# Patient Record
Sex: FEMALE | Race: BLACK OR AFRICAN AMERICAN | NOT HISPANIC OR LATINO | Employment: FULL TIME | URBAN - METROPOLITAN AREA
[De-identification: names, ages, dates, MRNs, and addresses within clinical notes are randomized per-mention and may not be internally consistent; named-entity substitution may affect disease eponyms.]

---

## 2017-01-09 ENCOUNTER — TELEPHONE (OUTPATIENT)
Dept: FAMILY MEDICINE | Facility: CLINIC | Age: 47
End: 2017-01-09

## 2017-01-09 DIAGNOSIS — I82.4Y9 ACUTE DEEP VEIN THROMBOSIS (DVT) OF PROXIMAL VEIN OF LOWER EXTREMITY, UNSPECIFIED LATERALITY (H): Primary | ICD-10-CM

## 2017-01-09 RX ORDER — WARFARIN SODIUM 5 MG/1
TABLET ORAL
Qty: 60 TABLET | Refills: 0 | Status: CANCELLED | OUTPATIENT
Start: 2017-01-09

## 2017-01-09 NOTE — TELEPHONE ENCOUNTER
warfarin (COUMADIN) 5 MG tablet 60 tablet 0 12/2/2016       Last Written Prescription Date: 12-2-2016  Last Fill Qty: 60, # refills: 0  Last Office Visit with [facility], [facility] or [facility] prescribing provider: 12-2-2016       Date and Result of Last PT/INR:   INR     2.80   8/19/2016  INR      1.7   12/10/2015  INR      2.7   11/16/2015  INR     4.50   11/12/2015

## 2017-01-12 ENCOUNTER — HOSPITAL ENCOUNTER (OUTPATIENT)
Dept: ULTRASOUND IMAGING | Facility: CLINIC | Age: 47
Discharge: HOME | End: 2017-01-12
Attending: INTERNAL MEDICINE | Admitting: INTERNAL MEDICINE
Payer: COMMERCIAL

## 2017-01-12 ENCOUNTER — ANTICOAGULATION THERAPY VISIT (OUTPATIENT)
Dept: NURSING | Facility: CLINIC | Age: 47
End: 2017-01-12
Payer: COMMERCIAL

## 2017-01-12 ENCOUNTER — OFFICE VISIT (OUTPATIENT)
Dept: FAMILY MEDICINE | Facility: CLINIC | Age: 47
End: 2017-01-12
Payer: COMMERCIAL

## 2017-01-12 VITALS
BODY MASS INDEX: 31.65 KG/M2 | SYSTOLIC BLOOD PRESSURE: 141 MMHG | HEIGHT: 65 IN | WEIGHT: 190 LBS | HEART RATE: 81 BPM | DIASTOLIC BLOOD PRESSURE: 94 MMHG | OXYGEN SATURATION: 98 % | TEMPERATURE: 97.8 F | RESPIRATION RATE: 18 BRPM

## 2017-01-12 DIAGNOSIS — I82.4Y9 ACUTE DEEP VEIN THROMBOSIS (DVT) OF PROXIMAL VEIN OF LOWER EXTREMITY, UNSPECIFIED LATERALITY (H): ICD-10-CM

## 2017-01-12 DIAGNOSIS — M79.89 RIGHT LEG SWELLING: ICD-10-CM

## 2017-01-12 DIAGNOSIS — Z79.01 LONG-TERM (CURRENT) USE OF ANTICOAGULANTS: Primary | ICD-10-CM

## 2017-01-12 DIAGNOSIS — I26.99 OTHER PULMONARY EMBOLISM WITHOUT ACUTE COR PULMONALE, UNSPECIFIED CHRONICITY (H): ICD-10-CM

## 2017-01-12 LAB — INR POINT OF CARE: 1.7 (ref 0.86–1.14)

## 2017-01-12 PROCEDURE — 99207 ZZC NO CHARGE NURSE ONLY: CPT

## 2017-01-12 PROCEDURE — 93971 EXTREMITY STUDY: CPT | Mod: RT

## 2017-01-12 PROCEDURE — 85610 PROTHROMBIN TIME: CPT | Mod: QW

## 2017-01-12 PROCEDURE — 99213 OFFICE O/P EST LOW 20 MIN: CPT | Performed by: INTERNAL MEDICINE

## 2017-01-12 PROCEDURE — 36416 COLLJ CAPILLARY BLOOD SPEC: CPT

## 2017-01-12 RX ORDER — WARFARIN SODIUM 5 MG/1
TABLET ORAL
Qty: 60 TABLET | Refills: 0 | Status: SHIPPED | OUTPATIENT
Start: 2017-01-12 | End: 2017-01-12

## 2017-01-12 RX ORDER — WARFARIN SODIUM 5 MG/1
TABLET ORAL
Qty: 60 TABLET | Refills: 0 | Status: SHIPPED | OUTPATIENT
Start: 2017-01-12 | End: 2017-02-13

## 2017-01-12 NOTE — TELEPHONE ENCOUNTER
"Routing to INR pool:    Pt has INR appointment today at 2:45pm. If she does attend this appointment, please refill medication.   Pt has not had INR done since 8/19/16. Pt has had many \"no shows\" to her INR appointments.     Silvia Swanson RN     "

## 2017-01-12 NOTE — PROGRESS NOTES
SUBJECTIVE:                                                    Lima Bueno is a 46 year old female who presents to clinic today for the following health issues:        Swelling      Duration: 2 weeks    Description (location/character/radiation): Swelling Lower right Extremity     Intensity:  moderate    Accompanying signs and symptoms: INR-low; Swelling; Pain in lower right extremity     History (similar episodes/previous evaluation): History of Blood clot    Precipitating or alleviating factors: None    Therapies tried and outcome: None   This is a pleasant 46-year-old -American female with a history of a previous right lower extremity deep venous thrombosis and pulmonary embolism. She presented for routine INR check and was noted to have a sub-therapeutic INR. She was encouraged to schedule a doctor visit appointment today after she revealed to the INR clinic nurse that she was experiencing a several week history of swelling in her right lower extremity. She has also noted mild to moderate associated pain which seems to improve when she elevates her right leg. She denies any new shortness of breath, pain. She has not had palpitations.    Problem list and histories reviewed & adjusted, as indicated.  Additional history: as documented    Patient Active Problem List   Diagnosis     DVT (deep venous thrombosis) (H)     Pulmonary embolism (H)     Asthma     ADD (attention deficit disorder with hyperactivity)     Breast mass     CARDIOVASCULAR SCREENING; LDL GOAL LESS THAN 160     Protein S deficiency (H)     Tobacco use disorder     LSIL (low grade squamous intraepithelial lesion) on Pap smear     Spinal stenosis     Chronic pain     Long-term (current) use of anticoagulants [Z79.01]     History reviewed. No pertinent past surgical history.    Social History   Substance Use Topics     Smoking status: Current Every Day Smoker -- 0.50 packs/day     Types: Cigarettes     Smokeless tobacco: Never Used      Alcohol Use: No     Family History   Problem Relation Age of Onset     Thyroid Disease Mother      hyperactive     Hypertension Brother      Breast Cancer No family hx of      Ovarian Cancer Paternal Grandmother          Current Outpatient Prescriptions   Medication Sig Dispense Refill     warfarin (COUMADIN) 5 MG tablet TAKE ONE & ONE-HALF TO TWO TABLETS BY MOUTH ONCE DAILY AS DIRECTED BY INR CLINIC. 60 tablet 0     nystatin POWD Externally apply 1 Dose topically every 6 hours as needed 60 g 1     COMPRESSION STOCKINGS 24-30mmHg, thigh high  Use as directed 2 each 2     methocarbamol (ROBAXIN) 750 MG tablet TAKE ONE TABLET BY MOUTH THREE TIMES DAILY AS NEEDED FOR MUSCLE SPASM 20 tablet 0     traMADol (ULTRAM) 50 MG tablet TAKE ONE TABLET BY MOUTH EVERY 6 HOURS AS NEEDED FOR  MODERATE  PAIN 30 tablet 0     traZODone (DESYREL) 100 MG tablet Take 1 tablet (100 mg) by mouth nightly as needed for sleep 90 tablet 3     albuterol (ALBUTEROL) 108 (90 BASE) MCG/ACT Inhaler Inhale 2 puffs into the lungs every 4 hours as needed for shortness of breath / dyspnea 1 Inhaler 11     ciclopirox 8 % SOLN Apply daily for min 4 months.  First day of each week, file off old medication and roughen surface of nail. 3 Bottle 2     mometasone-formoterol (DULERA) 100-5 MCG/ACT oral inhaler Inhale 2 puffs into the lungs 2 times daily 13 g 1     fexofenadine (ALLEGRA) 180 MG tablet Take 1 tablet (180 mg) by mouth daily (allergy symptoms) 30 tablet 0     nicotine (NICODERM CQ) 21 MG/24HR patch 2h hr Place 1 patch onto the skin every 24 hours 30 patch 3     nicotine polacrilex (EQ NICOTINE) 2 MG gum Place 1 each (2 mg) inside cheek as needed for smoking cessation 30 tablet 3     [DISCONTINUED] warfarin (COUMADIN) 5 MG tablet TAKE ONE & ONE-HALF TO TWO TABLETS BY MOUTH ONCE DAILY AS DIRECTED BY INR CLINIC. 60 tablet 0     [DISCONTINUED] warfarin (COUMADIN) 5 MG tablet TAKE ONE & ONE-HALF TO TWO TABLETS BY MOUTH ONCE DAILY AS DIRECTED BY INR  "CLINIC. 60 tablet 0     Allergies   Allergen Reactions     Terbutaline Hives       ROS:  C: NEGATIVE for fever, chills, change in weight  E/M: NEGATIVE for ear, mouth and throat problems  R: NEGATIVE for significant cough or SOB  CV: NEGATIVE for chest pain, palpitations or peripheral edema    OBJECTIVE:                                                    /94 mmHg  Pulse 81  Temp(Src) 97.8  F (36.6  C) (Oral)  Resp 18  Ht 5' 5\" (1.651 m)  Wt 190 lb (86.183 kg)  BMI 31.62 kg/m2  SpO2 98%  LMP 12/10/2016 (Approximate)  Body mass index is 31.62 kg/(m^2).  GENERAL: healthy, alert and no distress  RESP: lungs clear to auscultation - no rales, rhonchi or wheezes  CV: regular rate and rhythm, normal S1 S2, no S3 or S4, no murmur, click or rub, no peripheral edema and peripheral pulses strong  MS: There is possibly trace edema just above the ankle and the right foot, although this is very subtle, the difference in swelling between the left and right feet is minimal, however, she does have some tenderness in the calf with dorsiflexion of the right foot in addition to tenderness in the right calf to palpation without the identification of a discrete palpable cord.  NEURO: Normal strength and tone, mentation intact and speech normal  PSYCH: mentation appears normal, affect normal/bright    Diagnostic Test Results:  A Doppler ultrasound of the right lower extremity was scheduled for this patient after this clinic visit and the results of which are pending      ASSESSMENT/PLAN:                                                          1. Right leg swelling  Since she has a mildly subtherapeutic INR level, check right lower extremity Doppler ultrasound to exclude recurrent DVT which might need bridging Lovenox until the INR can be documented to be greater than 2. If the Doppler ultrasound is negative for deep vein thrombosis, consider a musculoskeletal etiology for her right lower extremity pain and mild swelling?  - " US Lower Extremity Venous Duplex Right; Future      FUTURE APPOINTMENTS:  Pending Doppler ultrasound results and symptoms    Adonis Prince MD  [facility]

## 2017-01-12 NOTE — TELEPHONE ENCOUNTER
Second request from pharmacy - states patient is out of medication!         Date and Result of Last PT/INR:   INR     2.80   8/19/2016  INR      1.7   12/10/2015  INR      2.7   11/16/2015  INR     4.50   11/12/2015       Pat Colindres RT (R)

## 2017-01-12 NOTE — PROGRESS NOTES
ANTICOAGULATION FOLLOW-UP CLINIC VISIT    Patient Name:  Lima Bueno  Date:  1/12/2017  Contact Type:  Face to Face    SUBJECTIVE:     Patient Findings     Comments Patient non-compliant with INR appts. Last INR;  8-22-16.    INR in clinic today 1.7  Patient reports no missed doses.   Patient complaining of left lower ext swelling and pain x 2 days.    Scheduled with Dr Prince today after INR appointment for DVT eval.    Patient agreed to keep INR appts going forward.    Will avoid greens for the next several days. Warfarin dose increased slightly with follow up INR appointment Monday (4 days).             OBJECTIVE    INR PROTIME   Date Value Ref Range Status   01/12/2017 1.7* 0.86 - 1.14 Final       ASSESSMENT / PLAN  INR assessment SUB    Recheck INR In: 4 DAYS    INR Location Clinic      Anticoagulation Summary as of 1/12/2017     INR goal 2.0-3.0   Selected INR 1.7! (1/12/2017)   Maintenance plan 10 mg (5 mg x 2) on Mon, Fri; 7.5 mg (5 mg x 1.5) all other days   Full instructions 1/12: 10 mg; Otherwise 10 mg on Mon, Fri; 7.5 mg all other days   Weekly total 57.5 mg   Plan last modified Mary Faustin RN (8/22/2016)   Next INR check 1/16/2017   Target end date     Indications   Long-term (current) use of anticoagulants [Z79.01] [Z79.01]  DVT (deep venous thrombosis) (H) [I82.409]  Pulmonary embolism (H) [I26.99]         Anticoagulation Episode Summary     [facility]    Preferred lab     Send INR reminders to [facility]    Comments Due to scheduling conflicts, has INR done in lab early a.m      Anticoagulation Care Providers     Provider Role Specialty Phone number    Oscar Baltazar MD Responsible Internal Medicine [phone]            See the Encounter Report to view Anticoagulation Flowsheet and Dosing Calendar (Go to Encounters tab in chart review, and find the Anticoagulation Therapy Visit)    Dosage adjustment made based on physician directed care  plan.    Ailin Buchanan RN

## 2017-01-12 NOTE — MR AVS SNAPSHOT
Lima Bueno   1/12/2017 2:45 PM   Anticoagulation Therapy Visit    Description:  46 year old female   Provider:   ANTICOAGULATION CLINIC   Department:   Nurse           INR as of 1/12/2017     Selected INR 1.7! (1/12/2017)      Anticoagulation Summary as of 1/12/2017     INR goal 2.0-3.0   Selected INR 1.7! (1/12/2017)   Full instructions 1/12: 10 mg; Otherwise 10 mg on Mon, Fri; 7.5 mg all other days   Next INR check 1/16/2017    Indications   Long-term (current) use of anticoagulants [Z79.01] [Z79.01]  DVT (deep venous thrombosis) (H) [I82.409]  Pulmonary embolism (H) [I26.99]         Your next Anticoagulation Clinic appointment(s)     Jan 16, 2017  2:45 PM   Anticoagulation Visit with  ANTICOAGULATION CLINIC   [facility]    [address]  [place] [address]   [phone]              Contact Numbers     Clinic Number:         January 2017 Details    Sun Mon Tue Wed Thu Fri Sat     1               2               3               4               5               6               7                 8               9               10               11               12      10 mg   See details      13      10 mg         14      7.5 mg           15      7.5 mg         16            17               18               19               20               21                 22               23               24               25               26               27               28                 29               30               31                    Date Details   01/12 This INR check       Date of next INR:  1/16/2017         How to take your warfarin dose     To take:  7.5 mg Take 1.5 of the 5 mg tablets.    To take:  10 mg Take 2 of the 5 mg tablets.

## 2017-01-12 NOTE — NURSING NOTE
"Chief Complaint   Patient presents with     Edema       Initial /94 mmHg  Pulse 81  Temp(Src) 97.8  F (36.6  C) (Oral)  Resp 18  Ht 5' 5\" (1.651 m)  Wt 190 lb (86.183 kg)  BMI 31.62 kg/m2  SpO2 98%  LMP 12/10/2016 (Approximate) Estimated body mass index is 31.62 kg/(m^2) as calculated from the following:    Height as of this encounter: 5' 5\" (1.651 m).    Weight as of this encounter: 190 lb (86.183 kg).  BP completed using cuff size: large; Right Arm  Evon Espinoza CMA (AAMA)      "

## 2017-01-12 NOTE — TELEPHONE ENCOUNTER
Patient scheduled for INR appointment today at 2:45pm.  Will review and refill at appointment.   Patient has been non-compliant with INR rechecks.    Last INR 8-22-16.     Ailin Buchanan RN, BSN

## 2017-01-18 ENCOUNTER — ANTICOAGULATION THERAPY VISIT (OUTPATIENT)
Dept: NURSING | Facility: CLINIC | Age: 47
End: 2017-01-18
Payer: COMMERCIAL

## 2017-01-18 DIAGNOSIS — I82.409 DVT (DEEP VENOUS THROMBOSIS) (H): ICD-10-CM

## 2017-01-18 DIAGNOSIS — Z79.01 LONG-TERM (CURRENT) USE OF ANTICOAGULANTS: Primary | ICD-10-CM

## 2017-01-18 DIAGNOSIS — I26.99 PULMONARY EMBOLISM (H): ICD-10-CM

## 2017-01-18 LAB — INR POINT OF CARE: 1.7 (ref 0.86–1.14)

## 2017-01-18 PROCEDURE — 99207 ZZC NO CHARGE NURSE ONLY: CPT

## 2017-01-18 PROCEDURE — 36416 COLLJ CAPILLARY BLOOD SPEC: CPT

## 2017-01-18 PROCEDURE — 85610 PROTHROMBIN TIME: CPT | Mod: QW

## 2017-01-18 NOTE — PROGRESS NOTES
ANTICOAGULATION FOLLOW-UP CLINIC VISIT    Patient Name:  Lima Bueno  Date:  1/18/2017  Contact Type:  Face to Face    SUBJECTIVE:     Patient Findings     Positives Unexplained INR or factor level change    Comments Denies missing any doses, call to [facility] to check MFG of newly started Warfarin RX and it is NOT TAR[facility]. Denies any other changes.           OBJECTIVE    INR PROTIME   Date Value Ref Range Status   01/18/2017 1.7* 0.86 - 1.14 Final       ASSESSMENT / PLAN  INR assessment SUB    Recheck INR In: 10 DAYS    INR Location Clinic      Anticoagulation Summary as of 1/18/2017     INR goal 2.0-3.0   Selected INR 1.7! (1/18/2017)   Maintenance plan 10 mg (5 mg x 2) on Mon, Wed, Fri; 7.5 mg (5 mg x 1.5) all other days   Full instructions 1/18: 15 mg; 1/19: 10 mg; Otherwise 10 mg on Mon, Wed, Fri; 7.5 mg all other days   Weekly total 60 mg   Plan last modified Jessica Alfonso RN (1/18/2017)   Next INR check 1/27/2017   Target end date     Indications   Long-term (current) use of anticoagulants [Z79.01] [Z79.01]  DVT (deep venous thrombosis) (H) [I82.409]  Pulmonary embolism (H) [I26.99]         Anticoagulation Episode Summary     [facility]    Preferred lab     Send INR reminders to [facility]    Comments Due to scheduling conflicts, has INR done in lab early a.m      Anticoagulation Care Providers     Provider Role Specialty Phone number    [place]xpe, Oscar Hubbard MD Responsible Internal Medicine [phone]            See the Encounter Report to view Anticoagulation Flowsheet and Dosing Calendar (Go to Encounters tab in chart review, and find the Anticoagulation Therapy Visit)    Dosage adjustment made based on physician directed care plan.    Jessica Alfonso RN

## 2017-01-18 NOTE — MR AVS SNAPSHOT
Lima Bueno   1/18/2017 3:45 PM   Anticoagulation Therapy Visit    Description:  46 year old female   Provider:   ANTICOAGULATION CLINIC   Department:   Nurse           INR as of 1/18/2017     Selected INR 1.7! (1/18/2017)      Anticoagulation Summary as of 1/18/2017     INR goal 2.0-3.0   Selected INR 1.7! (1/18/2017)   Full instructions 1/18: 15 mg; 1/19: 10 mg; Otherwise 10 mg on Mon, Wed, Fri; 7.5 mg all other days   Next INR check 1/27/2017    Indications   Long-term (current) use of anticoagulants [Z79.01] [Z79.01]  DVT (deep venous thrombosis) (H) [I82.409]  Pulmonary embolism (H) [I26.99]         Your next Anticoagulation Clinic appointment(s)     Jan 27, 2017  3:45 PM   Anticoagulation Visit with  ANTICOAGULATION CLINIC   [facility]    [address]  [place] [address]   [phone]              Contact Numbers     Clinic Number:         January 2017 Details    Sun Mon Tue Wed Thu Fri Sat     1               2               3               4               5               6               7                 8               9               10               11               12               13               14                 15               16               17               18      15 mg   See details      19      10 mg         20      10 mg         21      7.5 mg           22      7.5 mg         23      10 mg         24      7.5 mg         25      10 mg         26      7.5 mg         27            28                 29               30               31                    Date Details   01/18 This INR check       Date of next INR:  1/27/2017         How to take your warfarin dose     To take:  7.5 mg Take 1.5 of the 5 mg tablets.    To take:  10 mg Take 2 of the 5 mg tablets.    To take:  15 mg Take 3 of the 5 mg tablets.

## 2017-01-20 NOTE — PROGRESS NOTES
Quick Note:    I called her swelling is no worse, her INR is still too low, but she adjusted her coumadin dose. She will continue elevate her leg, and return to INR clinic as directed  ______

## 2017-01-27 ENCOUNTER — TELEPHONE (OUTPATIENT)
Dept: FAMILY MEDICINE | Facility: CLINIC | Age: 47
End: 2017-01-27

## 2017-01-27 NOTE — TELEPHONE ENCOUNTER
Reason for Call:  Other call back    Detailed comments: pt wants a INR to call her back.  About another appt.    Phone Number Patient can be reached at: Home number on file [phone] (home)    Best Time: any    Can we leave a detailed message on this number? YES    Call taken on 1/27/2017 at 3:12 PM by Madelin Novoa

## 2017-02-01 ENCOUNTER — ANTICOAGULATION THERAPY VISIT (OUTPATIENT)
Dept: NURSING | Facility: CLINIC | Age: 47
End: 2017-02-01
Payer: COMMERCIAL

## 2017-02-01 DIAGNOSIS — I82.409 DVT (DEEP VENOUS THROMBOSIS) (H): ICD-10-CM

## 2017-02-01 DIAGNOSIS — Z79.01 LONG-TERM (CURRENT) USE OF ANTICOAGULANTS: Primary | ICD-10-CM

## 2017-02-01 DIAGNOSIS — I26.99 PULMONARY EMBOLISM (H): ICD-10-CM

## 2017-02-01 LAB — INR POINT OF CARE: 1.9 (ref 0.86–1.14)

## 2017-02-01 PROCEDURE — 99207 ZZC NO CHARGE NURSE ONLY: CPT

## 2017-02-01 PROCEDURE — 36416 COLLJ CAPILLARY BLOOD SPEC: CPT

## 2017-02-01 PROCEDURE — 85610 PROTHROMBIN TIME: CPT | Mod: QW

## 2017-02-01 NOTE — PROGRESS NOTES
ANTICOAGULATION FOLLOW-UP CLINIC VISIT    Patient Name:  Lima Bueno  Date:  2/1/2017  Contact Type:  Face to Face    SUBJECTIVE:     Patient Findings     Positives No Problem Findings           OBJECTIVE    INR PROTIME   Date Value Ref Range Status   02/01/2017 1.9* 0.86 - 1.14 Final       ASSESSMENT / PLAN  INR assessment THER    Recheck INR In: 2 WEEKS    INR Location Clinic      Anticoagulation Summary as of 2/1/2017     INR goal 2.0-3.0   Selected INR 1.9! (2/1/2017)   Maintenance plan 7.5 mg (5 mg x 1.5) on Tue, Thu; 10 mg (5 mg x 2) all other days   Full instructions 2/1: 15 mg; 2/2: 10 mg; Otherwise 7.5 mg on Tue, Thu; 10 mg all other days   Weekly total 65 mg   Plan last modified Jessica Alfonso RN (2/1/2017)   Next INR check 2/15/2017   Target end date     Indications   Long-term (current) use of anticoagulants [Z79.01] [Z79.01]  DVT (deep venous thrombosis) (H) [I82.409]  Pulmonary embolism (H) [I26.99]         Anticoagulation Episode Summary     [facility] location [facility]    Preferred lab     Send INR reminders to [facility]    Comments Due to scheduling conflicts, has INR done in lab early a.m      Anticoagulation Care Providers     Provider Role Specialty Phone number    Oscar Baltazar MD [facility] Internal Medicine [phone]            See the Encounter Report to view Anticoagulation Flowsheet and Dosing Calendar (Go to Encounters tab in chart review, and find the Anticoagulation Therapy Visit)    Dosage adjustment made based on physician directed care plan.    Jessica Alfonso RN

## 2017-02-13 DIAGNOSIS — I82.4Y9 ACUTE DEEP VEIN THROMBOSIS (DVT) OF PROXIMAL VEIN OF LOWER EXTREMITY, UNSPECIFIED LATERALITY (H): ICD-10-CM

## 2017-02-13 RX ORDER — WARFARIN SODIUM 5 MG/1
7.5-1 TABLET ORAL DAILY
Qty: 180 TABLET | Refills: 0 | Status: SHIPPED | OUTPATIENT
Start: 2017-02-13 | End: 2017-05-18

## 2017-02-13 NOTE — TELEPHONE ENCOUNTER
Warfarin    Last Written Prescription Date: 1/12/17  Last Fill Qty: 60, # refills: 0  Last Office Visit with [facility], [facility] or [facility] prescribing provider: 1/12/2017       Date and Result of Last PT/INR:   Lab Results   Component Value Date    INR 1.9 02/01/2017    INR 1.7 01/18/2017    INR 2.80 08/19/2016    INR 4.50 11/12/2015      Refilled per [facility] protocol.  Jessica Alfonso RN

## 2017-02-15 ENCOUNTER — ANTICOAGULATION THERAPY VISIT (OUTPATIENT)
Dept: NURSING | Facility: CLINIC | Age: 47
End: 2017-02-15
Payer: COMMERCIAL

## 2017-02-15 DIAGNOSIS — I26.99 PULMONARY EMBOLISM (H): ICD-10-CM

## 2017-02-15 DIAGNOSIS — I82.409 DVT (DEEP VENOUS THROMBOSIS) (H): ICD-10-CM

## 2017-02-15 DIAGNOSIS — Z79.01 LONG-TERM (CURRENT) USE OF ANTICOAGULANTS: ICD-10-CM

## 2017-02-15 LAB — INR POINT OF CARE: 1.9 (ref 0.86–1.14)

## 2017-02-15 PROCEDURE — 99207 ZZC NO CHARGE NURSE ONLY: CPT

## 2017-02-15 PROCEDURE — 36416 COLLJ CAPILLARY BLOOD SPEC: CPT

## 2017-02-15 PROCEDURE — 85610 PROTHROMBIN TIME: CPT | Mod: QW

## 2017-02-15 NOTE — PROGRESS NOTES
ANTICOAGULATION FOLLOW-UP CLINIC VISIT    Patient Name:  Lima Bueno  Date:  2/15/2017  Contact Type:  Face to Face    SUBJECTIVE:     Patient Findings     Positives No Problem Findings           OBJECTIVE    INR Protime   Date Value Ref Range Status   02/15/2017 1.9 (A) 0.86 - 1.14 Final       ASSESSMENT / PLAN  INR assessment THER    Recheck INR In: 2 WEEKS    INR Location Clinic      Anticoagulation Summary as of 2/15/2017     INR goal 2.0-3.0   Today's INR 1.9!   Maintenance plan 7.5 mg (5 mg x 1.5) on Tue; 10 mg (5 mg x 2) all other days   Full instructions 7.5 mg on Tue; 10 mg all other days   Weekly total 67.5 mg   Plan last modified Kathy Ttoh RN (2/15/2017)   Next INR check 3/1/2017   Target end date     Indications   Long-term (current) use of anticoagulants [Z79.01] [Z79.01]  DVT (deep venous thrombosis) (H) [I82.409]  Pulmonary embolism (H) [I26.99]         Anticoagulation Episode Summary     INR check location [facility]    Preferred lab     Send INR reminders to [facility]    Comments Due to scheduling conflicts, has INR done in lab early a.m      Anticoagulation Care Providers     Provider Role Specialty Phone number    Oscar Baltazar MD Responsible Internal Medicine [phone]            See the Encounter Report to view Anticoagulation Flowsheet and Dosing Calendar (Go to Encounters tab in chart review, and find the Anticoagulation Therapy Visit)    Dosage adjustment made based on physician directed care plan.    Kathy Toth RN

## 2017-02-15 NOTE — MR AVS SNAPSHOT
Lima Bueno   2/15/2017 3:45 PM   Anticoagulation Therapy Visit    Description:  46 year old female   Provider:   ANTICOAGULATION CLINIC   Department:  [facility] Nurse           INR as of 2/15/2017     Today's INR 1.9!      Anticoagulation Summary as of 2/15/2017     INR goal 2.0-3.0   Today's INR 1.9!   Full instructions 7.5 mg on Tue; 10 mg all other days   Next INR check 3/1/2017    Indications   Long-term (current) use of anticoagulants [Z79.01] [Z79.01]  DVT (deep venous thrombosis) (H) [I82.409]  Pulmonary embolism (H) [I26.99]         Your next Anticoagulation Clinic appointment(s)     Mar 01, 2017  3:45 PM CST   Anticoagulation Visit with  ANTICOAGULATION CLINIC   [facility] [place] ([facility]    [address]  [place] [address]   [phone]              Contact Numbers     Clinic Number:         February 2017 Details    Sun Mon Tue Wed Thu Fri Sat        1               2               3               4                 5               6               7               8               9               10               11                 12               13               14               15      10 mg   See details      16      10 mg         17      10 mg         18      10 mg           19      10 mg         20      10 mg         21      7.5 mg         22      10 mg         23      10 mg         24      10 mg         25      10 mg           26      10 mg         27      10 mg         28      7.5 mg              Date Details   02/15 This INR check               How to take your warfarin dose     To take:  7.5 mg Take 1.5 of the 5 mg tablets.    To take:  10 mg Take 2 of the 5 mg tablets.           March 2017 Details    Sun Mon Tue Wed Thu Fri Sat        1            2               3               4                 5               6               7               8               9               10               11                 12               13               14                15               16               17               18                 19               20               21               22               23               24               25                 26               27               28               29               30               31                 Date Details   No additional details    Date of next INR:  3/1/2017         How to take your warfarin dose     To take:  10 mg Take 2 of the 5 mg tablets.

## 2017-03-06 ENCOUNTER — ANTICOAGULATION THERAPY VISIT (OUTPATIENT)
Dept: NURSING | Facility: CLINIC | Age: 47
End: 2017-03-06
Payer: COMMERCIAL

## 2017-03-06 DIAGNOSIS — I82.409 DVT (DEEP VENOUS THROMBOSIS) (H): ICD-10-CM

## 2017-03-06 DIAGNOSIS — I26.99 PULMONARY EMBOLISM (H): ICD-10-CM

## 2017-03-06 DIAGNOSIS — Z79.01 LONG-TERM (CURRENT) USE OF ANTICOAGULANTS: ICD-10-CM

## 2017-03-06 LAB — INR POINT OF CARE: 2.6 (ref 0.86–1.14)

## 2017-03-06 PROCEDURE — 36416 COLLJ CAPILLARY BLOOD SPEC: CPT

## 2017-03-06 PROCEDURE — 85610 PROTHROMBIN TIME: CPT | Mod: QW

## 2017-03-06 PROCEDURE — 99207 ZZC NO CHARGE NURSE ONLY: CPT

## 2017-03-06 NOTE — MR AVS SNAPSHOT
Lima Bueno   3/6/2017 3:30 PM   Anticoagulation Therapy Visit    Description:  46 year old female   Provider:   ANTICOAGULATION CLINIC   Department:  [facility] Nurse           INR as of 3/6/2017     Today's INR 2.6      Anticoagulation Summary as of 3/6/2017     INR goal 2.0-3.0   Today's INR 2.6   Full instructions 7.5 mg on Tue; 10 mg all other days   Next INR check 3/22/2017    Indications   Long-term (current) use of anticoagulants [Z79.01] [Z79.01]  DVT (deep venous thrombosis) (H) [I82.409]  Pulmonary embolism (H) [I26.99]         Your next Anticoagulation Clinic appointment(s)     Mar 24, 2017  4:00 PM CDT   Anticoagulation Visit with  ANTICOAGULATION CLINIC   [facility] [place] ([facility])    [address]  [place] [address]   [phone]              Contact Numbers     Clinic Number:         March 2017 Details    Sun Mon Tue Wed Thu Fri Sat        1               2               3               4                 5               6      10 mg   See details      7      7.5 mg         8      10 mg         9      10 mg         10      10 mg         11      10 mg           12      10 mg         13      10 mg         14      7.5 mg         15      10 mg         16      10 mg         17      10 mg         18      10 mg           19      10 mg         20      10 mg         21      7.5 mg         22            23               24               25                 26               27               28               29               30               31                 Date Details   03/06 This INR check       Date of next INR:  3/22/2017         How to take your warfarin dose     To take:  7.5 mg Take 1.5 of the 5 mg tablets.    To take:  10 mg Take 2 of the 5 mg tablets.

## 2017-03-06 NOTE — PROGRESS NOTES
ANTICOAGULATION FOLLOW-UP CLINIC VISIT    Patient Name:  Lima Bueno  Date:  3/6/2017  Contact Type:  Face to Face    SUBJECTIVE:     Patient Findings     Positives No Problem Findings           OBJECTIVE    INR Protime   Date Value Ref Range Status   03/06/2017 2.6 (A) 0.86 - 1.14 Final       ASSESSMENT / PLAN  INR assessment THER    Recheck INR In: 2 WEEKS    INR Location Clinic      Anticoagulation Summary as of 3/6/2017     INR goal 2.0-3.0   Today's INR 2.6   Maintenance plan 7.5 mg (5 mg x 1.5) on Tue; 10 mg (5 mg x 2) all other days   Full instructions 7.5 mg on Tue; 10 mg all other days   Weekly total 67.5 mg   No change documented Mary Solares RN   Plan last modified Kathy Toth RN (2/15/2017)   Next INR check 3/22/2017   Priority INR   Target end date     Indications   Long-term (current) use of anticoagulants [Z79.01] [Z79.01]  DVT (deep venous thrombosis) (H) [I82.409]  Pulmonary embolism (H) [I26.99]         Anticoagulation Episode Summary     [facility] check [facility]    Preferred lab     Send INR reminders to [facility] ANT[facility]AG[facility]    Comments Due to scheduling conflicts, has INR done in lab early a.m      Anticoagulation Care Providers     Provider Role Specialty Phone number    SiennabrianOscar miller MD [facility] Internal Medicine [phone]            See the Encounter Report to view Anticoagulation Flowsheet and Dosing Calendar (Go to Encounters tab in chart review, and find the Anticoagulation Therapy Visit)    Dosing based on [facility] Protocol and Provider directed care plan.      Mary Solares RN

## 2017-03-21 ENCOUNTER — TELEPHONE (OUTPATIENT)
Dept: FAMILY MEDICINE | Facility: CLINIC | Age: 47
End: 2017-03-21

## 2017-03-21 NOTE — TELEPHONE ENCOUNTER
Reason for Call:  Other: Appointment change    Detailed comments: Lima R Perez Killion called in to switch her INR Friday appointment on 3*24/17 to the 11am spot. She stated that if there is a call back and she cannot answer please leave a detailed message with the changed time to 11am for 3/24/17. Thank you.    Phone Number Patient can be reached at: Cell number on file:    Telephone Information:   Mobile [phone]       Best Time: Before 3/24/17    Can we leave a detailed message on this number? YES    Call taken on 3/21/2017 at 3:12 PM by Olivia Bradshaw

## 2017-03-24 ENCOUNTER — TELEPHONE (OUTPATIENT)
Dept: FAMILY MEDICINE | Facility: CLINIC | Age: 47
End: 2017-03-24

## 2017-03-24 NOTE — TELEPHONE ENCOUNTER
Reason for Call:  call back    Detailed comments: Patient call and would like for Jessica Alfonso  to call her back  She did not state way.    Phone Number Patient can be reached at: Home number on file [phone] (home)    Best Time: anytime    Can we leave a detailed message on this number? YES    Call taken on 3/24/2017 at 10:54 AM by Bria Merida

## 2017-03-29 ENCOUNTER — ANTICOAGULATION THERAPY VISIT (OUTPATIENT)
Dept: NURSING | Facility: CLINIC | Age: 47
End: 2017-03-29
Payer: COMMERCIAL

## 2017-03-29 DIAGNOSIS — I26.99 PULMONARY EMBOLISM (H): ICD-10-CM

## 2017-03-29 DIAGNOSIS — Z79.01 LONG-TERM (CURRENT) USE OF ANTICOAGULANTS: ICD-10-CM

## 2017-03-29 DIAGNOSIS — I82.409 DVT (DEEP VENOUS THROMBOSIS) (H): ICD-10-CM

## 2017-03-29 LAB — INR POINT OF CARE: 2.3 (ref 0.86–1.14)

## 2017-03-29 PROCEDURE — 85610 PROTHROMBIN TIME: CPT | Mod: QW

## 2017-03-29 PROCEDURE — 36416 COLLJ CAPILLARY BLOOD SPEC: CPT

## 2017-03-29 PROCEDURE — 99207 ZZC NO CHARGE NURSE ONLY: CPT

## 2017-03-29 NOTE — MR AVS SNAPSHOT
Lima Bueno   3/29/2017 3:45 PM   Anticoagulation Therapy Visit    Description:  46 year old female   Provider:   ANTICOAGULATION CLINIC   Department:  [facility] Nurse           INR as of 3/29/2017     Today's INR 2.3      Anticoagulation Summary as of 3/29/2017     INR goal 2.0-3.0   Today's INR 2.3   Full instructions 7.5 mg on Tue; 10 mg all other days   Next INR check 4/12/2017    Indications   Long-term (current) use of anticoagulants [Z79.01] [Z79.01]  DVT (deep venous thrombosis) (H) [I82.409]  Pulmonary embolism (H) [I26.99]         Your next Anticoagulation Clinic appointment(s)     Apr 12, 2017  3:45 PM CDT   Anticoagulation Visit with  ANTICOAGULATION CLINIC   [facility]    [address]  [place] [address]   [phone]              Contact Numbers     Clinic Number:         March 2017 Details    Sun Mon Tue Wed Thu Fri Sat        1               2               3               4                 5               6               7               8               9               10               11                 12               13               14               15               16               17               18                 19               20               21               22               23               24               25                 26               27               28               29      10 mg   See details      30      10 mg         31      10 mg           Date Details   03/29 This INR check               How to take your warfarin dose     To take:  10 mg Take 2 of the 5 mg tablets.           April 2017 Details    Sun Mon Tue Wed Thu Fri Sat           1      10 mg           2      10 mg         3      10 mg         4      7.5 mg         5      10 mg         6      10 mg         7      10 mg         8      10 mg           9      10 mg         10      10 mg         11      7.5 mg         12            13               14                15                 16               17               18               19               20               21               22                 23               24               25               26               27               28               29                 30                      Date Details   No additional details    Date of next INR:  4/12/2017         How to take your warfarin dose     To take:  7.5 mg Take 1.5 of the 5 mg tablets.    To take:  10 mg Take 2 of the 5 mg tablets.

## 2017-03-29 NOTE — PROGRESS NOTES
ANTICOAGULATION FOLLOW-UP CLINIC VISIT    Patient Name:  Lima Bueno  Date:  3/29/2017  Contact Type:  Face to Face    SUBJECTIVE:     Patient Findings     Positives No Problem Findings           OBJECTIVE    INR Protime   Date Value Ref Range Status   03/29/2017 2.3 (A) 0.86 - 1.14 Final       ASSESSMENT / PLAN  INR assessment THER    Recheck INR In: 2 WEEKS    INR Location Clinic      Anticoagulation Summary as of 3/29/2017     INR goal 2.0-3.0   Today's INR 2.3   Maintenance plan 7.5 mg (5 mg x 1.5) on Tue; 10 mg (5 mg x 2) all other days   Full instructions 7.5 mg on Tue; 10 mg all other days   Weekly total 67.5 mg   No change documented Jessica Alfonso RN   Plan last modified Kathy Toth RN (2/15/2017)   Next INR check 4/12/2017   Priority INR   Target end date     Indications   Long-term (current) use of anticoagulants [Z79.01] [Z79.01]  DVT (deep venous thrombosis) (H) [I82.409]  Pulmonary embolism (H) [I26.99]         Anticoagulation Episode Summary     [facility]    Preferred lab     Send INR reminders to [facility]    Comments Due to scheduling conflicts, has INR done in lab early a.m      Anticoagulation Care Providers     Provider Role Specialty Phone number    MadeleinekimberleyOscar miller MD Responsible Internal Medicine [phone]            See the Encounter Report to view Anticoagulation Flowsheet and Dosing Calendar (Go to Encounters tab in chart review, and find the Anticoagulation Therapy Visit)    Dosage adjustment made based on physician directed care plan.    Jessica Alfonso RN

## 2017-05-01 ENCOUNTER — TELEPHONE (OUTPATIENT)
Dept: FAMILY MEDICINE | Facility: CLINIC | Age: 47
End: 2017-05-01

## 2017-05-01 DIAGNOSIS — F51.01 PRIMARY INSOMNIA: ICD-10-CM

## 2017-05-01 RX ORDER — TRAZODONE HYDROCHLORIDE 100 MG/1
200 TABLET ORAL AT BEDTIME
Qty: 180 TABLET | Refills: 3 | Status: SHIPPED | OUTPATIENT
Start: 2017-05-01 | End: 2018-07-13

## 2017-05-01 NOTE — TELEPHONE ENCOUNTER
"Returned call to patient.  PCP--please advise.       Patient out of Trazadone 100mg tabs early.      Patient increased the dose on her own to 200mg at hs x 2 mths ago.    Now out of the med and requesting RX of the higher dose---or other med/advise.      Patient states unable to sleep without Trazadone and 100mg at hs \"wasn't working\".        Ailin Buchanan RN, BSN    "

## 2017-05-01 NOTE — TELEPHONE ENCOUNTER
Detailed message left with information noted below from provider along with contact number to clinic for additional questions.  Naomy Allred RN  [employer]

## 2017-05-01 NOTE — TELEPHONE ENCOUNTER
Reason for Call:  traZODone (DESYREL) 100 MG tablet    Detailed comments: Lima R Perez Killion called and wants to talk about her trazodone dosage with Dr. Baltazar and get a refill if possible.       Phone Number Patient can be reached at: Home number on file [phone] (home)    Best Time: ASAP     Can we leave a detailed message on this number? YES    Call taken on 5/1/2017 at 11:22 AM by Vi Bauman

## 2017-05-18 DIAGNOSIS — I82.4Y9 ACUTE DEEP VEIN THROMBOSIS (DVT) OF PROXIMAL VEIN OF LOWER EXTREMITY, UNSPECIFIED LATERALITY (H): ICD-10-CM

## 2017-05-18 NOTE — TELEPHONE ENCOUNTER
warfarin (COUMADIN) 5 MG tablet 180 tablet 0 2/13/2017       Last Written Prescription Date: 02/13/2017  Last Fill Qty: 180, # refills: 0  Last Office Visit with [facility], P or [facility] prescribing provider: 12/02/2016       Date and Result of Last PT/INR:   Lab Results   Component Value Date    INR 2.3 03/29/2017    INR 2.6 03/06/2017    INR 2.80 08/19/2016    INR 4.50 11/12/2015

## 2017-05-19 RX ORDER — WARFARIN SODIUM 5 MG/1
TABLET ORAL
Qty: 180 TABLET | Refills: 0 | Status: SHIPPED | OUTPATIENT
Start: 2017-05-19 | End: 2017-07-03

## 2017-05-19 NOTE — TELEPHONE ENCOUNTER
Prescription approved per [facility] Refill Protocol.  Mary Solares RN

## 2017-06-02 ENCOUNTER — TELEPHONE (OUTPATIENT)
Dept: FAMILY MEDICINE | Facility: CLINIC | Age: 47
End: 2017-06-02

## 2017-06-02 NOTE — TELEPHONE ENCOUNTER
Patient concerned she has not had period in 2 months.  Wondering if she should follow up with GYN or Dr. Baltazar.  Advised GYN would be appropriate to look into further.  Patient agreed and will schedule with GYN  Naomy Allred RN  [employer]riage [employer]

## 2017-06-02 NOTE — TELEPHONE ENCOUNTER
Reason for call:  Patient reporting a symptom    Symptom or request: no menstrial period in the last 3 months.  Is it due to the Coumadin or should she go to her OB/GYN    Duration (how long have symptoms been present): 3 months    Have you been treated for this before? No    Additional comments:     Phone Number patient can be reached at:  Home number on file [phone] (home)    Best Time:  any    Can we leave a detailed message on this number:  YES    Call taken on 6/2/2017 at 7:55 AM by Madelin Novoa

## 2017-06-05 ENCOUNTER — TELEPHONE (OUTPATIENT)
Dept: NURSING | Facility: CLINIC | Age: 47
End: 2017-06-05

## 2017-06-05 ENCOUNTER — ANTICOAGULATION THERAPY VISIT (OUTPATIENT)
Dept: NURSING | Facility: CLINIC | Age: 47
End: 2017-06-05
Payer: COMMERCIAL

## 2017-06-05 DIAGNOSIS — I82.409 DVT (DEEP VENOUS THROMBOSIS) (H): ICD-10-CM

## 2017-06-05 DIAGNOSIS — I26.99 PULMONARY EMBOLISM (H): ICD-10-CM

## 2017-06-05 DIAGNOSIS — Z79.01 LONG-TERM (CURRENT) USE OF ANTICOAGULANTS: ICD-10-CM

## 2017-06-05 LAB — INR POINT OF CARE: 1.9 (ref 0.86–1.14)

## 2017-06-05 PROCEDURE — 85610 PROTHROMBIN TIME: CPT | Mod: QW

## 2017-06-05 PROCEDURE — 99207 ZZC NO CHARGE NURSE ONLY: CPT

## 2017-06-05 PROCEDURE — 36416 COLLJ CAPILLARY BLOOD SPEC: CPT

## 2017-06-05 NOTE — TELEPHONE ENCOUNTER
Patient would like to do home INR monitoring.  States it is really hard for her to get to the clinic for INRs during business hours.  Patient has been coumadin since at least 2012.    Application completed and waiting for signature for PCP.  Mray Solares RN

## 2017-06-05 NOTE — MR AVS SNAPSHOT
Limaleydi Bueno   6/5/2017 11:30 AM   Anticoagulation Therapy Visit    Description:  46 year old female   Provider:   ANTICOAGULATION CLINIC   Department:  [facility] Nurse           INR as of 6/5/2017     Today's INR 1.9!      Anticoagulation Summary as of 6/5/2017     INR goal 2.0-3.0   Today's INR 1.9!   Full instructions 6/6: 10 mg; Otherwise 10 mg every day   Next INR check 6/19/2017    Indications   Long-term (current) use of anticoagulants [Z79.01] [Z79.01]  DVT (deep venous thrombosis) (H) [I82.409]  Pulmonary embolism (H) [I26.99]         Your next Anticoagulation Clinic appointment(s)     Jun 19, 2017  8:45 AM CDT   Anticoagulation Visit with  ANTICOAGULATION CLINIC   [facility] [place] ([facility]    [address]  [place] [address]   [phone]              Contact Numbers     Clinic Number:         June 2017 Details    Sun Mon Tue Wed Thu Fri Sat         1               2               3                 4               5      10 mg   See details      6      10 mg         7      10 mg         8      10 mg         9      10 mg         10      10 mg           11      10 mg         12      10 mg         13      10 mg         14      10 mg         15      10 mg         16      10 mg         17      10 mg           18      10 mg         19            20               21               22               23               24                 25               26               27               28               29               30                 Date Details   06/05 This INR check       Date of next INR:  6/19/2017         How to take your warfarin dose     To take:  10 mg Take 2 of the 5 mg tablets.

## 2017-06-05 NOTE — PROGRESS NOTES
ANTICOAGULATION FOLLOW-UP CLINIC VISIT    Patient Name:  Lima Bueno  Date:  6/5/2017  Contact Type:  Face to Face    SUBJECTIVE:     Patient Findings     Positives No Problem Findings           OBJECTIVE    INR Protime   Date Value Ref Range Status   06/05/2017 1.9 (A) 0.86 - 1.14 Final       ASSESSMENT / PLAN  INR assessment THER    Recheck INR In: 2 WEEKS    INR Location Clinic      Anticoagulation Summary as of 6/5/2017     INR goal 2.0-3.0   Today's INR 1.9!   Maintenance plan 10 mg (5 mg x 2) every day   Full instructions 6/6: 10 mg; Otherwise 10 mg every day   Weekly total 70 mg   Plan last modified Sonja Pettit RN (6/5/2017)   Next INR check 6/19/2017   Priority INR   Target end date     Indications   Long-term (current) use of anticoagulants [Z79.01] [Z79.01]  DVT (deep venous thrombosis) (H) [I82.409]  Pulmonary embolism (H) [I26.99]         Anticoagulation Episode Summary     INR check location [facility]    Preferred lab     Send INR reminders to [facility] ANTICOAGULATION    Comments Due to scheduling conflicts, has INR done in lab early a.m      Anticoagulation Care Providers     Provider Role Specialty Phone number    Oscar Baltazar MD Responsible Internal Medicine [phone]            See the Encounter Report to view Anticoagulation Flowsheet and Dosing Calendar (Go to Encounters tab in chart review, and find the Anticoagulation Therapy Visit)    Dosage adjustment made based on physician directed care plan.Pt is noncompliant with INR checks, remains on low end of range with checks.  Discussed increasing dosing by 2.5mg weekly, to 10mg daily every day.  Recheck 2 weeks.  Pt also asked about home monitoring.  Staff will fill out ale[employer] form and have PCP sign to start process.     Sonja Pettit RN

## 2017-06-05 NOTE — TELEPHONE ENCOUNTER
Application signed for PCP and faxed to [facility] [employer] today.  Application placed in [facility] folder in INR room.  Mary Solares RN

## 2017-06-06 ENCOUNTER — TELEPHONE (OUTPATIENT)
Dept: FAMILY MEDICINE | Facility: CLINIC | Age: 47
End: 2017-06-06

## 2017-06-06 NOTE — TELEPHONE ENCOUNTER
Left voicemail for frances to return call to clinic after being transferred twice.    Please try to get INR nurse with call back that is on 1st floor (Mary Serrano,Jessica Casillas today).      Form is in [place] folder in main INR room.  Sonja Pettit RN

## 2017-06-06 NOTE — TELEPHONE ENCOUNTER
Reason for Call:  Other     Detailed comments: calling to go over enrollment paperwork    Phone Number Rep can be reached at: Janina [phone]  [employer]    Best Time: anytime    Can we leave a detailed message on this number? YES    Call taken on 6/6/2017 at 9:29 AM by Noemi Meier

## 2017-06-06 NOTE — TELEPHONE ENCOUNTER
Janina called back from [place] and I spoke with her.   Answered all questions.     Maggie Moe RN

## 2017-06-12 ENCOUNTER — RADIANT APPOINTMENT (OUTPATIENT)
Dept: GENERAL RADIOLOGY | Facility: CLINIC | Age: 47
End: 2017-06-12
Attending: INTERNAL MEDICINE
Payer: COMMERCIAL

## 2017-06-12 ENCOUNTER — OFFICE VISIT (OUTPATIENT)
Dept: FAMILY MEDICINE | Facility: CLINIC | Age: 47
End: 2017-06-12
Payer: COMMERCIAL

## 2017-06-12 VITALS
SYSTOLIC BLOOD PRESSURE: 134 MMHG | WEIGHT: 168 LBS | BODY MASS INDEX: 27.99 KG/M2 | OXYGEN SATURATION: 96 % | TEMPERATURE: 97.6 F | HEIGHT: 65 IN | HEART RATE: 89 BPM | DIASTOLIC BLOOD PRESSURE: 87 MMHG

## 2017-06-12 DIAGNOSIS — J20.9 ACUTE BRONCHITIS, UNSPECIFIED ORGANISM: ICD-10-CM

## 2017-06-12 DIAGNOSIS — J45.20 MILD INTERMITTENT ASTHMA WITHOUT COMPLICATION: ICD-10-CM

## 2017-06-12 DIAGNOSIS — L81.9 DISCOLORATION OF SKIN: Primary | ICD-10-CM

## 2017-06-12 LAB
BASOPHILS # BLD AUTO: 0 10E9/L (ref 0–0.2)
BASOPHILS NFR BLD AUTO: 0.3 %
DIFFERENTIAL METHOD BLD: ABNORMAL
EOSINOPHIL # BLD AUTO: 0.1 10E9/L (ref 0–0.7)
EOSINOPHIL NFR BLD AUTO: 1.4 %
ERYTHROCYTE [DISTWIDTH] IN BLOOD BY AUTOMATED COUNT: 16.9 % (ref 10–15)
HCT VFR BLD AUTO: 42.8 % (ref 35–47)
HGB BLD-MCNC: 14.4 G/DL (ref 11.7–15.7)
LYMPHOCYTES # BLD AUTO: 3.3 10E9/L (ref 0.8–5.3)
LYMPHOCYTES NFR BLD AUTO: 52.3 %
MCH RBC QN AUTO: 28.1 PG (ref 26.5–33)
MCHC RBC AUTO-ENTMCNC: 33.6 G/DL (ref 31.5–36.5)
MCV RBC AUTO: 83 FL (ref 78–100)
MONOCYTES # BLD AUTO: 0.5 10E9/L (ref 0–1.3)
MONOCYTES NFR BLD AUTO: 7.9 %
NEUTROPHILS # BLD AUTO: 2.4 10E9/L (ref 1.6–8.3)
NEUTROPHILS NFR BLD AUTO: 38.1 %
PLATELET # BLD AUTO: 233 10E9/L (ref 150–450)
RBC # BLD AUTO: 5.13 10E12/L (ref 3.8–5.2)
WBC # BLD AUTO: 6.3 10E9/L (ref 4–11)

## 2017-06-12 PROCEDURE — 85025 COMPLETE CBC W/AUTO DIFF WBC: CPT | Performed by: INTERNAL MEDICINE

## 2017-06-12 PROCEDURE — 99214 OFFICE O/P EST MOD 30 MIN: CPT | Performed by: INTERNAL MEDICINE

## 2017-06-12 PROCEDURE — 36415 COLL VENOUS BLD VENIPUNCTURE: CPT | Performed by: INTERNAL MEDICINE

## 2017-06-12 PROCEDURE — 71020 XR CHEST 2 VW: CPT

## 2017-06-12 RX ORDER — ALBUTEROL SULFATE 90 UG/1
2 AEROSOL, METERED RESPIRATORY (INHALATION) EVERY 4 HOURS PRN
Qty: 1 INHALER | Refills: 11 | Status: SHIPPED | OUTPATIENT
Start: 2017-06-12 | End: 2018-02-07

## 2017-06-12 RX ORDER — CODEINE PHOSPHATE AND GUAIFENESIN 10; 100 MG/5ML; MG/5ML
1 SOLUTION ORAL EVERY 4 HOURS PRN
Qty: 236 ML | Refills: 0 | Status: SHIPPED | OUTPATIENT
Start: 2017-06-12 | End: 2017-07-03

## 2017-06-12 NOTE — LETTER
[facility]  [address] [facility]  [address]  [place]  [address]  Tel: [phone]    June 13, 2017    Lima Bueno  [address]  [place] [address]        Dear Ms. Chris Bueno,    Complete blood count is all returned within normal limits     If you have any further questions or problems, please contact our office.      Sincerely,    Oscar Baltazar MD/demetrice    Results for orders placed or performed in visit on 06/12/17   CBC with platelets and differential   Result Value Ref Range    WBC 6.3 4.0 - 11.0 10e9/L    RBC Count 5.13 3.8 - 5.2 10e12/L    Hemoglobin 14.4 11.7 - 15.7 g/dL    Hematocrit 42.8 35.0 - 47.0 %    MCV 83 78 - 100 fl    MCH 28.1 26.5 - 33.0 pg    MCHC 33.6 31.5 - 36.5 g/dL    RDW 16.9 (H) 10.0 - 15.0 %    Platelet Count 233 150 - 450 10e9/L    Diff Method Automated Method     % Neutrophils 38.1 %    % Lymphocytes 52.3 %    % Monocytes 7.9 %    % Eosinophils 1.4 %    % Basophils 0.3 %    Absolute Neutrophil 2.4 1.6 - 8.3 10e9/L    Absolute Lymphocytes 3.3 0.8 - 5.3 10e9/L    Absolute Monocytes 0.5 0.0 - 1.3 10e9/L    Absolute Eosinophils 0.1 0.0 - 0.7 10e9/L    Absolute Basophils 0.0 0.0 - 0.2 10e9/L           Enclosure: Lab Results

## 2017-06-12 NOTE — PROGRESS NOTES
"[facility]  CLINIC PROGRESS NOTE    Subjective:  Cough   Lima Bueno has had symptoms of wheezing and cough.  She has also noticed that she has had difficulty controlling her bowels - and passing gas. Has had cough for over two weeks.  She notes that her sputum is yellow.  She has stopped for 2 weeks smoking.  She is only smoking 6 cigarettes per day.     Skin Discoloration   Has had a discoloration on her temple on the left side.  She has yet to see a dermatologist.        Past medical history, medications, allergies, social history, family history reviewed and updated in [facility] as of 6/12/2017 .    ROS  CONSTITUTIONAL: no fatigue, no unexpected change in weight  SKIN: no worrisome rashes, no worrisome moles, no worrisome lesions  EYES: no acute vision problems or changes  ENT: no ear problems, no mouth problems, no throat problems  RESP: as above   CV: no chest pain, no palpitations, no new or worsening peripheral edema  GI: no nausea, no vomiting, no constipation, no diarrhea  : no frequency, no dysuria, no hematuria  MS: no claudication, no myalgias, no joint aches  PSYCHIATRIC: no changes in mood or affect      Objective:  Vitals  /87  Pulse 89  Temp 97.6  F (36.4  C)  Ht 5' 5\" (1.651 m)  Wt 168 lb (76.2 kg)  LMP 03/02/2017  SpO2 96%  Breastfeeding? No  BMI 27.96 kg/m2  GEN: Alert Oriented x3 NAD  HEENT: Atraumatic, normocephalic, neck supple, no thyromegaly, negative cervical adenopathy  TM: TM bilaterally pearly and grey with normal light reflex  CV: RRR no murmurs or rubs  PULM: wheezing on expiation all lung fields.   ABD: Soft, nontender nondistended, no hepatosplenomegally  SKIN: discoloration increased pigmentation over upper scalp  EXT: 2+ dorsal pedis pulses, no edema bilateral lower extremities  NEURO: Gait and station deferred, No focal neurologic deficits  PSYCH: Mood good, affect mood congruent    No results found for this or any previous visit (from the past 24 " hour(s)).    Assessment/Plan:  Patient Instructions   (L81.9) Discoloration of skin  (primary encounter diagnosis)  Comment: Recommendation referral to dermatology to evaluate skin on face  Plan: DERMATOLOGY REFERRAL            (J45.20) Mild intermittent asthma without complication  Comment: We will place prescription for albuterol and Dulera to pharmacy so that you will have enough refills -start taking immediately  Plan: albuterol (ALBUTEROL) 108 (90 BASE) MCG/ACT         Inhaler, mometasone-formoterol (DULERA) 100-5         MCG/ACT oral inhaler            (J20.9) Acute bronchitis, unspecified organism  Comment: We will treat empirically with antibiotics and will request chest x-ray and labs today   Plan: mometasone-formoterol (DULERA) 100-5 MCG/ACT         oral inhaler, XR Chest 2 Views, CBC with         platelets and differential,         amoxicillin-clavulanate (AUGMENTIN) 875-125 MG         per tablet, guaiFENesin-codeine (ROBITUSSIN AC)        100-10 MG/5ML SOLN solution            Follow up as needed        Follow up in as needed     Disclaimer: This note consists of symbols derived from keyboarding, dictation and/or voice recognition software. As a result, there may be errors in the script that have gone undetected. Please consider this when interpreting information found in this chart.    Oscar Baltazar MD  [phone]

## 2017-06-12 NOTE — MR AVS SNAPSHOT
After Visit Summary   6/12/2017    Lima Bueno    MRN: [id number]           Patient Information     Date Of Birth          1970        Visit Information        Provider Department      6/12/2017 2:30 PM Oscar Baltazar MD [facility]        Today's Diagnoses     Discoloration of skin    -  1    Mild intermittent asthma without complication        Acute bronchitis, unspecified organism          Care Instructions    (L81.9) Discoloration of skin  (primary encounter diagnosis)  Comment: Recommendation referral to dermatology to evaluate skin on face  Plan: DERMATOLOGY REFERRAL            (J45.20) Mild intermittent asthma without complication  Comment: We will place prescription for albuterol and Dulera to pharmacy so that you will have enough refills -start taking immediately  Plan: albuterol (ALBUTEROL) 108 (90 BASE) MCG/ACT         Inhaler, mometasone-formoterol (DULERA) 100-5         MCG/ACT oral inhaler            (J20.9) Acute bronchitis, unspecified organism  Comment: We will treat empirically with antibiotics and will request chest x-ray and labs today   Plan: mometasone-formoterol (DULERA) 100-5 MCG/ACT         oral inhaler, XR Chest 2 Views, CBC with         platelets and differential,         amoxicillin-clavulanate (AUGMENTIN) 875-125 MG         per tablet, guaiFENesin-codeine (ROBITUSSIN AC)        100-10 MG/5ML SOLN solution            Follow up as needed              Follow-ups after your visit        Additional Services     DERMATOLOGY REFERRAL       Your provider has referred you to:     [facility]    Please be aware that coverage of these services is subject to the terms and limitations of your health insurance plan.  Call member services at your health plan with any benefit or coverage questions.      Please bring the following with you to your appointment:    (1) Any X-Rays, CTs or MRIs which have been performed.  Contact the facility  where they were done to arrange for  prior to your scheduled appointment.    (2) List of current medications  (3) This referral request   (4) Any documents/labs given to you for this referral                  Your next 10 appointments already scheduled     Jun 19, 2017  8:45 AM CDT   Anticoagulation Visit with [facility]   [facility] ([facility]    [address]  [place] [address]   [phone]            Jun 23, 2017  9:50 AM CDT   Office Visit with Sera Cabezas MD   [facility]    [address]  [place] [address]   [phone]           Bring a current list of meds and any records pertaining to this visit.  For Physicals, please bring immunization records and any forms needing to be filled out.  Please arrive 10 minutes early to complete paperwork.              Future tests that were ordered for you today     Open Future Orders        Priority Expected Expires Ordered    XR Chest 2 Views Routine 6/12/2017 6/12/2018 6/12/2017            Who to contact     If you have questions or need follow up information about today's clinic visit or your schedule please contact [facility] directly at [phone].  Normal or non-critical lab and imaging results will be communicated to you by wavecatchhart, letter or phone within 4 business days after the clinic has received the results. If you do not hear from us within 7 days, please contact the clinic through [employer] or phone. If you have a critical or abnormal lab result, we will notify you by phone as soon as possible.  Submit refill requests through [email or web address] or call your pharmacy and they will forward the refill request to us. Please allow 3 business days for your refill to be completed.          Additional Information About Your Visit        [email or web address] Information     [email or web address] lets you send messages to your doctor, view your test results,  "renew your prescriptions, schedule appointments and more. To sign up, go to [email or web address] . Click on \"Log in\" on the left side of the screen, which will take you to the Welcome page. Then click on \"Sign up Now\" on the right side of the page.     You will be asked to enter the access code listed below, as well as some personal information. Please follow the directions to create your username and password.     Your access code is: [email or web address]  Expires: 7/3/2017  3:33 PM     Your access code will  in 90 days. If you need help or a new code, please call your [facility] clinic or [phone].        Care EveryWhere ID     This is your Care EveryWhere ID. This could be used by other organizations to access your [facility] medical records  [id number]        Your Vitals Were     Pulse Temperature Height Last Period Pulse Oximetry Breastfeeding?    89 97.6  F (36.4  C) 5' 5\" (1.651 m) 2017 96% No    BMI (Body Mass Index)                   27.96 kg/m2            Blood Pressure from Last 3 Encounters:   17 134/87   17 (!) 141/94   16 138/90    Weight from Last 3 Encounters:   17 168 lb (76.2 kg)   17 190 lb (86.2 kg)   16 185 lb (83.9 kg)              We Performed the Following     CBC with platelets and differential     DERMATOLOGY REFERRAL          Today's Medication Changes          These changes are accurate as of: 17  3:04 PM.  If you have any questions, ask your nurse or doctor.               Start taking these medicines.        Dose/Directions    amoxicillin-clavulanate 875-125 MG per tablet   Commonly known as:  AUGMENTIN   Used for:  Acute bronchitis, unspecified organism   Started by:  Oscar Baltazar MD        Dose:  1 tablet   Take 1 tablet by mouth 2 times daily   Quantity:  20 tablet   Refills:  0       guaiFENesin-codeine 100-10 MG/5ML Soln solution   Commonly known as:  ROBITUSSIN AC   Used for:  Acute bronchitis, unspecified organism "   Started by:  Oscar Baltazar MD        Dose:  1 tsp.   Take 5 mLs by mouth every 4 hours as needed for cough   Quantity:  236 mL   Refills:  0            Where to get your medicines      These medications were sent to [facility] [address] [place] [address] - [address]  [address] [facility] [address]     Phone:  [phone]     albuterol 108 (90 BASE) MCG/ACT Inhaler    amoxicillin-clavulanate 875-125 MG per tablet         Some of these will need a paper prescription and others can be bought over the counter.  Ask your nurse if you have questions.     Bring a paper prescription for each of these medications     guaiFENesin-codeine 100-10 MG/5ML Soln solution         Call your pharmacy to confirm that your medication is ready for pickup. It may take up to 24 hours for them to receive the prescription. If the prescription is not ready within 3 business days, please contact your clinic or your provider.     We will let you know when these medications are ready. If you don't hear back within 3 business days, please contact us.     mometasone-formoterol 100-5 MCG/ACT oral inhaler                Primary Care Provider Office Phone # Fax #    Oscar Baltazar [phone]       [place] [address] [facility] [address]  [facility] [address]        Thank you!     Thank you for choosing [facility]  for your care. Our goal is always to provide you with excellent care. Hearing back from our patients is one way we can continue to improve our services. Please take a few minutes to complete the written survey that you may receive in the mail after your visit with us. Thank you!             Your Updated Medication List - Protect others around you: Learn how to safely use, store and throw away your medicines at www.disposemymeds.org.          This list is accurate as of: 6/12/17  3:04 PM.  Always use your most recent med list.                   Brand Name  Dispense Instructions for use    albuterol 108 (90 BASE) MCG/ACT Inhaler    albuterol    1 Inhaler    Inhale 2 puffs into the lungs every 4 hours as needed for shortness of breath / dyspnea       amoxicillin-clavulanate 875-125 MG per tablet    AUGMENTIN    20 tablet    Take 1 tablet by mouth 2 times daily       ciclopirox 8 % Soln     3 Bottle    Apply daily for min 4 months.  First day of each week, file off old medication and roughen surface of nail.       COMPRESSION STOCKINGS     2 each    24-30mmHg, thigh high Use as directed       fexofenadine 180 MG tablet    ALLEGRA    30 tablet    Take 1 tablet (180 mg) by mouth daily (allergy symptoms)       guaiFENesin-codeine 100-10 MG/5ML Soln solution    ROBITUSSIN AC    236 mL    Take 5 mLs by mouth every 4 hours as needed for cough       methocarbamol 750 MG tablet    ROBAXIN    20 tablet    TAKE ONE TABLET BY MOUTH THREE TIMES DAILY AS NEEDED FOR MUSCLE SPASM       mometasone-formoterol 100-5 MCG/ACT oral inhaler    DULERA    13 g    Inhale 2 puffs into the lungs 2 times daily       nicotine 21 MG/24HR 24 hr patch    NICODERM CQ    30 patch    Place 1 patch onto the skin every 24 hours       nicotine polacrilex 2 MG gum    EQ NICOTINE    30 tablet    Place 1 each (2 mg) inside cheek as needed for smoking cessation       nystatin Powd     60 g    Externally apply 1 Dose topically every 6 hours as needed       traMADol 50 MG tablet    ULTRAM    30 tablet    TAKE ONE TABLET BY MOUTH EVERY 6 HOURS AS NEEDED FOR  MODERATE  PAIN       traZODone 100 MG tablet    DESYREL    180 tablet    Take 2 tablets (200 mg) by mouth At Bedtime       warfarin 5 MG tablet    COUMADIN    180 tablet    TAKE ONE & ONE-HALF TO TWO TABLETS BY MOUTH ONCE DAILY

## 2017-06-12 NOTE — PATIENT INSTRUCTIONS
(L81.9) Discoloration of skin  (primary encounter diagnosis)  Comment: Recommendation referral to dermatology to evaluate skin on face  Plan: DERMATOLOGY REFERRAL            (J45.20) Mild intermittent asthma without complication  Comment: We will place prescription for albuterol and Dulera to pharmacy so that you will have enough refills -start taking immediately  Plan: albuterol (ALBUTEROL) 108 (90 BASE) MCG/ACT         Inhaler, mometasone-formoterol (DULERA) 100-5         MCG/ACT oral inhaler            (J20.9) Acute bronchitis, unspecified organism  Comment: We will treat empirically with antibiotics and will request chest x-ray and labs today   Plan: mometasone-formoterol (DULERA) 100-5 MCG/ACT         oral inhaler, XR Chest 2 Views, CBC with         platelets and differential,         amoxicillin-clavulanate (AUGMENTIN) 875-125 MG         per tablet, guaiFENesin-codeine (ROBITUSSIN AC)        100-10 MG/5ML SOLN solution            Follow up as needed

## 2017-06-13 ENCOUNTER — TELEPHONE (OUTPATIENT)
Dept: FAMILY MEDICINE | Facility: CLINIC | Age: 47
End: 2017-06-13

## 2017-06-13 NOTE — TELEPHONE ENCOUNTER
PA has been submitted to [facility] for further review of the Dulera inhaler.    Delmar Wong, CMA

## 2017-06-19 ENCOUNTER — TELEPHONE (OUTPATIENT)
Dept: PODIATRY | Facility: CLINIC | Age: 47
End: 2017-06-19

## 2017-06-19 ENCOUNTER — TELEPHONE (OUTPATIENT)
Dept: FAMILY MEDICINE | Facility: CLINIC | Age: 47
End: 2017-06-19

## 2017-06-19 NOTE — TELEPHONE ENCOUNTER
ACT Asthma Control Test not done at recent appt per weekly quality report.  Mailed to pt.  Unable to do over the phone due to copyright laws.  Nereyda Flores MA

## 2017-06-19 NOTE — TELEPHONE ENCOUNTER
Reason for Call:  Other     Detailed comments: wants to discuss toenail removal    Phone Number Patient can be reached at: Home number on file [phone] (home)    Best Time: anytime    Can we leave a detailed message on this number? YES    Call taken on 6/19/2017 at 1:40 PM by Noemi Meier

## 2017-06-19 NOTE — TELEPHONE ENCOUNTER
Called patient and left a voicemail to call back.    Frances Loza CMA ([facility])  Podiatry/Foot & Ankle Surgery  [facility]

## 2017-06-20 NOTE — TELEPHONE ENCOUNTER
Janina from [facility] called in to Give update:   She said the patient opted to not move forward with the   INR program.     Ph.[phone] Ext [phone]

## 2017-06-20 NOTE — TELEPHONE ENCOUNTER
PA has been approved for the Dulera from 5/13/17 - 6/13/18. I called the pharmacy to let them know.  Delmar Wong, CMA

## 2017-06-20 NOTE — TELEPHONE ENCOUNTER
Pt called back yesterday and I spoke with her about the expected procedure. Her appt was made for 15 minutes but I extended it to 30 minutes so we would have time for the procedure. No further questions.    Frances Loza CMA ([facility])  Podiatry/Foot & Ankle Surgery  [facility]

## 2017-06-29 ENCOUNTER — OFFICE VISIT (OUTPATIENT)
Dept: PODIATRY | Facility: CLINIC | Age: 47
End: 2017-06-29
Payer: COMMERCIAL

## 2017-06-29 VITALS
HEIGHT: 65 IN | HEART RATE: 55 BPM | WEIGHT: 168 LBS | DIASTOLIC BLOOD PRESSURE: 86 MMHG | SYSTOLIC BLOOD PRESSURE: 130 MMHG | BODY MASS INDEX: 27.99 KG/M2

## 2017-06-29 DIAGNOSIS — B35.3 TINEA PEDIS OF BOTH FEET: ICD-10-CM

## 2017-06-29 DIAGNOSIS — B35.1 ONYCHOMYCOSIS: Primary | ICD-10-CM

## 2017-06-29 PROCEDURE — 99213 OFFICE O/P EST LOW 20 MIN: CPT | Performed by: PODIATRIST

## 2017-06-29 NOTE — NURSING NOTE
"Chief Complaint   Patient presents with     Procedure     Bilateral hallux, permanent nail removal       Initial /86 (BP Location: Left arm, Patient Position: Sitting, Cuff Size: Adult Large)  Pulse 55  Ht 5' 5\" (1.651 m)  Wt 168 lb (76.2 kg)  LMP 03/02/2017  BMI 27.96 kg/m2 Estimated body mass index is 27.96 kg/(m^2) as calculated from the following:    Height as of this encounter: 5' 5\" (1.651 m).    Weight as of this encounter: 168 lb (76.2 kg).  Medication Reconciliation: complete'    "

## 2017-06-29 NOTE — PROGRESS NOTES
"Foot & Ankle Surgery   June 29, 2017    S:  Pt is seen today for evaluation of onychomycosis.  She has tried topical therapies, and had the hallux nails temporarily removed, but they grew back mycotic.  She's been doing the skin/socks/shoes therapies as well.  In today for total temp bilateral hallux nail removal, but frustrated that nothing has worked.  She has not tried PO meds.    Vitals:    06/29/17 1010   BP: 130/86   BP Location: Left arm   Patient Position: Sitting   Cuff Size: Adult Large   Pulse: 55   Weight: 168 lb (76.2 kg)   Height: 5' 5\" (1.651 m)   '      ROS - Pos for CC.  Patient denies current nausea, vomiting, chills, fevers, belly pain, calf pain, chest pain or SOB.  Complete remainder of ROS it otherwise neg.      PE:  Gen:   No apparent distress  Neuro:   A&Ox3, no deficits  Psych:    Answering questions appropriately for age and situation with normal affect  Head:    NCAT  Eye:    Visual scanning without deficit  Ear:    Response to auditory stimuli wnl  Lung:    Non-labored breathing on RA noted  Abd:    NTND per patient report  Lymph:    Neg for pitting/non-pitting edema BLE  Vasc:    Pulses palpable, CFT minimally delayed  Neuro:    Light touch sensation intact to all sensory nerve distributions without paresthesias  Derm:    Chronic tinea pedis; onychomycosis multiple nails, including hallux nail bilateral   MSK:    ROM, strength wnl without limitation, no pain on palpation noted.  Calf:    Neg for redness, swelling or tenderness    Assessment:  46 year old female with onychomycosis; chronic tinea pedis bilateral lower extremity       Plan:  Discussed etiologies/options  1.  Onychomycosis bilateral   -discussed topical options; have not worked  -discussed PO options; I do not Rx them, but she can consider a Rx from PCP for this, if comfortable.  Discussed lab work she would likely need to have done as well  -she will discuss with her PCP for PO antifungal    2.  Tinea " pedis  -discussed/reviewed sock/shoe/shower/skin therapies, to improve likelihood of resolution and decrease likelihood of reinfection     Follow up:  Prn or sooner with acute issues      Body mass index is 27.96 kg/(m^2).  Weight management plan: Patient was referred to their PCP to discuss a diet and exercise plan.         Rashad De La Garza DPM   Podiatric Foot & Ankle Surgeon  [facility]  [phone]

## 2017-06-29 NOTE — MR AVS SNAPSHOT
"              After Visit Summary   6/29/2017    Lima Bueno    MRN: [id number]           Patient Information     Date Of Birth          1970        Visit Information        Provider Department      6/29/2017 10:00 AM Rashad De La Garza DPM [facility]        Today's Diagnoses     Onychomycosis    -  1    Tinea pedis of both feet           Follow-ups after your visit        Follow-up notes from your care team     Return if symptoms worsen or fail to improve.      Your next 10 appointments already scheduled     Jul 03, 2017  2:00 PM CDT   Office Visit with Oscar Baltazar MD   [facility] ([facility]    [address] [facility] [address] [phone]           Bring a current list of meds and any records pertaining to this visit.  For Physicals, please bring immunization records and any forms needing to be filled out.  Please arrive 10 minutes early to complete paperwork.              Who to contact     If you have questions or need follow up information about today's clinic visit or your schedule please contact [facility] directly at [phone].  Normal or non-critical lab and imaging results will be communicated to you by MyChart, letter or phone within 4 business days after the clinic has received the results. If you do not hear from us within 7 days, please contact the clinic through [employer] or phone. If you have a critical or abnormal lab result, we will notify you by phone as soon as possible.  Submit refill requests through [employer] or call your pharmacy and they will forward the refill request to us. Please allow 3 business days for your refill to be completed.          Additional Information About Your Visit        MyChart Information     [employer] lets you send messages to your doctor, view your test results, renew your prescriptions, schedule appointments and more. To sign up, go to [email or web address] . Click on \"Log " "in\" on the left side of the screen, which will take you to the Welcome page. Then click on \"Sign up Now\" on the right side of the page.     You will be asked to enter the access code listed below, as well as some personal information. Please follow the directions to create your username and password.     Your access code is: [email or web address]  Expires: 7/3/2017  3:33 PM     Your access code will  in 90 days. If you need help or a new code, please call your [facility] clinic or [phone].        Care EveryWhere ID     This is your Care EveryWhere ID. This could be used by other organizations to access your [facility] medical records  [id number]        Your Vitals Were     Pulse Height Last Period BMI (Body Mass Index)          55 5' 5\" (1.651 m) 2017 27.96 kg/m2         Blood Pressure from Last 3 Encounters:   17 130/86   17 134/87   17 (!) 141/94    Weight from Last 3 Encounters:   17 168 lb (76.2 kg)   17 168 lb (76.2 kg)   17 190 lb (86.2 kg)              Today, you had the following     No orders found for display       Primary Care Provider Office Phone # Fax #    Oscar Baltazar [phone]       [facility] [address]  [facility] [address]        Equal Access to Services     [facility]: Hadii aad ku hadasho Soomaali, waaxda luqadaha, qaybta kaalmada adeegyada, jack hurtado . So [facility] [phone].    ATENCIÓN: Si habla español, tiene a unger disposición servicios gratuitos de asistencia lingüística. Llame al [phone].    We comply with applicable federal civil rights laws and [place] laws. We do not discriminate on the basis of race, color, national origin, age, disability sex, sexual orientation or gender identity.            Thank you!     Thank you for choosing [facility]  for your care. Our goal is always to provide you with excellent care. Hearing back from our patients is " one way we can continue to improve our services. Please take a few minutes to complete the written survey that you may receive in the mail after your visit with us. Thank you!             Your Updated Medication List - Protect others around you: Learn how to safely use, store and throw away your medicines at www.disposemymeds.org.          This list is accurate as of: 6/29/17 10:30 AM.  Always use your most recent med list.                   Brand Name Dispense Instructions for use Diagnosis    albuterol 108 (90 BASE) MCG/ACT Inhaler    albuterol    1 Inhaler    Inhale 2 puffs into the lungs every 4 hours as needed for shortness of breath / dyspnea    Mild intermittent asthma without complication       ciclopirox 8 % Soln     3 Bottle    Apply daily for min 4 months.  First day of each week, file off old medication and roughen surface of nail.    Onychomycosis       COMPRESSION STOCKINGS     2 each    24-30mmHg, thigh high Use as directed    Thrombus       fexofenadine 180 MG tablet    ALLEGRA    30 tablet    Take 1 tablet (180 mg) by mouth daily (allergy symptoms)        guaiFENesin-codeine 100-10 MG/5ML Soln solution    ROBITUSSIN AC    236 mL    Take 5 mLs by mouth every 4 hours as needed for cough    Acute bronchitis, unspecified organism       methocarbamol 750 MG tablet    ROBAXIN    20 tablet    TAKE ONE TABLET BY MOUTH THREE TIMES DAILY AS NEEDED FOR MUSCLE SPASM    Muscle spasm       mometasone-formoterol 100-5 MCG/ACT oral inhaler    DULERA    13 g    Inhale 2 puffs into the lungs 2 times daily    Mild intermittent asthma without complication, Acute bronchitis, unspecified organism       nicotine 21 MG/24HR 24 hr patch    NICODERM CQ    30 patch    Place 1 patch onto the skin every 24 hours    Headache(784.0)       nicotine polacrilex 2 MG gum    EQ NICOTINE    30 tablet    Place 1 each (2 mg) inside cheek as needed for smoking cessation    Headache(784.0)       nystatin Powd     60 g    Externally apply 1  Dose topically every 6 hours as needed    Genital pruritus       traMADol 50 MG tablet    ULTRAM    30 tablet    TAKE ONE TABLET BY MOUTH EVERY 6 HOURS AS NEEDED FOR  MODERATE  PAIN    Other chronic pain       traZODone 100 MG tablet    DESYREL    180 tablet    Take 2 tablets (200 mg) by mouth At Bedtime    Primary insomnia       warfarin 5 MG tablet    COUMADIN    180 tablet    TAKE ONE & ONE-HALF TO TWO TABLETS BY MOUTH ONCE DAILY    Acute deep vein thrombosis (DVT) of proximal vein of lower extremity, unspecified laterality (H)

## 2017-07-03 ENCOUNTER — OFFICE VISIT (OUTPATIENT)
Dept: FAMILY MEDICINE | Facility: CLINIC | Age: 47
End: 2017-07-03
Payer: COMMERCIAL

## 2017-07-03 ENCOUNTER — ANTICOAGULATION THERAPY VISIT (OUTPATIENT)
Dept: NURSING | Facility: CLINIC | Age: 47
End: 2017-07-03
Payer: COMMERCIAL

## 2017-07-03 VITALS
OXYGEN SATURATION: 96 % | SYSTOLIC BLOOD PRESSURE: 115 MMHG | DIASTOLIC BLOOD PRESSURE: 85 MMHG | TEMPERATURE: 97.9 F | HEART RATE: 54 BPM

## 2017-07-03 DIAGNOSIS — Z79.01 LONG-TERM (CURRENT) USE OF ANTICOAGULANTS: ICD-10-CM

## 2017-07-03 DIAGNOSIS — B35.1 ONYCHOMYCOSIS: Primary | ICD-10-CM

## 2017-07-03 DIAGNOSIS — J20.9 ACUTE BRONCHITIS, UNSPECIFIED ORGANISM: ICD-10-CM

## 2017-07-03 DIAGNOSIS — I26.99 PULMONARY EMBOLISM (H): ICD-10-CM

## 2017-07-03 DIAGNOSIS — F17.200 TOBACCO USE DISORDER: ICD-10-CM

## 2017-07-03 DIAGNOSIS — I82.4Y9 ACUTE DEEP VEIN THROMBOSIS (DVT) OF PROXIMAL VEIN OF LOWER EXTREMITY, UNSPECIFIED LATERALITY (H): ICD-10-CM

## 2017-07-03 DIAGNOSIS — J45.20 MILD INTERMITTENT ASTHMA WITHOUT COMPLICATION: ICD-10-CM

## 2017-07-03 DIAGNOSIS — I82.409 DVT (DEEP VENOUS THROMBOSIS) (H): ICD-10-CM

## 2017-07-03 LAB — INR POINT OF CARE: 1.8 (ref 0.86–1.14)

## 2017-07-03 PROCEDURE — 99213 OFFICE O/P EST LOW 20 MIN: CPT | Performed by: INTERNAL MEDICINE

## 2017-07-03 PROCEDURE — 36416 COLLJ CAPILLARY BLOOD SPEC: CPT

## 2017-07-03 PROCEDURE — 80053 COMPREHEN METABOLIC PANEL: CPT | Performed by: INTERNAL MEDICINE

## 2017-07-03 PROCEDURE — 99207 ZZC NO CHARGE NURSE ONLY: CPT

## 2017-07-03 PROCEDURE — 85610 PROTHROMBIN TIME: CPT | Mod: QW

## 2017-07-03 RX ORDER — NICOTINE 21 MG/24HR
1 PATCH, TRANSDERMAL 24 HOURS TRANSDERMAL EVERY 24 HOURS
Qty: 30 PATCH | Refills: 3 | Status: SHIPPED | OUTPATIENT
Start: 2017-07-03 | End: 2018-12-31

## 2017-07-03 RX ORDER — TERBINAFINE HYDROCHLORIDE 250 MG/1
250 TABLET ORAL DAILY
Qty: 90 TABLET | Refills: 0 | Status: SHIPPED | OUTPATIENT
Start: 2017-07-03 | End: 2019-01-08

## 2017-07-03 RX ORDER — CODEINE PHOSPHATE AND GUAIFENESIN 10; 100 MG/5ML; MG/5ML
1 SOLUTION ORAL EVERY 4 HOURS PRN
Qty: 236 ML | Refills: 0 | Status: SHIPPED | OUTPATIENT
Start: 2017-07-03 | End: 2019-01-08

## 2017-07-03 RX ORDER — WARFARIN SODIUM 5 MG/1
10-15 TABLET ORAL DAILY
Qty: 180 TABLET | Refills: 0 | Status: SHIPPED | OUTPATIENT
Start: 2017-07-03 | End: 2017-10-12

## 2017-07-03 NOTE — MR AVS SNAPSHOT
Lima Bueno   7/3/2017 3:00 PM   Anticoagulation Therapy Visit    Description:  46 year old female   Provider:   ANTICOAGULATION CLINIC   Department:  [facility] Nurse           INR as of 7/3/2017     Today's INR 1.8!      Anticoagulation Summary as of 7/3/2017     INR goal 2.0-3.0   Today's INR 1.8!   Full instructions 7/3: 15 mg; 7/10: 15 mg; Otherwise 10 mg every day   Next INR check 7/14/2017    Indications   Long-term (current) use of anticoagulants [Z79.01] [Z79.01]  DVT (deep venous thrombosis) (H) [I82.409]  Pulmonary embolism (H) [I26.99]         Your next Anticoagulation Clinic appointment(s)     Jul 14, 2017 10:30 AM CDT   Anticoagulation Visit with  ANTICOAGULATION CLINIC   [facility]    [address]  [place] [address]   [phone]              Contact Numbers     Clinic Number:         July 2017 Details    Sun Mon Tue Wed Thu Fri Sat           1                 2               3      15 mg   See details      4      10 mg         5      10 mg         6      10 mg         7      10 mg         8      10 mg           9      10 mg         10      15 mg         11      10 mg         12      10 mg         13      10 mg         14            15                 16               17               18               19               20               21               22                 23               24               25               26               27               28               29                 30               31                     Date Details   07/03 This INR check       Date of next INR:  7/14/2017         How to take your warfarin dose     To take:  10 mg Take 2 of the 5 mg tablets.    To take:  15 mg Take 3 of the 5 mg tablets.

## 2017-07-03 NOTE — LETTER
[facility]  [address] [facility]  [address]  [place]  [address]  Tel: [phone]    July 7, 2017    Lima Bueno  [address]  [place] [facility] [address]        Dear Ms. Chris Bueno,    Your comprehensive metabolic panel shows show stable renal function and liver function tests - OK to start terbinafine      If you have any further questions or problems, please contact our office.      Sincerely,    Oscar Baltazar MD/ Fozia LAST CMA  Results for orders placed or performed in visit on 07/03/17   Comprehensive metabolic panel   Result Value Ref Range    Sodium 140 133 - 144 mmol/L    Potassium 3.9 3.4 - 5.3 mmol/L    Chloride 106 94 - 109 mmol/L    Carbon Dioxide 28 20 - 32 mmol/L    Anion Gap 6 3 - 14 mmol/L    Glucose 84 70 - 99 mg/dL    Urea Nitrogen 12 7 - 30 mg/dL    Creatinine 0.85 0.52 - 1.04 mg/dL    GFR Estimate 72 >60 mL/min/1.7m2    GFR Estimate If Black 87 >60 mL/min/1.7m2    Calcium 8.6 8.5 - 10.1 mg/dL    Bilirubin Total 0.4 0.2 - 1.3 mg/dL    Albumin 3.8 3.4 - 5.0 g/dL    Protein Total 7.4 6.8 - 8.8 g/dL    Alkaline Phosphatase 96 40 - 150 U/L    ALT 24 0 - 50 U/L    AST 13 0 - 45 U/L               Enclosure: Lab Results

## 2017-07-03 NOTE — MR AVS SNAPSHOT
After Visit Summary   7/3/2017    Lima Bueno    MRN: [id number]           Patient Information     Date Of Birth          1970        Visit Information        Provider Department      7/3/2017 2:00 PM Osacr Baltazar MD [place] [facility]        Today's Diagnoses     Onychomycosis    -  1    Acute bronchitis, unspecified organism        Tobacco use disorder        Mild intermittent asthma without complication          Care Instructions    (B35.1) Onychomycosis  (primary encounter diagnosis)  Comment: We will start new treatment for onychomycosis - will need labs today and follow up labs in 6 weeks.  Monitor for any side effects or allergies  Plan: terbinafine (LAMISIL) 250 MG tablet, Hepatic         panel, Comprehensive metabolic panel            (J20.9) Acute bronchitis, unspecified organism  Comment: Doing well  Plan: guaiFENesin-codeine (ROBITUSSIN AC) 100-10         MG/5ML SOLN solution            (F17.200) Tobacco use disorder  Comment: OK for a trial of nicotine 21 mg patch and step down to 14 mg.  Plan:     (J45.20) Mild intermittent asthma without complication  Comment: We will look into prior authorization for Dulera   Plan:              Follow-ups after your visit        Your next 10 appointments already scheduled     Jul 03, 2017  3:00 PM CDT   Anticoagulation Visit with  ANTICOAGULATION CLINIC   [facility] [place] ([facility])    [address]  [place] [id number] [phone]              Future tests that were ordered for you today     Open Future Orders        Priority Expected Expires Ordered    Hepatic panel Routine  12/30/2017 7/3/2017            Who to contact     If you have questions or need follow up information about today's clinic visit or your schedule please contact [facility] directly at [phone].  Normal or non-critical lab and imaging results will be communicated to you by MyChart, letter or phone within 4  "business days after the clinic has received the results. If you do not hear from us within 7 days, please contact the clinic through [employer] or phone. If you have a critical or abnormal lab result, we will notify you by phone as soon as possible.  Submit refill requests through [employer] or call your pharmacy and they will forward the refill request to us. Please allow 3 business days for your refill to be completed.          Additional Information About Your Visit        [employer] Information     [employer] lets you send messages to your doctor, view your test results, renew your prescriptions, schedule appointments and more. To sign up, go to [email or web address] . Click on \"Log in\" on the left side of the screen, which will take you to the Welcome page. Then click on \"Sign up Now\" on the right side of the page.     You will be asked to enter the access code listed below, as well as some personal information. Please follow the directions to create your username and password.     Your access code is: [email or web address]  Expires: 7/3/2017  3:33 PM     Your access code will  in 90 days. If you need help or a new code, please call your [place] clinic or [phone].        Care EveryWhere ID     This is your Care EveryWhere ID. This could be used by other organizations to access your [place] medical records  [id number]        Your Vitals Were     Pulse Temperature Last Period Pulse Oximetry Breastfeeding?       54 97.9  F (36.6  C) (Tympanic) 2017 96% No        Blood Pressure from Last 3 Encounters:   17 115/85   17 130/86   17 134/87    Weight from Last 3 Encounters:   17 168 lb (76.2 kg)   17 168 lb (76.2 kg)   17 190 lb (86.2 kg)              We Performed the Following     Comprehensive metabolic panel          Today's Medication Changes          These changes are accurate as of: 7/3/17  2:47 PM.  If you have any questions, ask your nurse or doctor.               Start " taking these medicines.        Dose/Directions    terbinafine 250 MG tablet   Commonly known as:  lamISIL   Used for:  Onychomycosis   Started by:  Oscar Baltazar MD        Dose:  250 mg   Take 1 tablet (250 mg) by mouth daily   Quantity:  90 tablet   Refills:  0            Where to get your medicines      These medications were sent to [facility] [address] [place] [address] [address] [facility]  [address] [facility] [address]     Phone:  [phone]     nicotine 21 MG/24HR 24 hr patch    nicotine polacrilex 2 MG gum    terbinafine 250 MG tablet         Some of these will need a paper prescription and others can be bought over the counter.  Ask your nurse if you have questions.     Bring a paper prescription for each of these medications     guaiFENesin-codeine 100-10 MG/5ML Soln solution                Primary Care Provider Office Phone # Fax #    Oscar Baltazar [phone]       [address] [facility] [address]  [facility] [address]        Equal Access to Services     [facility] AH: Hadii aad ku hadasho Soomaali, waaxda luqadaha, qaybta kaalmada adeegyada, waxay idiin hayaan kerry hurtado . So [facility] [phone].    ATENCIÓN: Si habla español, tiene a unger disposición servicios gratuitos de asistencia lingüística. [facility] [phone].    We comply with applicable federal civil rights laws and [place] laws. We do not discriminate on the basis of race, color, national origin, age, disability sex, sexual orientation or gender identity.            Thank you!     Thank you for choosing [facility]  for your care. Our goal is always to provide you with excellent care. Hearing back from our patients is one way we can continue to improve our services. Please take a few minutes to complete the written survey that you may receive in the mail after your visit with us. Thank you!             Your Updated Medication List - Protect others  around you: Learn how to safely use, store and throw away your medicines at www.disposemymeds.org.          This list is accurate as of: 7/3/17  2:47 PM.  Always use your most recent med list.                   Brand Name Dispense Instructions for use Diagnosis    albuterol 108 (90 BASE) MCG/ACT Inhaler    albuterol    1 Inhaler    Inhale 2 puffs into the lungs every 4 hours as needed for shortness of breath / dyspnea    Mild intermittent asthma without complication       ciclopirox 8 % Soln     3 Bottle    Apply daily for min 4 months.  First day of each week, file off old medication and roughen surface of nail.    Onychomycosis       COMPRESSION STOCKINGS     2 each    24-30mmHg, thigh high Use as directed    Thrombus       fexofenadine 180 MG tablet    ALLEGRA    30 tablet    Take 1 tablet (180 mg) by mouth daily (allergy symptoms)        guaiFENesin-codeine 100-10 MG/5ML Soln solution    ROBITUSSIN AC    236 mL    Take 5 mLs by mouth every 4 hours as needed for cough    Acute bronchitis, unspecified organism       methocarbamol 750 MG tablet    ROBAXIN    20 tablet    TAKE ONE TABLET BY MOUTH THREE TIMES DAILY AS NEEDED FOR MUSCLE SPASM    Muscle spasm       mometasone-formoterol 100-5 MCG/ACT oral inhaler    DULERA    13 g    Inhale 2 puffs into the lungs 2 times daily    Mild intermittent asthma without complication, Acute bronchitis, unspecified organism       nicotine 21 MG/24HR 24 hr patch    NICODERM CQ    30 patch    Place 1 patch onto the skin every 24 hours    Tobacco use disorder       nicotine polacrilex 2 MG gum    EQ NICOTINE    30 tablet    Place 1 each (2 mg) inside cheek as needed for smoking cessation    Tobacco use disorder       nystatin Powd     60 g    Externally apply 1 Dose topically every 6 hours as needed    Genital pruritus       terbinafine 250 MG tablet    lamISIL    90 tablet    Take 1 tablet (250 mg) by mouth daily    Onychomycosis       traMADol 50 MG tablet    ULTRAM    30 tablet     TAKE ONE TABLET BY MOUTH EVERY 6 HOURS AS NEEDED FOR  MODERATE  PAIN    Other chronic pain       traZODone 100 MG tablet    DESYREL    180 tablet    Take 2 tablets (200 mg) by mouth At Bedtime    Primary insomnia       warfarin 5 MG tablet    COUMADIN    180 tablet    TAKE ONE & ONE-HALF TO TWO TABLETS BY MOUTH ONCE DAILY    Acute deep vein thrombosis (DVT) of proximal vein of lower extremity, unspecified laterality (H)

## 2017-07-03 NOTE — PROGRESS NOTES
[facility]  CLINIC PROGRESS NOTE    Subjective:   Onychomycosis bilateral    At her most recent podiatry appointment she discussed topical options for treatment; To this point the topical options have not worked.  SHe also discussed oral options and would like to consider this today.    Past medical history, medications, allergies, social history, family history reviewed and updated in [facility] as of 7/3/2017 .    ROS  CONSTITUTIONAL: no fatigue, no unexpected change in weight  SKIN: no worrisome rashes, no worrisome moles, no worrisome lesions  EYES: no acute vision problems or changes  ENT: no ear problems, no mouth problems, no throat problems  RESP: still cough  CV: no chest pain, no palpitations, no new or worsening peripheral edema  GI: no nausea, no vomiting, no constipation, no diarrhea  : no frequency, no dysuria, no hematuria  MS: no acute pain - occasional cramping in her legs  PSYCHIATRIC: no changes in mood or affect      Objective:  Vitals  /85 (BP Location: Right arm, Cuff Size: Adult Large)  Pulse 54  Temp 97.9  F (36.6  C) (Tympanic)  LMP 03/02/2017  SpO2 96%  Breastfeeding? No  GEN: Alert Oriented x3 NAD  HEENT: Atraumatic, normocephalic, neck supple, no thyromegaly,   SKIN: No visible skin lesion or ulcerations  EXT:  no edema bilateral lower extremities  NEURO: Gait and station deferred, No focal neurologic deficits  PSYCH: Mood good, affect mood congruent    No results found for this or any previous visit (from the past 24 hour(s)).    Assessment/Plan:  Patient Instructions   (B35.1) Onychomycosis  (primary encounter diagnosis)  Comment: We will start new treatment for onychomycosis - will need labs today and follow up labs in 6 weeks.  Monitor for any side effects or allergies  Plan: terbinafine (LAMISIL) 250 MG tablet, Hepatic         panel, Comprehensive metabolic panel            (J20.9) Acute bronchitis, unspecified organism  Comment: Doing well  Plan:  guaiFENesin-codeine (ROBITUSSIN AC) 100-10         MG/5ML SOLN solution            (F17.200) Tobacco use disorder  Comment: OK for a trial of nicotine 21 mg patch and step down to 14 mg.  Plan:     (J45.20) Mild intermittent asthma without complication  Comment: We will look into prior authorization for Dulera   Plan:      15 minutes spent with patient.  Over 50% of time counseling    Follow up in 6 weeks for repeat labs    Disclaimer: This note consists of symbols derived from keyboarding, dictation and/or voice recognition software. As a result, there may be errors in the script that have gone undetected. Please consider this when interpreting information found in this chart.    Oscar Baltazar MD  [phone]

## 2017-07-03 NOTE — PATIENT INSTRUCTIONS
(B35.1) Onychomycosis  (primary encounter diagnosis)  Comment: We will start new treatment for onychomycosis - will need labs today and follow up labs in 6 weeks.  Monitor for any side effects or allergies  Plan: terbinafine (LAMISIL) 250 MG tablet, Hepatic         panel, Comprehensive metabolic panel            (J20.9) Acute bronchitis, unspecified organism  Comment: Doing well  Plan: guaiFENesin-codeine (ROBITUSSIN AC) 100-10         MG/5ML SOLN solution            (F17.200) Tobacco use disorder  Comment: OK for a trial of nicotine 21 mg patch and step down to 14 mg.  Plan:     (J45.20) Mild intermittent asthma without complication  Comment: We will look into prior authorization for Dulera   Plan:

## 2017-07-03 NOTE — PROGRESS NOTES
ANTICOAGULATION FOLLOW-UP CLINIC VISIT    Patient Name:  Lima Bueno  Date:  7/3/2017  Contact Type:  Face to Face    SUBJECTIVE:     Patient Findings     Positives Unexplained INR or factor level change           OBJECTIVE    INR Protime   Date Value Ref Range Status   07/03/2017 1.8 (A) 0.86 - 1.14 Final       ASSESSMENT / PLAN  INR assessment SUB    Recheck INR In: 2 WEEKS    INR Location Clinic      Anticoagulation Summary as of 7/3/2017     INR goal 2.0-3.0   Today's INR 1.8!   Maintenance plan 10 mg (5 mg x 2) every day   Full instructions 7/3: 15 mg; 7/10: 15 mg; Otherwise 10 mg every day   Weekly total 70 mg   Plan last modified Sonja Pettit RN (6/5/2017)   Next INR check 7/14/2017   Priority INR   Target end date     Indications   Long-term (current) use of anticoagulants [Z79.01] [Z79.01]  DVT (deep venous thrombosis) (H) [I82.409]  Pulmonary embolism (H) [I26.99]         Anticoagulation Episode Summary     INR check location [facility]    Preferred lab     Send INR reminders to CS ANTICOAGULATION    Comments Due to scheduling conflicts, has INR done in lab early a.m      Anticoagulation Care Providers     Provider Role Specialty Phone number    Siennaruben, Oscar Hubbard MD [facility] Internal Medicine [phone]            See the Encounter Report to view Anticoagulation Flowsheet and Dosing Calendar (Go to Encounters tab in chart review, and find the Anticoagulation Therapy Visit)    Dosing based on [facility] Protocol and Provider directed care plan.      Mary Solares RN

## 2017-07-04 LAB
ALBUMIN SERPL-MCNC: 3.8 G/DL (ref 3.4–5)
ALP SERPL-CCNC: 96 U/L (ref 40–150)
ALT SERPL W P-5'-P-CCNC: 24 U/L (ref 0–50)
ANION GAP SERPL CALCULATED.3IONS-SCNC: 6 MMOL/L (ref 3–14)
AST SERPL W P-5'-P-CCNC: 13 U/L (ref 0–45)
BILIRUB SERPL-MCNC: 0.4 MG/DL (ref 0.2–1.3)
BUN SERPL-MCNC: 12 MG/DL (ref 7–30)
CALCIUM SERPL-MCNC: 8.6 MG/DL (ref 8.5–10.1)
CHLORIDE SERPL-SCNC: 106 MMOL/L (ref 94–109)
CO2 SERPL-SCNC: 28 MMOL/L (ref 20–32)
CREAT SERPL-MCNC: 0.85 MG/DL (ref 0.52–1.04)
GFR SERPL CREATININE-BSD FRML MDRD: 72 ML/MIN/1.7M2
GLUCOSE SERPL-MCNC: 84 MG/DL (ref 70–99)
POTASSIUM SERPL-SCNC: 3.9 MMOL/L (ref 3.4–5.3)
PROT SERPL-MCNC: 7.4 G/DL (ref 6.8–8.8)
SODIUM SERPL-SCNC: 140 MMOL/L (ref 133–144)

## 2017-07-11 ENCOUNTER — TELEPHONE (OUTPATIENT)
Dept: FAMILY MEDICINE | Facility: CLINIC | Age: 47
End: 2017-07-11

## 2017-07-11 NOTE — LETTER
"[facility]  [address] [facility]  [address]  [place]  [address]  Tel: [phone]    July 11, 2017    Lima Bueno  [address]  [place] [address]        Dear Ms. Chris Bueno,    We missed doing your Asthma Control Test when you were in for your recent appointment.     Please complete the enclosed \"ACT\"and either     mail back to us  or  fax [phone] back to us  or  call [phone] us with your answers (ask for any of the nurses/ med assist).     Due to copyright laws we are unable to ask you the questions over the phone without the form visible to you.         Sincerely,    Nereyda ALVAREZ MA on behalf of Oscar Baltazar MD        Enc: ACT form      "

## 2017-07-20 ENCOUNTER — ANTICOAGULATION THERAPY VISIT (OUTPATIENT)
Dept: NURSING | Facility: CLINIC | Age: 47
End: 2017-07-20
Payer: COMMERCIAL

## 2017-07-20 DIAGNOSIS — Z79.01 LONG-TERM (CURRENT) USE OF ANTICOAGULANTS: ICD-10-CM

## 2017-07-20 DIAGNOSIS — I26.99 PULMONARY EMBOLISM (H): ICD-10-CM

## 2017-07-20 DIAGNOSIS — I82.409 DVT (DEEP VENOUS THROMBOSIS) (H): ICD-10-CM

## 2017-07-20 LAB — INR POINT OF CARE: 2.6 (ref 0.86–1.14)

## 2017-07-20 PROCEDURE — 36416 COLLJ CAPILLARY BLOOD SPEC: CPT

## 2017-07-20 PROCEDURE — 99207 ZZC NO CHARGE NURSE ONLY: CPT

## 2017-07-20 PROCEDURE — 85610 PROTHROMBIN TIME: CPT | Mod: QW

## 2017-07-20 NOTE — PROGRESS NOTES
ANTICOAGULATION FOLLOW-UP CLINIC VISIT    Patient Name:  Lima Bueno  Date:  7/20/2017  Contact Type:  Face to Face    SUBJECTIVE:        OBJECTIVE    INR Protime   Date Value Ref Range Status   07/20/2017 2.6 (A) 0.86 - 1.14 Final       ASSESSMENT / PLAN  INR assessment THER    Recheck INR In: 3 WEEKS    INR Location Clinic      Anticoagulation Summary as of 7/20/2017     INR goal 2.0-3.0   Today's INR 2.6   Maintenance plan 15 mg (5 mg x 3) on Mon; 10 mg (5 mg x 2) all other days   Full instructions 15 mg on Mon; 10 mg all other days   Weekly total 75 mg   Plan last modified Ailin Buchanan RN (7/20/2017)   Next INR check 8/10/2017   Priority INR   Target end date     Indications   Long-term (current) use of anticoagulants [Z79.01] [Z79.01]  DVT (deep venous thrombosis) (H) [I82.409]  Pulmonary embolism (H) [I26.99]         Anticoagulation Episode Summary     INR check location [facility]    Preferred lab     Send INR reminders to CS ANTICOAGULATION    Comments Due to scheduling conflicts, has INR done in lab early a.m      Anticoagulation Care Providers     Provider Role Specialty Phone number    Oscar Baltazar MD Responsible Internal Medicine [phone]            See the Encounter Report to view Anticoagulation Flowsheet and Dosing Calendar (Go to Encounters tab in chart review, and find the Anticoagulation Therapy Visit)    Dosage adjustment made based on physician directed care plan.    Ailin Buchanan RN

## 2017-07-20 NOTE — MR AVS SNAPSHOT
Lima Bueno   7/20/2017 3:45 PM   Anticoagulation Therapy Visit    Description:  46 year old female   Provider:   ANTICOAGULATION CLINIC   Department:  Cs Nurse           INR as of 7/20/2017     Today's INR 2.6      Anticoagulation Summary as of 7/20/2017     INR goal 2.0-3.0   Today's INR 2.6   Full instructions 15 mg on Mon; 10 mg all other days   Next INR check 8/10/2017    Indications   Long-term (current) use of anticoagulants [Z79.01] [Z79.01]  DVT (deep venous thrombosis) (H) [I82.409]  Pulmonary embolism (H) [I26.99]         Your next Anticoagulation Clinic appointment(s)     Aug 10, 2017  3:45 PM CDT   Anticoagulation Visit with  ANTICOAGULATION CLINIC   [facility] [place] ([facility])    [address]  [place] [address]   [phone]              Contact Numbers     Clinic Number:         July 2017 Details    Sun Mon Tue Wed Thu Fri Sat           1                 2               3               4               5               6               7               8                 9               10               11               12               13               14               15                 16               17               18               19               20      10 mg   See details      21      10 mg         22      10 mg           23      10 mg         24      15 mg         25      10 mg         26      10 mg         27      10 mg         28      10 mg         29      10 mg           30      10 mg         31      15 mg               Date Details   07/20 This INR check               How to take your warfarin dose     To take:  10 mg Take 2 of the 5 mg tablets.    To take:  15 mg Take 3 of the 5 mg tablets.           August 2017 Details    Sun Mon Tue Wed Thu Fri Sat       1      10 mg         2      10 mg         3      10 mg         4      10 mg         5      10 mg           6      10 mg         7      15 mg         8      10 mg         9      10 mg          10            11               12                 13               14               15               16               17               18               19                 20               21               22               23               24               25               26                 27               28               29               30               31                  Date Details   No additional details    Date of next INR:  8/10/2017         How to take your warfarin dose     To take:  10 mg Take 2 of the 5 mg tablets.    To take:  15 mg Take 3 of the 5 mg tablets.

## 2017-08-14 ENCOUNTER — TELEPHONE (OUTPATIENT)
Dept: FAMILY MEDICINE | Facility: CLINIC | Age: 47
End: 2017-08-14

## 2017-08-14 NOTE — TELEPHONE ENCOUNTER
Can we call Lima Bueno and remind her that she is due for a follow-up liver function panel as she is now taking terbinafine for her toenail fungus    Oscar Baltazar MD

## 2017-08-22 ENCOUNTER — TELEPHONE (OUTPATIENT)
Dept: FAMILY MEDICINE | Facility: CLINIC | Age: 47
End: 2017-08-22

## 2017-08-22 DIAGNOSIS — I26.99 PULMONARY EMBOLISM (H): ICD-10-CM

## 2017-08-22 DIAGNOSIS — Z79.01 LONG-TERM (CURRENT) USE OF ANTICOAGULANTS: Primary | ICD-10-CM

## 2017-08-22 DIAGNOSIS — Z79.01 LONG-TERM (CURRENT) USE OF ANTICOAGULANTS: ICD-10-CM

## 2017-08-22 DIAGNOSIS — I82.409 DVT (DEEP VENOUS THROMBOSIS) (H): ICD-10-CM

## 2017-08-22 LAB — INR PPP: 2.8 (ref 0.86–1.14)

## 2017-08-22 PROCEDURE — 85610 PROTHROMBIN TIME: CPT | Performed by: INTERNAL MEDICINE

## 2017-08-22 PROCEDURE — 36416 COLLJ CAPILLARY BLOOD SPEC: CPT | Performed by: INTERNAL MEDICINE

## 2017-08-22 NOTE — TELEPHONE ENCOUNTER
Reason for Call:  Other appointment    Detailed comments: She has a 3 pm INR today and wants to know if she can get that drawn today at the lab after her appt  She is having a scheduling conflict today  Cannot make it at 3 for INR     Phone Number Patient can be reached at: Home number on file [phone] (home)    Best Time: Please LVM if she can go to lab for today's INR    Can we leave a detailed message on this number? YES    Call taken on 8/22/2017 at 8:47 AM by Manda Mckay

## 2017-08-23 ENCOUNTER — TELEPHONE (OUTPATIENT)
Dept: FAMILY MEDICINE | Facility: CLINIC | Age: 47
End: 2017-08-23

## 2017-08-23 ENCOUNTER — ANTICOAGULATION THERAPY VISIT (OUTPATIENT)
Dept: FAMILY MEDICINE | Facility: CLINIC | Age: 47
End: 2017-08-23
Payer: COMMERCIAL

## 2017-08-23 DIAGNOSIS — Z79.01 LONG-TERM (CURRENT) USE OF ANTICOAGULANTS: ICD-10-CM

## 2017-08-23 DIAGNOSIS — I26.99 PULMONARY EMBOLISM (H): ICD-10-CM

## 2017-08-23 DIAGNOSIS — I82.409 DVT (DEEP VENOUS THROMBOSIS) (H): ICD-10-CM

## 2017-08-23 PROCEDURE — 99207 ZZC NO CHARGE NURSE ONLY: CPT | Performed by: INTERNAL MEDICINE

## 2017-08-23 NOTE — MR AVS SNAPSHOT
Lima Bueno   8/23/2017   Anticoagulation Therapy Visit    Description:  46 year old female   Provider:  Oscar Baltazar MD   Department:  [facility] Family [facility]           INR as of 8/23/2017     Today's INR 2.80 (8/22/2017)      Anticoagulation Summary as of 8/23/2017     INR goal 2.0-3.0   Today's INR 2.80 (8/22/2017)   Full instructions 15 mg on Mon; 10 mg all other days   Next INR check 9/13/2017    Indications   Long-term (current) use of anticoagulants [Z79.01] [Z79.01]  DVT (deep venous thrombosis) (H) [I82.409]  Pulmonary embolism (H) [I26.99]         August 2017 Details    Sun Mon Tue Wed Thu Fri Sat       1               2               3               4               5                 6               7               8               9               10               11               12                 13               14               15               16               17               18               19                 20               21               22               23      10 mg   See details      24      10 mg         25      10 mg         26      10 mg           27      10 mg         28      15 mg         29      10 mg         30      10 mg         31      10 mg            Date Details   08/23 This INR check               How to take your warfarin dose     To take:  10 mg Take 2 of the 5 mg tablets.    To take:  15 mg Take 3 of the 5 mg tablets.           September 2017 Details    Sun Mon Tue Wed Thu Fri Sat          1      10 mg         2      10 mg           3      10 mg         4      15 mg         5      10 mg         6      10 mg         7      10 mg         8      10 mg         9      10 mg           10      10 mg         11      15 mg         12      10 mg         13            14               15               16                 17               18               19               20               21               22               23                 24               25                26               27               28               29               30                Date Details   No additional details    Date of next INR:  9/13/2017         How to take your warfarin dose     To take:  10 mg Take 2 of the 5 mg tablets.    To take:  15 mg Take 3 of the 5 mg tablets.

## 2017-08-23 NOTE — PROGRESS NOTES
ANTICOAGULATION FOLLOW-UP CLINIC VISIT    Patient Name:  Lima Bueno  Date:  8/23/2017  Contact Type:  Telephone/ Called and left message for pt.     SUBJECTIVE:     Patient Findings     Comments Patient had INR done yesterday in lab after missing INR appointment with nurse.   INR 2.8  Called patient today and left detailed message with INR results, Warfarin dosing instructions and next INR date.                  OBJECTIVE    INR   Date Value Ref Range Status   08/22/2017 2.80 (H) 0.86 - 1.14 Final     Comment:     Performed at Point of Care       ASSESSMENT / PLAN  INR assessment THER    Recheck INR In: 3 WEEKS    INR Location Clinic      Anticoagulation Summary as of 8/23/2017     INR goal 2.0-3.0   Today's INR 2.80 (8/22/2017)   Maintenance plan 15 mg (5 mg x 3) on Mon; 10 mg (5 mg x 2) all other days   Full instructions 15 mg on Mon; 10 mg all other days   Weekly total 75 mg   No change documented Ailin Buchanan RN   Plan last modified Ailin Buchanan RN (7/20/2017)   Next INR check 9/13/2017   Priority INR   Target end date     Indications   Long-term (current) use of anticoagulants [Z79.01] [Z79.01]  DVT (deep venous thrombosis) (H) [I82.409]  Pulmonary embolism (H) [I26.99]         Anticoagulation Episode Summary     INR check location [facility]    Preferred lab     Send INR reminders to [facility] ANTICOAGULATION    Comments Due to scheduling conflicts, has INR done in lab early a.m      Anticoagulation Care Providers     Provider Role Specialty Phone number    Delaney Oscar Hubbard MD [facility] Internal Medicine [phone]            See the Encounter Report to view Anticoagulation Flowsheet and Dosing Calendar (Go to Encounters tab in chart review, and find the Anticoagulation Therapy Visit)    Dosage adjustment made based on physician directed care plan.    Ailin Buchanan RN

## 2017-08-23 NOTE — TELEPHONE ENCOUNTER
Patient had INR done yesterday in lab after missing INR appointment with nurse.     INR 2.8    Called today and left detailed message with INR results, Warfarin dosing instructions and next INR date.     Ailin MARTINEZ RN,BSN

## 2017-08-24 ENCOUNTER — OFFICE VISIT (OUTPATIENT)
Dept: FAMILY MEDICINE | Facility: CLINIC | Age: 47
End: 2017-08-24
Payer: COMMERCIAL

## 2017-08-24 VITALS
TEMPERATURE: 97.8 F | HEART RATE: 81 BPM | OXYGEN SATURATION: 100 % | DIASTOLIC BLOOD PRESSURE: 89 MMHG | SYSTOLIC BLOOD PRESSURE: 135 MMHG

## 2017-08-24 DIAGNOSIS — Z79.899 ENCOUNTER FOR LONG-TERM (CURRENT) USE OF MEDICATIONS: Primary | ICD-10-CM

## 2017-08-24 PROCEDURE — 36415 COLL VENOUS BLD VENIPUNCTURE: CPT | Performed by: NURSE PRACTITIONER

## 2017-08-24 PROCEDURE — 84460 ALANINE AMINO (ALT) (SGPT): CPT | Performed by: NURSE PRACTITIONER

## 2017-08-24 PROCEDURE — 99213 OFFICE O/P EST LOW 20 MIN: CPT | Performed by: NURSE PRACTITIONER

## 2017-08-24 PROCEDURE — 84450 TRANSFERASE (AST) (SGOT): CPT | Performed by: NURSE PRACTITIONER

## 2017-08-24 NOTE — LETTER
[facility]  [address] [facility] [address] [facility]  [address]  [place]  [address]  Tel: [phone]    August 25, 2017    Lima Bueno  [address]  [place] [address]        Dear Ms. Chris Bueno,    Labs reviewed and within normal limits    If you have any further questions or problems, please contact our office.      Sincerely,    Candace Barjaas NP/ Fozia LAST, CMA  Results for orders placed or performed in visit on 08/24/17   ALT   Result Value Ref Range    ALT 28 0 - 50 U/L   AST   Result Value Ref Range    AST 16 0 - 45 U/L               Enclosure: Lab Results

## 2017-08-24 NOTE — NURSING NOTE
"Chief Complaint   Patient presents with     Follow Up For     Follow up for Lamisil and liver function tests due to long term use.       Initial /89 (BP Location: Right arm, Cuff Size: Adult Large)  Pulse 81  Temp 97.8  F (36.6  C) (Oral)  SpO2 100%  Breastfeeding? No Estimated body mass index is 27.96 kg/(m^2) as calculated from the following:    Height as of 6/29/17: 5' 5\" (1.651 m).    Weight as of 6/29/17: 168 lb (76.2 kg).  Medication Reconciliation: complete   Zakiya Correa MA      "

## 2017-08-24 NOTE — PROGRESS NOTES
SUBJECTIVE:   Lima Bueno is a 46 year old female who presents to clinic today for the following health issues:    Follow up for Lamisil and liver function tests due to long term use. Has onychomycosis of right great toe.   And has been on lamisil for 6 weeks.  No new growth seen yet.  No pain       Problem list and histories reviewed & adjusted, as indicated.  Additional history: as documented    Patient Active Problem List   Diagnosis     DVT (deep venous thrombosis) (H)     Pulmonary embolism (H)     Asthma     ADD (attention deficit disorder with hyperactivity)     Breast mass     CARDIOVASCULAR SCREENING; LDL GOAL LESS THAN 160     Protein S deficiency (H)     Tobacco use disorder     LSIL (low grade squamous intraepithelial lesion) on Pap smear     Spinal stenosis     Chronic pain     Long-term (current) use of anticoagulants [Z79.01]     No past surgical history on file.    Social History   Substance Use Topics     Smoking status: Current Every Day Smoker     Packs/day: 0.50     Types: Cigarettes     Smokeless tobacco: Never Used     Alcohol use No     Family History   Problem Relation Age of Onset     Thyroid Disease Mother      hyperactive     Hypertension Brother      Ovarian Cancer Paternal Grandmother      Breast Cancer No family hx of          Current Outpatient Prescriptions   Medication Sig Dispense Refill     terbinafine (LAMISIL) 250 MG tablet Take 1 tablet (250 mg) by mouth daily 90 tablet 0     guaiFENesin-codeine (ROBITUSSIN AC) 100-10 MG/5ML SOLN solution Take 5 mLs by mouth every 4 hours as needed for cough 236 mL 0     nicotine (NICODERM CQ) 21 MG/24HR 24 hr patch Place 1 patch onto the skin every 24 hours 30 patch 3     nicotine polacrilex (EQ NICOTINE) 2 MG gum Place 1 each (2 mg) inside cheek as needed for smoking cessation 30 tablet 3     warfarin (COUMADIN) 5 MG tablet Take 2-3 tablets (10-15 mg) by mouth daily Or as directed by your INR clinic 180 tablet 0     albuterol  (ALBUTEROL) 108 (90 BASE) MCG/ACT Inhaler Inhale 2 puffs into the lungs every 4 hours as needed for shortness of breath / dyspnea 1 Inhaler 11     mometasone-formoterol (DULERA) 100-5 MCG/ACT oral inhaler Inhale 2 puffs into the lungs 2 times daily 13 g 3     traZODone (DESYREL) 100 MG tablet Take 2 tablets (200 mg) by mouth At Bedtime 180 tablet 3     nystatin POWD Externally apply 1 Dose topically every 6 hours as needed 60 g 1     COMPRESSION STOCKINGS 24-30mmHg, thigh high  Use as directed 2 each 2     methocarbamol (ROBAXIN) 750 MG tablet TAKE ONE TABLET BY MOUTH THREE TIMES DAILY AS NEEDED FOR MUSCLE SPASM 20 tablet 0     traMADol (ULTRAM) 50 MG tablet TAKE ONE TABLET BY MOUTH EVERY 6 HOURS AS NEEDED FOR  MODERATE  PAIN 30 tablet 0     ciclopirox 8 % SOLN Apply daily for min 4 months.  First day of each week, file off old medication and roughen surface of nail. 3 Bottle 2     fexofenadine (ALLEGRA) 180 MG tablet Take 1 tablet (180 mg) by mouth daily (allergy symptoms) 30 tablet 0     Allergies   Allergen Reactions     Phosphoric Acid Hives     Terbutaline Hives     Trazodone      Other reaction(s): Other - Describe In Comment Field  Facial nerve pain  Other reaction(s): Other - Describe In Comment Field  Facial nerve pain         Reviewed and updated as needed this visit by clinical staffProblems       Reviewed and updated as needed this visit by Provider  Problems         ROS:  Constitutional, HEENT, cardiovascular, pulmonary, gi and gu systems are negative, except as otherwise noted.      OBJECTIVE:   /89 (BP Location: Right arm, Cuff Size: Adult Large)  Pulse 81  Temp 97.8  F (36.6  C) (Oral)  SpO2 100%  Breastfeeding? No  There is no height or weight on file to calculate BMI.  GENERAL: healthy, alert and no distress  EYES: Eyes grossly normal to inspection, PERRL and conjunctivae and sclerae normal, no icterus  SKIN: no suspicious lesions or rashes, thickened discolored toenail to the base of  right great toe    Diagnostic Test Results:  Alt  AST  ASSESSMENT /PLAN      ICD-10-CM    1. Encounter for long-term (current) use of medications Z79.899 ALT     AST   continue lamisil for total 3 month course      MISTY Jaramillo [facility]

## 2017-08-24 NOTE — MR AVS SNAPSHOT
"              After Visit Summary   2017    Lima Bueno    MRN: [id number]           Patient Information     Date Of Birth          1970        Visit Information        Provider Department      2017 4:00 PM Candace Barajas APRN CNP [facility]        Today's Diagnoses     Encounter for long-term (current) use of medications    -  1       Follow-ups after your visit        Follow-up notes from your care team     Return if symptoms worsen or fail to improve.      Who to contact     If you have questions or need follow up information about today's clinic visit or your schedule please contact [facility] directly at [phone].  Normal or non-critical lab and imaging results will be communicated to you by MyChart, letter or phone within 4 business days after the clinic has received the results. If you do not hear from us within 7 days, please contact the clinic through Familytichart or phone. If you have a critical or abnormal lab result, we will notify you by phone as soon as possible.  Submit refill requests through IntelliGeneScan or call your pharmacy and they will forward the refill request to us. Please allow 3 business days for your refill to be completed.          Additional Information About Your Visit        MyChart Information     IntelliGeneScan lets you send messages to your doctor, view your test results, renew your prescriptions, schedule appointments and more. To sign up, go to [email or web address] . Click on \"Log in\" on the left side of the screen, which will take you to the Welcome page. Then click on \"Sign up Now\" on the right side of the page.     You will be asked to enter the access code listed below, as well as some personal information. Please follow the directions to create your username and password.     Your access code is: [email or web address]  Expires: 2017  7:19 PM     Your access code will  in 90 days. If you need help or a new code, please call your " [facility] or [phone].        Care EveryWhere ID     This is your Care EveryWhere ID. This could be used by other organizations to access your [place] medical records  [id number]        Your Vitals Were     Pulse Temperature Pulse Oximetry Breastfeeding?          81 97.8  F (36.6  C) (Oral) 100% No         Blood Pressure from Last 3 Encounters:   08/24/17 135/89   07/03/17 115/85   06/29/17 130/86    Weight from Last 3 Encounters:   06/29/17 168 lb (76.2 kg)   06/12/17 168 lb (76.2 kg)   01/12/17 190 lb (86.2 kg)              We Performed the Following     ALT     AST        Primary Care Provider Office Phone # Fax #    Oscar Baltazar [phone] [address]  [place] [address]        Equal Access to Services     [facility]: Hadii aad ku hadasho Soomaali, waaxda luqadaha, qaybta kaalmada adeegyada, waxay marjin haylorin kerry hurtado . So [facility] [phone].    ATENCIÓN: Si habla español, tiene a unger disposición servicios gratuitos de asistencia lingüística. Llame al [phone].    We comply with applicable federal civil rights laws and [place] laws. We do not discriminate on the basis of race, color, national origin, age, disability sex, sexual orientation or gender identity.            Thank you!     Thank you for choosing [facility]  for your care. Our goal is always to provide you with excellent care. Hearing back from our patients is one way we can continue to improve our services. Please take a few minutes to complete the written survey that you may receive in the mail after your visit with us. Thank you!             Your Updated Medication List - Protect others around you: Learn how to safely use, store and throw away your medicines at www.disposemymeds.org.          This list is accurate as of: 8/24/17  5:00 PM.  Always use your most recent med list.                   Brand Name Dispense Instructions for use Diagnosis    albuterol  108 (90 BASE) MCG/ACT Inhaler    PROAIR HFA    1 Inhaler    Inhale 2 puffs into the lungs every 4 hours as needed for shortness of breath / dyspnea    Mild intermittent asthma without complication       ciclopirox 8 % Soln     3 Bottle    Apply daily for min 4 months.  First day of each week, file off old medication and roughen surface of nail.    Onychomycosis       COMPRESSION STOCKINGS     2 each    24-30mmHg, thigh high Use as directed    Thrombus       fexofenadine 180 MG tablet    ALLEGRA    30 tablet    Take 1 tablet (180 mg) by mouth daily (allergy symptoms)        guaiFENesin-codeine 100-10 MG/5ML Soln solution    ROBITUSSIN AC    236 mL    Take 5 mLs by mouth every 4 hours as needed for cough    Acute bronchitis, unspecified organism       methocarbamol 750 MG tablet    ROBAXIN    20 tablet    TAKE ONE TABLET BY MOUTH THREE TIMES DAILY AS NEEDED FOR MUSCLE SPASM    Muscle spasm       mometasone-formoterol 100-5 MCG/ACT oral inhaler    DULERA    13 g    Inhale 2 puffs into the lungs 2 times daily    Mild intermittent asthma without complication, Acute bronchitis, unspecified organism       nicotine 21 MG/24HR 24 hr patch    NICODERM CQ    30 patch    Place 1 patch onto the skin every 24 hours    Tobacco use disorder       nicotine polacrilex 2 MG gum    EQ NICOTINE    30 tablet    Place 1 each (2 mg) inside cheek as needed for smoking cessation    Tobacco use disorder       nystatin Powd     60 g    Externally apply 1 Dose topically every 6 hours as needed    Genital pruritus       terbinafine 250 MG tablet    lamISIL    90 tablet    Take 1 tablet (250 mg) by mouth daily    Onychomycosis       traMADol 50 MG tablet    ULTRAM    30 tablet    TAKE ONE TABLET BY MOUTH EVERY 6 HOURS AS NEEDED FOR  MODERATE  PAIN    Other chronic pain       traZODone 100 MG tablet    DESYREL    180 tablet    Take 2 tablets (200 mg) by mouth At Bedtime    Primary insomnia       warfarin 5 MG tablet    COUMADIN    180 tablet     Take 2-3 tablets (10-15 mg) by mouth daily Or as directed by your INR clinic    Acute deep vein thrombosis (DVT) of proximal vein of lower extremity, unspecified laterality (H)

## 2017-08-25 LAB
ALT SERPL W P-5'-P-CCNC: 28 U/L (ref 0–50)
AST SERPL W P-5'-P-CCNC: 16 U/L (ref 0–45)

## 2017-09-13 ENCOUNTER — OFFICE VISIT (OUTPATIENT)
Dept: URGENT CARE | Facility: URGENT CARE | Age: 47
End: 2017-09-13
Payer: COMMERCIAL

## 2017-09-13 VITALS
TEMPERATURE: 98.2 F | SYSTOLIC BLOOD PRESSURE: 113 MMHG | OXYGEN SATURATION: 94 % | DIASTOLIC BLOOD PRESSURE: 84 MMHG | HEART RATE: 71 BPM

## 2017-09-13 DIAGNOSIS — H57.9 ITCHY EYES: ICD-10-CM

## 2017-09-13 DIAGNOSIS — G44.219 EPISODIC TENSION-TYPE HEADACHE, NOT INTRACTABLE: Primary | ICD-10-CM

## 2017-09-13 PROCEDURE — 99214 OFFICE O/P EST MOD 30 MIN: CPT | Performed by: PHYSICIAN ASSISTANT

## 2017-09-13 NOTE — NURSING NOTE
"Chief Complaint   Patient presents with     Urgent Care     Conjunctivitis     Itching in left eyes that started about two days ago and pain on left side of forehead as well.        Initial /84 (BP Location: Right arm, Cuff Size: Adult Large)  Pulse 71  Temp 98.2  F (36.8  C) (Oral)  SpO2 94%  Breastfeeding? No Estimated body mass index is 27.96 kg/(m^2) as calculated from the following:    Height as of 6/29/17: 5' 5\" (1.651 m).    Weight as of 6/29/17: 168 lb (76.2 kg).  Medication Reconciliation: complete.  MAXX Cannon      "

## 2017-09-13 NOTE — LETTER
[facility]  [address]  [place] [address]  Phone: [phone]  Fax: [phone]    September 13, 2017        Lima Bueno  [address]  [place] [address]          To whom it may concern:    RE: Lima Bueno    Patient was seen and treated today at our clinic and missed work. Please excuse her from work on 9/13/2017.     Please contact me for questions or concerns.      Sincerely,        Jenae Cheney PA-C

## 2017-09-13 NOTE — MR AVS SNAPSHOT
"              After Visit Summary   2017    Lima Bueno    MRN: [id number]           Patient Information     Date Of Birth          1970        Visit Information        Provider Department      2017 5:40 PM Jenae Cheney PA-C [facility]        Care Instructions    Tylenol 500-1000mg at the onset of headache with water, sit down and try to relax.             Follow-ups after your visit        Who to contact     If you have questions or need follow up information about today's clinic visit or your schedule please contact [facility] directly at [phone].  Normal or non-critical lab and imaging results will be communicated to you by EcoTimberhart, letter or phone within 4 business days after the clinic has received the results. If you do not hear from us within 7 days, please contact the clinic through EcoTimberhart or phone. If you have a critical or abnormal lab result, we will notify you by phone as soon as possible.  Submit refill requests through EpiSensor or call your pharmacy and they will forward the refill request to us. Please allow 3 business days for your refill to be completed.          Additional Information About Your Visit        MyChart Information     E[employer]Sensor lets you send messages to your doctor, view your test results, renew your prescriptions, schedule appointments and more. To sign up, go to [email or web address] . Click on \"Log in\" on the left side of the screen, which will take you to the Welcome page. Then click on \"Sign up Now\" on the right side of the page.     You will be asked to enter the access code listed below, as well as some personal information. Please follow the directions to create your username and password.     Your access code is: [email or web address]  Expires: 2017  7:19 PM     Your access code will  in 90 days. If you need help or a new code, please call your [place] clinic or [phone].      "   Care EveryWhere ID     This is your Care EveryWhere ID. This could be used by other organizations to access your [place] medical records  [id number]        Your Vitals Were     Pulse Temperature Pulse Oximetry Breastfeeding?          71 98.2  F (36.8  C) (Oral) 94% No         Blood Pressure from Last 3 Encounters:   09/13/17 113/84   08/24/17 135/89   07/03/17 115/85    Weight from Last 3 Encounters:   06/29/17 168 lb (76.2 kg)   06/12/17 168 lb (76.2 kg)   01/12/17 190 lb (86.2 kg)              Today, you had the following     No orders found for display       Primary Care Provider Office Phone # Fax #    Oscar Baltazar [phone] [address] [address]        Equal Access to Services     [facility]: Hadii aad ku hadasho Soomaali, waaxda luqadaha, qaybta kaalmada adeegyada, jack john haymayra hurtado . So [facility] [phone].    ATENCIÓN: Si habla español, tiene a unger disposición servicios gratuitos de asistencia lingüística. Llame al [phone].    We comply with applicable federal civil rights laws and [place] laws. We do not discriminate on the basis of race, color, national origin, age, disability sex, sexual orientation or gender identity.            Thank you!     Thank you for choosing [facility]  for your care. Our goal is always to provide you with excellent care. Hearing back from our patients is one way we can continue to improve our services. Please take a few minutes to complete the written survey that you may receive in the mail after your visit with us. Thank you!             Your Updated Medication List - Protect others around you: Learn how to safely use, store and throw away your medicines at www.disposemymeds.org.          This list is accurate as of: 9/13/17  6:29 PM.  Always use your most recent med list.                   Brand Name Dispense Instructions for use Diagnosis    albuterol 108 (90  BASE) MCG/ACT Inhaler    PROAIR HFA    1 Inhaler    Inhale 2 puffs into the lungs every 4 hours as needed for shortness of breath / dyspnea    Mild intermittent asthma without complication       ciclopirox 8 % Soln     3 Bottle    Apply daily for min 4 months.  First day of each week, file off old medication and roughen surface of nail.    Onychomycosis       COMPRESSION STOCKINGS     2 each    24-30mmHg, thigh high Use as directed    Thrombus       fexofenadine 180 MG tablet    ALLEGRA    30 tablet    Take 1 tablet (180 mg) by mouth daily (allergy symptoms)        guaiFENesin-codeine 100-10 MG/5ML Soln solution    ROBITUSSIN AC    236 mL    Take 5 mLs by mouth every 4 hours as needed for cough    Acute bronchitis, unspecified organism       methocarbamol 750 MG tablet    ROBAXIN    20 tablet    TAKE ONE TABLET BY MOUTH THREE TIMES DAILY AS NEEDED FOR MUSCLE SPASM    Muscle spasm       mometasone-formoterol 100-5 MCG/ACT oral inhaler    DULERA    13 g    Inhale 2 puffs into the lungs 2 times daily    Mild intermittent asthma without complication, Acute bronchitis, unspecified organism       nicotine 21 MG/24HR 24 hr patch    NICODERM CQ    30 patch    Place 1 patch onto the skin every 24 hours    Tobacco use disorder       nicotine polacrilex 2 MG gum    EQ NICOTINE    30 tablet    Place 1 each (2 mg) inside cheek as needed for smoking cessation    Tobacco use disorder       nystatin Powd     60 g    Externally apply 1 Dose topically every 6 hours as needed    Genital pruritus       terbinafine 250 MG tablet    lamISIL    90 tablet    Take 1 tablet (250 mg) by mouth daily    Onychomycosis       traMADol 50 MG tablet    ULTRAM    30 tablet    TAKE ONE TABLET BY MOUTH EVERY 6 HOURS AS NEEDED FOR  MODERATE  PAIN    Other chronic pain       traZODone 100 MG tablet    DESYREL    180 tablet    Take 2 tablets (200 mg) by mouth At Bedtime    Primary insomnia       warfarin 5 MG tablet    COUMADIN    180 tablet    Take 2-3  tablets (10-15 mg) by mouth daily Or as directed by your INR clinic    Acute deep vein thrombosis (DVT) of proximal vein of lower extremity, unspecified laterality (H)

## 2017-09-15 NOTE — PROGRESS NOTES
SUBJECTIVE:  Lima Bueno is a 46 year old female who comes in for evaluation of headache.  Headache began 6+ months ago and was located on the left side of her head. Over the past 3 months has has had a sharp pain in the back of her head. It is intermittent. The pain does not radiate, is a sharp pain. She is not awakened out of her sleep with a headache, but often when she awakes the headache will be present. She admits to nausea without vomiting with the headaches. She admits to an increase in stress. She does not take any medication for her headaches. She does not have a dx of migraines.     TYPICAL PRECIPITANTS OF HEADACHE: None known.     CURRENT USE OF MEDS TO TREAT HA:   Abortive meds? none    Daily use? NO   Prophylactic meds? none    ADDITIONAL RELEVANT HISTORY:  Exposure to carbon monoxide? NO  Substance use: denies any use  Neurologic ROS: Denies, dizziness, syncope and focal weakness.    She also complains of left itchy eye. Her son has recently been dx with pink eye. She denies drainage from her eye, difficulty with the light or pain in her eye. She does have a history of allergies.     Past Medical History:   Diagnosis Date     ADD (attention deficit disorder with hyperactivity)      Asthma      Breast mass 11/1/2012    Patient yet to have biopsy as of 11/1/2012       DVT (deep venous thrombosis) (H) 10/12    right leg     LSIL (low grade squamous intraepithelial lesion) on Pap smear 02/19/14    Neg high risk HPV     Pulmonary embolism (H)      Current Outpatient Prescriptions   Medication Sig Dispense Refill     terbinafine (LAMISIL) 250 MG tablet Take 1 tablet (250 mg) by mouth daily 90 tablet 0     guaiFENesin-codeine (ROBITUSSIN AC) 100-10 MG/5ML SOLN solution Take 5 mLs by mouth every 4 hours as needed for cough 236 mL 0     nicotine (NICODERM CQ) 21 MG/24HR 24 hr patch Place 1 patch onto the skin every 24 hours 30 patch 3     nicotine polacrilex (EQ NICOTINE) 2 MG gum Place 1 each (2  mg) inside cheek as needed for smoking cessation 30 tablet 3     warfarin (COUMADIN) 5 MG tablet Take 2-3 tablets (10-15 mg) by mouth daily Or as directed by your INR clinic 180 tablet 0     albuterol (ALBUTEROL) 108 (90 BASE) MCG/ACT Inhaler Inhale 2 puffs into the lungs every 4 hours as needed for shortness of breath / dyspnea 1 Inhaler 11     mometasone-formoterol (DULERA) 100-5 MCG/ACT oral inhaler Inhale 2 puffs into the lungs 2 times daily 13 g 3     traZODone (DESYREL) 100 MG tablet Take 2 tablets (200 mg) by mouth At Bedtime 180 tablet 3     nystatin POWD Externally apply 1 Dose topically every 6 hours as needed 60 g 1     COMPRESSION STOCKINGS 24-30mmHg, thigh high  Use as directed 2 each 2     methocarbamol (ROBAXIN) 750 MG tablet TAKE ONE TABLET BY MOUTH THREE TIMES DAILY AS NEEDED FOR MUSCLE SPASM 20 tablet 0     traMADol (ULTRAM) 50 MG tablet TAKE ONE TABLET BY MOUTH EVERY 6 HOURS AS NEEDED FOR  MODERATE  PAIN 30 tablet 0     ciclopirox 8 % SOLN Apply daily for min 4 months.  First day of each week, file off old medication and roughen surface of nail. 3 Bottle 2     fexofenadine (ALLEGRA) 180 MG tablet Take 1 tablet (180 mg) by mouth daily (allergy symptoms) 30 tablet 0     Social History   Substance Use Topics     Smoking status: Current Every Day Smoker     Packs/day: 0.50     Types: Cigarettes     Smokeless tobacco: Never Used     Alcohol use No       ROS:   Review of systems negative except as stated above.    OBJECTIVE:  /84 (BP Location: Right arm, Cuff Size: Adult Large)  Pulse 71  Temp 98.2  F (36.8  C) (Oral)  SpO2 94%  Breastfeeding? No  GENERAL APPEARANCE: healthy, alert and no distress  EYES: EOMI,  PERRL, conjunctiva clear  HENT: ear canals and TM's normal.  Nose and mouth without ulcers, erythema or lesions  NECK: supple, nontender, no lymphadenopathy  RESP: lungs clear to auscultation - no rales, rhonchi or wheezes  CV: regular rates and rhythm, normal S1 S2, no murmur  noted  ABDOMEN:  soft, nontender, no HSM or masses and bowel sounds normal  NEURO: Normal strength and tone, sensory exam grossly normal,  normal speech and mentation  SKIN: no suspicious lesions or rashes    ASSESSMENT/PLAN:  1. Episodic tension-type headache, not intractable  Stress reduction discussed along with deep belly breathing. Patient has stress surrounding her eldest daughter and her habits.   Tylenol for headache. Do not want her to use it more than every other day, given that she is on oral antifungals. She asks about using Ultram for pain, I advised her AGAINST. Ultram should only be used for her back pain.     2. Itchy eyes   Use Zyrtec daily. Related to allergies no sign of conjunctivitis noted.     25 minutes was spent with the patient going over treatment of headaches, stress reduction and itchy eyes related to allergies.     Jenae Cheney PA-C

## 2017-10-04 ENCOUNTER — ANTICOAGULATION THERAPY VISIT (OUTPATIENT)
Dept: NURSING | Facility: CLINIC | Age: 47
End: 2017-10-04
Payer: COMMERCIAL

## 2017-10-04 ENCOUNTER — OFFICE VISIT (OUTPATIENT)
Dept: FAMILY MEDICINE | Facility: CLINIC | Age: 47
End: 2017-10-04
Payer: COMMERCIAL

## 2017-10-04 VITALS
DIASTOLIC BLOOD PRESSURE: 93 MMHG | WEIGHT: 170 LBS | HEART RATE: 95 BPM | BODY MASS INDEX: 28.32 KG/M2 | HEIGHT: 65 IN | OXYGEN SATURATION: 97 % | TEMPERATURE: 98 F | SYSTOLIC BLOOD PRESSURE: 134 MMHG | RESPIRATION RATE: 16 BRPM

## 2017-10-04 DIAGNOSIS — I82.409 DVT (DEEP VENOUS THROMBOSIS) (H): ICD-10-CM

## 2017-10-04 DIAGNOSIS — I26.99 OTHER PULMONARY EMBOLISM WITHOUT ACUTE COR PULMONALE, UNSPECIFIED CHRONICITY (H): ICD-10-CM

## 2017-10-04 DIAGNOSIS — B35.1 ONYCHOMYCOSIS: ICD-10-CM

## 2017-10-04 DIAGNOSIS — I26.99 PULMONARY EMBOLISM (H): ICD-10-CM

## 2017-10-04 DIAGNOSIS — Z79.899 MEDICATION MANAGEMENT: Primary | ICD-10-CM

## 2017-10-04 DIAGNOSIS — Z79.01 LONG-TERM (CURRENT) USE OF ANTICOAGULANTS: ICD-10-CM

## 2017-10-04 DIAGNOSIS — I82.4Y1 DEEP VEIN THROMBOSIS (DVT) OF PROXIMAL VEIN OF RIGHT LOWER EXTREMITY, UNSPECIFIED CHRONICITY (H): ICD-10-CM

## 2017-10-04 LAB
ALBUMIN SERPL-MCNC: 3.6 G/DL (ref 3.4–5)
ALP SERPL-CCNC: 96 U/L (ref 40–150)
ALT SERPL W P-5'-P-CCNC: 23 U/L (ref 0–50)
ANION GAP SERPL CALCULATED.3IONS-SCNC: 8 MMOL/L (ref 3–14)
AST SERPL W P-5'-P-CCNC: 14 U/L (ref 0–45)
BILIRUB SERPL-MCNC: 0.3 MG/DL (ref 0.2–1.3)
BUN SERPL-MCNC: 10 MG/DL (ref 7–30)
CALCIUM SERPL-MCNC: 9 MG/DL (ref 8.5–10.1)
CHLORIDE SERPL-SCNC: 107 MMOL/L (ref 94–109)
CO2 SERPL-SCNC: 25 MMOL/L (ref 20–32)
CREAT SERPL-MCNC: 0.82 MG/DL (ref 0.52–1.04)
GFR SERPL CREATININE-BSD FRML MDRD: 74 ML/MIN/1.7M2
GLUCOSE SERPL-MCNC: 101 MG/DL (ref 70–99)
INR PPP: 3.2 (ref 0.86–1.14)
POTASSIUM SERPL-SCNC: 3.8 MMOL/L (ref 3.4–5.3)
PROT SERPL-MCNC: 7.5 G/DL (ref 6.8–8.8)
SODIUM SERPL-SCNC: 140 MMOL/L (ref 133–144)

## 2017-10-04 PROCEDURE — 99213 OFFICE O/P EST LOW 20 MIN: CPT | Performed by: NURSE PRACTITIONER

## 2017-10-04 PROCEDURE — 36415 COLL VENOUS BLD VENIPUNCTURE: CPT | Performed by: NURSE PRACTITIONER

## 2017-10-04 PROCEDURE — 99207 ZZC NO CHARGE NURSE ONLY: CPT | Performed by: INTERNAL MEDICINE

## 2017-10-04 PROCEDURE — 80053 COMPREHEN METABOLIC PANEL: CPT | Performed by: NURSE PRACTITIONER

## 2017-10-04 PROCEDURE — 85610 PROTHROMBIN TIME: CPT | Performed by: INTERNAL MEDICINE

## 2017-10-04 RX ORDER — TERBINAFINE HYDROCHLORIDE 250 MG/1
250 TABLET ORAL DAILY
Qty: 90 TABLET | Refills: 0 | Status: CANCELLED | OUTPATIENT
Start: 2017-10-04

## 2017-10-04 NOTE — MR AVS SNAPSHOT
Lima Bueno   10/4/2017   Anticoagulation Therapy Visit    Description:  46 year old female   Provider:  Oscar Baltazar MD   Department:  [facility]           INR as of 10/4/2017     Today's INR 3.20!      Anticoagulation Summary as of 10/4/2017     INR goal 2.0-3.0   Today's INR 3.20!   Full instructions 15 mg on Mon; 10 mg all other days   Next INR check 10/18/2017    Indications   Long-term (current) use of anticoagulants [Z79.01] [Z79.01]  DVT (deep venous thrombosis) (H) [I82.409]  Pulmonary embolism (H) [I26.99]         Contact Numbers     Clinic Number:         October 2017 Details    Sun Mon Tue Wed Thu Fri Sat     1               2               3               4      10 mg   See details      5      10 mg         6      10 mg         7      10 mg           8      10 mg         9      15 mg         10      10 mg         11      10 mg         12      10 mg         13      10 mg         14      10 mg           15      10 mg         16      15 mg         17      10 mg         18            19               20               21                 22               23               24               25               26               27               28                 29               30               31                    Date Details   10/04 This INR check       Date of next INR:  10/18/2017         How to take your warfarin dose     To take:  10 mg Take 2 of the 5 mg tablets.    To take:  15 mg Take 3 of the 5 mg tablets.

## 2017-10-04 NOTE — LETTER
[facility]  [address] [facility]  [address]  [place]  [address]  Tel: [phone]    October 5, 2017    Lima Bueno  [address]  [place] [address]        Dear Ms. Chris Bueno,    Your labs look great. Hope your symptoms resolve with the Vicks.     If you have any further questions or problems, please contact our office.      Sincerely,    Liz Ace NP/RON          Enclosure: Lab Results  Results for orders placed or performed in visit on 10/04/17   Comprehensive metabolic panel   Result Value Ref Range    Sodium 140 133 - 144 mmol/L    Potassium 3.8 3.4 - 5.3 mmol/L    Chloride 107 94 - 109 mmol/L    Carbon Dioxide 25 20 - 32 mmol/L    Anion Gap 8 3 - 14 mmol/L    Glucose 101 (H) 70 - 99 mg/dL    Urea Nitrogen 10 7 - 30 mg/dL    Creatinine 0.82 0.52 - 1.04 mg/dL    GFR Estimate 74 >60 mL/min/1.7m2    GFR Estimate If Black 90 >60 mL/min/1.7m2    Calcium 9.0 8.5 - 10.1 mg/dL    Bilirubin Total 0.3 0.2 - 1.3 mg/dL    Albumin 3.6 3.4 - 5.0 g/dL    Protein Total 7.5 6.8 - 8.8 g/dL    Alkaline Phosphatase 96 40 - 150 U/L    ALT 23 0 - 50 U/L    AST 14 0 - 45 U/L   INR   Result Value Ref Range    INR 3.20 (H) 0.86 - 1.14

## 2017-10-04 NOTE — PROGRESS NOTES
ANTICOAGULATION FOLLOW-UP CLINIC VISIT    Patient Name:  Lima Bueno  Date:  10/4/2017  Contact Type:  Telephone/ Spoke with patient on the phone. Provided dosing instruction and recheck date. Patient has INR drawn in lab and then INR RN calls patient with dosing.     SUBJECTIVE:     Patient Findings     Positives No Problem Findings           OBJECTIVE    INR   Date Value Ref Range Status   10/04/2017 3.20 (H) 0.86 - 1.14 Final     Comment:     Performed at Point of Care       ASSESSMENT / PLAN  INR assessment SUPRA    Recheck INR In: 2 WEEKS    INR Location Clinic      Anticoagulation Summary as of 10/4/2017     INR goal 2.0-3.0   Today's INR 3.20!   Maintenance plan 15 mg (5 mg x 3) on Mon; 10 mg (5 mg x 2) all other days   Full instructions 15 mg on Mon; 10 mg all other days   Weekly total 75 mg   No change documented Maggie Moe RN   Plan last modified Ailin Buchanan RN (7/20/2017)   Next INR check 10/18/2017   Priority INR   Target end date     Indications   Long-term (current) use of anticoagulants [Z79.01] [Z79.01]  DVT (deep venous thrombosis) (H) [I82.409]  Pulmonary embolism (H) [I26.99]         Anticoagulation Episode Summary     INR check location [facility]    Preferred lab     Send INR reminders to [facility]    Comments Due to scheduling conflicts, has INR done in lab early a.m      Anticoagulation Care Providers     Provider Role Specialty Phone number    Delaney Oscar Hubbard MD Responsible Internal Medicine [phone]            See the Encounter Report to view Anticoagulation Flowsheet and Dosing Calendar (Go to Encounters tab in chart review, and find the Anticoagulation Therapy Visit)    Dosage adjustment made based on physician directed care plan.  No changes.     Maggie Moe RN

## 2017-10-04 NOTE — MR AVS SNAPSHOT
"              After Visit Summary   10/4/2017    Lima Bueno    MRN: [id number]           Patient Information     Date Of Birth          1970        Visit Information        Provider Department      10/4/2017 9:00 AM Liz Ace APRN CNP [facility]        Today's Diagnoses     Medication management    -  1    Onychomycosis        Long-term (current) use of anticoagulants [Z79.01]        Other pulmonary embolism without acute cor pulmonale, unspecified chronicity (H)        Deep vein thrombosis (DVT) of proximal vein of right lower extremity, unspecified chronicity (H)           Follow-ups after your visit        Who to contact     If you have questions or need follow up information about today's clinic visit or your schedule please contact [facility] directly at [phone].  Normal or non-critical lab and imaging results will be communicated to you by [email or web address], letter or phone within 4 business days after the clinic has received the results. If you do not hear from us within 7 days, please contact the clinic through MyChart or phone. If you have a critical or abnormal lab result, we will notify you by phone as soon as possible.  Submit refill requests through Periscape or call your pharmacy and they will forward the refill request to us. Please allow 3 business days for your refill to be completed.          Additional Information About Your Visit        MyChart Information     Periscape lets you send messages to your doctor, view your test results, renew your prescriptions, schedule appointments and more. To sign up, go to [email or web address] . Click on \"Log in\" on the left side of the screen, which will take you to the Welcome page. Then click on \"Sign up Now\" on the right side of the page.     You will be asked to enter the access code listed below, as well as some personal information. Please follow the directions to create your username and password.     Your " "access code is: [email or web address]  Expires: 2017  7:19 PM     Your access code will  in 90 days. If you need help or a new code, please call your [facility] clinic or [phone].        Care EveryWhere ID     This is your Care EveryWhere ID. This could be used by other organizations to access your [facility] medical records  [id number]        Your Vitals Were     Pulse Temperature Respirations Height Last Period Pulse Oximetry    95 98  F (36.7  C) (Oral) 16 5' 5\" (1.651 m) (LMP Unknown) 97%    BMI (Body Mass Index)                   28.29 kg/m2            Blood Pressure from Last 3 Encounters:   10/04/17 (!) 134/93   17 113/84   17 135/89    Weight from Last 3 Encounters:   10/04/17 170 lb (77.1 kg)   17 168 lb (76.2 kg)   17 168 lb (76.2 kg)              We Performed the Following     Comprehensive metabolic panel     INR        Primary Care Provider Office Phone # Fax #    Oscar Baltazar [phone] [address]  [place] [address]        Equal Access to Services     [facility] HospitalDAVID : Hadii saurabh ku hadasho Soomaali, waaxda luqadaha, qaybta kaalmada adeegyada, waxay dora hurtado . So [facility] [phone].    ATENCIÓN: Si habla español, tiene a unger disposición servicios gratuitos de asistencia lingüística. Llame al [phone].    We comply with applicable federal civil rights laws and [place] laws. We do not discriminate on the basis of race, color, national origin, age, disability, sex, sexual orientation, or gender identity.            Thank you!     Thank you for choosing [facility]  for your care. Our goal is always to provide you with excellent care. Hearing back from our patients is one way we can continue to improve our services. Please take a few minutes to complete the written survey that you may receive in the mail after your visit with us. Thank you!             Your Updated Medication List - Protect " others around you: Learn how to safely use, store and throw away your medicines at www.disposemymeds.org.          This list is accurate as of: 10/4/17 11:03 AM.  Always use your most recent med list.                   Brand Name Dispense Instructions for use Diagnosis    albuterol 108 (90 BASE) MCG/ACT Inhaler    PROAIR HFA    1 Inhaler    Inhale 2 puffs into the lungs every 4 hours as needed for shortness of breath / dyspnea    Mild intermittent asthma without complication       ciclopirox 8 % Soln     3 Bottle    Apply daily for min 4 months.  First day of each week, file off old medication and roughen surface of nail.    Onychomycosis       COMPRESSION STOCKINGS     2 each    24-30mmHg, thigh high Use as directed    Thrombus       fexofenadine 180 MG tablet    ALLEGRA    30 tablet    Take 1 tablet (180 mg) by mouth daily (allergy symptoms)        guaiFENesin-codeine 100-10 MG/5ML Soln solution    ROBITUSSIN AC    236 mL    Take 5 mLs by mouth every 4 hours as needed for cough    Acute bronchitis, unspecified organism       methocarbamol 750 MG tablet    ROBAXIN    20 tablet    TAKE ONE TABLET BY MOUTH THREE TIMES DAILY AS NEEDED FOR MUSCLE SPASM    Muscle spasm       mometasone-formoterol 100-5 MCG/ACT oral inhaler    DULERA    13 g    Inhale 2 puffs into the lungs 2 times daily    Mild intermittent asthma without complication, Acute bronchitis, unspecified organism       nicotine 21 MG/24HR 24 hr patch    NICODERM CQ    30 patch    Place 1 patch onto the skin every 24 hours    Tobacco use disorder       nicotine polacrilex 2 MG gum    EQ NICOTINE    30 tablet    Place 1 each (2 mg) inside cheek as needed for smoking cessation    Tobacco use disorder       nystatin Powd     60 g    Externally apply 1 Dose topically every 6 hours as needed    Genital pruritus       terbinafine 250 MG tablet    lamISIL    90 tablet    Take 1 tablet (250 mg) by mouth daily    Onychomycosis       traMADol 50 MG tablet    ULTRAM     30 tablet    TAKE ONE TABLET BY MOUTH EVERY 6 HOURS AS NEEDED FOR  MODERATE  PAIN    Other chronic pain       traZODone 100 MG tablet    DESYREL    180 tablet    Take 2 tablets (200 mg) by mouth At Bedtime    Primary insomnia       warfarin 5 MG tablet    COUMADIN    180 tablet    Take 2-3 tablets (10-15 mg) by mouth daily Or as directed by your INR clinic    Acute deep vein thrombosis (DVT) of proximal vein of lower extremity, unspecified laterality (H)

## 2017-10-04 NOTE — NURSING NOTE
"Chief Complaint   Patient presents with     Recheck Medication     Follow up Lamasil       Initial BP (!) 134/93 (BP Location: Right arm, Patient Position: Chair, Cuff Size: Adult Regular)  Pulse 95  Temp 98  F (36.7  C) (Oral)  Resp 16  Ht 5' 5\" (1.651 m)  Wt 170 lb (77.1 kg)  LMP  (LMP Unknown)  SpO2 97%  BMI 28.29 kg/m2 Estimated body mass index is 28.29 kg/(m^2) as calculated from the following:    Height as of this encounter: 5' 5\" (1.651 m).    Weight as of this encounter: 170 lb (77.1 kg).  Medication Reconciliation: complete   Evon Espinoza CMA (AAMA)      "

## 2017-10-04 NOTE — PROGRESS NOTES
SUBJECTIVE:                                                    Lima Bueno is a 46 year old female who presents to clinic today for the following health issues:      Patient comes in today for a refill of lamisil and to recheck lab work due to being on Lamisil for 12 weeks  Patient stated her fungus on her feet is improving and would like to stay on Lamisil  She has been dealing with fungus for 3-5 years      Past Medical History:   Diagnosis Date     ADD (attention deficit disorder with hyperactivity)      Asthma      Breast mass 11/1/2012    Patient yet to have biopsy as of 11/1/2012       DVT (deep venous thrombosis) (H) 10/12    right leg     LSIL (low grade squamous intraepithelial lesion) on Pap smear 02/19/14    Neg high risk HPV     Pulmonary embolism (H)      History reviewed. No pertinent surgical history.  Social History   Substance Use Topics     Smoking status: Current Every Day Smoker     Packs/day: 0.50     Types: Cigarettes     Smokeless tobacco: Never Used     Alcohol use No     Current Outpatient Prescriptions   Medication Sig Dispense Refill     terbinafine (LAMISIL) 250 MG tablet Take 1 tablet (250 mg) by mouth daily 90 tablet 0     guaiFENesin-codeine (ROBITUSSIN AC) 100-10 MG/5ML SOLN solution Take 5 mLs by mouth every 4 hours as needed for cough 236 mL 0     nicotine (NICODERM CQ) 21 MG/24HR 24 hr patch Place 1 patch onto the skin every 24 hours 30 patch 3     nicotine polacrilex (EQ NICOTINE) 2 MG gum Place 1 each (2 mg) inside cheek as needed for smoking cessation 30 tablet 3     warfarin (COUMADIN) 5 MG tablet Take 2-3 tablets (10-15 mg) by mouth daily Or as directed by your INR clinic 180 tablet 0     albuterol (ALBUTEROL) 108 (90 BASE) MCG/ACT Inhaler Inhale 2 puffs into the lungs every 4 hours as needed for shortness of breath / dyspnea 1 Inhaler 11     mometasone-formoterol (DULERA) 100-5 MCG/ACT oral inhaler Inhale 2 puffs into the lungs 2 times daily 13 g 3     traZODone  "(DESYREL) 100 MG tablet Take 2 tablets (200 mg) by mouth At Bedtime 180 tablet 3     nystatin POWD Externally apply 1 Dose topically every 6 hours as needed 60 g 1     COMPRESSION STOCKINGS 24-30mmHg, thigh high  Use as directed 2 each 2     methocarbamol (ROBAXIN) 750 MG tablet TAKE ONE TABLET BY MOUTH THREE TIMES DAILY AS NEEDED FOR MUSCLE SPASM 20 tablet 0     traMADol (ULTRAM) 50 MG tablet TAKE ONE TABLET BY MOUTH EVERY 6 HOURS AS NEEDED FOR  MODERATE  PAIN 30 tablet 0     ciclopirox 8 % SOLN Apply daily for min 4 months.  First day of each week, file off old medication and roughen surface of nail. 3 Bottle 2     fexofenadine (ALLEGRA) 180 MG tablet Take 1 tablet (180 mg) by mouth daily (allergy symptoms) 30 tablet 0     Allergies   Allergen Reactions     Phosphoric Acid Hives     Terbutaline Hives     Trazodone      Other reaction(s): Other - Describe In Comment Field  Facial nerve pain  Other reaction(s): Other - Describe In Comment Field  Facial nerve pain       Reviewed and updated as needed this visit by clinical staff and provider    Review of Systems  Detailed as above       BP (!) 134/93 (BP Location: Right arm, Patient Position: Chair, Cuff Size: Adult Regular)  Pulse 95  Temp 98  F (36.7  C) (Oral)  Resp 16  Ht 5' 5\" (1.651 m)  Wt 170 lb (77.1 kg)  LMP  (LMP Unknown)  SpO2 97%  BMI 28.29 kg/m2      Physical Exam   Constitutional: She appears well-developed.   HENT:   Head: Normocephalic.   Eyes: Conjunctivae are normal.   Pulmonary/Chest: Effort normal.   Neurological: She is alert.   Skin:   Thick discolored great toenail. Normal nail growth at base of nail noted   Psychiatric: She has a normal mood and affect. Judgment normal.       Assessment and Plan:       ICD-10-CM    1. Medication management Z79.899 Comprehensive metabolic panel   2. Onychomycosis B35.1    3. Long-term (current) use of anticoagulants [Z79.01] Z79.01 INR   4. Other pulmonary embolism without acute cor pulmonale, " unspecified chronicity (H) I26.99 INR   5. Deep vein thrombosis (DVT) of proximal vein of right lower extremity, unspecified chronicity (H) I82.4Y1 INR       Lab work today  Did not refill Lamisil today as she has already completed 12 weeks of treatment, can discuss with PCP at next visit  Try tran vapor rub 2 times a day to nails   Call or return to clinic if symptoms are worse or do not improve      Chelly Buchanan RN NP Student    Liz Ace APRN, CNP  [facility]

## 2017-10-12 DIAGNOSIS — I82.4Y9 ACUTE DEEP VEIN THROMBOSIS (DVT) OF PROXIMAL VEIN OF LOWER EXTREMITY, UNSPECIFIED LATERALITY (H): ICD-10-CM

## 2017-10-12 RX ORDER — WARFARIN SODIUM 5 MG/1
TABLET ORAL
Qty: 180 TABLET | Refills: 0 | Status: SHIPPED | OUTPATIENT
Start: 2017-10-12 | End: 2017-12-29

## 2017-11-25 ENCOUNTER — HEALTH MAINTENANCE LETTER (OUTPATIENT)
Age: 47
End: 2017-11-25

## 2017-12-29 DIAGNOSIS — I82.4Y9 ACUTE DEEP VEIN THROMBOSIS (DVT) OF PROXIMAL VEIN OF LOWER EXTREMITY, UNSPECIFIED LATERALITY (H): ICD-10-CM

## 2017-12-29 NOTE — TELEPHONE ENCOUNTER
Requested Prescriptions   Pending Prescriptions Disp Refills     warfarin (COUMADIN) 5 MG tablet  Last Written Prescription Date:  10/12/17  Last Fill Quantity: 180 tablet,  # refills: 0   Last Office Visit with G, P or [facility] prescribing provider:  10/4/17   Future Office Visit:      180 tablet 0    Vitamin K Antagonists Failed    12/29/2017  9:19 AM       Failed - INR is within goal in the past 6 weeks    Confirm INR is within goal in the past 6 weeks.     Recent Labs   Lab Test  10/04/17   1001   INR  3.20*          Passed - Recent or future visit with authorizing provider's specialty    Patient had office visit in the last year or has a visit in the next 30 days with authorizing provider.  See chart review.          Passed - Patient is 18 years of age or older       Passed - Patient is not pregnant       Passed - No positive pregnancy on file in past 12 months

## 2018-01-02 ENCOUNTER — ANTICOAGULATION THERAPY VISIT (OUTPATIENT)
Dept: FAMILY MEDICINE | Facility: CLINIC | Age: 48
End: 2018-01-02

## 2018-01-02 DIAGNOSIS — I82.409 DVT (DEEP VENOUS THROMBOSIS) (H): ICD-10-CM

## 2018-01-02 DIAGNOSIS — Z79.01 LONG-TERM (CURRENT) USE OF ANTICOAGULANTS: ICD-10-CM

## 2018-01-02 DIAGNOSIS — I26.99 PULMONARY EMBOLISM (H): ICD-10-CM

## 2018-01-02 LAB — INR PPP: 2 (ref 0.86–1.14)

## 2018-01-02 PROCEDURE — 85610 PROTHROMBIN TIME: CPT | Performed by: INTERNAL MEDICINE

## 2018-01-02 PROCEDURE — 36416 COLLJ CAPILLARY BLOOD SPEC: CPT | Performed by: INTERNAL MEDICINE

## 2018-01-02 PROCEDURE — 99207 ZZC NO CHARGE NURSE ONLY: CPT | Performed by: INTERNAL MEDICINE

## 2018-01-02 RX ORDER — WARFARIN SODIUM 5 MG/1
TABLET ORAL
Qty: 180 TABLET | Refills: 0 | Status: SHIPPED | OUTPATIENT
Start: 2018-01-02 | End: 2018-02-19 | Stop reason: ALTCHOICE

## 2018-01-02 NOTE — MR AVS SNAPSHOT
Lima Bueno   1/2/2018   Anticoagulation Therapy Visit    Description:  47 year old female   Provider:  Oscar Baltazar MD   Department:  [facility]           INR as of 1/2/2018     Today's INR 2.00      Anticoagulation Summary as of 1/2/2018     INR goal 2.0-3.0   Today's INR 2.00   Full instructions 15 mg on Mon, Fri; 10 mg all other days   Next INR check 1/30/2018    Indications   Long-term (current) use of anticoagulants [Z79.01] [Z79.01]  Deep vein thrombosis (DVT) of lower extremity (H) [I82.409]  Pulmonary embolism (H) [I26.99]         January 2018 Details    Sun Mon Tue Wed Thu Fri Sat      1               2      10 mg   See details      3      10 mg         4      10 mg         5      15 mg         6      10 mg           7      10 mg         8      15 mg         9      10 mg         10      10 mg         11      10 mg         12      15 mg         13      10 mg           14      10 mg         15      15 mg         16      10 mg         17      10 mg         18      10 mg         19      15 mg         20      10 mg           21      10 mg         22      15 mg         23      10 mg         24      10 mg         25      10 mg         26      15 mg         27      10 mg           28      10 mg         29      15 mg         30            31                   Date Details   01/02 This INR check       Date of next INR:  1/30/2018         How to take your warfarin dose     To take:  10 mg Take 2 of the 5 mg tablets.    To take:  15 mg Take 3 of the 5 mg tablets.

## 2018-01-02 NOTE — PROGRESS NOTES
ANTICOAGULATION FOLLOW-UP CLINIC VISIT    Patient Name:  Lima Bueno  Date:  1/2/2018  Contact Type:  Telephone    SUBJECTIVE:     Patient Findings     Positives No Problem Findings           OBJECTIVE    INR   Date Value Ref Range Status   01/02/2018 2.00 (H) 0.86 - 1.14 Final     Comment:     Performed at Point of Care       ASSESSMENT / PLAN  INR assessment THER    Recheck INR In: 4 WEEKS    INR Location Clinic      Anticoagulation Summary as of 1/2/2018     INR goal 2.0-3.0   Today's INR 2.00   Maintenance plan 15 mg (5 mg x 3) on Mon, Fri; 10 mg (5 mg x 2) all other days   Full instructions 15 mg on Mon, Fri; 10 mg all other days   Weekly total 80 mg   Plan last modified Mary Faustin RN (1/2/2018)   Next INR check 1/30/2018   Priority INR   Target end date     Indications   Long-term (current) use of anticoagulants [Z79.01] [Z79.01]  Deep vein thrombosis (DVT) of lower extremity (H) [I82.409]  Pulmonary embolism (H) [I26.99]         Anticoagulation Episode Summary     INR check location [facility]    Preferred lab     Send INR reminders to [facility] ANTICOAGULATION    Comments Due to scheduling conflicts, has INR done in lab early a.m      Anticoagulation Care Providers     Provider Role Specialty Phone number    Siennaruben Oscar Hubbard MD [facility] Internal Medicine [phone]            See the Encounter Report to view Anticoagulation Flowsheet and Dosing Calendar (Go to Encounters tab in chart review, and find the Anticoagulation Therapy Visit)    Dosage adjustment made based on physician directed care plan. Lab appt for INR today: 2.0.   Her last INR was done in OCT.  Left message for patient to increase dose slightly AND recheck in 4 weeks.  I explained that I don't want to risk her INR going any lower than 2, especially since she tends to have infrequent INR testing.      Mary Fausitn, RN

## 2018-01-27 DIAGNOSIS — G89.29 OTHER CHRONIC PAIN: ICD-10-CM

## 2018-01-29 NOTE — TELEPHONE ENCOUNTER
Controlled Substance Refill Request for traMADol (ULTRAM) 50 MG tablet    Problem List Complete:  No     PROVIDER TO CONSIDER COMPLETION OF PROBLEM LIST AND OVERVIEW/CONTROLLED SUBSTANCE AGREEMENT    Last Written Prescription Date:  12/2/2016  Last Fill Quantity: 30,   # refills: 0    Last Office Visit with [facility] primary care provider: 7/3/2017    Future Office visit:     Controlled substance agreement on file: Yes:  Date 10/5/2016.     Processing:  Fax Rx to [facility] pharmacy   checked in past 6 months?  No, route to RN

## 2018-01-30 RX ORDER — TRAMADOL HYDROCHLORIDE 50 MG/1
TABLET ORAL
Qty: 30 TABLET | Refills: 0 | Status: SHIPPED | OUTPATIENT
Start: 2018-01-30 | End: 2018-07-27

## 2018-01-30 NOTE — TELEPHONE ENCOUNTER
Routing refill request to provider for review/approval because:  Drug not on the [facility] refill protocol   Please authorize if appropriate.  Thanks,  Caroline Subramanian RN

## 2018-02-03 ENCOUNTER — HEALTH MAINTENANCE LETTER (OUTPATIENT)
Age: 48
End: 2018-02-03

## 2018-02-06 ENCOUNTER — OFFICE VISIT (OUTPATIENT)
Dept: FAMILY MEDICINE | Facility: CLINIC | Age: 48
End: 2018-02-06
Payer: COMMERCIAL

## 2018-02-06 VITALS
HEIGHT: 65 IN | HEART RATE: 75 BPM | DIASTOLIC BLOOD PRESSURE: 93 MMHG | OXYGEN SATURATION: 98 % | SYSTOLIC BLOOD PRESSURE: 139 MMHG | TEMPERATURE: 97.7 F

## 2018-02-06 DIAGNOSIS — I26.99 PULMONARY EMBOLISM (H): ICD-10-CM

## 2018-02-06 DIAGNOSIS — M79.89 RIGHT LEG SWELLING: Primary | ICD-10-CM

## 2018-02-06 DIAGNOSIS — Z79.01 LONG-TERM (CURRENT) USE OF ANTICOAGULANTS: ICD-10-CM

## 2018-02-06 DIAGNOSIS — I82.409 DVT (DEEP VENOUS THROMBOSIS) (H): ICD-10-CM

## 2018-02-06 LAB — INR PPP: 3.4 (ref 0.86–1.14)

## 2018-02-06 PROCEDURE — 99214 OFFICE O/P EST MOD 30 MIN: CPT | Performed by: NURSE PRACTITIONER

## 2018-02-06 PROCEDURE — 36415 COLL VENOUS BLD VENIPUNCTURE: CPT | Performed by: INTERNAL MEDICINE

## 2018-02-06 PROCEDURE — 85610 PROTHROMBIN TIME: CPT | Performed by: INTERNAL MEDICINE

## 2018-02-06 NOTE — LETTER
[place] [address] [facility] [address]  Phone: [phone]    February 6, 2018              To whom it may concern:    RE: Lima Bueno    Patient was seen and treated today at our clinic.  Please allow her to sit while working for the next 3 days, thru 2/9    Please contact me for questions or concerns.      Sincerely,        MISTY Almonte CNP

## 2018-02-06 NOTE — MR AVS SNAPSHOT
After Visit Summary   2/6/2018    Lima Bueno    MRN: [id number]           Patient Information     Date Of Birth          1970        Visit Information        Provider Department      2/6/2018 4:30 PM Liz Ace APRN CNP [facility]        Today's Diagnoses     Right leg swelling    -  1       Follow-ups after your visit        Your next 10 appointments already scheduled     Feb 07, 2018 12:30 PM CST   US LOWER EXTREMITY VENOUS DUPLEX RIGHT with SHUS1   [facility] Ultrasound ([facility])    [address] [address] [phone]           Please bring a list of your medicines (including vitamins, minerals and over-the-counter drugs). Also, tell your doctor about any allergies you may have. Wear comfortable clothes and leave your valuables at home.  You do not need to do anything special to prepare for your exam.  Please call the Imaging Department at your exam site with any questions.            Mar 07, 2018  3:45 PM CST   Anticoagulation Visit with  ANTICOAGULATION CLINIC   [facility]    [address] [facility] [id number] [phone]              Future tests that were ordered for you today     Open Future Orders        Priority Expected Expires Ordered    US Lower Extremity Venous Duplex Right Routine  2/6/2019 2/6/2018            Who to contact     If you have questions or need follow up information about today's clinic visit or your schedule please contact [facility] directly at [phone].  Normal or non-critical lab and imaging results will be communicated to you by MyChart, letter or phone within 4 business days after the clinic has received the results. If you do not hear from us within 7 days, please contact the clinic through MyChart or phone. If you have a critical or abnormal lab result, we will notify you by phone as soon as possible.  Submit  "refill requests through Or[employer]It or call your pharmacy and they will forward the refill request to us. Please allow 3 business days for your refill to be completed.          Additional Information About Your Visit        [facility] Mobile Information     OrdrIt lets you send messages to your doctor, view your test results, renew your prescriptions, schedule appointments and more. To sign up, go to [email or web address] . Click on \"Log in\" on the left side of the screen, which will take you to the Welcome page. Then click on \"Sign up Now\" on the right side of the page.     You will be asked to enter the access code listed below, as well as some personal information. Please follow the directions to create your username and password.     Your access code is: [email or web address]  Expires: 2018 10:50 AM     Your access code will  in 90 days. If you need help or a new code, please call your [facility] clinic or [phone].        Care EveryWhere ID     This is your Care EveryWhere ID. This could be used by other organizations to access your [facility] medical records  [id number]        Your Vitals Were     Pulse Temperature Height Last Period Pulse Oximetry Breastfeeding?    75 97.7  F (36.5  C) 5' 5\" (1.651 m) 10/01/2017 98% No       Blood Pressure from Last 3 Encounters:   18 (!) 139/93   10/04/17 (!) 134/93   17 113/84    Weight from Last 3 Encounters:   10/04/17 170 lb (77.1 kg)   17 168 lb (76.2 kg)   17 168 lb (76.2 kg)               Primary Care Provider Office Phone # Fax #    Oscar Baltazar [phone] [address] [facility] [address]  [facility] [address]        Equal Access to Services     SUSAN SNYDER : Molly Allen, waaxda luqadaha, qaybta kaalmajack gupta. [facility] [phone].    ATENCIÓN: Si habla español, tiene a unger disposición servicios gratuitos de asistencia lingüística. Llame al [phone].    We " comply with applicable federal civil rights laws and [place] laws. We do not discriminate on the basis of race, color, national origin, age, disability, sex, sexual orientation, or gender identity.            Thank you!     Thank you for choosing [facility]  for your care. Our goal is always to provide you with excellent care. Hearing back from our patients is one way we can continue to improve our services. Please take a few minutes to complete the written survey that you may receive in the mail after your visit with us. Thank you!             Your Updated Medication List - Protect others around you: Learn how to safely use, store and throw away your medicines at www.disposemymeds.org.          This list is accurate as of 2/6/18 11:59 PM.  Always use your most recent med list.                   Brand Name Dispense Instructions for use Diagnosis    albuterol 108 (90 BASE) MCG/ACT Inhaler    PROAIR HFA    1 Inhaler    Inhale 2 puffs into the lungs every 4 hours as needed for shortness of breath / dyspnea    Mild intermittent asthma without complication       ciclopirox 8 % Soln     3 Bottle    Apply daily for min 4 months.  First day of each week, file off old medication and roughen surface of nail.    Onychomycosis       COMPRESSION STOCKINGS     2 each    24-30mmHg, thigh high Use as directed    Thrombus       fexofenadine 180 MG tablet    ALLEGRA    30 tablet    Take 1 tablet (180 mg) by mouth daily (allergy symptoms)        guaiFENesin-codeine 100-10 MG/5ML Soln solution    ROBITUSSIN AC    236 mL    Take 5 mLs by mouth every 4 hours as needed for cough    Acute bronchitis, unspecified organism       methocarbamol 750 MG tablet    ROBAXIN    20 tablet    TAKE ONE TABLET BY MOUTH THREE TIMES DAILY AS NEEDED FOR MUSCLE SPASM    Muscle spasm       mometasone-formoterol 100-5 MCG/ACT oral inhaler    DULERA    13 g    Inhale 2 puffs into the lungs 2 times daily    Mild intermittent asthma without  complication, Acute bronchitis, unspecified organism       nicotine 21 MG/24HR 24 hr patch    NICODERM CQ    30 patch    Place 1 patch onto the skin every 24 hours    Tobacco use disorder       nicotine polacrilex 2 MG gum    EQ NICOTINE    30 tablet    Place 1 each (2 mg) inside cheek as needed for smoking cessation    Tobacco use disorder       nystatin Powd     60 g    Externally apply 1 Dose topically every 6 hours as needed    Genital pruritus       terbinafine 250 MG tablet    lamISIL    90 tablet    Take 1 tablet (250 mg) by mouth daily    Onychomycosis       traMADol 50 MG tablet    ULTRAM    30 tablet    TAKE ONE TABLET BY MOUTH EVERY 6 HOURS AS NEEDED FOR  MODERATE  PAIN    Other chronic pain       traZODone 100 MG tablet    DESYREL    180 tablet    Take 2 tablets (200 mg) by mouth At Bedtime    Primary insomnia       warfarin 5 MG tablet    COUMADIN    180 tablet    TAKE TWO TO THREE TABLETS BY MOUTH ONCE DAILY  OR AS DIRECTED BY INR CLINIC    Acute deep vein thrombosis (DVT) of proximal vein of lower extremity, unspecified laterality (H)

## 2018-02-06 NOTE — PROGRESS NOTES
HPI      SUBJECTIVE:   Lima Bueno is a 47 year old female who presents to clinic today for the following health issues:    Chief Complaint   Patient presents with     Edema     right ankle swelling x 1 week no known injury, INR nurse said warm to touch.  Shortness of breath (hasn't been taking inhaler) .         Right leg swelling for 1 week   Bruising right upper leg and unsure what that is from  INR slightly elevated today  Hasn't been using her inhaler for awhile and has been slightly short of breath, even before the swelling started.  No CP, palpitations   Diffuse right leg pain   No abd pain   Has been adequately anticoagulated for over 6 months   Works 2 jobs       Past Medical History:   Diagnosis Date     ADD (attention deficit disorder with hyperactivity)      Asthma      Breast mass 11/1/2012    Patient yet to have biopsy as of 11/1/2012       DVT (deep venous thrombosis) (H) 10/12    right leg     LSIL (low grade squamous intraepithelial lesion) on Pap smear 02/19/14    Neg high risk HPV     Pulmonary embolism (H)      No past surgical history on file.  Social History   Substance Use Topics     Smoking status: Current Every Day Smoker     Packs/day: 0.50     Types: Cigarettes     Smokeless tobacco: Never Used     Alcohol use No     Current Outpatient Prescriptions   Medication Sig Dispense Refill     traMADol (ULTRAM) 50 MG tablet TAKE ONE TABLET BY MOUTH EVERY 6 HOURS AS NEEDED FOR  MODERATE  PAIN 30 tablet 0     warfarin (COUMADIN) 5 MG tablet TAKE TWO TO THREE TABLETS BY MOUTH ONCE DAILY  OR AS DIRECTED BY INR CLINIC 180 tablet 0     terbinafine (LAMISIL) 250 MG tablet Take 1 tablet (250 mg) by mouth daily 90 tablet 0     guaiFENesin-codeine (ROBITUSSIN AC) 100-10 MG/5ML SOLN solution Take 5 mLs by mouth every 4 hours as needed for cough 236 mL 0     albuterol (ALBUTEROL) 108 (90 BASE) MCG/ACT Inhaler Inhale 2 puffs into the lungs every 4 hours as needed for shortness of breath / dyspnea 1  "Inhaler 11     mometasone-formoterol (DULERA) 100-5 MCG/ACT oral inhaler Inhale 2 puffs into the lungs 2 times daily 13 g 3     traZODone (DESYREL) 100 MG tablet Take 2 tablets (200 mg) by mouth At Bedtime 180 tablet 3     nystatin POWD Externally apply 1 Dose topically every 6 hours as needed 60 g 1     COMPRESSION STOCKINGS 24-30mmHg, thigh high  Use as directed 2 each 2     methocarbamol (ROBAXIN) 750 MG tablet TAKE ONE TABLET BY MOUTH THREE TIMES DAILY AS NEEDED FOR MUSCLE SPASM 20 tablet 0     ciclopirox 8 % SOLN Apply daily for min 4 months.  First day of each week, file off old medication and roughen surface of nail. 3 Bottle 2     fexofenadine (ALLEGRA) 180 MG tablet Take 1 tablet (180 mg) by mouth daily (allergy symptoms) 30 tablet 0     nicotine (NICODERM CQ) 21 MG/24HR 24 hr patch Place 1 patch onto the skin every 24 hours (Patient not taking: Reported on 2/6/2018) 30 patch 3     nicotine polacrilex (EQ NICOTINE) 2 MG gum Place 1 each (2 mg) inside cheek as needed for smoking cessation (Patient not taking: Reported on 2/6/2018) 30 tablet 3     Allergies   Allergen Reactions     Phosphoric Acid Hives     Terbutaline Hives     Trazodone      Other reaction(s): Other - Describe In Comment Field  Facial nerve pain  Other reaction(s): Other - Describe In Comment Field  Facial nerve pain       Reviewed and updated as needed this visit by clinical staff and provider      ROS  Detailed as above        BP (!) 139/93 (Cuff Size: Adult Regular)  Pulse 75  Temp 97.7  F (36.5  C)  Ht 5' 5\" (1.651 m)  LMP 10/01/2017  SpO2 98%  Breastfeeding? No    Physical Exam   Constitutional: She is well-developed, well-nourished, and in no distress.   HENT:   Head: Normocephalic.   Eyes: Conjunctivae are normal.   Pulmonary/Chest: Effort normal.   Musculoskeletal: Normal range of motion. She exhibits edema.   Swelling of entire right leg   Neurological: She is alert. Gait normal.   Skin: Skin is warm and dry. No erythema. "   Small bruise right medial upper leg  Equal temperature of both Legs   Psychiatric: Mood and affect normal.   Vitals reviewed.      Assessment and Plan:       ICD-10-CM    1. Right leg swelling M79.89 US Lower Extremity Venous Duplex Right       Swelling of entire right leg. Less likely DVT as she is adequately anticoagulated though will complete ultrasound. If this is negative we may need to complete abdominal imaging to evaluate for obstructive process causing the right leg edema. She does have a bruise of the right upper leg, so this could be post injury.   She does have some shortness of breath but admits to not using her inhalers in the setting of asthma. I do not suspect PE as a cause of her symptoms.  She will follow-up with PCP next week if symptoms do not resolve      MISTY Burt, CNP  [facility]

## 2018-02-07 ENCOUNTER — TELEPHONE (OUTPATIENT)
Dept: FAMILY MEDICINE | Facility: CLINIC | Age: 48
End: 2018-02-07

## 2018-02-07 ENCOUNTER — HOSPITAL ENCOUNTER (OUTPATIENT)
Dept: ULTRASOUND IMAGING | Facility: CLINIC | Age: 48
Discharge: HOME | End: 2018-02-07
Attending: NURSE PRACTITIONER | Admitting: NURSE PRACTITIONER
Payer: COMMERCIAL

## 2018-02-07 ENCOUNTER — OFFICE VISIT (OUTPATIENT)
Dept: FAMILY MEDICINE | Facility: CLINIC | Age: 48
End: 2018-02-07
Payer: COMMERCIAL

## 2018-02-07 DIAGNOSIS — I82.431 ACUTE DEEP VEIN THROMBOSIS (DVT) OF POPLITEAL VEIN OF RIGHT LOWER EXTREMITY (H): Primary | ICD-10-CM

## 2018-02-07 DIAGNOSIS — J45.20 MILD INTERMITTENT ASTHMA WITHOUT COMPLICATION: ICD-10-CM

## 2018-02-07 DIAGNOSIS — I82.431 ACUTE DEEP VEIN THROMBOSIS (DVT) OF POPLITEAL VEIN OF RIGHT LOWER EXTREMITY (H): ICD-10-CM

## 2018-02-07 DIAGNOSIS — M79.89 RIGHT LEG SWELLING: ICD-10-CM

## 2018-02-07 DIAGNOSIS — J20.9 ACUTE BRONCHITIS, UNSPECIFIED ORGANISM: ICD-10-CM

## 2018-02-07 PROCEDURE — 99213 OFFICE O/P EST LOW 20 MIN: CPT | Performed by: NURSE PRACTITIONER

## 2018-02-07 PROCEDURE — 93971 EXTREMITY STUDY: CPT | Mod: RT

## 2018-02-07 RX ORDER — ALBUTEROL SULFATE 90 UG/1
2 AEROSOL, METERED RESPIRATORY (INHALATION) EVERY 4 HOURS PRN
Qty: 1 INHALER | Refills: 11 | Status: SHIPPED | OUTPATIENT
Start: 2018-02-07 | End: 2019-07-03

## 2018-02-07 ASSESSMENT — ASTHMA QUESTIONNAIRES: ACT_TOTALSCORE: 17

## 2018-02-07 NOTE — PATIENT INSTRUCTIONS
Stop Coumadin   Take the Lovenox twice a day   You need to follow up with hematology within 1 week. Call right away. I spoke with Dr ESTHELA Carranza

## 2018-02-07 NOTE — LETTER
[place] [address] [facility] [address]  Phone: [phone]    February 7, 2018          To whom it may concern:    RE: Lima Bueno      Patient was seen and treated today at our clinic.  She can return to work but will need to have the ability to sit for 1 month until 3/7/18      Please contact me for questions or concerns.      Sincerely,        MISTY Almonte CNP

## 2018-02-07 NOTE — TELEPHONE ENCOUNTER
Reason for Call:  Other appointment    Detailed comments: PT was seen yesterday and is having an ultrasound today.  She would like to know if she needs to come back to the clinic for a follow up    Phone Number Patient can be reached at: Home number on file [phone] (home)    Best Time: any    Can we leave a detailed message on this number? YES    Call taken on 2/7/2018 at 12:56 PM by Shobha Live

## 2018-02-07 NOTE — TELEPHONE ENCOUNTER
Reordered Rx with copied sig and sent to [facility] per patient request.    Receipt confirmed at 1505 2/7/18    Called patient to update. Patient confirmed pharmacy has received Rx.     Araceli SARKAR RN

## 2018-02-07 NOTE — TELEPHONE ENCOUNTER
Called and spoke with pharmacy. Verified that correct dose was sent.     Pharmacy will f/u with patient who is waiting.     Araceli SARKAR RN

## 2018-02-07 NOTE — MR AVS SNAPSHOT
"                  MRN:[id number]                      After Visit Summary   2018    Lima Bueno    MRN: [id number]           Visit Information        Provider Department      2018 2:00 PM Liz Ace APRN CNP [facility]        Your next 10 appointments already scheduled     2018  2:00 PM CST   Office Visit with MISTY Almonte CNP   [facility])    [address] [facility] [id number] [phone]           Bring a current list of meds and any records pertaining to this visit. For Physicals, please bring immunization records and any forms needing to be filled out. Please arrive 10 minutes early to complete paperwork.            Mar 07, 2018  3:45 PM CST   Anticoagulation Visit with  ANTICOAGULATION CLINIC   [facility]    [address]  [facility] [address] [phone]              MyChart Information     [employer] lets you send messages to your doctor, view your test results, renew your prescriptions, schedule appointments and more. To sign up, go to [email or web address] . Click on \"Log in\" on the left side of the screen, which will take you to the Welcome page. Then click on \"Sign up Now\" on the right side of the page.     You will be asked to enter the access code listed below, as well as some personal information. Please follow the directions to create your username and password.     Your access code is: [email or web address]  Expires: 2018 10:50 AM     Your access code will  in 90 days. If you need help or a new code, please call your [facility] or [phone].        Care EveryWhere ID     This is your Care EveryWhere ID. This could be used by other organizations to access your [facility] medical records  [id number]        Equal Access to Services     SUSAN SNYDER AH: Molly Allen, shayna donovan, slade baldwin, jack belcher " kerry hurtado ah. So [facility] [phone].    ATENCIÓN: Si habla español, tiene a unger disposición servicios gratuitos de asistencia lingüística. Llame al [phone].    We comply with applicable federal civil rights laws and [place] laws. We do not discriminate on the basis of race, color, national origin, age, disability, sex, sexual orientation, or gender identity.

## 2018-02-07 NOTE — TELEPHONE ENCOUNTER
Reason for Call:  Medication or medication refill:    Do you use a [facility]?  Name of the pharmacy and phone number for the current request:  [facility] [phone]    Name of the medication requested: enoxaparin (LOVENOX) 80 MG/0.8ML injection    Other request: Patient requested this to be sent to [facility] and was sent to [facility] please resend as patient is waiting at pharmacy.    Can we leave a detailed message on this number? Not Applicable    Phone number patient can be reached at: Cell number on file:    Telephone Information:   Mobile [phone]       Best Time: ASAP    Call taken on 2/7/2018 at 2:53 PM by Chary Fernandez

## 2018-02-07 NOTE — TELEPHONE ENCOUNTER
Patient states pharmacy informed her that the amount of medication that was order is only good enough for 8 days instead of 10 days. Patient would like the math double checked and a new rx sent for the corrected amount if needed.

## 2018-02-07 NOTE — MR AVS SNAPSHOT
After Visit Summary   2/7/2018    Lima Bueno    MRN: [id number]           Patient Information     Date Of Birth          1970        Visit Information        Provider Department      2/7/2018 2:00 PM Liz Ace APRN CNP [facility]        Today's Diagnoses     Acute deep vein thrombosis (DVT) of popliteal vein of right lower extremity (H)    -  1    Mild intermittent asthma without complication        Acute bronchitis, unspecified organism          Care Instructions    Stop Coumadin   Take the Lovenox twice a day   You need to follow up with hematology within 1 week. Call right away. I spoke with Dr ESTHELA Carranza          Follow-ups after your visit        Additional Services     ONC/HEME ADULT REFERRAL       Your provider has referred you to: [facility]: Cancer Care/Hematology (All Cancer Related Services) - [place] [phone]   [email or web address]    Please be aware that coverage of these services is subject to the terms and limitations of your health insurance plan.  Call member services at your health plan with any benefit or coverage questions.      Please bring the following with you to your appointment:    (1) Any X-Rays, CTs or MRIs which have been performed.  Contact the facility where they were done to arrange for  prior to your scheduled appointment.   (2) List of current medications  (3) This referral request   (4) Any documents/labs given to you for this referral                  Your next 10 appointments already scheduled     Mar 07, 2018  3:45 PM CST   Anticoagulation Visit with  ANTICOAGULATION CLINIC   [facility] [place] ([facility])    [address]  [place] [address] [phone]              Future tests that were ordered for you today     Open Future Orders        Priority Expected Expires Ordered    US Lower Extremity Venous Duplex Right Routine  2/6/2019 2/6/2018        "     Who to contact     If you have questions or need follow up information about today's clinic visit or your schedule please contact [facility] directly at [phone].  Normal or non-critical lab and imaging results will be communicated to you by MyChart, letter or phone within 4 business days after the clinic has received the results. If you do not hear from us within 7 days, please contact the clinic through MyChart or phone. If you have a critical or abnormal lab result, we will notify you by phone as soon as possible.  Submit refill requests through [employer] or call your pharmacy and they will forward the refill request to us. Please allow 3 business days for your refill to be completed.          Additional Information About Your Visit        [employer] Information     [employer] lets you send messages to your doctor, view your test results, renew your prescriptions, schedule appointments and more. To sign up, go to [email or web address] . Click on \"Log in\" on the left side of the screen, which will take you to the Welcome page. Then click on \"Sign up Now\" on the right side of the page.     You will be asked to enter the access code listed below, as well as some personal information. Please follow the directions to create your username and password.     Your access code is: [email or web address]  Expires: 2018 10:50 AM     Your access code will  in 90 days. If you need help or a new code, please call your [place] clinic or [phone].        Care EveryWhere ID     This is your Care EveryWhere ID. This could be used by other organizations to access your [place] medical records  [id number]         Blood Pressure from Last 3 Encounters:   18 (!) 139/93   10/04/17 (!) 134/93   17 113/84    Weight from Last 3 Encounters:   10/04/17 170 lb (77.1 kg)   17 168 lb (76.2 kg)   17 168 lb (76.2 kg)              We Performed the Following     ONC/HEME ADULT REFERRAL          Today's " Medication Changes          These changes are accurate as of 2/7/18  2:10 PM.  If you have any questions, ask your nurse or doctor.               Start taking these medicines.        Dose/Directions    enoxaparin 80 MG/0.8ML injection   Commonly known as:  LOVENOX   Used for:  Acute deep vein thrombosis (DVT) of popliteal vein of right lower extremity (H)   Started by:  Liz Ace APRN CNP        Dose:  80 mg   Inject 0.8 mLs (80 mg) Subcutaneous 2 times daily for 10 days   Quantity:  16 mL   Refills:  0            Where to get your medicines      These medications were sent to [place], [place] - [address]  [address], [facility] [address]     Phone:  [phone]     enoxaparin 80 MG/0.8ML injection         These medications were sent to [facility] [address] [place] [address] - [address]  [address] [facility] [address]     Phone:  [phone]     albuterol 108 (90 BASE) MCG/ACT Inhaler         Call your pharmacy to confirm that your medication is ready for pickup. It may take up to 24 hours for them to receive the prescription. If the prescription is not ready within 3 business days, please contact your clinic or your provider.     We will let you know when these medications are ready. If you don't hear back within 3 business days, please contact us.     mometasone-formoterol 100-5 MCG/ACT oral inhaler                Primary Care Provider Office Phone # Fax #    Oscar Baltazar [phone] [address]  [facility] [address]        Equal Access to Services     [facility] CenterDAVID : Hadii saurabh [place], waaxda luqadaha, qaybta kaaljack mclaughlin. So [facility] [phone].    ATENCIÓN: Si habla español, tiene a unger disposición servicios gratuitos de asistencia lingüística. Llame al [phone].    We comply with applicable [facility] civil  rights laws and [place] laws. We do not discriminate on the basis of race, color, national origin, age, disability, sex, sexual orientation, or gender identity.            Thank you!     Thank you for choosing [facility]  for your care. Our goal is always to provide you with excellent care. Hearing back from our patients is one way we can continue to improve our services. Please take a few minutes to complete the written survey that you may receive in the mail after your visit with us. Thank you!             Your Updated Medication List - Protect others around you: Learn how to safely use, store and throw away your medicines at www.disposemymeds.org.          This list is accurate as of 2/7/18  2:10 PM.  Always use your most recent med list.                   Brand Name Dispense Instructions for use Diagnosis    albuterol 108 (90 BASE) MCG/ACT Inhaler    PROAIR HFA    1 Inhaler    Inhale 2 puffs into the lungs every 4 hours as needed for shortness of breath / dyspnea    Mild intermittent asthma without complication       ciclopirox 8 % Soln     3 Bottle    Apply daily for min 4 months.  First day of each week, file off old medication and roughen surface of nail.    Onychomycosis       COMPRESSION STOCKINGS     2 each    24-30mmHg, thigh high Use as directed    Thrombus       enoxaparin 80 MG/0.8ML injection    LOVENOX    16 mL    Inject 0.8 mLs (80 mg) Subcutaneous 2 times daily for 10 days    Acute deep vein thrombosis (DVT) of popliteal vein of right lower extremity (H)       fexofenadine 180 MG tablet    ALLEGRA    30 tablet    Take 1 tablet (180 mg) by mouth daily (allergy symptoms)        guaiFENesin-codeine 100-10 MG/5ML Soln solution    ROBITUSSIN AC    236 mL    Take 5 mLs by mouth every 4 hours as needed for cough    Acute bronchitis, unspecified organism       methocarbamol 750 MG tablet    ROBAXIN    20 tablet    TAKE ONE TABLET BY MOUTH THREE TIMES DAILY AS NEEDED FOR MUSCLE SPASM    Muscle  spasm       mometasone-formoterol 100-5 MCG/ACT oral inhaler    DULERA    13 g    Inhale 2 puffs into the lungs 2 times daily    Mild intermittent asthma without complication, Acute bronchitis, unspecified organism       nicotine 21 MG/24HR 24 hr patch    NICODERM CQ    30 patch    Place 1 patch onto the skin every 24 hours    Tobacco use disorder       nicotine polacrilex 2 MG gum    EQ NICOTINE    30 tablet    Place 1 each (2 mg) inside cheek as needed for smoking cessation    Tobacco use disorder       nystatin Powd     60 g    Externally apply 1 Dose topically every 6 hours as needed    Genital pruritus       terbinafine 250 MG tablet    lamISIL    90 tablet    Take 1 tablet (250 mg) by mouth daily    Onychomycosis       traMADol 50 MG tablet    ULTRAM    30 tablet    TAKE ONE TABLET BY MOUTH EVERY 6 HOURS AS NEEDED FOR  MODERATE  PAIN    Other chronic pain       traZODone 100 MG tablet    DESYREL    180 tablet    Take 2 tablets (200 mg) by mouth At Bedtime    Primary insomnia       warfarin 5 MG tablet    COUMADIN    180 tablet    TAKE TWO TO THREE TABLETS BY MOUTH ONCE DAILY  OR AS DIRECTED BY INR CLINIC    Acute deep vein thrombosis (DVT) of proximal vein of lower extremity, unspecified laterality (H)

## 2018-02-07 NOTE — PROGRESS NOTES
HPI      SUBJECTIVE:   Lima Bueno is a 47 year old female who presents to clinic today for the following health issues:    Chief Complaint   Patient presents with     Follow Up For     Ultrasound     I saw the pt yesterday for swelling of the right leg   Positive US for DVT today   She is on coumadin and has been adequately anticoagulated for at least 6 months (slightly lower INR once) but has been on coumadin since 2012 after a DVT and PE  Hematology workup showed a slight Protein S Deficiency       Past Medical History:   Diagnosis Date     ADD (attention deficit disorder with hyperactivity)      Asthma      Breast mass 11/1/2012    Patient yet to have biopsy as of 11/1/2012       DVT (deep venous thrombosis) (H) 10/12    right leg     LSIL (low grade squamous intraepithelial lesion) on Pap smear 02/19/14    Neg high risk HPV     Pulmonary embolism (H) 2012    Saw Dr Toledo, protein S deficiency     No past surgical history on file.  Social History   Substance Use Topics     Smoking status: Current Every Day Smoker     Packs/day: 0.50     Types: Cigarettes     Smokeless tobacco: Never Used     Alcohol use No     Current Outpatient Prescriptions   Medication Sig Dispense Refill     albuterol (PROAIR HFA) 108 (90 BASE) MCG/ACT Inhaler Inhale 2 puffs into the lungs every 4 hours as needed for shortness of breath / dyspnea 1 Inhaler 11     mometasone-formoterol (DULERA) 100-5 MCG/ACT oral inhaler Inhale 2 puffs into the lungs 2 times daily 13 g 3     traMADol (ULTRAM) 50 MG tablet TAKE ONE TABLET BY MOUTH EVERY 6 HOURS AS NEEDED FOR  MODERATE  PAIN 30 tablet 0     warfarin (COUMADIN) 5 MG tablet TAKE TWO TO THREE TABLETS BY MOUTH ONCE DAILY  OR AS DIRECTED BY INR CLINIC 180 tablet 0     terbinafine (LAMISIL) 250 MG tablet Take 1 tablet (250 mg) by mouth daily 90 tablet 0     guaiFENesin-codeine (ROBITUSSIN AC) 100-10 MG/5ML SOLN solution Take 5 mLs by mouth every 4 hours as needed for cough 236 mL 0      nicotine (NICODERM CQ) 21 MG/24HR 24 hr patch Place 1 patch onto the skin every 24 hours 30 patch 3     nicotine polacrilex (EQ NICOTINE) 2 MG gum Place 1 each (2 mg) inside cheek as needed for smoking cessation 30 tablet 3     traZODone (DESYREL) 100 MG tablet Take 2 tablets (200 mg) by mouth At Bedtime 180 tablet 3     nystatin POWD Externally apply 1 Dose topically every 6 hours as needed 60 g 1     COMPRESSION STOCKINGS 24-30mmHg, thigh high  Use as directed 2 each 2     methocarbamol (ROBAXIN) 750 MG tablet TAKE ONE TABLET BY MOUTH THREE TIMES DAILY AS NEEDED FOR MUSCLE SPASM 20 tablet 0     ciclopirox 8 % SOLN Apply daily for min 4 months.  First day of each week, file off old medication and roughen surface of nail. 3 Bottle 2     fexofenadine (ALLEGRA) 180 MG tablet Take 1 tablet (180 mg) by mouth daily (allergy symptoms) 30 tablet 0     fluticasone-salmeterol (ADVAIR-HFA) 230-21 MCG/ACT inhaler Inhale 2 puffs into the lungs 2 times daily 36 g 3     enoxaparin (LOVENOX) 80 MG/0.8ML injection Inject 0.8 mLs (80 mg) Subcutaneous 2 times daily Inject 0.8 mLs (80 mg) Subcutaneous 2 times daily for 10 days 16 mL 0     Allergies   Allergen Reactions     Phosphoric Acid Hives     Terbutaline Hives     Trazodone      Other reaction(s): Other - Describe In Comment Field  Facial nerve pain  Other reaction(s): Other - Describe In Comment Field  Facial nerve pain       Reviewed and updated as needed this visit by clinical staff and provider      ROS  Detailed as above          Physical Exam   Constitutional: She is well-developed, well-nourished, and in no distress.   Neurological: She is alert.   Psychiatric: Mood and affect normal.   Vitals reviewed.      Assessment and Plan:       ICD-10-CM    1. Acute deep vein thrombosis (DVT) of popliteal vein of right lower extremity (H) I82.431 ONC/HEME ADULT REFERRAL     DISCONTINUED: enoxaparin (LOVENOX) 80 MG/0.8ML injection   2. Mild intermittent asthma without complication  J45.20 albuterol (PROAIR HFA) 108 (90 BASE) MCG/ACT Inhaler     mometasone-formoterol (DULERA) 100-5 MCG/ACT oral inhaler   3. Acute bronchitis, unspecified organism J20.9 mometasone-formoterol (DULERA) 100-5 MCG/ACT oral inhaler       I called and spoke with hematology in regards to patient's positive DVT in the setting of anticoagulation. We have decided to stop the warfarin and switch her to Lovenox b.i.d. until she is able to follow-up next week with hematology for further evaluation and treatment. Patient has done Lovenox in the past and feels comfortable doing this.   Also sent in refill for her asthma medication as she has not been taking it for some time.  She knows that if new symptoms develop or she has any concerns, she should present to the ED for evaluation.      The total visit time was 15 minutes more  than 50% was spent in counseling and coordination of care as discussed above.      Liz Ace, APRN, CNP  [facility]

## 2018-02-13 ENCOUNTER — TELEPHONE (OUTPATIENT)
Dept: FAMILY MEDICINE | Facility: CLINIC | Age: 48
End: 2018-02-13

## 2018-02-13 DIAGNOSIS — I82.431 ACUTE DEEP VEIN THROMBOSIS (DVT) OF POPLITEAL VEIN OF RIGHT LOWER EXTREMITY (H): ICD-10-CM

## 2018-02-13 NOTE — TELEPHONE ENCOUNTER
Spoke with Liz  Advised that patient continue Lovenox BID.  New script Lovenox sent in as patient will be running out before appointment with Dr. Carranza    I called patient and left detailed VM for patient that I sent in new prescription for Lovenox and to keep appointment with Dr. Carranza on 2/19/18.     Maggie Moe RN

## 2018-02-13 NOTE — TELEPHONE ENCOUNTER
Patient diagnosed recently with DVT  Patient stopped Warfarin 2/7/18 and has only been doing Lovenox BID per Liz Ace's instruction below.   Patient scheduled 2/16/18 with Gonzalo Ace for paperwork and 2/19/18 Dr. Carranza Hematology/Onc     I called patient and spoke with her.   She reports that her abdomen is bruised from injections.   I explained that is normal for bruising due to Lovenox injections.   She has been rotating injection sites in abdomen.   She asked about other injection sites that she could use. I said since it is sub-q and she is administering herself, the abdomen is the best location for injection.   Patient understood.   She also asked about when she should be following up for INR.   I explained that since she has stopped Warfarin at this time, that we will wait to see what Dr. Carranza has to say at her 2/19/18 appointment on what patient should be doing going forward.    ROGERIO to Liz.     Maggie Moe RN         2/7/18 Office Visit w/ Db for Ultrasound Review   I called and spoke with hematology in regards to patient's positive DVT in the setting of anticoagulation. We have decided to stop the warfarin and switch her to Lovenox b.i.d. until she is able to follow-up next week with hematology for further evaluation and treatment. Patient has done Lovenox in the past and feels comfortable doing this.   Also sent in refill for her asthma medication as she has not been taking it for some time.  She knows that if new symptoms develop or she has any concerns, she should present to the ED for evaluation.    Liz Ace, APRN, CNP  [facility]

## 2018-02-13 NOTE — TELEPHONE ENCOUNTER
Reason for Call:  Other INR question    Detailed comments: Pt states her Lovenox injections are bruising her stomach. She would like to speak with a nurse about these symptoms     Phone Number Patient can be reached at: Home number on file [phone] (home)    Best Time: Anytime    Can we leave a detailed message on this number? YES    Call taken on 2/13/2018 at 9:16 AM by Emmie Molina

## 2018-02-16 ENCOUNTER — OFFICE VISIT (OUTPATIENT)
Dept: FAMILY MEDICINE | Facility: CLINIC | Age: 48
End: 2018-02-16
Payer: COMMERCIAL

## 2018-02-16 ENCOUNTER — TELEPHONE (OUTPATIENT)
Dept: ONCOLOGY | Facility: CLINIC | Age: 48
End: 2018-02-16

## 2018-02-16 VITALS
DIASTOLIC BLOOD PRESSURE: 87 MMHG | HEIGHT: 65 IN | SYSTOLIC BLOOD PRESSURE: 134 MMHG | TEMPERATURE: 98.2 F | OXYGEN SATURATION: 97 % | HEART RATE: 91 BPM

## 2018-02-16 DIAGNOSIS — I82.4Y1 DEEP VEIN THROMBOSIS (DVT) OF PROXIMAL VEIN OF RIGHT LOWER EXTREMITY, UNSPECIFIED CHRONICITY (H): ICD-10-CM

## 2018-02-16 DIAGNOSIS — Z02.89 ENCOUNTER FOR COMPLETION OF FORM WITH PATIENT: Primary | ICD-10-CM

## 2018-02-16 PROCEDURE — 99213 OFFICE O/P EST LOW 20 MIN: CPT | Performed by: NURSE PRACTITIONER

## 2018-02-16 NOTE — PROGRESS NOTES
HPI      SUBJECTIVE:   Lima Bueno is a 47 year old female who presents to clinic today for the following health issues:    Recheck DVT - needs note for work stating restrictions  No complaints today     Past Medical History:   Diagnosis Date     ADD (attention deficit disorder with hyperactivity)      Asthma      Breast mass 11/1/2012    Patient yet to have biopsy as of 11/1/2012       DVT (deep venous thrombosis) (H) 10/12    right leg     LSIL (low grade squamous intraepithelial lesion) on Pap smear 02/19/14    Neg high risk HPV     Pulmonary embolism (H) 2012    Saw Dr Toledo, protein S deficiency     No past surgical history on file.  Social History   Substance Use Topics     Smoking status: Current Every Day Smoker     Packs/day: 0.50     Types: Cigarettes     Smokeless tobacco: Never Used     Alcohol use No     Current Outpatient Prescriptions   Medication Sig Dispense Refill     order for DME Equipment being ordered: knee high compression stockings, medium pressure, length of use=99 2 each 0     order for DME Equipment being ordered: sharps container 1 each 0     enoxaparin (LOVENOX) 80 MG/0.8ML injection Inject 0.8 mLs (80 mg) Subcutaneous 2 times daily Inject 0.8 mLs (80 mg) Subcutaneous 2 times daily for 10 days 16 mL 0     fluticasone-salmeterol (ADVAIR-HFA) 230-21 MCG/ACT inhaler Inhale 2 puffs into the lungs 2 times daily 36 g 3     albuterol (PROAIR HFA) 108 (90 BASE) MCG/ACT Inhaler Inhale 2 puffs into the lungs every 4 hours as needed for shortness of breath / dyspnea 1 Inhaler 11     mometasone-formoterol (DULERA) 100-5 MCG/ACT oral inhaler Inhale 2 puffs into the lungs 2 times daily 13 g 3     traMADol (ULTRAM) 50 MG tablet TAKE ONE TABLET BY MOUTH EVERY 6 HOURS AS NEEDED FOR  MODERATE  PAIN 30 tablet 0     warfarin (COUMADIN) 5 MG tablet TAKE TWO TO THREE TABLETS BY MOUTH ONCE DAILY  OR AS DIRECTED BY INR CLINIC 180 tablet 0     terbinafine (LAMISIL) 250 MG tablet Take 1 tablet (250  "mg) by mouth daily 90 tablet 0     guaiFENesin-codeine (ROBITUSSIN AC) 100-10 MG/5ML SOLN solution Take 5 mLs by mouth every 4 hours as needed for cough 236 mL 0     nicotine (NICODERM CQ) 21 MG/24HR 24 hr patch Place 1 patch onto the skin every 24 hours 30 patch 3     nicotine polacrilex (EQ NICOTINE) 2 MG gum Place 1 each (2 mg) inside cheek as needed for smoking cessation 30 tablet 3     traZODone (DESYREL) 100 MG tablet Take 2 tablets (200 mg) by mouth At Bedtime 180 tablet 3     nystatin POWD Externally apply 1 Dose topically every 6 hours as needed 60 g 1     COMPRESSION STOCKINGS 24-30mmHg, thigh high  Use as directed 2 each 2     methocarbamol (ROBAXIN) 750 MG tablet TAKE ONE TABLET BY MOUTH THREE TIMES DAILY AS NEEDED FOR MUSCLE SPASM 20 tablet 0     ciclopirox 8 % SOLN Apply daily for min 4 months.  First day of each week, file off old medication and roughen surface of nail. 3 Bottle 2     fexofenadine (ALLEGRA) 180 MG tablet Take 1 tablet (180 mg) by mouth daily (allergy symptoms) 30 tablet 0     Allergies   Allergen Reactions     Phosphoric Acid Hives     Terbutaline Hives     Trazodone      Other reaction(s): Other - Describe In Comment Field  Facial nerve pain  Other reaction(s): Other - Describe In Comment Field  Facial nerve pain       Reviewed and updated as needed this visit by clinical staff and provider      ROS  Detailed as above       /87 (BP Location: Right arm, Cuff Size: Adult Large)  Pulse 91  Temp 98.2  F (36.8  C) (Tympanic)  Ht 5' 5\" (1.651 m)  SpO2 97%  Breastfeeding? No      Physical Exam   Constitutional: She is well-developed, well-nourished, and in no distress.   Neurological: She is alert.   Psychiatric: Mood and affect normal.   Vitals reviewed.      Assessment and Plan:       ICD-10-CM    1. Encounter for completion of form with patient Z02.89    2. Deep vein thrombosis (DVT) of proximal vein of right lower extremity, unspecified chronicity (H) I82.4Y1 order for DME    "  order for DME       Completed work form with patient and faxed to her job.  Patient is given copy.      The total visit time was 10 minutes more  than 50% was spent in counseling and coordination of care as discussed above.      MISTY Burt, CNP  [facility]

## 2018-02-16 NOTE — NURSING NOTE
"Chief Complaint   Patient presents with     Forms     RECHECK       Initial /87 (BP Location: Right arm, Cuff Size: Adult Large)  Pulse 91  Temp 98.2  F (36.8  C) (Tympanic)  Ht 5' 5\" (1.651 m)  SpO2 97%  Breastfeeding? No Estimated body mass index is 28.29 kg/(m^2) as calculated from the following:    Height as of 10/4/17: 5' 5\" (1.651 m).    Weight as of 10/4/17: 170 lb (77.1 kg).  Medication Reconciliation: complete   Zakiya Correa MA      "

## 2018-02-16 NOTE — TELEPHONE ENCOUNTER
I called and spoke and LVM with patient introducing myself. All records are in [facility]. Patient previously seen by another provider her  4 years ago.  Will follow as needed. Danya Wen

## 2018-02-16 NOTE — LETTER
[place] [address] [facility] [address]  Phone: [phone]    February 16, 2018          To whom it may concern:      RE: Lima Bueno      Patient was seen and treated today at our clinic.  Please excuse her on 2/10/18 and today 2/16/18       Please contact me for questions or concerns.      Sincerely,        MISTY Almonte CNP

## 2018-02-16 NOTE — MR AVS SNAPSHOT
After Visit Summary   2/16/2018    Lima Bueno    MRN: [id number]           Patient Information     Date Of Birth          1970        Visit Information        Provider Department      2/16/2018 1:30 PM Liz Ace APRN CNP [facility]        Today's Diagnoses     Encounter for completion of form with patient    -  1    Deep vein thrombosis (DVT) of proximal vein of right lower extremity, unspecified chronicity (H)           Follow-ups after your visit        Your next 10 appointments already scheduled     Feb 19, 2018  2:30 PM CST   New Visit with Briana Carranza MD   [facility] ([facility])    [facility]  [address]  [facility] [id number] [phone]            Mar 07, 2018  3:45 PM CST   Anticoagulation Visit with  ANTICOAGULATION CLINIC   [facility]    [address]  [facility] [id number] [phone]              Who to contact     If you have questions or need follow up information about today's clinic visit or your schedule please contact [facility] directly at [phone].  Normal or non-critical lab and imaging results will be communicated to you by MyChart, letter or phone within 4 business days after the clinic has received the results. If you do not hear from us within 7 days, please contact the clinic through MyChart or phone. If you have a critical or abnormal lab result, we will notify you by phone as soon as possible.  Submit refill requests through Sentrigo or call your pharmacy and they will forward the refill request to us. Please allow 3 business days for your refill to be completed.          Additional Information About Your Visit        MyChart Information     Sentrigo lets you send messages to your doctor, view your test results, renew your prescriptions, schedule appointments and more. To sign up, go to  [email or web address] . Click on \"Log in\" on the left side of the screen, which will take you to the Welcome page. Then click on \"Sign up Now\" on the right side of the page.     You will be asked to enter the access code listed below, as well as some personal information. Please follow the directions to create your username and password.     Your access code is: [email or web address]  Expires: 2018 10:50 AM     Your access code will  in 90 days. If you need help or a new code, please call your [place] clinic or [phone].        Care EveryWhere ID     This is your Care EveryWhere ID. This could be used by other organizations to access your [place] medical records  [id number]        Your Vitals Were     Pulse Temperature Height Pulse Oximetry Breastfeeding?       91 98.2  F (36.8  C) (Tympanic) 5' 5\" (1.651 m) 97% No        Blood Pressure from Last 3 Encounters:   18 134/87   18 (!) 139/93   10/04/17 (!) 134/93    Weight from Last 3 Encounters:   10/04/17 170 lb (77.1 kg)   17 168 lb (76.2 kg)   17 168 lb (76.2 kg)              Today, you had the following     No orders found for display         Today's Medication Changes          These changes are accurate as of 18  4:43 PM.  If you have any questions, ask your nurse or doctor.               Start taking these medicines.        Dose/Directions    * order for DME   Used for:  Deep vein thrombosis (DVT) of proximal vein of right lower extremity, unspecified chronicity (H)   Started by:  Liz Ace APRN CNP        Equipment being ordered: knee high compression stockings, medium pressure, length of use=99   Quantity:  2 each   Refills:  0       * order for DME   Used for:  Deep vein thrombosis (DVT) of proximal vein of right lower extremity, unspecified chronicity (H)   Started by:  Liz Ace APRN CNP        Equipment being ordered: sharps container   Quantity:  1 each   Refills:  0       * Notice:  This list " has 2 medication(s) that are the same as other medications prescribed for you. Read the directions carefully, and ask your doctor or other care provider to review them with you.         Where to get your medicines      Some of these will need a paper prescription and others can be bought over the counter.  Ask your nurse if you have questions.     Bring a paper prescription for each of these medications     order for DME    order for DME                Primary Care Provider Office Phone # Fax #    Oscar Baltazar [phone] [address] [address]        Equal Access to Services     [facility]: Hadii aad ku hadasho Soomaali, waaxda luqadaha, qaybta kaalmada denny, jack hurtado . So [facility] [phone].    ATENCIÓN: Si habla español, tiene a unger disposición servicios gratuitos de asistencia lingüística. [facility] [phone].    We comply with applicable federal civil rights laws and [place] laws. We do not discriminate on the basis of race, color, national origin, age, disability, sex, sexual orientation, or gender identity.            Thank you!     Thank you for choosing [facility]  for your care. Our goal is always to provide you with excellent care. Hearing back from our patients is one way we can continue to improve our services. Please take a few minutes to complete the written survey that you may receive in the mail after your visit with us. Thank you!             Your Updated Medication List - Protect others around you: Learn how to safely use, store and throw away your medicines at www.disposemymeds.org.          This list is accurate as of 2/16/18  4:43 PM.  Always use your most recent med list.                   Brand Name Dispense Instructions for use Diagnosis    albuterol 108 (90 BASE) MCG/ACT Inhaler    PROAIR HFA    1 Inhaler    Inhale 2 puffs into the lungs every 4 hours as needed for shortness of breath  / dyspnea    Mild intermittent asthma without complication       ciclopirox 8 % Soln     3 Bottle    Apply daily for min 4 months.  First day of each week, file off old medication and roughen surface of nail.    Onychomycosis       COMPRESSION STOCKINGS     2 each    24-30mmHg, thigh high Use as directed    Thrombus       enoxaparin 80 MG/0.8ML injection    LOVENOX    16 mL    Inject 0.8 mLs (80 mg) Subcutaneous 2 times daily Inject 0.8 mLs (80 mg) Subcutaneous 2 times daily for 10 days    Acute deep vein thrombosis (DVT) of popliteal vein of right lower extremity (H)       fexofenadine 180 MG tablet    ALLEGRA    30 tablet    Take 1 tablet (180 mg) by mouth daily (allergy symptoms)        fluticasone-salmeterol 230-21 MCG/ACT inhaler    ADVAIR-HFA    36 g    Inhale 2 puffs into the lungs 2 times daily    Persistent asthma without complication, unspecified asthma severity       guaiFENesin-codeine 100-10 MG/5ML Soln solution    ROBITUSSIN AC    236 mL    Take 5 mLs by mouth every 4 hours as needed for cough    Acute bronchitis, unspecified organism       methocarbamol 750 MG tablet    ROBAXIN    20 tablet    TAKE ONE TABLET BY MOUTH THREE TIMES DAILY AS NEEDED FOR MUSCLE SPASM    Muscle spasm       mometasone-formoterol 100-5 MCG/ACT oral inhaler    DULERA    13 g    Inhale 2 puffs into the lungs 2 times daily    Mild intermittent asthma without complication, Acute bronchitis, unspecified organism       nicotine 21 MG/24HR 24 hr patch    NICODERM CQ    30 patch    Place 1 patch onto the skin every 24 hours    Tobacco use disorder       nicotine polacrilex 2 MG gum    EQ NICOTINE    30 tablet    Place 1 each (2 mg) inside cheek as needed for smoking cessation    Tobacco use disorder       nystatin Powd     60 g    Externally apply 1 Dose topically every 6 hours as needed    Genital pruritus       * order for DME     2 each    Equipment being ordered: knee high compression stockings, medium pressure, length of use=99     Deep vein thrombosis (DVT) of proximal vein of right lower extremity, unspecified chronicity (H)       * order for DME     1 each    Equipment being ordered: sharps container    Deep vein thrombosis (DVT) of proximal vein of right lower extremity, unspecified chronicity (H)       terbinafine 250 MG tablet    lamISIL    90 tablet    Take 1 tablet (250 mg) by mouth daily    Onychomycosis       traMADol 50 MG tablet    ULTRAM    30 tablet    TAKE ONE TABLET BY MOUTH EVERY 6 HOURS AS NEEDED FOR  MODERATE  PAIN    Other chronic pain       traZODone 100 MG tablet    DESYREL    180 tablet    Take 2 tablets (200 mg) by mouth At Bedtime    Primary insomnia       warfarin 5 MG tablet    COUMADIN    180 tablet    TAKE TWO TO THREE TABLETS BY MOUTH ONCE DAILY  OR AS DIRECTED BY INR CLINIC    Acute deep vein thrombosis (DVT) of proximal vein of lower extremity, unspecified laterality (H)       * Notice:  This list has 2 medication(s) that are the same as other medications prescribed for you. Read the directions carefully, and ask your doctor or other care provider to review them with you.

## 2018-02-19 ENCOUNTER — HOSPITAL ENCOUNTER (OUTPATIENT)
Facility: CLINIC | Age: 48
Setting detail: SPECIMEN
Discharge: HOME | End: 2018-02-19
Attending: NURSE PRACTITIONER | Admitting: INTERNAL MEDICINE
Payer: COMMERCIAL

## 2018-02-19 ENCOUNTER — ONCOLOGY VISIT (OUTPATIENT)
Dept: ONCOLOGY | Facility: CLINIC | Age: 48
End: 2018-02-19
Attending: NURSE PRACTITIONER
Payer: COMMERCIAL

## 2018-02-19 VITALS
SYSTOLIC BLOOD PRESSURE: 115 MMHG | OXYGEN SATURATION: 97 % | DIASTOLIC BLOOD PRESSURE: 79 MMHG | TEMPERATURE: 98.2 F | RESPIRATION RATE: 14 BRPM | HEART RATE: 92 BPM

## 2018-02-19 DIAGNOSIS — I82.4Y1 DEEP VEIN THROMBOSIS (DVT) OF PROXIMAL VEIN OF RIGHT LOWER EXTREMITY, UNSPECIFIED CHRONICITY (H): Primary | ICD-10-CM

## 2018-02-19 LAB
CREAT SERPL-MCNC: 0.75 MG/DL (ref 0.52–1.04)
ERYTHROCYTE [DISTWIDTH] IN BLOOD BY AUTOMATED COUNT: 15.3 % (ref 10–15)
GFR SERPL CREATININE-BSD FRML MDRD: 83 ML/MIN/1.7M2
HCT VFR BLD AUTO: 42.4 % (ref 35–47)
HGB BLD-MCNC: 14.2 G/DL (ref 11.7–15.7)
MCH RBC QN AUTO: 28.6 PG (ref 26.5–33)
MCHC RBC AUTO-ENTMCNC: 33.5 G/DL (ref 31.5–36.5)
MCV RBC AUTO: 86 FL (ref 78–100)
PLATELET # BLD AUTO: 224 10E9/L (ref 150–450)
RBC # BLD AUTO: 4.96 10E12/L (ref 3.8–5.2)
WBC # BLD AUTO: 5.6 10E9/L (ref 4–11)

## 2018-02-19 PROCEDURE — 85306 CLOT INHIBIT PROT S FREE: CPT | Performed by: INTERNAL MEDICINE

## 2018-02-19 PROCEDURE — 85303 CLOT INHIBIT PROT C ACTIVITY: CPT | Performed by: INTERNAL MEDICINE

## 2018-02-19 PROCEDURE — 36415 COLL VENOUS BLD VENIPUNCTURE: CPT

## 2018-02-19 PROCEDURE — G0463 HOSPITAL OUTPT CLINIC VISIT: HCPCS

## 2018-02-19 PROCEDURE — 85027 COMPLETE CBC AUTOMATED: CPT | Performed by: INTERNAL MEDICINE

## 2018-02-19 PROCEDURE — 99204 OFFICE O/P NEW MOD 45 MIN: CPT | Performed by: INTERNAL MEDICINE

## 2018-02-19 PROCEDURE — 82565 ASSAY OF CREATININE: CPT | Performed by: INTERNAL MEDICINE

## 2018-02-19 ASSESSMENT — PAIN SCALES - GENERAL: PAINLEVEL: NO PAIN (0)

## 2018-02-19 NOTE — MR AVS SNAPSHOT
After Visit Summary   2/19/2018    Lima Bueno    MRN: [id number]           Patient Information     Date Of Birth          1970        Visit Information        Provider Department      2/19/2018 2:30 PM Briana Carranza MD [facility]        Today's Diagnoses     Deep vein thrombosis (DVT) of proximal vein of right lower extremity, unspecified chronicity (H)    -  1      Care Instructions    CT scan.  Follow up after CT scan.  Start xarelto tomorrow. 15 mg twice a day for 21 days.  Stop lovenox after evening dose today.  Labs today.          Follow-ups after your visit        Your next 10 appointments already scheduled     Feb 26, 2018 10:00 AM CST   (Arrive by 9:45 AM)   CT CHEST ABDOMEN PELVIS W/O & W CONTRAST with SHCT1   [facility] ([facility])    [address] [place] [id number] [phone]           Please bring any scans or X-rays taken at other hospitals, if similar tests were done. Also bring a list of your medicines, including vitamins, minerals and over-the-counter drugs. It is safest to leave personal items at home.  Be sure to tell your doctor:   If you have any allergies.   If there s any chance you are pregnant.   If you are breastfeeding.  You may have contrast for this exam. To prepare:   Do not eat or drink for 2 hours before your exam. If you need to take medicine, you may take it with small sips of water. (We may ask you to take liquid medicine as well.)   The day before your exam, drink extra fluids at least six 8-ounce glasses (unless your doctor tells you to restrict your fluids).   You will be given instructions on how to drink the contrast.  Patients over 70 or patients with diabetes or kidney problems:   If you haven t had a blood test (creatinine test) within the last 30 days, the Cardiologist/Radiologist may require you to get this test prior to your exam.  If you have diabetes:   Continue to take your  "metformin medication on the day of your exam  Please wear loose clothing, such as a sweat suit or jogging clothes. Avoid snaps, zippers and other metal. We may ask you to undress and put on a hospital gown.  If you have any questions, please call the Imaging Department where you will have your exam.            Feb 28, 2018  3:00 PM CST   Return Visit with Briana Carranza MD   [facility] ([facility])    [facility]  [address]  [facility] [address] [phone]            Mar 07, 2018  3:45 PM CST   Anticoagulation Visit with  ANTICOAGULATION CLINIC   [facility]    [address]  [place] [id number] [phone]              Future tests that were ordered for you today     Open Future Orders        Priority Expected Expires Ordered    CT Chest Abdomen Pelvis w/o & w Contrast Routine 2/19/2018 2/19/2019 2/19/2018            Who to contact     If you have questions or need follow up information about today's clinic visit or your schedule please contact [facility] directly at [phone].  Normal or non-critical lab and imaging results will be communicated to you by MyChart, letter or phone within 4 business days after the clinic has received the results. If you do not hear from us within 7 days, please contact the clinic through Zomazzhart or phone. If you have a critical or abnormal lab result, we will notify you by phone as soon as possible.  Submit refill requests through Keybroker or call your pharmacy and they will forward the refill request to us. Please allow 3 business days for your refill to be completed.          Additional Information About Your Visit        ZomazzharSignal Data Information     Keybroker lets you send messages to your doctor, view your test results, renew your prescriptions, schedule appointments and more. To sign up, go to [email or web address] . Click on \"Log in\" on the left side of the " "screen, which will take you to the Welcome page. Then click on \"Sign up Now\" on the right side of the page.     You will be asked to enter the access code listed below, as well as some personal information. Please follow the directions to create your username and password.     Your access code is: [email or web address]  Expires: 2018 10:50 AM     Your access code will  in 90 days. If you need help or a new code, please call your [facility] or [phone].        Care EveryWhere ID     This is your Care EveryWhere ID. This could be used by other organizations to access your [place] medical records  [id number]        Your Vitals Were     Pulse Temperature Respirations Pulse Oximetry          92 98.2  F (36.8  C) (Oral) 14 97%         Blood Pressure from Last 3 Encounters:   18 115/79   18 134/87   18 (!) 139/93    Weight from Last 3 Encounters:   10/04/17 77.1 kg (170 lb)   17 76.2 kg (168 lb)   17 76.2 kg (168 lb)              We Performed the Following     CBC with platelets     Creatinine     Protein C chromogenic     Protein S Antigen Free          Today's Medication Changes          These changes are accurate as of 18  3:57 PM.  If you have any questions, ask your nurse or doctor.               Start taking these medicines.        Dose/Directions    rivaroxaban ANTICOAGULANT 15 MG Tabs tablet   Commonly known as:  XARELTO   Used for:  Deep vein thrombosis (DVT) of proximal vein of right lower extremity, unspecified chronicity (H)        Dose:  15 mg   Take 1 tablet (15 mg) by mouth 2 times daily (with meals) for 21 days   Quantity:  42 tablet   Refills:  0         Stop taking these medicines if you haven't already. Please contact your care team if you have questions.     warfarin 5 MG tablet   Commonly known as:  COUMADIN                Where to get your medicines      These medications were sent to [facility] [address] [place] [address]  [address] [facility] [address]     Phone:  [phone]     rivaroxaban ANTICOAGULANT 15 MG Tabs tablet                Primary Care Provider Office Phone # Fax #    Oscar Baltazar [phone] [address]  [facility] [address]        Equal Access to Services     [facility] : Hadii aad ku hadasho Soomaali, waaxda luqadaha, qaybta kaalmada adeegyada, waxay idiin hayaan adeeg kharash la'aan . So [facility] [phone].    ATENCIÓN: Si habla español, tiene a unger disposición servicios gratuitos de asistencia lingüística. Llame al [phone].    We comply with applicable federal civil rights laws and [place] laws. We do not discriminate on the basis of race, color, national origin, age, disability, sex, sexual orientation, or gender identity.            Thank you!     Thank you for choosing [facility]  for your care. Our goal is always to provide you with excellent care. Hearing back from our patients is one way we can continue to improve our services. Please take a few minutes to complete the written survey that you may receive in the mail after your visit with us. Thank you!             Your Updated Medication List - Protect others around you: Learn how to safely use, store and throw away your medicines at www.disposemymeds.org.          This list is accurate as of 2/19/18  3:57 PM.  Always use your most recent med list.                   Brand Name Dispense Instructions for use Diagnosis    albuterol 108 (90 BASE) MCG/ACT Inhaler    PROAIR HFA    1 Inhaler    Inhale 2 puffs into the lungs every 4 hours as needed for shortness of breath / dyspnea    Mild intermittent asthma without complication       ciclopirox 8 % Soln     3 Bottle    Apply daily for min 4 months.  First day of each week, file off old medication and roughen surface of nail.    Onychomycosis       COMPRESSION STOCKINGS     2 each    24-30mmHg, thigh high Use as directed    Thrombus        enoxaparin 80 MG/0.8ML injection    LOVENOX    16 mL    Inject 0.8 mLs (80 mg) Subcutaneous 2 times daily Inject 0.8 mLs (80 mg) Subcutaneous 2 times daily for 10 days    Acute deep vein thrombosis (DVT) of popliteal vein of right lower extremity (H)       fexofenadine 180 MG tablet    ALLEGRA    30 tablet    Take 1 tablet (180 mg) by mouth daily (allergy symptoms)        fluticasone-salmeterol 230-21 MCG/ACT inhaler    ADVAIR-HFA    36 g    Inhale 2 puffs into the lungs 2 times daily    Persistent asthma without complication, unspecified asthma severity       guaiFENesin-codeine 100-10 MG/5ML Soln solution    ROBITUSSIN AC    236 mL    Take 5 mLs by mouth every 4 hours as needed for cough    Acute bronchitis, unspecified organism       methocarbamol 750 MG tablet    ROBAXIN    20 tablet    TAKE ONE TABLET BY MOUTH THREE TIMES DAILY AS NEEDED FOR MUSCLE SPASM    Muscle spasm       mometasone-formoterol 100-5 MCG/ACT oral inhaler    DULERA    13 g    Inhale 2 puffs into the lungs 2 times daily    Mild intermittent asthma without complication, Acute bronchitis, unspecified organism       nicotine 21 MG/24HR 24 hr patch    NICODERM CQ    30 patch    Place 1 patch onto the skin every 24 hours    Tobacco use disorder       nicotine polacrilex 2 MG gum    EQ NICOTINE    30 tablet    Place 1 each (2 mg) inside cheek as needed for smoking cessation    Tobacco use disorder       nystatin Powd     60 g    Externally apply 1 Dose topically every 6 hours as needed    Genital pruritus       * order for DME     2 each    Equipment being ordered: knee high compression stockings, medium pressure, length of use=99    Deep vein thrombosis (DVT) of proximal vein of right lower extremity, unspecified chronicity (H)       * order for DME     1 each    Equipment being ordered: sharps container    Deep vein thrombosis (DVT) of proximal vein of right lower extremity, unspecified chronicity (H)       rivaroxaban ANTICOAGULANT 15 MG Tabs  tablet    XARELTO    42 tablet    Take 1 tablet (15 mg) by mouth 2 times daily (with meals) for 21 days    Deep vein thrombosis (DVT) of proximal vein of right lower extremity, unspecified chronicity (H)       terbinafine 250 MG tablet    lamISIL    90 tablet    Take 1 tablet (250 mg) by mouth daily    Onychomycosis       traMADol 50 MG tablet    ULTRAM    30 tablet    TAKE ONE TABLET BY MOUTH EVERY 6 HOURS AS NEEDED FOR  MODERATE  PAIN    Other chronic pain       traZODone 100 MG tablet    DESYREL    180 tablet    Take 2 tablets (200 mg) by mouth At Bedtime    Primary insomnia       * Notice:  This list has 2 medication(s) that are the same as other medications prescribed for you. Read the directions carefully, and ask your doctor or other care provider to review them with you.

## 2018-02-19 NOTE — LETTER
"    2/19/2018         RE: Lima Bueno  [address]  [facility] [address]        Dear Colleague,    Thank you for referring your patient, Lima Bueno, to the [facility]. Please see a copy of my visit note below.    Oncology Rooming Note    February 19, 2018 2:42 PM   Lima Bueno is a 47 year old female who presents for:    Chief Complaint   Patient presents with     Oncology Clinic Visit     Initial Vitals: /79 (BP Location: Left arm, Patient Position: Chair, Cuff Size: Adult Large)  Pulse 92  Temp 98.2  F (36.8  C) (Oral)  Resp 14  SpO2 97% Estimated body mass index is 28.29 kg/(m^2) as calculated from the following:    Height as of 10/4/17: 1.651 m (5' 5\").    Weight as of 10/4/17: 77.1 kg (170 lb). There is no height or weight on file to calculate BSA.  No Pain (0) Comment: Data Unavailable   No LMP recorded. Patient is not currently having periods (Reason: Irregular Periods).  Allergies reviewed: Yes  Medications reviewed: Yes    Medications: Medication refills not needed today.  Pharmacy name entered into [employer]:    [facility] - [place], [place] - [address] [facility] [address] - [place], [place]    Clinical concerns: None     5 minutes for nursing intake (face to face time)     Zakiya Leiva CMA            Medical Assistant Note:  Lima Bueno presents today for labs.    Patient seen by provider today: Yes.   present during visit today: Not Applicable.    Concerns: No Concerns.    Procedure:  Lab draw site: LAC, Needle type: BF, Gauge: 21.    Post Assessment:  Labs drawn without difficulty: Yes.    Discharge Plan:  Departure Mode: Ambulatory.    Face to Face Time: 5 minutes.    Zakiya Leiva MA              Visit Date:   02/19/2018      REFERRING PHYSICIAN, REASON FOR CONSULTATION: This consult has been requested by MISTY Burt, CNP, for recurrent DVT was " on warfarin.      HISTORY OF PRESENT ILLNESS:  Ms. Bueno is a 47-year-old female with history of DVT and pulmonary embolism as described below.    1.  Right lower extremity ultrasound was done on 10/05/2012 for pain and swelling.  It revealed occlusive DVT extending from the proximal right femoral vein into popliteal vein and peroneal veins in the calf.   2.  CT chest angiogram on 10/05/2012 revealed small pulmonary emboli bilaterally.  This was an unprovoked DVT and pulmonary embolism.  The patient was started on anticoagulation.  She is on warfarin since 2012.  She has been compliant.  INR generally has been therapeutic.   3.  The patient had multiple hypercoagulable workup done on 11/30/2012 -- antithrombin III normal at 104.   4.  Heparin lupus negative.   5.  Heparin protein C of 36.   6.  Protein S of less than 10.     7.  Anticardiolipin antibody IgG and IgM normal.   8.  No Factor V Leiden mutation.     9.  No prothrombin gene mutation.   10.  Right lower extremity ultrasound was done on 01/12/2017 because of foot swelling.  There was no acute DVT.  There is chronic nonocclusive thrombus in the right mid femoral to popliteal vein.  Previously this was occlusive.     11.  Because of leg swelling, repeat right lower extremity ultrasound was done on 02/07/2018.   It reveals new occlusive thrombus in the right popliteal vein.  There is chronic nonocclusive thrombus in the right superficial vein in the mid-thigh.   12.  Warfarin was stopped and enoxaparin was started on 02/07/2018.      The patient is doing well on enoxaparin.  No bleeding complication.  Denies any chest pain.  No shortness of breath.  No pain, swelling or redness in the extremities now.      I discussed with her regarding any provoking factor.  In 2012, there was no provoking factor.  She had not gone on any long plane or car ride.  There was no trauma to the leg.  She did not have any surgery.  She was not on any hormonal pills.  The patient  says that she has been very faithful with her warfarin.  She has been taking it regularly.  Her INR has been generally therapeutic.        She has an active lifestyle.  She has 2 jobs.  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  When she stands, she does not move much.  There is no provoking factor for this current DVT.  Her INR was therapeutic at 3.40 when her present episode of DVT was diagnosed.      REVIEW OF SYSTEMS:  The patient denies any headache.  No dizziness.  No neck pain.  No chest pain, difficulty breathing.  No abdominal pain, nausea or vomiting.  No urinary or bowel complaints.  No bleeding.  No fevers, chills, night sweats.      All other review of systems negative.      ALLERGIES:  REVIEWED.      MEDICATIONS:  Reviewed.      PAST MEDICAL HISTORY:   1.  Right lower extremity deep venous thrombosis.   2.  Bilateral lung pulmonary embolism, unprovoked.   3.  Asthma.   4.  Attention deficit disorder with hyperactivity.   5.  Spinal stenosis.   6.  Low protein S and C.  This was drawn when she was on warfarin.      PAST SURGICAL HISTORY:  Not significant.      SOCIAL HISTORY:  She smokes half a pack per day for almost 27 years.   No alcohol use.      FAMILY HISTORY:  Father  at age of 57 from organ failure.  Mother is living and doing fairly well for her age.  She has 3 brothers and 2 sisters.  Overall, they are doing well for their age.  No family history of blood clot.      PHYSICAL EXAMINATION:   GENERAL:  She was alert, oriented x 3.   VITAL SIGNS:  Reviewed.   Rest of the systems not examined.      ASSESSMENT:   1.  A 47-year-old female with recurrent right lower extremity deep venous thrombosis while on warfarin.  INR is therapeutic.   2.  Unprovoked right lower extremity deep venous thrombosis and bilateral pulmonary emboli diagnosed on 10/05/2012.     3.  Low protein C and S. This was drawn while patient was on warfarin.   4.  Other medical problems including asthma,  attention-deficit hyperactivity disorder, and smoking.      RECOMMENDATIONS:   1.  I had a long discussion with the patient regarding thrombosis.  The patient initially was diagnosed with right lower extremity deep venous thrombosis and bilateral pulmonary embolism in 10/2012.  It was unprovoked.  She has been on warfarin since then and she has been compliant.      The patient was now diagnosed with acute right lower extremity deep venous thrombosis on 02/07/2018.  This is unprovoked.  Her INR was therapeutic.      I discussed with her regarding different causes of recurrent thrombosis.  I discussed with her regarding subtherapeutic anticoagulation.  She has been on chronic warfarin.  She is compliant.  Her INR was therapeutic.  This is failure of warfarin.  I also discussed regarding other causes, including malignancy.  Given the recurrent thrombosis, we should rule out malignancy as a cause.      The patient does have chronic thrombus in the right lower extremity from previous DVT.  I told her that people who have chronic residual thrombus are at high risk of blood clot.      2.  Discussed regarding further workup.  I am going to get a CT chest, abdomen and pelvis to rule out malignancy.      I am also going to repeat protein C and S.  She has been off warfarin since February 7th.  I want to see what the levels are when she is not on warfarin      3.  Discussed with her regarding treatment.  The patient is currently on Lovenox.  I explained to her that we will switch her to Xarelto.  That will be convenient for her.  She wants to do that.  The patient will stop enoxaparin after today's dose.  Tomorrow she will start Xarelto 15 mg twice a day for 21 days.  After that, we will switch her to 20 mg a day of  Xarelto.  The patient agreeable for this plan.      4.  General ways to reduce thrombosis discussed.  I advised her to be active.  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  Also advised her to wear stockings.      5.  The patient advised to quit smoking.      6.  She had multiple questions, which were all answered.  I will see her after the CT scan.      Thanks for the consult.      Total face-to-face time spent 45 minutes, more than 50% was spent in counseling and coordination of care.         NGUYEN OWENS MD             D: 2018   T: 2018   MT: NTS      Name:     ROSA BAINS   MRN:      [id number]        Account:      [id number]   :      1970           Visit Date:   2018      Document: [id number]       Again, thank you for allowing me to participate in the care of your patient.        Sincerely,        Nguyen Owens MD

## 2018-02-19 NOTE — PROGRESS NOTES
"Oncology Rooming Note    February 19, 2018 2:42 PM   Lima Bueno is a 47 year old female who presents for:    Chief Complaint   Patient presents with     Oncology Clinic Visit     Initial Vitals: /79 (BP Location: Left arm, Patient Position: Chair, Cuff Size: Adult Large)  Pulse 92  Temp 98.2  F (36.8  C) (Oral)  Resp 14  SpO2 97% Estimated body mass index is 28.29 kg/(m^2) as calculated from the following:    Height as of 10/4/17: 1.651 m (5' 5\").    Weight as of 10/4/17: 77.1 kg (170 lb). There is no height or weight on file to calculate BSA.  No Pain (0) Comment: Data Unavailable   No LMP recorded. Patient is not currently having periods (Reason: Irregular Periods).  Allergies reviewed: Yes  Medications reviewed: Yes    Medications: Medication refills not needed today.  Pharmacy name entered into [employer]:    [facility] - [place], [place] - [address] [facility] - [place], [place]    Clinical concerns: None     5 minutes for nursing intake (face to face time)     Zakiya Leiva CMA            Medical Assistant Note:  Lima Bueno presents today for labs.    Patient seen by provider today: Yes.   present during visit today: Not Applicable.    Concerns: No Concerns.    Procedure:  Lab draw site: LAC, Needle type: BF, Gauge: 21.    Post Assessment:  Labs drawn without difficulty: Yes.    Discharge Plan:  Departure Mode: Ambulatory.    Face to Face Time: 5 minutes.    Zakiya Leiva MA            "

## 2018-02-19 NOTE — PATIENT INSTRUCTIONS
CT scan.  Scheduled/janice  Follow up after CT scan.   Scheduled/janice  Start xarelto tomorrow. 15 mg twice a day for 21 days.  Stop lovenox after evening dose today.  Labs today.      AVS printed & given to patient with oral contrast/yadi

## 2018-02-20 NOTE — PROGRESS NOTES
Visit Date:   02/19/2018      This consult has been requested by MISTY Burt, CNP, for recurrent DVT was on warfarin.      Ms. Bueno is a 47-year-old female with history of DVT and pulmonary embolism as described below.    1.  Right lower extremity ultrasound was done on 10/05/2012 for pain and swelling.  It revealed occlusive DVT extending from the proximal right femoral vein into popliteal vein and peroneal veins in the calf.   -CT chest angiogram on 10/05/2012 revealed small pulmonary emboli bilaterally.    -This was an unprovoked DVT and pulmonary embolism.    -Patient was started on anticoagulation.  She is on warfarin since 2012.  She has been compliant.  INR generally has been therapeutic.     2.  Hypercoagulable workup done on 11/30/2012   -Antithrombin III normal at 104.   -Lupus negative.   -Protein C of 36.   -Protein S of less than 10.     -Anticardiolipin antibody IgG and IgM normal.   -No Factor V Leiden mutation.     -No prothrombin gene mutation.     3.  Right lower extremity ultrasound was done on 01/12/2017 because of foot swelling.  There was no acute DVT.  There is chronic nonocclusive thrombus in the right mid femoral to popliteal vein.  Previously this was occlusive.       4.  Right lower extremity ultrasound was done on 02/07/2018 for leg swelling.   It reveals new occlusive thrombus in the right popliteal vein.  There is chronic nonocclusive thrombus in the right superficial femoral vein in the mid-thigh.     5.  Warfarin was stopped and enoxaparin started on 02/07/2018.      Patient is doing well on enoxaparin.  No bleeding complication.  Denies any chest pain.  No shortness of breath.  No pain, swelling or redness in the extremities now.      I discussed with her regarding any provoking factor.  In 2012, there was no provoking factor.  She had not gone on any long plane or car ride.  There was no trauma to the leg.  She did not have any surgery.  She was not on any hormonal  pills.  The patient says that she has been very faithful with her warfarin.  She has been taking it regularly.  Her INR has been generally therapeutic.        She has an active lifestyle.  She has 2 jobs.  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  When she stands, she does not move much.  There is no provoking factor for this current DVT.  Her INR was therapeutic at 3.40 when her present episode of DVT was diagnosed.      REVIEW OF SYSTEMS:    Patient denies any headache.  No dizziness.  No neck pain.  No chest pain or difficulty breathing.  No abdominal pain, nausea or vomiting.  No urinary or bowel complaints.  No bleeding.  No fevers, chills, night sweats.      All other review of systems negative.      ALLERGIES:  REVIEWED.      MEDICATIONS:  Reviewed.      PAST MEDICAL HISTORY:   1.  Right lower extremity deep venous thrombosis and bilateral lung pulmonary embolism in 10/2012.Unprovoked.   2.  Asthma.   3.  Attention deficit disorder with hyperactivity.   4.  Spinal stenosis.   5.  Low protein S and C.  This was drawn when she was on warfarin.      PAST SURGICAL HISTORY:    Not significant.      SOCIAL HISTORY:    -She smokes half a pack per day for almost 27 years.   -No alcohol use.      FAMILY HISTORY:    -Father  at age of 57 from organ failure.    -Mother is living and doing fairly well for her age.    -She has 3 brothers and 2 sisters.  Overall, they are doing well for their age.    -No family history of blood clot.      PHYSICAL EXAMINATION:   GENERAL:  She was alert, oriented x 3.   VITAL SIGNS:  Reviewed.   Rest of the systems not examined.      ASSESSMENT:   1.  A 47-year-old female with recurrent right lower extremity deep venous thrombosis while on warfarin.  INR was therapeutic.   2.  Unprovoked right lower extremity deep venous thrombosis and bilateral pulmonary emboli diagnosed on 10/05/2012.   3.  Low protein C and S. This was drawn while patient was on warfarin.   4.  Other  medical problems including asthma and attention-deficit hyperactivity disorder.  5. Smoking.      RECOMMENDATIONS:   1.  I had a long discussion with the patient regarding thrombosis.  Patient initially was diagnosed with right lower extremity deep venous thrombosis and bilateral pulmonary embolism in 10/2012. She has been on warfarin since then and she has been compliant.      Patient was now diagnosed with acute right lower extremity deep venous thrombosis on 02/07/2018.  This is unprovoked.  Her INR was therapeutic. This is warfarin failure.       I discussed with her regarding different causes of recurrent thrombosis including malignancy.  Given the recurrent thrombosis, we should rule out malignancy as a cause.      Patient does have chronic thrombus in the right lower extremity from previous DVT.  I told her that people who have chronic residual thrombus are at high risk of blood clot.      2.  Discussed regarding further workup.  I am going to get a CT chest, abdomen and pelvis to rule out malignancy.      I am also going to repeat protein C and S.  She has been off warfarin since February 7th.  I want to see what the levels are when she is not on warfarin      3.  Discussed with her regarding treatment.  Patient is currently on Lovenox.  I explained to her that we will switch her to Xarelto.  That will be convenient for her.  She wants to do that.  Patient will stop enoxaparin after today's dose.  Tomorrow she will start Xarelto 15 mg twice a day for 21 days.  After that, we will switch her to 20 mg a day of  Xarelto.  Patient agreeable for this plan.      4.  General ways to reduce thrombosis discussed.  I advised her to be active.  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  Also advised her to wear stockings.      5.  Patient advised to quit smoking.      6.  She had multiple questions, which were all answered.  I will see her after the CT scan.      Thanks  for the consult.      Total face-to-face time spent 45 minutes, more than 50% was spent in counseling and coordination of care.         NGUYEN OWENS MD             D: 2018   T: 2018   MT: CHING      Name:     ROSA BAINS   MRN:      [id number]        Account:      [id number]   :      1970           Visit Date:   2018      Document: [id number]

## 2018-02-23 LAB
PROT C ACT/NOR PPP CHRO: 124 % (ref 70–170)
PROT S FREE AG ACT/NOR PPP IA: 42 % (ref 55–125)

## 2018-02-26 ENCOUNTER — HOSPITAL ENCOUNTER (OUTPATIENT)
Dept: CT IMAGING | Facility: CLINIC | Age: 48
Discharge: HOME | End: 2018-02-26
Attending: INTERNAL MEDICINE | Admitting: INTERNAL MEDICINE
Payer: COMMERCIAL

## 2018-02-26 DIAGNOSIS — I82.4Y1 DEEP VEIN THROMBOSIS (DVT) OF PROXIMAL VEIN OF RIGHT LOWER EXTREMITY, UNSPECIFIED CHRONICITY (H): ICD-10-CM

## 2018-02-26 PROCEDURE — 25000128 H RX IP 250 OP 636: Performed by: INTERNAL MEDICINE

## 2018-02-26 PROCEDURE — 74177 CT ABD & PELVIS W/CONTRAST: CPT

## 2018-02-26 PROCEDURE — 25000125 ZZHC RX 250: Performed by: INTERNAL MEDICINE

## 2018-02-26 RX ORDER — IOPAMIDOL 755 MG/ML
83 INJECTION, SOLUTION INTRAVASCULAR ONCE
Status: COMPLETED | OUTPATIENT
Start: 2018-02-26 | End: 2018-02-26

## 2018-02-26 RX ADMIN — IOPAMIDOL 83 ML: 755 INJECTION, SOLUTION INTRAVENOUS at 10:03

## 2018-02-26 RX ADMIN — SODIUM CHLORIDE 64 ML: 9 INJECTION, SOLUTION INTRAVENOUS at 10:03

## 2018-02-28 ENCOUNTER — ONCOLOGY VISIT (OUTPATIENT)
Dept: ONCOLOGY | Facility: CLINIC | Age: 48
End: 2018-02-28
Attending: INTERNAL MEDICINE
Payer: COMMERCIAL

## 2018-02-28 VITALS
SYSTOLIC BLOOD PRESSURE: 131 MMHG | DIASTOLIC BLOOD PRESSURE: 88 MMHG | TEMPERATURE: 98.1 F | RESPIRATION RATE: 18 BRPM | OXYGEN SATURATION: 97 % | HEART RATE: 94 BPM

## 2018-02-28 DIAGNOSIS — I82.4Y1 DEEP VEIN THROMBOSIS (DVT) OF PROXIMAL VEIN OF RIGHT LOWER EXTREMITY, UNSPECIFIED CHRONICITY (H): Primary | ICD-10-CM

## 2018-02-28 DIAGNOSIS — I26.99 OTHER PULMONARY EMBOLISM WITHOUT ACUTE COR PULMONALE, UNSPECIFIED CHRONICITY (H): ICD-10-CM

## 2018-02-28 PROCEDURE — 99213 OFFICE O/P EST LOW 20 MIN: CPT | Performed by: INTERNAL MEDICINE

## 2018-02-28 PROCEDURE — G0463 HOSPITAL OUTPT CLINIC VISIT: HCPCS

## 2018-02-28 ASSESSMENT — PAIN SCALES - GENERAL: PAINLEVEL: NO PAIN (0)

## 2018-02-28 NOTE — PATIENT INSTRUCTIONS
Start xarelto.  Protein S to be drawn in mid-march.-scheduled LW  Follow up in 1 year.-scheduled LW    03/21/2018 4:00 pm Labs at Dr. Carranza's office     02/27/2019  3:30 pm Follow up Dr. Carranza

## 2018-02-28 NOTE — MR AVS SNAPSHOT
After Visit Summary   2/28/2018    Lima Bueno    MRN: [id number]           Patient Information     Date Of Birth          1970        Visit Information        Provider Department      2/28/2018 3:00 PM Briana Carranza MD [facility]        Today's Diagnoses     Deep vein thrombosis (DVT) of proximal vein of right lower extremity, unspecified chronicity (H)    -  1    Other pulmonary embolism without acute cor pulmonale, unspecified chronicity (H)          Care Instructions    Start xarelto.  Protein S to be drawn in mid-march.-scheduled LW  Follow up in 1 year.-scheduled LW    03/21/2018 4:00 pm Labs at Dr. Carranza's office     02/27/2019  3:30 pm Follow up Dr. Carranza           Follow-ups after your visit        Your next 10 appointments already scheduled     Mar 07, 2018  3:45 PM CST   Anticoagulation Visit with  ANTICOAGULATION CLINIC   [facility] ([facility]    [address]  [place] [address]   [phone]            Mar 21, 2018  4:00 PM CDT   Return Visit with  Oncology Nurse   [facility] ([facility])    [facility]  [address]  [facility] [address]   [phone]            Feb 27, 2019  3:30 PM CST   Return Visit with Briana Carranza MD   [facility] ([facility])    [facility]  [address]  [facility] [address] [phone]              Who to contact     If you have questions or need follow up information about today's clinic visit or your schedule please contact [facility] directly at [phone].  Normal or non-critical lab and imaging results will be communicated to you by MyChart, letter or phone within 4 business days after the clinic has received the results. If you do not hear from us within 7 days, please contact the clinic through MyChart or phone. If you have a critical or abnormal  "lab result, we will notify you by phone as soon as possible.  Submit refill requests through [employer] or call your pharmacy and they will forward the refill request to us. Please allow 3 business days for your refill to be completed.          Additional Information About Your Visit        [employer] Information     [employer] lets you send messages to your doctor, view your test results, renew your prescriptions, schedule appointments and more. To sign up, go to [email or web address] . Click on \"Log in\" on the left side of the screen, which will take you to the Welcome page. Then click on \"Sign up Now\" on the right side of the page.     You will be asked to enter the access code listed below, as well as some personal information. Please follow the directions to create your username and password.     Your access code is: [email or web address]  Expires: 2018 10:50 AM     Your access code will  in 90 days. If you need help or a new code, please call your [facility] clinic or [phone].        Care EveryWhere ID     This is your Care EveryWhere ID. This could be used by other organizations to access your [facility] medical records  [id number]        Your Vitals Were     Pulse Temperature Respirations Pulse Oximetry          94 98.1  F (36.7  C) (Oral) 18 97%         Blood Pressure from Last 3 Encounters:   18 131/88   18 115/79   18 134/87    Weight from Last 3 Encounters:   10/04/17 77.1 kg (170 lb)   17 76.2 kg (168 lb)   17 76.2 kg (168 lb)                 Today's Medication Changes          These changes are accurate as of 18 11:59 PM.  If you have any questions, ask your nurse or doctor.               These medicines have changed or have updated prescriptions.        Dose/Directions    * rivaroxaban ANTICOAGULANT 15 MG Tabs tablet   Commonly known as:  XARELTO   This may have changed:  Another medication with the same name was added. Make sure you understand how and when to take " each.   Used for:  Deep vein thrombosis (DVT) of proximal vein of right lower extremity, unspecified chronicity (H)        Dose:  15 mg   Take 1 tablet (15 mg) by mouth 2 times daily (with meals) for 21 days   Quantity:  42 tablet   Refills:  0       * rivaroxaban ANTICOAGULANT 20 MG Tabs tablet   Commonly known as:  XARELTO   This may have changed:  You were already taking a medication with the same name, and this prescription was added. Make sure you understand how and when to take each.   Used for:  Deep vein thrombosis (DVT) of proximal vein of right lower extremity, unspecified chronicity (H), Other pulmonary embolism without acute cor pulmonale, unspecified chronicity (H)        Dose:  20 mg   Take 1 tablet (20 mg) by mouth daily (with dinner) Start after xarelto 15 mg twice a day is completed.   Quantity:  30 tablet   Refills:  11       * Notice:  This list has 2 medication(s) that are the same as other medications prescribed for you. Read the directions carefully, and ask your doctor or other care provider to review them with you.      Stop taking these medicines if you haven't already. Please contact your care team if you have questions.     enoxaparin 80 MG/0.8ML injection   Commonly known as:  LOVENOX                Where to get your medicines      These medications were sent to [facility] [address] [place] [address]     Phone:  [phone]     rivaroxaban ANTICOAGULANT 20 MG Tabs tablet                Primary Care Provider Office Phone # Fax #    Oscar Baltazar [phone] [address] [address]        Equal Access to Services     [facility]: Hadii saurabh ku hadasho Sobrynnali, waaxda luqadaha, qaybta kaalmada adeegsavanna, jack dickson. [facility] [phone].    ATENCIÓN: Si habla español, tiene a unger disposición servicios gratuitos de asistencia lingüística.  Jill al [phone].    We comply with applicable federal civil rights laws and [place] laws. We do not discriminate on the basis of race, color, national origin, age, disability, sex, sexual orientation, or gender identity.            Thank you!     Thank you for choosing [facility]  for your care. Our goal is always to provide you with excellent care. Hearing back from our patients is one way we can continue to improve our services. Please take a few minutes to complete the written survey that you may receive in the mail after your visit with us. Thank you!             Your Updated Medication List - Protect others around you: Learn how to safely use, store and throw away your medicines at www.disposemymeds.org.          This list is accurate as of 2/28/18 11:59 PM.  Always use your most recent med list.                   Brand Name Dispense Instructions for use Diagnosis    albuterol 108 (90 BASE) MCG/ACT Inhaler    PROAIR HFA    1 Inhaler    Inhale 2 puffs into the lungs every 4 hours as needed for shortness of breath / dyspnea    Mild intermittent asthma without complication       ciclopirox 8 % Soln     3 Bottle    Apply daily for min 4 months.  First day of each week, file off old medication and roughen surface of nail.    Onychomycosis       COMPRESSION STOCKINGS     2 each    24-30mmHg, thigh high Use as directed    Thrombus       fexofenadine 180 MG tablet    ALLEGRA    30 tablet    Take 1 tablet (180 mg) by mouth daily (allergy symptoms)        fluticasone-salmeterol 230-21 MCG/ACT inhaler    ADVAIR-HFA    36 g    Inhale 2 puffs into the lungs 2 times daily    Persistent asthma without complication, unspecified asthma severity       guaiFENesin-codeine 100-10 MG/5ML Soln solution    ROBITUSSIN AC    236 mL    Take 5 mLs by mouth every 4 hours as needed for cough    Acute bronchitis, unspecified organism       methocarbamol 750 MG tablet    ROBAXIN    20 tablet    TAKE ONE TABLET BY MOUTH  THREE TIMES DAILY AS NEEDED FOR MUSCLE SPASM    Muscle spasm       mometasone-formoterol 100-5 MCG/ACT oral inhaler    DULERA    13 g    Inhale 2 puffs into the lungs 2 times daily    Mild intermittent asthma without complication, Acute bronchitis, unspecified organism       nicotine 21 MG/24HR 24 hr patch    NICODERM CQ    30 patch    Place 1 patch onto the skin every 24 hours    Tobacco use disorder       nicotine polacrilex 2 MG gum    EQ NICOTINE    30 tablet    Place 1 each (2 mg) inside cheek as needed for smoking cessation    Tobacco use disorder       nystatin Powd     60 g    Externally apply 1 Dose topically every 6 hours as needed    Genital pruritus       * order for DME     2 each    Equipment being ordered: knee high compression stockings, medium pressure, length of use=99    Deep vein thrombosis (DVT) of proximal vein of right lower extremity, unspecified chronicity (H)       * order for DME     1 each    Equipment being ordered: sharps container    Deep vein thrombosis (DVT) of proximal vein of right lower extremity, unspecified chronicity (H)       * rivaroxaban ANTICOAGULANT 15 MG Tabs tablet    XARELTO    42 tablet    Take 1 tablet (15 mg) by mouth 2 times daily (with meals) for 21 days    Deep vein thrombosis (DVT) of proximal vein of right lower extremity, unspecified chronicity (H)       * rivaroxaban ANTICOAGULANT 20 MG Tabs tablet    XARELTO    30 tablet    Take 1 tablet (20 mg) by mouth daily (with dinner) Start after xarelto 15 mg twice a day is completed.    Deep vein thrombosis (DVT) of proximal vein of right lower extremity, unspecified chronicity (H), Other pulmonary embolism without acute cor pulmonale, unspecified chronicity (H)       terbinafine 250 MG tablet    lamISIL    90 tablet    Take 1 tablet (250 mg) by mouth daily    Onychomycosis       traMADol 50 MG tablet    ULTRAM    30 tablet    TAKE ONE TABLET BY MOUTH EVERY 6 HOURS AS NEEDED FOR  MODERATE  PAIN    Other chronic pain        traZODone 100 MG tablet    DESYREL    180 tablet    Take 2 tablets (200 mg) by mouth At Bedtime    Primary insomnia       * Notice:  This list has 4 medication(s) that are the same as other medications prescribed for you. Read the directions carefully, and ask your doctor or other care provider to review them with you.

## 2018-02-28 NOTE — LETTER
February 28, 2018       Re: Lima Bueno  [address]  [facility] [address]         To Whom It May Concern,      Patient, Lmia Bueno,  was seen in my office today for exam of her deep vein thrombosis of lower extremity.    Patient can return to work on 3/8/2018 with no restrictions. I encourage patient to walk and or move hourly.     If you have any questions or concerns please contact my office at [phone].      Sincerely,      Briana Carranza MD

## 2018-02-28 NOTE — LETTER
"    2/28/2018         RE: Lima Bueno  [address]  [facility] [address]        Dear Colleague,    Thank you for referring your patient, Lima Bueno, to the [facility]. Please see a copy of my visit note below.    Oncology Rooming Note                            February 28, 2018 3:05 PM   Lima Bueno is a 47 year old female who presents for:    Chief Complaint   Patient presents with     Oncology Clinic Visit     Pulmonary embolism     Initial Vitals: /88 (BP Location: Left arm, Patient Position: Sitting, Cuff Size: Adult Large)  Pulse 94  Temp 98.1  F (36.7  C) (Oral)  Resp 18  SpO2 97% Estimated body mass index is 28.29 kg/(m^2) as calculated from the following:    Height as of 10/4/17: 1.651 m (5' 5\").    Weight as of 10/4/17: 77.1 kg (170 lb). There is no height or weight on file to calculate BSA.  No Pain (0) Comment: Data Unavailable   No LMP recorded. Patient is not currently having periods (Reason: Irregular Periods).  Allergies reviewed: Yes  Medications reviewed: Yes    Medications: Medication refills not needed today.  Pharmacy name entered into [employer]:    [facility] - [place], [place] - [address] [facility] - [place], [place]    Clinical concerns: None       4 minutes for nursing intake (face to face time)     Dahlia Loomis MA              Visit Date:   02/28/2018      SUBJECTIVE:  Ms. Amato is a 47-year-old female with recurrent right lower extremity DVT as described above.  She is currently on enoxaparin.  Plan is to transition to Xarelto.  She is here for followup on the result of investigations.      CT chest, abdomen and pelvis was done on 02/26/2018.  There is no evidence of any malignancy.  Labs on 02/19/2018 reveals normal protein C of 124.  It was previously low.  Protein S is 42.  Previously it was less than 10.  These labs were done when she was off warfarin for about 12 " days.      The patient overall is doing good.  She has no new complaints or concerns.      ASSESSMENT:   1.  A 47-year-old female with recurrent right lower extremity deep venous thrombosis while on warfarin.  This was unprovoked.  INR was therapeutic.   2.  History of unprovoked right lower extremity deep venous thrombosis and bilateral pulmonary embolism in 10/2012.   3.  Low protein S.  Could be still due to the effect of warfarin.      PLAN:   1.  I had a long discussion with the patient.  CT scan was reviewed.  She was happy to know that there is no evidence of malignancy.        The patient previously had low protein C and S. Protein C is normal as she was off warfarin for 12 days.  Protein S has improved but is still a little low.  I told her that it may normalize.  We need to again recheck it in next 3-6 weeks.  She is going to do that.      I told the patient that at this time we do not know what caused her thrombosis.  This was unprovoked.  We have not found any cause of her thrombosis.  I am not convinced that this slightly low protein S could have caused her thrombosis.      2.  Discussed regarding anticoagulation.  She is going to start Xarelto this Friday.  Plan is for lifelong anticoagulation as long as she is tolerating it well and there is no bleeding complication.  The patient agreeable for it.   3.  General ways to reduce blood clots were all discussed.  I advised her to be active.  The patient will plan to be more ambulatory at her work.  She is also going to get compression stocking.   4.  She had a few questions, which were all answered.  I will see her in 1 year for followup.  The patient advised to go to the Emergency Room if she has chest pain, shortness of breath, head trauma, bleeding or any other concerns.  She had a few questions, which were all answered.         NGUYEN OWENS MD             D: 02/28/2018   T: 02/28/2018   MT: HILARIO      Name:     ROSA BAINS   MRN:       [id number]        Account:      [id number]   :      1970           Visit Date:   2018      Document: [id number]       Again, thank you for allowing me to participate in the care of your patient.        Sincerely,        Briana Carranza MD

## 2018-02-28 NOTE — PROGRESS NOTES
"Oncology Rooming Note                            February 28, 2018 3:05 PM   Lima Bueno is a 47 year old female who presents for:    Chief Complaint   Patient presents with     Oncology Clinic Visit     Pulmonary embolism     Initial Vitals: /88 (BP Location: Left arm, Patient Position: Sitting, Cuff Size: Adult Large)  Pulse 94  Temp 98.1  F (36.7  C) (Oral)  Resp 18  SpO2 97% Estimated body mass index is 28.29 kg/(m^2) as calculated from the following:    Height as of 10/4/17: 1.651 m (5' 5\").    Weight as of 10/4/17: 77.1 kg (170 lb). There is no height or weight on file to calculate BSA.  No Pain (0) Comment: Data Unavailable   No LMP recorded. Patient is not currently having periods (Reason: Irregular Periods).  Allergies reviewed: Yes  Medications reviewed: Yes    Medications: Medication refills not needed today.  Pharmacy name entered into T[employer]:    [facility] - [place], [place] - [address] [facility] - [place], [place]    Clinical concerns: None       4 minutes for nursing intake (face to face time)     Dahlia Loomis MA            "

## 2018-03-01 NOTE — PROGRESS NOTES
Visit Date:   02/28/2018     HEMATOLOGY HISTORY: Ms. Bueno is a female with history of DVT and pulmonary embolism.      1.  Right lower extremity ultrasound on 10/05/2012 for pain and swelling revealed occlusive DVT extending from the proximal right femoral vein into popliteal vein and peroneal veins in the calf.   -CT chest angiogram on 10/05/2012 revealed small pulmonary emboli bilaterally.    -This was an unprovoked DVT and pulmonary embolism.    -She is on warfarin since 2012. She has been compliant.  INR generally has been therapeutic.      2.  Hypercoagulable workup done on 11/30/2012.   -Antithrombin III normal at 104.   -Lupus negative.   -Protein C of 36.   -Protein S of less than 10.     -Anticardiolipin antibody IgG and IgM normal.   -No Factor V Leiden mutation.     -No prothrombin gene mutation.      3.  Right lower extremity ultrasound on 01/12/2017 for foot swelling revealed chronic nonocclusive thrombus in the right mid femoral to popliteal vein.  Previously this was occlusive. There was no acute DVT.       4.  Right lower extremity ultrasound on 02/07/2018 for leg swelling reveals new occlusive thrombus in the right popliteal vein.  There is chronic nonocclusive thrombus in the right superficial femoral vein in the mid-thigh. INR was 3.4 on 02/06/2018.     5.  Warfarin was stopped and enoxaparin started on 02/07/2018.     6. On 02/19/2018:  -Protein C of 124.  It was previously low at 36.  -Protein S of 42.  Previously it was less than 10.     7. CT chest, abdomen and pelvis on 02/26/2018 does not reveal any evidence of malignancy.       SUBJECTIVE:    Ms. Bueno is a 47-year-old female with recurrent right lower extremity DVT as described above.  She is currently on enoxaparin.  Plan is to transition to Xarelto.  She is here for followup on the result of investigations.      CT chest, abdomen and pelvis was done on 02/26/2018.  There is no evidence of any malignancy.  Labs on 02/19/2018 reveals  normal protein C of 124.  It was previously low.  Protein S is 42.  Previously it was less than 10.  These labs were done when she was off warfarin for about 12 days.      Patient overall is doing good.  She has no new complaints or concerns.      ASSESSMENT:   1.  A 47-year-old female with recurrent right lower extremity deep venous thrombosis while on warfarin.  This was unprovoked.  INR was therapeutic.   2.  History of unprovoked right lower extremity deep venous thrombosis and bilateral pulmonary embolism in 10/2012.   3.  Low protein S.  Could be still due to the effect of warfarin.      PLAN:   1.  I had a long discussion with the patient.  CT scan was reviewed.  She was happy to know that there is no evidence of malignancy.        The patient previously had low protein C and S. Protein C is normal as she was off warfarin for 12 days.  Protein S has improved but is still a little low.  I told her that it may normalize.  We need to again recheck it in next 3-6 weeks.  She is agreeable for it.      I told the patient that at this time we do not know what caused her thrombosis. I am not convinced that this slightly low protein S could have caused her thrombosis.      2.  Discussed regarding anticoagulation.  She is going to start Xarelto this Friday.  Plan is for lifelong anticoagulation as long as she is tolerating it well and there is no bleeding complication.  Patient agreeable for it.     3.  General ways to reduce blood clots were all discussed.  I advised her to be active.  Patient will plan to be more ambulatory at her work.  She is also going to get compression stocking.     4.  She had a few questions, which were all answered.  I will see her in 1 year for followup.  Patient advised to go to the Emergency Room if she has chest pain, shortness of breath, head trauma, bleeding or any other concerns.  She had a few questions, which were all answered.         NGUYEN OWENS MD             D: 02/28/2018    T: 2018   MT: HILARIO      Name:     ROSA BAINS   MRN:      [id number]        Account:      [id number]   :      1970           Visit Date:   2018      Document: [id number]

## 2018-03-02 ENCOUNTER — TELEPHONE (OUTPATIENT)
Dept: FAMILY MEDICINE | Facility: CLINIC | Age: 48
End: 2018-03-02

## 2018-03-02 ENCOUNTER — NURSE TRIAGE (OUTPATIENT)
Dept: NURSING | Facility: CLINIC | Age: 48
End: 2018-03-02

## 2018-03-02 DIAGNOSIS — I82.431 ACUTE DEEP VEIN THROMBOSIS (DVT) OF POPLITEAL VEIN OF RIGHT LOWER EXTREMITY (H): ICD-10-CM

## 2018-03-02 DIAGNOSIS — I82.4Y1 DEEP VEIN THROMBOSIS (DVT) OF PROXIMAL VEIN OF RIGHT LOWER EXTREMITY, UNSPECIFIED CHRONICITY (H): ICD-10-CM

## 2018-03-02 DIAGNOSIS — I26.99 OTHER PULMONARY EMBOLISM WITHOUT ACUTE COR PULMONALE, UNSPECIFIED CHRONICITY (H): ICD-10-CM

## 2018-03-03 NOTE — TELEPHONE ENCOUNTER
Requested Prescriptions   Pending Prescriptions Disp Refills     enoxaparin (LOVENOX) 80 MG/0.8ML injection [Pharmacy Med Name: ENOXAPARIN 80MG/0.8ML INJ]  0     Sig: INJECT 0.8 MLS (80 MG) SUBCUTANEOUSLY TWICE DAILY FOR 10 DAYS    There is no refill protocol information for this order        enoxaparin (LOVENOX)      Last Written Prescription Date: ?  Last Fill Quantity: ?,   # refills: ?  Last Office Visit: 7/03/17 ([place])  Future Office visit:    Next 5 appointments (look out 90 days)     Mar 21, 2018  4:00 PM CDT   Return Visit with  Oncology Nurse   [facility] ([facility]    [facility]  [address] [id number] [phone]                   Routing refill request to provider for review/approval because:  Drug not active on patient's medication list    Routing refill request to provider for review/approval because:  Drug not on the [facility], [facility] or [facility] refill protocol or controlled substance

## 2018-03-03 NOTE — TELEPHONE ENCOUNTER
"  Reason for Disposition    [1] Request for URGENT new prescription or refill of \"essential\" medication (i.e., likelihood of harm to patient if not taken) AND [2] triager unable to fill per unit policy    Protocols used: MEDICATION QUESTION CALL-ADULT-[facility] pharmacy calling with patient request for cheaper anticoagulant as unable to afford Xarelto.  Paged Dr Carranza, who is on call, to [phone] via smart web.   Ailin Benson RN  [facility]    "

## 2018-03-05 NOTE — TELEPHONE ENCOUNTER
She has been transitioned to Xarelto and is not taking Lovenox at this moment.  We confirm this with the patient?    Oscar Baltazar MD

## 2018-03-05 NOTE — TELEPHONE ENCOUNTER
"Lovenox shows discontinued on 2/13/18, \"alternate therapy\" reason    Please advise on refill  Chelly Rogers RN  [employer]    "

## 2018-03-06 NOTE — TELEPHONE ENCOUNTER
Left VM for patient to call clinic back.   Need to know if she has discontinued Lovenox?  She was to have transitioned to Xarelto.     Maggie Moe RN

## 2018-03-09 ENCOUNTER — ANTICOAGULATION THERAPY VISIT (OUTPATIENT)
Dept: NURSING | Facility: CLINIC | Age: 48
End: 2018-03-09

## 2018-03-09 DIAGNOSIS — I26.99 OTHER PULMONARY EMBOLISM WITHOUT ACUTE COR PULMONALE, UNSPECIFIED CHRONICITY (H): ICD-10-CM

## 2018-03-09 DIAGNOSIS — I82.4Y1 DEEP VEIN THROMBOSIS (DVT) OF PROXIMAL VEIN OF RIGHT LOWER EXTREMITY, UNSPECIFIED CHRONICITY (H): ICD-10-CM

## 2018-03-09 DIAGNOSIS — Z79.01 LONG-TERM (CURRENT) USE OF ANTICOAGULANTS: ICD-10-CM

## 2018-03-09 NOTE — MR AVS SNAPSHOT
Lima Bueno   3/9/2018   Anticoagulation Therapy Visit    Description:  47 year old female   Provider:  Oscar Baltazar MD   Department:  [facility]           INR as of 3/9/2018     Today's INR       Anticoagulation Summary as of 3/9/2018     INR goal 2.0-3.0   Today's INR    Full instructions 15 mg on Mon, Fri; 10 mg all other days   Next INR check     Indications   Long-term (current) use of anticoagulants [Z79.01] [Z79.01]  Deep vein thrombosis (DVT) of lower extremity (H) [I82.409]  Pulmonary embolism (H) [I26.99]         Anticoagulation Episode Summary     Resolved date 3/9/2018    Resolved reason Changed Anticoagulant       Instructions    Patient has been switched to Xarelto per Dr. Carranza 2/28/18 Office Visit     Maggie Moe RN           Contact Numbers     Clinic Number:

## 2018-03-09 NOTE — TELEPHONE ENCOUNTER
Pt asking for two things:    1. 90 day Rx of Xarelto as copay is $50 whether pt gets 30 tabs or 90 tabs.    2. Lovenox standing order - As insurance only makes pt pay $12 for lovenox.  Asking for this for when she cannot afford Xarelto.  Advised importance of trying to stay stable on xarelto/not switching back and fourth between anticoag.    Routing to [employer] to review.  Please route to [facility] if this should go through him as Xarelto first ordered through [facility].  Sonja Pettit RN

## 2018-03-09 NOTE — TELEPHONE ENCOUNTER
Yes, I would recommend Dr. Carranza advise on future prescriptions, but for now, I will sign both prescriptions so that she will not be without anticoagulation over the weekend    Oscar Baltazar MD

## 2018-03-12 ENCOUNTER — TELEPHONE (OUTPATIENT)
Dept: FAMILY MEDICINE | Facility: CLINIC | Age: 48
End: 2018-03-12

## 2018-03-12 NOTE — TELEPHONE ENCOUNTER
I called and left  for patient that scripts were sent into pharmacy.  Advised that she follow up with Dr. Carranza regarding these prescriptions in the future.     Maggie Moe RN

## 2018-03-22 ENCOUNTER — TELEPHONE (OUTPATIENT)
Dept: ONCOLOGY | Facility: CLINIC | Age: 48
End: 2018-03-22

## 2018-03-30 ENCOUNTER — ONCOLOGY VISIT (OUTPATIENT)
Dept: ONCOLOGY | Facility: CLINIC | Age: 48
End: 2018-03-30
Attending: INTERNAL MEDICINE
Payer: COMMERCIAL

## 2018-03-30 ENCOUNTER — HOSPITAL ENCOUNTER (OUTPATIENT)
Facility: CLINIC | Age: 48
Setting detail: SPECIMEN
Discharge: HOME | End: 2018-03-30
Attending: INTERNAL MEDICINE | Admitting: INTERNAL MEDICINE
Payer: COMMERCIAL

## 2018-03-30 ENCOUNTER — TELEPHONE (OUTPATIENT)
Dept: FAMILY MEDICINE | Facility: CLINIC | Age: 48
End: 2018-03-30

## 2018-03-30 DIAGNOSIS — I26.99 OTHER PULMONARY EMBOLISM WITHOUT ACUTE COR PULMONALE, UNSPECIFIED CHRONICITY (H): ICD-10-CM

## 2018-03-30 DIAGNOSIS — I82.4Y1 DEEP VEIN THROMBOSIS (DVT) OF PROXIMAL VEIN OF RIGHT LOWER EXTREMITY, UNSPECIFIED CHRONICITY (H): ICD-10-CM

## 2018-03-30 PROCEDURE — 85306 CLOT INHIBIT PROT S FREE: CPT | Performed by: INTERNAL MEDICINE

## 2018-03-30 PROCEDURE — 36415 COLL VENOUS BLD VENIPUNCTURE: CPT

## 2018-03-30 NOTE — MR AVS SNAPSHOT
"              After Visit Summary   3/30/2018    Lima Bueno    MRN: [id number]           Patient Information     Date Of Birth          1970        Visit Information        Provider Department      3/30/2018 4:00 PM Nurse, [facility]        Today's Diagnoses     Other pulmonary embolism without acute cor pulmonale, unspecified chronicity (H)        Deep vein thrombosis (DVT) of proximal vein of right lower extremity, unspecified chronicity (H)           Follow-ups after your visit        Your next 10 appointments already scheduled     Feb 27, 2019  3:00 PM CST   Return Visit with Briana Carranza MD   [facility] ([facility])    [facility]  [address] [facility] [address]  [facility] [address] [phone]              Who to contact     If you have questions or need follow up information about today's clinic visit or your schedule please contact [facility] directly at [phone].  Normal or non-critical lab and imaging results will be communicated to you by Lumatixhart, letter or phone within 4 business days after the clinic has received the results. If you do not hear from us within 7 days, please contact the clinic through [employer] or phone. If you have a critical or abnormal lab result, we will notify you by phone as soon as possible.  Submit refill requests through [email or web address] or call your pharmacy and they will forward the refill request to us. Please allow 3 business days for your refill to be completed.          Additional Information About Your Visit        LumatixharHowDo Information     [email or web address] lets you send messages to your doctor, view your test results, renew your prescriptions, schedule appointments and more. To sign up, go to [email or web address] . Click on \"Log in\" on the left side of the screen, which will take you to the Welcome page. Then click on \"Sign up Now\" on the right side of the page.     You will be " asked to enter the access code listed below, as well as some personal information. Please follow the directions to create your username and password.     Your access code is: [email or web address]  Expires: 2018 11:50 AM     Your access code will  in 90 days. If you need help or a new code, please call your [facility] or [phone].        Care EveryWhere ID     This is your Care EveryWhere ID. This could be used by other organizations to access your [facility] medical records  [id number]         Blood Pressure from Last 3 Encounters:   18 131/88   18 115/79   18 134/87    Weight from Last 3 Encounters:   10/04/17 77.1 kg (170 lb)   17 76.2 kg (168 lb)   17 76.2 kg (168 lb)              We Performed the Following     Protein S Antigen Free        Primary Care Provider Office Phone # Fax #    Oscar Baltazar [phone] [address] [facility] [address]  [place] [address]        Equal Access to Services     [facility]: Hadii aad ku hadasho Soonofre, waaxda luqadaha, qaybta kaalmada adegreer, jack hurtado . [facility] [phone].    ATENCIÓN: Si habla español, tiene a unger disposición servicios gratuitos de asistencia lingüística. [facility] [phone].    We comply with applicable federal civil rights laws and [place] laws. We do not discriminate on the basis of race, color, national origin, age, disability, sex, sexual orientation, or gender identity.            Thank you!     Thank you for choosing [facility]  for your care. Our goal is always to provide you with excellent care. Hearing back from our patients is one way we can continue to improve our services. Please take a few minutes to complete the written survey that you may receive in the mail after your visit with us. Thank you!             Your Updated Medication List - Protect others around you: Learn how to safely use, store and throw away your medicines at  www.disposemymeds.org.          This list is accurate as of 3/30/18  4:11 PM.  Always use your most recent med list.                   Brand Name Dispense Instructions for use Diagnosis    albuterol 108 (90 BASE) MCG/ACT Inhaler    PROAIR HFA    1 Inhaler    Inhale 2 puffs into the lungs every 4 hours as needed for shortness of breath / dyspnea    Mild intermittent asthma without complication       ciclopirox 8 % Soln     3 Bottle    Apply daily for min 4 months.  First day of each week, file off old medication and roughen surface of nail.    Onychomycosis       COMPRESSION STOCKINGS     2 each    24-30mmHg, thigh high Use as directed    Thrombus       enoxaparin 80 MG/0.8ML injection    LOVENOX    20 Syringe    Inject 0.8 mL (80 mg) subcutaneously twice daily for 10 days -Not to be taken simultaneously with Xarelto    Acute deep vein thrombosis (DVT) of popliteal vein of right lower extremity (H)       fexofenadine 180 MG tablet    ALLEGRA    30 tablet    Take 1 tablet (180 mg) by mouth daily (allergy symptoms)        fluticasone-salmeterol 230-21 MCG/ACT inhaler    ADVAIR-HFA    36 g    Inhale 2 puffs into the lungs 2 times daily    Persistent asthma without complication, unspecified asthma severity       guaiFENesin-codeine 100-10 MG/5ML Soln solution    ROBITUSSIN AC    236 mL    Take 5 mLs by mouth every 4 hours as needed for cough    Acute bronchitis, unspecified organism       methocarbamol 750 MG tablet    ROBAXIN    20 tablet    TAKE ONE TABLET BY MOUTH THREE TIMES DAILY AS NEEDED FOR MUSCLE SPASM    Muscle spasm       mometasone-formoterol 100-5 MCG/ACT oral inhaler    DULERA    13 g    Inhale 2 puffs into the lungs 2 times daily    Mild intermittent asthma without complication, Acute bronchitis, unspecified organism       nicotine 21 MG/24HR 24 hr patch    NICODERM CQ    30 patch    Place 1 patch onto the skin every 24 hours    Tobacco use disorder       nicotine polacrilex 2 MG gum    EQ NICOTINE    30  tablet    Place 1 each (2 mg) inside cheek as needed for smoking cessation    Tobacco use disorder       nystatin Powd     60 g    Externally apply 1 Dose topically every 6 hours as needed    Genital pruritus       * order for DME     2 each    Equipment being ordered: knee high compression stockings, medium pressure, length of use=99    Deep vein thrombosis (DVT) of proximal vein of right lower extremity, unspecified chronicity (H)       * order for DME     1 each    Equipment being ordered: sharps container    Deep vein thrombosis (DVT) of proximal vein of right lower extremity, unspecified chronicity (H)       rivaroxaban ANTICOAGULANT 20 MG Tabs tablet    XARELTO    90 tablet    Take 1 tablet (20 mg) by mouth daily (with dinner) Start after xarelto 15 mg twice a day is completed. Not to be taken simultaneously with Lovenox    Deep vein thrombosis (DVT) of proximal vein of right lower extremity, unspecified chronicity (H), Other pulmonary embolism without acute cor pulmonale, unspecified chronicity (H)       terbinafine 250 MG tablet    lamISIL    90 tablet    Take 1 tablet (250 mg) by mouth daily    Onychomycosis       traMADol 50 MG tablet    ULTRAM    30 tablet    TAKE ONE TABLET BY MOUTH EVERY 6 HOURS AS NEEDED FOR  MODERATE  PAIN    Other chronic pain       traZODone 100 MG tablet    DESYREL    180 tablet    Take 2 tablets (200 mg) by mouth At Bedtime    Primary insomnia       * Notice:  This list has 2 medication(s) that are the same as other medications prescribed for you. Read the directions carefully, and ask your doctor or other care provider to review them with you.

## 2018-03-30 NOTE — PROGRESS NOTES
Medical Assistant Note:  Lima Smileyion presents today for lab only.    Patient seen by provider today: No.   present during visit today: Not Applicable.    Concerns: No Concerns.    Procedure:  Lab draw site: RAC, Needle type: BF, Gauge: 21.    Post Assessment:  Labs drawn without difficulty: Yes.    Discharge Plan:  Departure Mode: Ambulatory.    Face to Face Time: 5 minutes.    Zakiya Leiva MA

## 2018-03-30 NOTE — LETTER
3/30/2018         RE: Lima Bueno  [address]  [place] [address]        Dear Colleague,    Thank you for referring your patient, Lima Bueno, to the [facility]. Please see a copy of my visit note below.    Medical Assistant Note:  Lima Bueno presents today for lab only.    Patient seen by provider today: No.   present during visit today: Not Applicable.    Concerns: No Concerns.    Procedure:  Lab draw site: RAC, Needle type: BF, Gauge: 21.    Post Assessment:  Labs drawn without difficulty: Yes.    Discharge Plan:  Departure Mode: Ambulatory.    Face to Face Time: 5 minutes.    Zakiya Leiva MA              Again, thank you for allowing me to participate in the care of your patient.        Sincerely,        Oncology Nurse

## 2018-03-30 NOTE — TELEPHONE ENCOUNTER
Pt is past due for f/u pap smear.  Reminder letter was sent 10/04/17.  Spoke with pt, states she will call the clinic to schedule.  If no reply and/or appt within 2 weeks (04/13/18) pt will be considered lost to pap tracking f/u.  Randi Naranjo,    Pap Tracking

## 2018-04-03 LAB — PROT S FREE AG ACT/NOR PPP IA: 38 % (ref 55–125)

## 2018-04-07 ENCOUNTER — NURSE TRIAGE (OUTPATIENT)
Dept: NURSING | Facility: CLINIC | Age: 48
End: 2018-04-07

## 2018-04-07 NOTE — TELEPHONE ENCOUNTER
[facility] Triage Call  Presenting Problem:Caller wanted to clarify medications. Gave information per epic:   enoxaparin (LOVENOX) 80 MG/0.8ML injection 20 Syringe 3 3/9/2018  No   Sig: Inject 0.8 mL (80 mg) subcutaneously twice daily for 10 days -Not to be taken simultaneously with Xarelto     rivaroxaban ANTICOAGULANT (XARELTO) 20 MG TABS tablet 90 tablet 3 3/9/2018  No   Sig: Take 1 tablet (20 mg) by mouth daily (with dinner) Start after xarelto 15 mg twice a day is completed. Not to be taken simultaneously with Lovenox       Patient Recommendations/Teaching:see above  Kitty Dumont RN [facility]

## 2018-04-10 NOTE — PROGRESS NOTES
Let her know that protein S level is still low at 38. Normal is above 55. Continue on blood thinner.    Briana Carranza

## 2018-04-11 ENCOUNTER — TELEPHONE (OUTPATIENT)
Dept: ONCOLOGY | Facility: CLINIC | Age: 48
End: 2018-04-11

## 2018-04-11 NOTE — TELEPHONE ENCOUNTER
See result note...    Results reviewed with patient and patient is to continue on anticoagulant. Patient verbalized understanding. Danya Wen

## 2018-07-13 DIAGNOSIS — F51.01 PRIMARY INSOMNIA: ICD-10-CM

## 2018-07-13 NOTE — TELEPHONE ENCOUNTER
"traZODone (DESYREL) 100 MG tablet 180 tablet 3 5/1/2017     Last Written Prescription Date:  5/1/17  Last Fill Quantity: 180,  # refills: 3   Last office visit: 2/16/2018 with prescribing provider:  Delaney   Future Office Visit:  none    Requested Prescriptions   Pending Prescriptions Disp Refills     traZODone (DESYREL) 100 MG tablet [Pharmacy Med Name: TraZODone 100MG     TAB] 90 tablet 7     Sig: TAKE TWO TABLETS BY MOUTH ONCE DAILY AT BEDTIME    Serotonin Modulators Passed    7/13/2018 11:34 AM       Passed - Recent (12 mo) or future (30 days) visit within the authorizing provider's specialty    Patient had office visit in the last 12 months or has a visit in the next 30 days with authorizing provider or within the authorizing provider's specialty.  See \"Patient Info\" tab in inbasket, or \"Choose Columns\" in Meds & Orders section of the refill encounter.           Passed - Patient is age 18 or older       Passed - No active pregnancy on record       Passed - No positive pregnancy test in past 12 months        PHQ-9 SCORE 5/23/2014 9/27/2016 12/29/2016   Total Score 5 - -   Total Score - 1 0     "

## 2018-07-15 DIAGNOSIS — G89.29 OTHER CHRONIC PAIN: ICD-10-CM

## 2018-07-17 RX ORDER — TRAZODONE HYDROCHLORIDE 100 MG/1
TABLET ORAL
Qty: 180 TABLET | Refills: 1 | Status: SHIPPED | OUTPATIENT
Start: 2018-07-17 | End: 2018-07-27

## 2018-07-17 NOTE — TELEPHONE ENCOUNTER
traMADol (ULTRAM) 50 MG tablet 30 tablet 0 1/30/2018           Last Written Prescription Date:  01/30/2018  Last Fill Quantity: 30,   # refills: 0  Last Office Visit: 02/06/2018 Liz  06/12/2017   Future Office visit:Unknown       Routing refill request to provider for review/approval because:  Drug not on the [facility], [facility] or  Health refill protocol or controlled substance

## 2018-07-18 RX ORDER — TRAMADOL HYDROCHLORIDE 50 MG/1
TABLET ORAL
Qty: 30 TABLET | Refills: 0 | Status: CANCELLED | OUTPATIENT
Start: 2018-07-18

## 2018-07-19 NOTE — TELEPHONE ENCOUNTER
Please call pt to schedule f/u with Dr. Baltazar and discuss med at ov. Did not fill today.  Sonja Pettit RN

## 2018-07-27 ENCOUNTER — OFFICE VISIT (OUTPATIENT)
Dept: FAMILY MEDICINE | Facility: CLINIC | Age: 48
End: 2018-07-27
Payer: COMMERCIAL

## 2018-07-27 VITALS
HEIGHT: 65 IN | OXYGEN SATURATION: 93 % | TEMPERATURE: 97.4 F | BODY MASS INDEX: 28.32 KG/M2 | DIASTOLIC BLOOD PRESSURE: 90 MMHG | SYSTOLIC BLOOD PRESSURE: 128 MMHG | HEART RATE: 108 BPM | WEIGHT: 170 LBS

## 2018-07-27 DIAGNOSIS — Z12.31 VISIT FOR SCREENING MAMMOGRAM: ICD-10-CM

## 2018-07-27 DIAGNOSIS — G89.29 OTHER CHRONIC PAIN: ICD-10-CM

## 2018-07-27 DIAGNOSIS — I82.4Y1 DEEP VEIN THROMBOSIS (DVT) OF PROXIMAL VEIN OF RIGHT LOWER EXTREMITY, UNSPECIFIED CHRONICITY (H): ICD-10-CM

## 2018-07-27 DIAGNOSIS — F51.01 PRIMARY INSOMNIA: ICD-10-CM

## 2018-07-27 DIAGNOSIS — I26.99 OTHER PULMONARY EMBOLISM WITHOUT ACUTE COR PULMONALE, UNSPECIFIED CHRONICITY (H): ICD-10-CM

## 2018-07-27 DIAGNOSIS — Z11.4 SCREENING FOR HIV (HUMAN IMMUNODEFICIENCY VIRUS): ICD-10-CM

## 2018-07-27 DIAGNOSIS — F17.200 TOBACCO USE DISORDER: ICD-10-CM

## 2018-07-27 DIAGNOSIS — R41.840 DIFFICULTY CONCENTRATING: Primary | ICD-10-CM

## 2018-07-27 DIAGNOSIS — Z13.6 CARDIOVASCULAR SCREENING; LDL GOAL LESS THAN 160: ICD-10-CM

## 2018-07-27 LAB
AMPHETAMINES UR QL: NOT DETECTED NG/ML
BARBITURATES UR QL SCN: NOT DETECTED NG/ML
BENZODIAZ UR QL SCN: NOT DETECTED NG/ML
BUPRENORPHINE UR QL: NOT DETECTED NG/ML
CANNABINOIDS UR QL: NOT DETECTED NG/ML
COCAINE UR QL SCN: NOT DETECTED NG/ML
D-METHAMPHET UR QL: NOT DETECTED NG/ML
METHADONE UR QL SCN: NOT DETECTED NG/ML
OPIATES UR QL SCN: NOT DETECTED NG/ML
OXYCODONE UR QL SCN: NOT DETECTED NG/ML
PCP UR QL SCN: NOT DETECTED NG/ML
PROPOXYPH UR QL: NOT DETECTED NG/ML
TRICYCLICS UR QL SCN: NOT DETECTED NG/ML

## 2018-07-27 PROCEDURE — 87389 HIV-1 AG W/HIV-1&-2 AB AG IA: CPT | Performed by: INTERNAL MEDICINE

## 2018-07-27 PROCEDURE — 80061 LIPID PANEL: CPT | Performed by: INTERNAL MEDICINE

## 2018-07-27 PROCEDURE — 99214 OFFICE O/P EST MOD 30 MIN: CPT | Performed by: INTERNAL MEDICINE

## 2018-07-27 PROCEDURE — 36415 COLL VENOUS BLD VENIPUNCTURE: CPT | Performed by: INTERNAL MEDICINE

## 2018-07-27 PROCEDURE — 80306 DRUG TEST PRSMV INSTRMNT: CPT | Performed by: INTERNAL MEDICINE

## 2018-07-27 RX ORDER — TRAMADOL HYDROCHLORIDE 50 MG/1
50 TABLET ORAL EVERY 6 HOURS PRN
Qty: 30 TABLET | Refills: 3 | Status: SHIPPED | OUTPATIENT
Start: 2018-07-27 | End: 2018-11-16

## 2018-07-27 RX ORDER — TRAZODONE HYDROCHLORIDE 300 MG/1
300 TABLET ORAL AT BEDTIME
Qty: 90 TABLET | Refills: 3 | Status: SHIPPED | OUTPATIENT
Start: 2018-07-27 | End: 2019-03-05

## 2018-07-27 RX ORDER — TRAMADOL HYDROCHLORIDE 50 MG/1
TABLET ORAL
Qty: 30 TABLET | Refills: 0 | Status: CANCELLED | OUTPATIENT
Start: 2018-07-27

## 2018-07-27 ASSESSMENT — ANXIETY QUESTIONNAIRES
IF YOU CHECKED OFF ANY PROBLEMS ON THIS QUESTIONNAIRE, HOW DIFFICULT HAVE THESE PROBLEMS MADE IT FOR YOU TO DO YOUR WORK, TAKE CARE OF THINGS AT HOME, OR GET ALONG WITH OTHER PEOPLE: SOMEWHAT DIFFICULT
5. BEING SO RESTLESS THAT IT IS HARD TO SIT STILL: NOT AT ALL
3. WORRYING TOO MUCH ABOUT DIFFERENT THINGS: SEVERAL DAYS
GAD7 TOTAL SCORE: 4
1. FEELING NERVOUS, ANXIOUS, OR ON EDGE: NOT AT ALL
7. FEELING AFRAID AS IF SOMETHING AWFUL MIGHT HAPPEN: SEVERAL DAYS
6. BECOMING EASILY ANNOYED OR IRRITABLE: NOT AT ALL
2. NOT BEING ABLE TO STOP OR CONTROL WORRYING: SEVERAL DAYS

## 2018-07-27 ASSESSMENT — PATIENT HEALTH QUESTIONNAIRE - PHQ9: 5. POOR APPETITE OR OVEREATING: SEVERAL DAYS

## 2018-07-27 NOTE — PATIENT INSTRUCTIONS
(R48.305) Difficulty concentrating  (primary encounter diagnosis)  Comment: recommend referral to psychiatry for discussion of ADHD  Plan: MENTAL HEALTH REFERRAL  - Adult; Psychiatry and        Medication Management; Psychiatry; [facility]:         [facility] Psychiatry Service [phone].  Medication management & future         refills will be returned to G PCP upon         completion of evaluation; We rey...            (Z12.31) Visit for screening mammogram  Comment:  [facility] (also performs diagnostic mammogram, ultrasound and biopsy) [phone].   Plan: MA SCREENING DIGITAL BILAT - Future  (s+30)            (Z11.4) Screening for HIV (human immunodeficiency virus)  Comment:   Plan: HIV Screening            (G89.29) Other chronic pain  Comment: Discussed risks/benefits of tramadol and pain controlled wll with current dose  Plan:     (I82.4Y1) Deep vein thrombosis (DVT) of proximal vein of right lower extremity, unspecified chronicity (H)  Comment: Continue on Xarelto  Plan:     (I26.99) Other pulmonary embolism without acute cor pulmonale, unspecified chronicity (H)  Comment: as above   Plan:     (F51.01) Primary insomnia  Comment: recommend continue on trazodone at current dose of 300 mg daily  Plan: traZODone 300 MG TABS          Smoking  Comment: Recommend that you speak to conrado Rabago in medical therapeutic management to discuss this.

## 2018-07-27 NOTE — MR AVS SNAPSHOT
After Visit Summary   7/27/2018    Lima Bueno    MRN: [id number]           Patient Information     Date Of Birth          1970        Visit Information        Provider Department      7/27/2018 4:00 PM Oscar Baltazar MD [facility] [place]        Today's Diagnoses     Difficulty concentrating    -  1    Visit for screening mammogram        Screening for HIV (human immunodeficiency virus)        Other chronic pain        Deep vein thrombosis (DVT) of proximal vein of right lower extremity, unspecified chronicity (H)        Other pulmonary embolism without acute cor pulmonale, unspecified chronicity (H)        Primary insomnia        CARDIOVASCULAR SCREENING; LDL GOAL LESS THAN 160        Tobacco use disorder          Care Instructions    (R49.064) Difficulty concentrating  (primary encounter diagnosis)  Comment: recommend referral to psychiatry for discussion of ADHD  Plan: MENTAL HEALTH REFERRAL  - Adult; Psychiatry and        Medication Management; Psychiatry; [facility] [place] [place]:         [facility]ry Service [phone].  Medication management & future         refills will be returned to [facility] [place] [place] PCP upon         completion of evaluation; We rey...            (Z12.31) Visit for screening mammogram  Comment:  [facility] [place] (also performs diagnostic mammogram, ultrasound and biopsy) [phone].   Plan: MA SCREENING DIGITAL BILAT - Future  (s+30)            (Z11.4) Screening for HIV (human immunodeficiency virus)  Comment:   Plan: HIV Screening            (G89.29) Other chronic pain  Comment: Discussed risks/benefits of tramadol and pain controlled wll with current dose  Plan:     (I82.4Y1) Deep vein thrombosis (DVT) of proximal vein of right lower extremity, unspecified chronicity (H)  Comment: Continue on Xarelto  Plan:     (I26.99) Other pulmonary embolism without acute cor pulmonale, unspecified chronicity (H)  Comment: as above   Plan:      (F51.01) Primary insomnia  Comment: recommend continue on trazodone at current dose of 300 mg daily  Plan: traZODone 300 MG TABS          Smoking  Comment: Recommend that you speak to conrado Rabago in medical therapeutic management to discuss this.              Follow-ups after your visit        Additional Services     MED THERAPY MANAGE REFERRAL       Your provider has referred you to: **[facility] Scheduling (numerous locations) [phone]   [email or web address]  [facility]: [facility] [phone]   [email or web address]    Reason for Referral: Tobacco Cessation    The [facility] department will contact you to schedule an appointment.  You may also schedule the appointment by calling [phone].  For [facility] patients, please call [phone] to confirm/schedule your appointment on the next business day.    This service is designed to help you get the most from your medications.  A specially trained Pharmacist will work closely with you and your providers to solve any questions, concerns, issues or problems related to your medications.    Please bring all of your prescription and non-prescription medications (such as vitamins, over-the-counter medications, and herbals) or a detailed medication list to your appointment.    If you have a glucose meter or other home monitoring information, please also bring this to your appointment (i.e. blood glucose log, blood pressure log, pain log, etc.).            MENTAL HEALTH REFERRAL  - Adult; Psychiatry and Medication Management; Psychiatry; [facility] [place]: [facility]ry Service [phone].  Medication management & future refills will be returned to [facility] PCP upon completion of evaluation; We rey...       All scheduling is subject to the client's specific insurance plan & benefits,  provider/location availability, and provider clinical specialities.  Please arrive 15 minutes early for your first appointment and bring your completed paperwork.    Please be aware that coverage of these services is subject to the terms and limitations of your health insurance plan.  Call member services at your health plan with any benefit or coverage questions.                            Your next 10 appointments already scheduled     Oct 23, 2018  3:00 PM CDT   PHYSICAL with Sera Cabezas MD   [facility]    [address]  [address]  [place] [address] [phone]            Feb 27, 2019  3:00 PM CST   Return Visit with Briana Carranza MD   [facility] ([facility])    [facility]  [address]  [place] [address] [phone]              Future tests that were ordered for you today     Open Future Orders        Priority Expected Expires Ordered    MA SCREENING DIGITAL BILAT - Future  (s+30) Routine  7/27/2019 7/27/2018            Who to contact     If you have questions or need follow up information about today's clinic visit or your schedule please contact [facility] directly at [phone].  Normal or non-critical lab and imaging results will be communicated to you by MyChart, letter or phone within 4 business days after the clinic has received the results. If you do not hear from us within 7 days, please contact the clinic through MyChart or phone. If you have a critical or abnormal lab result, we will notify you by phone as soon as possible.  Submit refill requests through [employer] or call your pharmacy and they will forward the refill request to us. Please allow 3 business days for your refill to be completed.          Additional Information About Your Visit        Care EveryWhere ID     This is your Care EveryWhere ID. This could be used by other  "organizations to access your [place] medical records  [id number]        Your Vitals Were     Pulse Temperature Height Pulse Oximetry BMI (Body Mass Index)       108 97.4  F (36.3  C) (Oral) 5' 5\" (1.651 m) 93% 28.29 kg/m2        Blood Pressure from Last 3 Encounters:   07/27/18 128/90   02/28/18 131/88   02/19/18 115/79    Weight from Last 3 Encounters:   07/27/18 170 lb (77.1 kg)   10/04/17 170 lb (77.1 kg)   06/29/17 168 lb (76.2 kg)              We Performed the Following     Drug Abuse Screen Panel 13, Urine (Pain Care Package)     HIV Screening     Lipid panel reflex to direct LDL Fasting     MED THERAPY MANAGE REFERRAL     MENTAL HEALTH REFERRAL  - Adult; Psychiatry and Medication Management; Psychiatry; [facility] [place] [facility]ry Service [phone].  Medication management & future refills will be returned to [facility] [place] upon completion of evaluation; We rey...          Today's Medication Changes          These changes are accurate as of 7/27/18  4:18 PM.  If you have any questions, ask your nurse or doctor.               These medicines have changed or have updated prescriptions.        Dose/Directions    traZODone HCl 300 MG Tabs   This may have changed:    - medication strength  - See the new instructions.   Used for:  Primary insomnia   Changed by:  Oscar Baltazar MD        Dose:  300 mg   Take 300 mg by mouth At Bedtime   Quantity:  90 tablet   Refills:  3         Stop taking these medicines if you haven't already. Please contact your care team if you have questions.     traMADol 50 MG tablet   Commonly known as:  ULTRAM   Stopped by:  Oscar Baltazar MD                Where to get your medicines      These medications were sent to [facility] [address] [place] [address] [address]  [address] [facility] [address]     Phone:  [phone]     traZODone HCl 300 MG Tabs                Primary Care Provider Office Phone # Fax #    Oscar Hubbard " MD [place] [phone]       [address]  [facility] [address]        Equal Access to Services     SUSAN SNYDER : Molly saurabh laureano ang Allen, shayna aimeerea, slade kajuan miguel baldwin, jack marjin hayaan wandanasir martinez lasowmyajuan dickson. So [facility] [phone].    ATENCIÓN: Si habla español, tiene a unger disposición servicios gratuitos de asistencia lingüística. Llame al [phone].    We comply with applicable federal civil rights laws and [place] laws. We do not discriminate on the basis of race, color, national origin, age, disability, sex, sexual orientation, or gender identity.            Thank you!     Thank you for choosing [facility]  for your care. Our goal is always to provide you with excellent care. Hearing back from our patients is one way we can continue to improve our services. Please take a few minutes to complete the written survey that you may receive in the mail after your visit with us. Thank you!             Your Updated Medication List - Protect others around you: Learn how to safely use, store and throw away your medicines at www.disposemymeds.org.          This list is accurate as of 7/27/18  4:18 PM.  Always use your most recent med list.                   Brand Name Dispense Instructions for use Diagnosis    albuterol 108 (90 Base) MCG/ACT Inhaler    PROAIR HFA    1 Inhaler    Inhale 2 puffs into the lungs every 4 hours as needed for shortness of breath / dyspnea    Mild intermittent asthma without complication       ciclopirox 8 % Soln     3 Bottle    Apply daily for min 4 months.  First day of each week, file off old medication and roughen surface of nail.    Onychomycosis       COMPRESSION STOCKINGS     2 each    24-30mmHg, thigh high Use as directed    Thrombus       enoxaparin 80 MG/0.8ML injection    LOVENOX    20 Syringe    Inject 0.8 mL (80 mg) subcutaneously twice daily for 10 days -Not to be taken simultaneously with Xarelto    Acute deep vein  thrombosis (DVT) of popliteal vein of right lower extremity (H)       fexofenadine 180 MG tablet    ALLEGRA    30 tablet    Take 1 tablet (180 mg) by mouth daily (allergy symptoms)        fluticasone-salmeterol 230-21 MCG/ACT inhaler    ADVAIR-HFA    36 g    Inhale 2 puffs into the lungs 2 times daily    Persistent asthma without complication, unspecified asthma severity       guaiFENesin-codeine 100-10 MG/5ML Soln solution    ROBITUSSIN AC    236 mL    Take 5 mLs by mouth every 4 hours as needed for cough    Acute bronchitis, unspecified organism       methocarbamol 750 MG tablet    ROBAXIN    20 tablet    TAKE ONE TABLET BY MOUTH THREE TIMES DAILY AS NEEDED FOR MUSCLE SPASM    Muscle spasm       mometasone-formoterol 100-5 MCG/ACT oral inhaler    DULERA    13 g    Inhale 2 puffs into the lungs 2 times daily    Mild intermittent asthma without complication, Acute bronchitis, unspecified organism       nicotine 21 MG/24HR 24 hr patch    NICODERM CQ    30 patch    Place 1 patch onto the skin every 24 hours    Tobacco use disorder       nicotine polacrilex 2 MG gum    EQ NICOTINE    30 tablet    Place 1 each (2 mg) inside cheek as needed for smoking cessation    Tobacco use disorder       nystatin Powd     60 g    Externally apply 1 Dose topically every 6 hours as needed    Genital pruritus       * order for DME     2 each    Equipment being ordered: knee high compression stockings, medium pressure, length of use=99    Deep vein thrombosis (DVT) of proximal vein of right lower extremity, unspecified chronicity (H)       * order for DME     1 each    Equipment being ordered: sharps container    Deep vein thrombosis (DVT) of proximal vein of right lower extremity, unspecified chronicity (H)       rivaroxaban ANTICOAGULANT 20 MG Tabs tablet    XARELTO    90 tablet    Take 1 tablet (20 mg) by mouth daily (with dinner) Start after xarelto 15 mg twice a day is completed. Not to be taken simultaneously with Lovenox    Deep  vein thrombosis (DVT) of proximal vein of right lower extremity, unspecified chronicity (H), Other pulmonary embolism without acute cor pulmonale, unspecified chronicity (H)       terbinafine 250 MG tablet    lamISIL    90 tablet    Take 1 tablet (250 mg) by mouth daily    Onychomycosis       traZODone HCl 300 MG Tabs     90 tablet    Take 300 mg by mouth At Bedtime    Primary insomnia       * Notice:  This list has 2 medication(s) that are the same as other medications prescribed for you. Read the directions carefully, and ask your doctor or other care provider to review them with you.

## 2018-07-27 NOTE — LETTER
[facility]  [address] [place] [address]. [address] [facility]  [address]  [place]  [address]  Tel: [phone]    July 31, 2018    Lima Bueno  [address]  [place] [address]        Dear MsEmerald Chris Bueno,    I had the opportunity to review your recent labs and a summary of your labs reads as follows:    Your fasting lipid panel show  - normal HDL (good) cholesterol -as your goal is greater than 40  - low LDL (bad) cholesterol as your goal is less than 130  - normal triglyceride levels  Your urine drug screen returned negative your HIV antibody returned negative as well    Congratulaions on your excellent results.    If you have any further questions or problems, please contact our office.      Sincerely,    Oscar Baltazar MD/ Xi Torres CMA  Results for orders placed or performed in visit on 07/27/18   HIV Screening   Result Value Ref Range    HIV Antigen Antibody Combo Nonreactive NR^Nonreactive       Drug Abuse Screen Panel 13, Urine (Pain Care Package)   Result Value Ref Range    Cannabinoids (90-blp-7-carboxy-9-THC) Not Detected NDET^Not Detected ng/mL    Phencyclidine (Phencyclidine) Not Detected NDET^Not Detected ng/mL    Cocaine (Benzoylecgonine) Not Detected NDET^Not Detected ng/mL    Methamphetamine (d-Methamphetamine) Not Detected NDET^Not Detected ng/mL    Opiates (Morphine) Not Detected NDET^Not Detected ng/mL    Amphetamine (d-Amphetamine) Not Detected NDET^Not Detected ng/mL    Benzodiazepines (Nordiazepam) Not Detected NDET^Not Detected ng/mL    Tricyclic Antidepressants (Desipramine) Not Detected NDET^Not Detected ng/mL    Methadone (Methadone) Not Detected NDET^Not Detected ng/mL    Barbiturates (Butalbital) Not Detected NDET^Not Detected ng/mL    Oxycodone (Oxycodone) Not Detected NDET^Not Detected ng/mL    Propoxyphene (Norpropoxyphene) Not Detected NDET^Not Detected ng/mL    Buprenorphine (Buprenorphine) Not Detected NDET^Not Detected ng/mL   Lipid panel reflex to direct  LDL Fasting   Result Value Ref Range    Cholesterol 162 <200 mg/dL    Triglycerides 206 (H) <150 mg/dL    HDL Cholesterol 39 (L) >49 mg/dL    LDL Cholesterol Calculated 82 <100 mg/dL    Non HDL Cholesterol 123 <130 mg/dL               Enclosure: Lab Results     No

## 2018-07-27 NOTE — PROGRESS NOTES
"  SUBJECTIVE:   Lima Bueno is a 47 year old female who presents to clinic today for the following health issues:      Medication Followup    [facility]  CLINIC PROGRESS NOTE    Subjective:   Visit for screening mammogram  Screening for HIV (human immunodeficiency virus)  Other chronic pain   She requests a refill of her prescription for tramadol as she has needed this pain medication infrequently.  She is aware of the risks of drug addiction/dependence and thus far has not needed to escalate her pain medications or noticed any concern drug dependence.  Deep vein thrombosis (DVT) of proximal vein of right lower extremity, unspecified chronicity (H)   Lima Bueno continues on Rivaroxaban with no bleeding or bruising issues.  She has had follow up in hematology and recommended life-long anticoagulation.        Past medical history, medications, allergies, social history, family history reviewed and updated in [facility] as of 7/27/2018 .    ROS  CONSTITUTIONAL: no fatigue, no unexpected change in weight  SKIN: no worrisome rashes, no worrisome moles, no worrisome lesions  EYES: no acute vision problems or changes  ENT: no ear problems, no mouth problems, no throat problems  RESP: no significant cough, no shortness of breath  CV: no chest pain, no palpitations, no new or worsening peripheral edema  GI: no nausea, no vomiting, no constipation, no diarrhea  : no frequency, no dysuria, no hematuria  MS: leg pain as above   PSYCHIATRIC: no changes in mood - has had difficulty with worsening insomnia and has escalated dose of trazodone to 300 mg       Objective:  Vitals  /90 (BP Location: Right arm)  Pulse 108  Temp 97.4  F (36.3  C) (Oral)  Ht 5' 5\" (1.651 m)  Wt 170 lb (77.1 kg)  SpO2 93%  BMI 28.29 kg/m2  GEN: Alert Oriented x3 NAD  HEENT: Atraumatic, normocephalic, neck supple, no thyromegaly, negative cervical adenopathy  TM: TM bilaterally pearly and grey with normal light " reflex  CV: RRR no murmurs or rubs  PULM: CTA no wheezes or crackles  ABD: Soft, nontender nondistended, no hepatosplenomegally  SKIN: No visible skin lesion or ulcerations  EXT: 2+ dorsal pedis pulses, 2+ edema right lower extremities  NEURO: Gait and station deferred, No focal neurologic deficits  PSYCH: Mood good, affect mood congruent    No images are attached to the encounter.    No results found for this or any previous visit (from the past 24 hour(s)).    Assessment/Plan:  Patient Instructions   (R41.840) Difficulty concentrating  (primary encounter diagnosis)  Comment: recommend referral to psychiatry for discussion of ADHD  Plan: MENTAL HEALTH REFERRAL  - Adult; Psychiatry and        Medication Management; Psychiatry; [facility] [place]:         [facility] Psychiatry Service [phone].  Medication management & future         refills will be returned to [facility] PCP upon         completion of evaluation; Reji le...            (Z12.31) Visit for screening mammogram  Comment:  [facility] (also performs diagnostic mammogram, ultrasound and biopsy) [phone].   Plan: MA SCREENING DIGITAL BILAT - Future  (s+30)            (Z11.4) Screening for HIV (human immunodeficiency virus)  Comment:   Plan: HIV Screening            (G89.29) Other chronic pain  Comment: Discussed risks/benefits of tramadol and pain controlled wll with current dose  Plan:     (I82.4Y1) Deep vein thrombosis (DVT) of proximal vein of right lower extremity, unspecified chronicity (H)  Comment: Continue on Xarelto  Plan:     (I26.99) Other pulmonary embolism without acute cor pulmonale, unspecified chronicity (H)  Comment: as above   Plan:     (F51.01) Primary insomnia  Comment: recommend continue on trazodone at current dose of 300 mg daily  Plan: traZODone 300 MG TABS          Smoking  Comment: Recommend that you speak to conrado Rabago in medical therapeutic management to discuss this.        Follow up in medical  therapeutic management     25 minutes spent with patient.  Over 50% of time counseling       Disclaimer: This note consists of symbols derived from keyboarding, dictation and/or voice recognition software. As a result, there may be errors in the script that have gone undetected. Please consider this when interpreting information found in this chart.    Oscar Baltazar MD  [phone]

## 2018-07-27 NOTE — LETTER
[facility]    07/27/18    Patient: Lima Bueno  YOB: 1970  Medical Record Number: [id number]                                                                  Controlled Substance Agreement  I understand that my care provider has prescribed controlled substances (narcotics, tranquilizers, and/or stimulants) to help manage my condition(s).  I am taking this medicine to help me function or work.  I know that this is strong medicine.  It could have serious side effects and even cause a dependency on the drug.  If I stop these medicines suddenly, I could have severe withdrawal symptoms.    The risks, benefits, and side effects of these medication(s) were explained to me.  I agree that:  1. I will take part in other treatments as advised by my provider.  This may be psychiatry or counseling, physical therapy, behavioral therapy, group treatment, or a referral to a pain clinic.  I will reduce or stop my medicine when my provider tells me to do so.   2. I will take my medicines as prescribed.  I will not change the dose or schedule unless my provider tells me to.  There will be no refills if I  run out early.   I may be contacted at any time without warning and asked to complete a drug test or pill count.   3. I will keep all my appointments at the clinic.  If I miss appointments or fail to follow instructions, my provider may stop my medicine.  4. I will not ask other providers to prescribe controlled substances. And I will not accept controlled substances from other people. If I need another prescribed controlled substance for a new reason, I will notify my provider within one business day.  5. If I enroll in the [place] Medical Marijuana program, I will tell my provider.  I will also sign an agreement to share my medical records with my provider.  6. I will use one pharmacy to fill all of my controlled substance prescriptions.  If my prescription is mailed to my pharmacy, it may  take 5 to 7 days for my medicine to be ready.  7. I understand that my provider, clinic care team, and pharmacy can track controlled substance prescriptions from other providers through a central database (prescription monitoring program).  8. I will bring in my list of medications (or my medicine bottles) each time I come to the clinic.  [id number] REV-  07/2018                                                                                                                                   Page 1 of 2      [facility]    07/27/18    Patient: Lima Bueno  YOB: 1970  Medical Record Number: [id number]    9. Refills of controlled substances will be made only during office hours.  It is up to me to make sure that I do not run out of my medicines on weekends or holidays.    10. I am responsible for my prescriptions.  If the medicine/prescription is lost or stolen, it will not be replaced.   I also agree not to share these medicines with anyone.  11. I agree to not use ANY illegal or recreational drugs.  This includes marijuana, cocaine, bath salts or other drugs.  I agree not to use alcohol unless my provider says I may.  I agree to give urine samples whenever asked.  If I fail to give a urine sample, the provider may stop my medicine.     12. I will tell my nurse or provider right away if I become pregnant or have a new medical problem treated outside of [facility].  13. I understand that this medicine can affect my thinking and judgment.  It may be unsafe for me to drive, use machinery and do dangerous tasks.  I will not do any of these things until I know how the medicine affects me.  If my dose changes, I will wait to see how it affects me.  I will contact my provider if I have concerns about medicine side effects.  I understand that if I do not follow any of the conditions above, my prescriptions or treatment may be stopped.    I agree that my provider, clinic care team, and  pharmacy may work with any city, state or federal law enforcement agency that investigates the misuse, sale, or other diversion of my controlled medicine. I will allow my provider to discuss my care with or share a copy of this agreement with any other treating provider, pharmacy or emergency room where I receive care.  I agree to give up (waive) any right of privacy or confidentiality with respect to these authorizations.   I have read this agreement and have asked questions about anything I did not understand.   ___________________________________    ___________________________  Patient Signature                                                           Date and Time  ___________________________________     ____________________________  Witness                                                                            Date and Time  ___________________________________  Oscar Baltazar MD, MD  [id number] REV-  07/2018                                                                                                                                                   Page 2 of 2

## 2018-07-28 LAB
CHOLEST SERPL-MCNC: 162 MG/DL
HDLC SERPL-MCNC: 39 MG/DL
LDLC SERPL CALC-MCNC: 82 MG/DL
NONHDLC SERPL-MCNC: 123 MG/DL
TRIGL SERPL-MCNC: 206 MG/DL

## 2018-07-28 ASSESSMENT — ASTHMA QUESTIONNAIRES: ACT_TOTALSCORE: 17

## 2018-07-28 ASSESSMENT — ANXIETY QUESTIONNAIRES: GAD7 TOTAL SCORE: 4

## 2018-07-28 ASSESSMENT — PATIENT HEALTH QUESTIONNAIRE - PHQ9: SUM OF ALL RESPONSES TO PHQ QUESTIONS 1-9: 4

## 2018-07-30 LAB — HIV 1+2 AB+HIV1 P24 AG SERPL QL IA: NONREACTIVE

## 2018-08-01 ENCOUNTER — TELEPHONE (OUTPATIENT)
Dept: PHARMACY | Facility: OTHER | Age: 48
End: 2018-08-01

## 2018-08-01 NOTE — LETTER
August 1, 2018      Lima Bueno  [address]  [place] [address]        Dear Dr. Delaney Amato has recommended you schedule a Medication Therapy Management (MTM) appointment. MTM is designed to help you get the most of out of your medicines.     During an MTM appointment a specially trained pharmacist will review all of your medicines, both prescription and over-the-counter. They will make sure your medicines are the best choice for you and are safe and convenient for you.  MTM pharmacists work together with you and your doctor to help you understand your medicines, solve any problems related to your medicines and help you get the best results from taking your medicines.     At [facility], we strongly believe in a team approach to health care. We want to help you understand your medicines and health conditions. To learn more about how you might benefit from MTM services, watch the patient video at [email or web address].     To make an appointment, please call the clinic at [phone] or the MTM scheduling line at [phone] (toll-free at [phone]).    We look forward to hearing from you!        Rosario Rbaago, PharmD, BCACP  Medication Therapy Management Provider  Pager: [phone]

## 2018-08-01 NOTE — TELEPHONE ENCOUNTER
MTM referral from: [facility] visit (referral by provider)    MTM referral outreach attempt #2 on August 1, 2018 at 2:02 PM      Outcome: Patient not reachable after several attempts, will route to MTM Pharmacist/Provider as an FYI. Thank you for the referral.    Joyce Goddard, MTM Coordinator

## 2018-08-01 NOTE — TELEPHONE ENCOUNTER
Sent referral letter.    Darien RiosD, [facility]  Medication Therapy Management Provider  Pager: [phone]

## 2018-09-13 ENCOUNTER — TELEPHONE (OUTPATIENT)
Dept: FAMILY MEDICINE | Facility: CLINIC | Age: 48
End: 2018-09-13

## 2018-09-13 NOTE — TELEPHONE ENCOUNTER
Prior Authorization Retail Medication Request    Medication/Dose: traMADol (ULTRAM) 50 MG tablet  ICD code (if different than what is on RX):    Previously Tried and Failed:  Ibuprofen, naproxen  Rationale:  Patient uses tramadol for chronic pain    Insurance Name:    Insurance ID:  [id number]      Pharmacy Information (if different than what is on RX)  Name:    Phone:    Note from pharmacy: PA needed for greater than 2 fills per 60 days    Delmar Wong CMA

## 2018-09-14 NOTE — TELEPHONE ENCOUNTER
Central Prior Authorization Team   Phone: [phone]    PA Initiation    Medication: tramadol 50 mg  Insurance Company: [employer] - Phone [phone] Fax [phone]  Pharmacy Filling the Rx: [facility] [address] [place] [address]  Filling Pharmacy Phone: [phone]  Filling Pharmacy Fax:    Start Date: 9/14/2018

## 2018-09-14 NOTE — TELEPHONE ENCOUNTER
Prior Authorization Approval    Authorization Effective Date: 8/15/2018  Authorization Expiration Date: 9/14/2019  Medication: tramadol 50 mg  Approved Dose/Quantity:    Reference #:     Insurance Company: [employer] - Phone [phone] Fax [phone]  Expected CoPay:       CoPay Card Available:      Foundation Assistance Needed:    Which Pharmacy is filling the prescription (Not needed for infusion/clinic administered): [facility] [address]  Pharmacy Notified: Yes  Patient Notified: Yes

## 2018-11-16 DIAGNOSIS — G89.29 OTHER CHRONIC PAIN: ICD-10-CM

## 2018-11-17 NOTE — TELEPHONE ENCOUNTER
traMADol (ULTRAM) 50 MG tablet  Last Written Prescription Date:  07/27/2018  Last Fill Quantity: 30,   # refills: 3  Last Office Visit: 07/27/2018  Future Office visit:    Next 5 appointments (look out 90 days)     Dec 21, 2018  3:40 PM CST   [facility] with [facility]as [facility]    [address] [place] [address]   [phone]                   Routing refill request to provider for review/approval because:  Drug not on the [facility], UMP or  Health refill protocol or controlled substance

## 2018-11-19 RX ORDER — TRAMADOL HYDROCHLORIDE 50 MG/1
TABLET ORAL
Qty: 30 TABLET | Refills: 0 | Status: SHIPPED | OUTPATIENT
Start: 2018-11-19 | End: 2018-11-19

## 2018-11-19 RX ORDER — TRAMADOL HYDROCHLORIDE 50 MG/1
50 TABLET ORAL EVERY 6 HOURS PRN
Qty: 30 TABLET | Refills: 3 | Status: SHIPPED | OUTPATIENT
Start: 2018-11-19 | End: 2019-03-05

## 2018-11-19 NOTE — TELEPHONE ENCOUNTER
Routing refill request to provider for review/approval because:  Drug not on the [facility] refill protocol     RX [place]) reviewed:  reviewed- no concerns    [place]:  [email or web address]    Ailin MARTINEZ RN,BSN

## 2018-11-19 NOTE — ASSESSMENT & PLAN NOTE
Patient is followed by Oscar Baltazar MD, MD for ongoing prescription of pain medication.  All refills should only be approved by this provider, or covering partner.    Medication(s): Tramadol.   Maximum quantity per month: 30  Clinic visit frequency required: Q 3 months     Controlled substance agreement:  Encounter-Level CSA - 07/27/2018:          Controlled Substance Agreement - Scan on 8/1/2018  8:25 AM : CONTROLLED SUBSTANCE AGREEMENT (below)            Encounter-Level CSA - 07/27/2018:          Controlled Substance Agreement - Scan on 10/5/2016  1:07 PM : CONTROLLED SUBSTANCE AGREEMENT (below)            Encounter-Level CSA - 07/27/2018:          Controlled Substance Agreement - Scan on 9/2/2015  3:58 PM : Controlled Substrance Agreement 9/2/15 (below)              No flowsheet data found.    Last [facility] website verification:  done on 11/19/2018   [email or web address]

## 2018-12-13 ENCOUNTER — TELEPHONE (OUTPATIENT)
Dept: FAMILY MEDICINE | Facility: CLINIC | Age: 48
End: 2018-12-13

## 2018-12-13 NOTE — TELEPHONE ENCOUNTER
Spoke with patient  Dentist  did a tooth extraction today   Pt listed off all the meds she is taking currently to her dentist   Pt states she is already on Tramadol - that is what the dentist would have prescribed but because she is already on it he advised she check with PCP for Tramadol dosing as well as APAP directions     Dentist did not write a script for any meds for her     Currently taking Tramadol 50mg every 6 hours as needed. She is asking for directions on APAP and if she can temporarily increase/adjust Tramadol for pain    Did discuss with patient for APAP do not want to exceed 3-4g per 24 hours and follow instructions on bottle but patient is requesting directions from PCP     Please advise   Mary Ellen LAST RN

## 2018-12-13 NOTE — TELEPHONE ENCOUNTER
I would not raise tramadol dose, tylenol 650mg every 4 hours prn max dose as listed on bottle    Abad Lainez M.D.

## 2018-12-13 NOTE — TELEPHONE ENCOUNTER
Reason for Call:  Other Directions for taking Tylenol and Tramadol.    Detailed comments: Pt had a dental procedure today and dentist advised her to take Tylenol and Tramadol. Pt is taking Tramadol 50mg tablets, 1 tablet every 6 hours prn.     Phone Number Patient can be reached at: Cell number on file:    Telephone Information:   Mobile [phone]       Best Time: Anytime    Can we leave a detailed message on this number? YES    Call taken on 12/13/2018 at 11:03 AM by Josselyn Low

## 2018-12-13 NOTE — TELEPHONE ENCOUNTER
Tried calling patient back, no answer and VM is full     Per below looks like dentist started patient on Tramadol and APAP?    Did the dentist write the Tramadol script? If so directions should come from that provider     Will need to re-call patient     Mary Ellen LAST RN

## 2018-12-31 ENCOUNTER — APPOINTMENT (OUTPATIENT)
Dept: GENERAL RADIOLOGY | Facility: CLINIC | Age: 48
End: 2018-12-31
Attending: EMERGENCY MEDICINE
Payer: COMMERCIAL

## 2018-12-31 ENCOUNTER — HOSPITAL ENCOUNTER (EMERGENCY)
Facility: CLINIC | Age: 48
Discharge: HOME | End: 2018-12-31
Attending: EMERGENCY MEDICINE | Admitting: EMERGENCY MEDICINE
Payer: COMMERCIAL

## 2018-12-31 VITALS
HEART RATE: 91 BPM | RESPIRATION RATE: 18 BRPM | HEIGHT: 66 IN | BODY MASS INDEX: 28.93 KG/M2 | OXYGEN SATURATION: 98 % | TEMPERATURE: 98.9 F | SYSTOLIC BLOOD PRESSURE: 134 MMHG | WEIGHT: 180 LBS | DIASTOLIC BLOOD PRESSURE: 96 MMHG

## 2018-12-31 DIAGNOSIS — S82.851A CLOSED TRIMALLEOLAR FRACTURE OF RIGHT ANKLE, INITIAL ENCOUNTER: ICD-10-CM

## 2018-12-31 PROCEDURE — 29515 APPLICATION SHORT LEG SPLINT: CPT | Mod: RT

## 2018-12-31 PROCEDURE — 99284 EMERGENCY DEPT VISIT MOD MDM: CPT | Mod: 25

## 2018-12-31 PROCEDURE — 25000132 ZZH RX MED GY IP 250 OP 250 PS 637: Performed by: EMERGENCY MEDICINE

## 2018-12-31 PROCEDURE — 73610 X-RAY EXAM OF ANKLE: CPT | Mod: RT

## 2018-12-31 RX ORDER — HYDROCODONE BITARTRATE AND ACETAMINOPHEN 5; 325 MG/1; MG/1
2 TABLET ORAL ONCE
Status: COMPLETED | OUTPATIENT
Start: 2018-12-31 | End: 2018-12-31

## 2018-12-31 RX ORDER — OXYCODONE AND ACETAMINOPHEN 5; 325 MG/1; MG/1
1-2 TABLET ORAL EVERY 4 HOURS PRN
Qty: 30 TABLET | Refills: 0 | Status: SHIPPED | OUTPATIENT
Start: 2018-12-31 | End: 2019-12-18

## 2018-12-31 RX ADMIN — HYDROCODONE BITARTRATE AND ACETAMINOPHEN 2 TABLET: 5; 325 TABLET ORAL at 15:55

## 2018-12-31 ASSESSMENT — MIFFLIN-ST. JEOR: SCORE: 1458.22

## 2018-12-31 ASSESSMENT — ENCOUNTER SYMPTOMS
JOINT SWELLING: 1
ABDOMINAL PAIN: 0
HEADACHES: 0

## 2018-12-31 NOTE — ED PROVIDER NOTES
"  History     Chief Complaint:  Fall       HPI   Lima Bueno is a 48 year old female with a history of DVT on blood thinners who presents for evaluation of right ankle pain. The patient reports that she was walking and slipped on the ice between 10:45 and 11 AM this morning. The patient reports that her left foot was at a 90 degree and which she immediately reduced. She denies hitting her head and does not have any chest pain, abdominal pain or headache.       Allergies:   Phosphoric acid  Terbutaline  Trazodone     Medications:    albuterol  fluticasone-salmeterol  oxyCODONE-acetaminophen   rivaroxaban ANTICOAGULANT   traMADol   traZODone   enoxaparin   guaiFENesin-codeine   terbinafine     Past Medical History:    ADD (attention deficit disorder with hyperactivity)   Asthma   Breast mass   DVT (deep venous thrombosis)    LSIL (low grade squamous intraepithelial lesion) on Pap smear   Pulmonary embolism      Past Surgical History:    History reviewed. No pertinent surgical history.    Family History:    Thyroid disease    Social History:  Smoking status: Current every day smoker  Alcohol use: no  Marital Status:  Single [1]       Review of Systems   Cardiovascular: Negative for chest pain.   Gastrointestinal: Negative for abdominal pain.   Musculoskeletal: Positive for joint swelling.   Neurological: Negative for headaches.   All other systems reviewed and are negative.        Physical Exam     Patient Vitals for the past 24 hrs:   BP Temp Temp src Pulse Resp SpO2 Height Weight   12/31/18 1812 -- -- -- -- 18 98 % -- --   12/31/18 1739 (!) 134/96 -- -- 91 -- -- -- --   12/31/18 1510 (!) 153/98 98.9  F (37.2  C) Oral 93 16 98 % 1.676 m (5' 6\") 81.6 kg (180 lb)         Physical Exam  Consitutional: Black female supine.   MS: Right leg non tender to touch. No pain to proximal tibia or  Fibula. Swollen right ankle with effusion. Achilles tendon intact. Dorsal pedal pulses intact, Sensation to touch intact. " Patient able to dorsiflex foot and toes against resistance but limited due to  Pain.  Neuro: Awake, alert, appropriate.       Emergency Department Course     Imaging:  Radiographic findings were communicated with the patient who voiced understanding of the findings.     XR ankle right    IMPRESSION: Mildly displaced and distracted fracture through the  distal fibula at the level of the ankle mortise. Scattered comminuted  fracture of the distal tibia, as well especially at the base of the  medial malleolus, with suspected intra-articular extension. Ankle  mortise appears somewhat widened on AP radiograph, though more normal  on lateral radiograph and oblique radiograph. However, there is  possibility of ankle mortise disruption given distribution of  fractures. Diffuse soft tissue swelling.  As read by radiology     Procedures:  Short leg splint was applied to the left ankle  and after placement I checked and adjusted the fit to ensure proper positioning.  The patient was more comfortable with the splint in place.  Sensation and circulation are intact after splint placement.    Interventions:  1555: Norco 5/325 MG 2 tablet PO      Emergency Department Course:  Past medical records, nursing notes, and vitals reviewed.  1651: I performed an exam of the patient and obtained history, as documented above.    The patient was sent for a XR ankle right while in the emergency department, findings above.     1812: I rechecked the patient. Findings and plan explained to the Patient. Patient discharged home with instructions regarding supportive care, medications, and reasons to return. The importance of close follow-up was reviewed.       Impression & Plan      Medical Decision Making:  Lima Perez is a 48 year old who presents with right ankle injury. She slipped and her ankle twisted in. She grabbed her ankle, swore and straightened it back out again. She comes her now with a swollen right ankle. She has no other injury.  On exam she has a effusion of that ankle. There is no proximal tenderness. She can dorsiflex but it is limited by the pain. The achilles is intact. Xray shows a trimalleolar fracture which is not dislocated. Patient was placed in a short leg splint by nursing using 6 inch wide plaster stirrups and this has stabilized the foot and ankle. She has crutches and will be non weight bearing.  I will give her Percocet for pain. She should use cold and elevation, non weight bear and call Dr. Caleb Turk who is on call the day after New Years to arrange surgical procedure. She is on a blood thinner and I have told her this may take a few days to get off this.       Diagnosis:    ICD-10-CM    1. Closed trimalleolar fracture of right ankle, initial encounter S82.851A        Disposition:  discharged to home    Discharge Medications:     Medication List      Started    oxyCODONE-acetaminophen 5-325 MG tablet  Commonly known as:  PERCOCET  1-2 tablets, Oral, EVERY 4 HOURS PRN              Jimmy Escalante  12/31/2018    EMERGENCY DEPARTMENT    Scribe Disclosure:  I, Jimmy Escalante, am serving as a scribe at 4:51 PM on 12/31/2018 to document services personally performed by  Adi Kapoor MD  based on my observations and the provider's statements to me.        Adi Kapoor MD  01/01/19 0029

## 2018-12-31 NOTE — ED AVS SNAPSHOT
Emergency Department  [address] [address]  Phone:  [phone]  Fax:  [phone]                                    Lima Bueno   MRN: [id number]    Department:   Emergency Department   Date of Visit:  12/31/2018           After Visit Summary Signature Page    I have received my discharge instructions, and my questions have been answered. I have discussed any challenges I see with this plan with the nurse or doctor.    ..........................................................................................................................................  Patient/Patient Representative Signature      ..........................................................................................................................................  Patient Representative Print Name and Relationship to Patient    ..................................................               ................................................  Date                                   Time    ..........................................................................................................................................  Reviewed by Signature/Title    ...................................................              ..............................................  Date                                               Time          22EPIC Rev 08/18

## 2019-01-02 ENCOUNTER — TELEPHONE (OUTPATIENT)
Dept: FAMILY MEDICINE | Facility: CLINIC | Age: 49
End: 2019-01-02

## 2019-01-02 NOTE — TELEPHONE ENCOUNTER
Spoke with patient, she is going to see Dr. Caleb Turk tomorrow but she will need a letter for work today. Please advise.    Philomena Stoll CMA

## 2019-01-02 NOTE — TELEPHONE ENCOUNTER
Pt is calling in to give the fax number to her 's office so that the letter can be faxed asap.  It it as follows:  [facility]  ATTN: Nnamdi SWEET  Fax: [phone]    Please expedite.

## 2019-01-02 NOTE — TELEPHONE ENCOUNTER
Letter faxed over to 's office and placed in accordion folder on Brenda pod.    Rashad Parker CMA on 1/2/2019 at 10:13 AM

## 2019-01-02 NOTE — LETTER
[address] [address]       To whom it may concern,    Lima Bueno was seen in the emergency department 12/31/2018 for acute ankle fracture and is unable to return to work at this time.  She has a follow up appointment scheduled in orthopedics for 01/03/2019 and will have more specific work restrictions defined at that time.    Sincerely,  Oscar Baltazar MD  1/2/2019

## 2019-01-02 NOTE — TELEPHONE ENCOUNTER
Reason for Call:  Work Letter    Detailed comments: Patient Slipped and Fell and went to the ER she Broke her Right Ankle  She needs a Note for work cause she can't drive and she can't work  Asking if this could be Emailed ? Please call her to discuss    Phone Number Patient can be reached at: Home number on file [phone] (home)    Best Time: ASAP    Can we leave a detailed message on this number? YES    Call taken on 1/2/2019 at 8:08 AM by Fransico Lorenzana

## 2019-01-08 ENCOUNTER — OFFICE VISIT (OUTPATIENT)
Dept: FAMILY MEDICINE | Facility: CLINIC | Age: 49
End: 2019-01-08
Payer: COMMERCIAL

## 2019-01-08 VITALS
WEIGHT: 234 LBS | TEMPERATURE: 97.3 F | HEIGHT: 66 IN | BODY MASS INDEX: 37.61 KG/M2 | HEART RATE: 92 BPM | SYSTOLIC BLOOD PRESSURE: 129 MMHG | OXYGEN SATURATION: 93 % | DIASTOLIC BLOOD PRESSURE: 88 MMHG

## 2019-01-08 DIAGNOSIS — Z01.818 PREOP GENERAL PHYSICAL EXAM: Primary | ICD-10-CM

## 2019-01-08 DIAGNOSIS — S82.851A CLOSED TRIMALLEOLAR FRACTURE OF RIGHT ANKLE, INITIAL ENCOUNTER: ICD-10-CM

## 2019-01-08 LAB
CREAT SERPL-MCNC: 0.81 MG/DL (ref 0.52–1.04)
GFR SERPL CREATININE-BSD FRML MDRD: 85 ML/MIN/{1.73_M2}
HGB BLD-MCNC: 14.6 G/DL (ref 11.7–15.7)
POTASSIUM SERPL-SCNC: 3.7 MMOL/L (ref 3.4–5.3)

## 2019-01-08 PROCEDURE — 99215 OFFICE O/P EST HI 40 MIN: CPT | Performed by: NURSE PRACTITIONER

## 2019-01-08 PROCEDURE — 36415 COLL VENOUS BLD VENIPUNCTURE: CPT | Performed by: NURSE PRACTITIONER

## 2019-01-08 PROCEDURE — 84132 ASSAY OF SERUM POTASSIUM: CPT | Performed by: NURSE PRACTITIONER

## 2019-01-08 PROCEDURE — 85018 HEMOGLOBIN: CPT | Performed by: NURSE PRACTITIONER

## 2019-01-08 PROCEDURE — 82565 ASSAY OF CREATININE: CPT | Performed by: NURSE PRACTITIONER

## 2019-01-08 ASSESSMENT — MIFFLIN-ST. JEOR: SCORE: 1703.17

## 2019-01-08 NOTE — PROGRESS NOTES
[facility]  [address] [facility] [address] [facility] [address]  [phone]  Dept: [phone]    PRE-OP EVALUATION:  Today's date: 2019    Lima Bueno (: 1970) presents for pre-operative evaluation assessment as requested by Dr. Stevie Turk .  She requires evaluation and anesthesia risk assessment prior to undergoing surgery/procedure for treatment of right ankle fracture .    Proposed Kuldip maite/ Procedure: Right Ankle Surgery  Date of Surgery/ Procedure: 01/10/2019  Time of Surgery/ Procedure: [facility]/Surgical Facility: [facility]  Fax number for surgical facility: [phone]  Primary Physician: Oscar Baltazar  Type of Anesthesia Anticipated: General    Patient has a Health Care Directive or Living Will:  NO    1. NO - Do you have a history of heart attack, stroke, stent, bypass or surgery on an artery in the head, neck, heart or legs?  2. NO - Do you ever have any pain or discomfort in your chest?  3. NO - Do you have a history of  Heart Failure?  4. NO - Are you troubled by shortness of breath when: walking on the level, up a slight hill or at night?  5. NO - Do you currently have a cold, bronchitis or other respiratory infection?  6. NO - Do you have a cough, shortness of breath or wheezing?  7. NO - Do you sometimes get pains in the calves of your legs when you walk?  8. YES - Do you or anyone in your family have previous history of blood clots? Personal history of clots.   9. YES - DO YOU OR DOES ANYONE IN YOUR FAMILY HAVE A SERIOUS BLEEDING PROBLEM SUCH AS PROLONGED BLEEDING FOLLOWING SURGERIES OR CUTS? On xarelto  10. NO - Have you ever had problems with anemia or been told to take iron pills?  11. NO - Have you had any abnormal blood loss such as black, tarry or bloody stools, or abnormal vaginal bleeding?  12. NO - Have you ever had a blood transfusion?  13. NO - Have you or any of your relatives ever had problems with anesthesia?  14.  NO - Do you have sleep apnea, excessive snoring or daytime drowsiness?  15. NO - Do you have any prosthetic heart valves?  16. NO - Do you have prosthetic joints?  17. NO - Is there any chance that you may be pregnant?      HPI:     HPI related to upcoming procedure: DAVID rubi fx on 12/31. Going for repair.   Has been feeling well lately. Is usually quite active.       See problem list for active medical problems.  Problems all longstanding and stable, except as noted/documented.  See ROS for pertinent symptoms related to these conditions.                                                                                                                                                          .    MEDICAL HISTORY:     Patient Active Problem List    Diagnosis Date Noted     Long-term (current) use of anticoagulants [Z79.01] 08/22/2016     Priority: Medium     Chronic pain 07/27/2016     Priority: Medium     Spinal stenosis 03/04/2015     Priority: Medium     LSIL (low grade squamous intraepithelial lesion) on Pap smear 02/19/2014     Priority: Medium     02/19/14 Pap= LSIL, Neg high risk HPV. Plan for colp per PCP.  10/01/14 Attempts made to contact patient without call back. Consider to be declining/noncompliant.  set for 6 months Dx pap. Reminder letter sent out today.    10/29/14 No call back and no appointment scheduled. Consider to be lost to pap follow up.  10/21/16 Pap= NIL, Neg HPV, Endometrial cells present that do correlate with LMP. Referred to see ObGyn due to no follow up on prior abnormal.no c  12/29/16 Consult with Gyn, no colp or EMB, repeat pap in 1 yr NOT at time of menses. Due 10/2017         Tobacco use disorder 02/04/2014     Priority: Medium     Protein S deficiency (H) 03/12/2013     Priority: Medium     See note of  dated 2/19/2013       CARDIOVASCULAR SCREENING; LDL GOAL LESS THAN 160 02/27/2013     Priority: Medium     Breast mass 11/01/2012     Priority: Medium     Patient yet to  have biopsy as of 11/1/2012        Deep vein thrombosis (DVT) of lower extremity (H)      Priority: Medium     right leg       Pulmonary embolism (H)      Priority: Medium     Asthma      Priority: Medium     ADD (attention deficit disorder with hyperactivity)      Priority: Medium     Problem list name updated by automated process. Provider to review and confirm        Past Medical History:   Diagnosis Date     ADD (attention deficit disorder with hyperactivity)      Asthma      Breast mass 11/1/2012    Patient yet to have biopsy as of 11/1/2012       DVT (deep venous thrombosis) (H) 10/12    right leg     LSIL (low grade squamous intraepithelial lesion) on Pap smear 02/19/14    Neg high risk HPV     Pulmonary embolism (H) 2012    Saw Dr Toledo, protein S deficiency     No past surgical history on file.  Current Outpatient Medications   Medication Sig Dispense Refill     albuterol (PROAIR HFA) 108 (90 BASE) MCG/ACT Inhaler Inhale 2 puffs into the lungs every 4 hours as needed for shortness of breath / dyspnea 1 Inhaler 11     COMPRESSION STOCKINGS 24-30mmHg, thigh high  Use as directed 2 each 2     enoxaparin (LOVENOX) 80 MG/0.8ML injection Inject 0.8 mL (80 mg) subcutaneously twice daily for 10 days -Not to be taken simultaneously with Xarelto 20 Syringe 3     fluticasone-salmeterol (ADVAIR-HFA) 230-21 MCG/ACT inhaler Inhale 2 puffs into the lungs 2 times daily 36 g 3     guaiFENesin-codeine (ROBITUSSIN AC) 100-10 MG/5ML SOLN solution Take 5 mLs by mouth every 4 hours as needed for cough 236 mL 0     order for DME Equipment being ordered: knee high compression stockings, medium pressure, length of use=99 2 each 0     order for DME Equipment being ordered: sharps container 1 each 0     oxyCODONE-acetaminophen (PERCOCET) 5-325 MG tablet Take 1-2 tablets by mouth every 4 hours as needed for pain 30 tablet 0     rivaroxaban ANTICOAGULANT (XARELTO) 15 MG TABS tablet Take 1 tablet (15 mg) by mouth 2 times daily (with  "meals) for 21 days 42 tablet 0     rivaroxaban ANTICOAGULANT (XARELTO) 20 MG TABS tablet Take 1 tablet (20 mg) by mouth daily (with dinner) Start after xarelto 15 mg twice a day is completed. Not to be taken simultaneously with Lovenox 90 tablet 3     terbinafine (LAMISIL) 250 MG tablet Take 1 tablet (250 mg) by mouth daily 90 tablet 0     traMADol (ULTRAM) 50 MG tablet Take 1 tablet (50 mg) by mouth every 6 hours as needed for severe pain 30 tablet 3     traZODone 300 MG TABS Take 300 mg by mouth At Bedtime 90 tablet 3     OTC products: None, except as noted above    Allergies   Allergen Reactions     Phosphoric Acid Hives     Terbutaline Hives     Trazodone      Other reaction(s): Other - Describe In Comment Field  Facial nerve pain  Other reaction(s): Other - Describe In Comment Field  Facial nerve pain      Latex Allergy: NO    Social History     Tobacco Use     Smoking status: Current Every Day Smoker     Packs/day: 0.50     Types: Cigarettes     Smokeless tobacco: Never Used   Substance Use Topics     Alcohol use: No     Alcohol/week: 0.0 oz     History   Drug Use No       REVIEW OF SYSTEMS:   Constitutional, neuro, ENT, endocrine, pulmonary, cardiac, gastrointestinal, genitourinary, musculoskeletal, integument and psychiatric systems are negative, except as otherwise noted.    EXAM:   /88 (BP Location: Right arm, Patient Position: Chair, Cuff Size: Adult Large)   Pulse 92   Temp 97.3  F (36.3  C) (Tympanic)   Ht 1.676 m (5' 6\")   Wt 106.1 kg (234 lb)   SpO2 93%   BMI 37.77 kg/m      GENERAL APPEARANCE: healthy, alert and no distress     EYES: EOMI, PERRL     HENT: mouth without ulcers or lesions     NECK: no adenopathy, no asymmetry, masses, or scars and thyroid normal to palpation     RESP: lungs clear to auscultation - no rales, rhonchi or wheezes     CV: regular rates and rhythm, normal S1 S2, no S3 or S4 and no murmur, click or rub     MS: extremities normal- no gross deformities noted, no " evidence of inflammation in joints, FROM in all extremities. Except right ankle with splint     SKIN: no suspicious lesions or rashes     NEURO: Normal strength and tone, sensory exam grossly normal, mentation intact and speech normal     PSYCH: mentation appears normal. and affect normal/bright     LYMPHATICS: No cervical adenopathy    DIAGNOSTICS:     Labs Resulted Today:   Results for orders placed or performed in visit on 01/08/19   Hemoglobin   Result Value Ref Range    Hemoglobin 14.6 11.7 - 15.7 g/dL   Potassium   Result Value Ref Range    Potassium 3.7 3.4 - 5.3 mmol/L   Creatinine   Result Value Ref Range    Creatinine 0.81 0.52 - 1.04 mg/dL    GFR Estimate 85 >60 mL/min/[1.73_m2]    GFR Estimate If Black >90 >60 mL/min/[1.73_m2]       Recent Labs   Lab Test 02/19/18  1536 02/06/18  1551 01/02/18  1038  10/04/17  0956  07/03/17  1527  06/12/17  1539   HGB 14.2  --   --   --   --   --   --   --  14.4     --   --   --   --   --   --   --  233   INR  --  3.40* 2.00*   < >  --    < >  --    < >  --    NA  --   --   --   --  140  --  140  --   --    POTASSIUM  --   --   --   --  3.8  --  3.9  --   --    CR 0.75  --   --   --  0.82  --  0.85  --   --     < > = values in this interval not displayed.        IMPRESSION:   Reason for surgery/procedure: R trimalleolar fx  Diagnosis/reason for consult: medical preop    The proposed surgical procedure is considered INTERMEDIATE risk.    REVISED CARDIAC RISK INDEX  The patient has the following serious cardiovascular risks for perioperative complications such as (MI, PE, VFib and 3  AV Block):  No serious cardiac risks  INTERPRETATION: 0 risks: Class I (very low risk - 0.4% complication rate)    The patient has the following additional risks for perioperative complications:  No identified additional risks  smoker      ICD-10-CM    1. Preop general physical exam Z01.818 Hemoglobin     Potassium     Creatinine   2. Closed trimalleolar fracture of right ankle,  initial encounter S82.987S        RECOMMENDATIONS:       --Patient is to take all scheduled medications on the day of surgery EXCEPT for modifications listed below.    Anticoagulant or Antiplatelet Medication Use  XARELTO: Hold for 2 days prior. She will contact her hematologist.  She is not currently on Lovenox         APPROVAL GIVEN to proceed with proposed procedure, without further diagnostic evaluation       Signed Electronically by: MISTY Almonte CNP    Copy of this evaluation report is provided to requesting physician.    [facility] Preop Guidelines    Revised Cardiac Risk Index

## 2019-01-08 NOTE — PATIENT INSTRUCTIONS
Before Your Surgery      Call your surgeon if there is any change in your health. This includes signs of a cold or flu (such as a sore throat, runny nose, cough, rash or fever).    Do not smoke, drink alcohol or take over the counter medicine (unless your surgeon or primary care doctor tells you to) for the 24 hours before and after surgery.    If you take prescribed drugs: Follow your doctor s orders about which medicines to take and which to stop until after surgery.    Eating and drinking prior to surgery: follow the instructions from your surgeon    Take a shower or bath the night before surgery. Use the soap your surgeon gave you to gently clean your skin. If you do not have soap from your surgeon, use your regular soap. Do not shave or scrub the surgery site.  Wear clean pajamas and have clean sheets on your bed.     Dr Carranza: [phone]

## 2019-01-09 ASSESSMENT — ASTHMA QUESTIONNAIRES: ACT_TOTALSCORE: 23

## 2019-01-22 ENCOUNTER — TELEPHONE (OUTPATIENT)
Dept: FAMILY MEDICINE | Facility: CLINIC | Age: 49
End: 2019-01-22

## 2019-01-22 NOTE — TELEPHONE ENCOUNTER
Reason for call:  Patient reporting a symptom    Symptom or request: patient had ankle fracture on 12/31,  She has been unable to sleep and said it is getting pretty bad, and she would like to get some medication to help her sleep. Please advise.     Duration (how long have symptoms been present): since injury     Have you been treated for this before? No    Additional comments:     Phone Number patient can be reached at:  Home number on file [phone] (home)    Best Time:  any    Can we leave a detailed message on this number:  YES    Call taken on 1/22/2019 at 2:02 PM by Radha Hamlin

## 2019-01-22 NOTE — TELEPHONE ENCOUNTER
PCP/ DOD,    Please see message below and advise. Pt would like something for insomnia. Hasn't been able to sleep since ankle fracture.  She is not taking Trazodone or Tramadol because she is taking Percocet.    Pt has been trying to be as active as she can   Denies consuming caffeine at night  Since the accident, pt's lost her sense of safety. She's always nervous and can't relax.    Please advise.    Thank you,  Yariel PATINO RN

## 2019-01-22 NOTE — TELEPHONE ENCOUNTER
If she is on percocet I would not give her a sleeping med.  She can do team office visit.    Abad Lainez M.D.

## 2019-01-23 NOTE — TELEPHONE ENCOUNTER
Detailed message left notifying patient of provider response below, asking patient to call back to schedule a visit to discuss concerns.     Mary Ellen LAST RN

## 2019-03-05 ENCOUNTER — ONCOLOGY VISIT (OUTPATIENT)
Dept: ONCOLOGY | Facility: CLINIC | Age: 49
End: 2019-03-05
Attending: INTERNAL MEDICINE
Payer: COMMERCIAL

## 2019-03-05 VITALS
DIASTOLIC BLOOD PRESSURE: 93 MMHG | SYSTOLIC BLOOD PRESSURE: 130 MMHG | HEART RATE: 93 BPM | OXYGEN SATURATION: 98 % | BODY MASS INDEX: 37.77 KG/M2 | HEIGHT: 66 IN | RESPIRATION RATE: 18 BRPM

## 2019-03-05 DIAGNOSIS — I26.99 OTHER PULMONARY EMBOLISM WITHOUT ACUTE COR PULMONALE, UNSPECIFIED CHRONICITY (H): Primary | ICD-10-CM

## 2019-03-05 DIAGNOSIS — I82.4Y1 DEEP VEIN THROMBOSIS (DVT) OF PROXIMAL VEIN OF RIGHT LOWER EXTREMITY, UNSPECIFIED CHRONICITY (H): ICD-10-CM

## 2019-03-05 PROCEDURE — G0463 HOSPITAL OUTPT CLINIC VISIT: HCPCS

## 2019-03-05 PROCEDURE — 99214 OFFICE O/P EST MOD 30 MIN: CPT | Performed by: INTERNAL MEDICINE

## 2019-03-05 NOTE — LETTER
3/5/2019         RE: Lima Bueno  [address]  [place] [facility] [address]        Dear Colleague,    Thank you for referring your patient, Lima Bueno, to the [facility]. Please see a copy of my visit note below.    Visit Date:   03/05/2019     HEMATOLOGY HISTORY: Ms. Bueno is a female with history of DVT and pulmonary embolism. Protein S deficiency.      1.  Right lower extremity ultrasound on 10/05/2012 for pain and swelling revealed occlusive DVT extending from the proximal right femoral vein into popliteal vein and peroneal veins in the calf.   -CT chest angiogram on 10/05/2012 revealed small pulmonary emboli bilaterally.    -This was an unprovoked DVT and pulmonary embolism.    -She is on warfarin since 2012. She has been compliant.  INR generally has been therapeutic.      2.  Hypercoagulable workup done on 11/30/2012.   -Antithrombin III normal at 104.   -Lupus negative.   -Protein C of 36.   -Protein S of less than 10.     -Anticardiolipin antibody IgG and IgM normal.   -No Factor V Leiden mutation.     -No prothrombin gene mutation.      3.  Right lower extremity ultrasound on 01/12/2017 for foot swelling revealed chronic nonocclusive thrombus in the right mid femoral to popliteal vein.  Previously this was occlusive. There was no acute DVT.       4.  Right lower extremity ultrasound on 02/07/2018 for leg swelling reveals new occlusive thrombus in the right popliteal vein.  There is chronic nonocclusive thrombus in the right superficial femoral vein in the mid-thigh. INR was 3.4 on 02/06/2018.     5.  Warfarin was stopped and enoxaparin started on 02/07/2018.      6. On 02/19/2018:  -Protein C of 124.  It was previously low at 36.  -Protein S of 42.  Previously it was less than 10.      7. CT chest, abdomen and pelvis on 02/26/2018 does not reveal any evidence of malignancy.      8. On 03/30/2018, protein S of 38.     SUBJECTIVE:  Ms. Bueno is a  48-year-old female with history of recurrent thrombosis.  She had DVT and pulmonary embolism before.  She has had multiple investigations done.  Her protein S has remained low.      Patient recently fell down on the ice.  X-ray on 12/31/2018 revealed a right ankle fracture.  She had surgery done.  She is in crutches now.      Patient has not had any new thrombosis.  No bleeding complications.      No headache.  No dizziness.  No chest pain.  No difficulty breathing.  No abdominal pain, nausea or vomiting.  Appetite is good.  No urinary or bowel complaints.  No bleeding.  No fever, chills or night sweats.      PHYSICAL EXAMINATION:   GENERAL:  She is alert and oriented x 3.   VITAL SIGNS:  Reviewed.  ECOG PS of 1.   Rest of the system not examined.      LABORATORY DATA:  Reviewed.      ASSESSMENT:   1.  A 48-year-old female with a recurrent thrombosis.  She had DVT and pulmonary embolism.   2.  Protein S deficiency.      PLAN:   1.  I discussed regarding thrombosis.  Patient is doing well.  She has not had any more thrombosis.      She is on indefinite anticoagulation.  She is currently on Xarelto.  She is tolerating it well.  She will continue on it. Side effects of Xarelto were all discussed.  I advised her to go to the emergency room if she has any trauma, bleeding, chest pain, shortness of breath, swelling/redness in extremities.       2. Her protein S is low.  It has been checked multiple times even when she is not on anticoagulation.  It remains low.  She has protein S deficiency.  Discussed regarding protein S deficiency.  She is already on anticoagulation.     3.  She had a few questions, which were all answered.  I will see her in 1 year's time.  Advised her to call us with any questions or concerns.      Total face-to-face time spent 25 minutes, more than 50% of the time spent in counseling and coordination of care.         NGUYEN OWENS MD             D: 03/06/2019   T: 03/06/2019   MT: AS      Name:      ROSA BAINS   MRN:      [id number]        Account:      [id number]   :      1970           Visit Date:   2019      Document: [id number]        Again, thank you for allowing me to participate in the care of your patient.        Sincerely,        Briana Carranza MD

## 2019-03-06 NOTE — PROGRESS NOTES
Visit Date:   03/05/2019     HEMATOLOGY HISTORY: Ms. Bueno is a female with history of DVT and pulmonary embolism. Protein S deficiency.      1.  Right lower extremity ultrasound on 10/05/2012 for pain and swelling revealed occlusive DVT extending from the proximal right femoral vein into popliteal vein and peroneal veins in the calf.   -CT chest angiogram on 10/05/2012 revealed small pulmonary emboli bilaterally.    -This was an unprovoked DVT and pulmonary embolism.    -She is on warfarin since 2012. She has been compliant.  INR generally has been therapeutic.      2.  Hypercoagulable workup done on 11/30/2012.   -Antithrombin III normal at 104.   -Lupus negative.   -Protein C of 36.   -Protein S of less than 10.     -Anticardiolipin antibody IgG and IgM normal.   -No Factor V Leiden mutation.     -No prothrombin gene mutation.      3.  Right lower extremity ultrasound on 01/12/2017 for foot swelling revealed chronic nonocclusive thrombus in the right mid femoral to popliteal vein.  Previously this was occlusive. There was no acute DVT.       4.  Right lower extremity ultrasound on 02/07/2018 for leg swelling reveals new occlusive thrombus in the right popliteal vein.  There is chronic nonocclusive thrombus in the right superficial femoral vein in the mid-thigh. INR was 3.4 on 02/06/2018.     5.  Warfarin was stopped and enoxaparin started on 02/07/2018.      6. On 02/19/2018:  -Protein C of 124.  It was previously low at 36.  -Protein S of 42.  Previously it was less than 10.      7. CT chest, abdomen and pelvis on 02/26/2018 does not reveal any evidence of malignancy.      8. On 03/30/2018, protein S of 38.     SUBJECTIVE:  Ms. Bueno is a 48-year-old female with history of recurrent thrombosis.  She had DVT and pulmonary embolism before.  She has had multiple investigations done.  Her protein S has remained low.      Patient recently fell down on the ice.  X-ray on 12/31/2018 revealed a right ankle fracture.   She had surgery done.  She is in crutches now.      Patient has not had any new thrombosis.  No bleeding complications.      No headache.  No dizziness.  No chest pain.  No difficulty breathing.  No abdominal pain, nausea or vomiting.  Appetite is good.  No urinary or bowel complaints.  No bleeding.  No fever, chills or night sweats.      PHYSICAL EXAMINATION:   GENERAL:  She is alert and oriented x 3.   VITAL SIGNS:  Reviewed.  ECOG PS of 1.   Rest of the system not examined.      LABORATORY DATA:  Reviewed.      ASSESSMENT:   1.  A 48-year-old female with a recurrent thrombosis.  She had DVT and pulmonary embolism.   2.  Protein S deficiency.      PLAN:   1.  I discussed regarding thrombosis.  Patient is doing well.  She has not had any more thrombosis.      She is on indefinite anticoagulation.  She is currently on Xarelto.  She is tolerating it well.  She will continue on it. Side effects of Xarelto were all discussed.  I advised her to go to the emergency room if she has any trauma, bleeding, chest pain, shortness of breath, swelling/redness in extremities.       2. Her protein S is low.  It has been checked multiple times even when she is not on anticoagulation.  It remains low.  She has protein S deficiency.  Discussed regarding protein S deficiency.  She is already on anticoagulation.     3.  She had a few questions, which were all answered.  I will see her in 1 year's time.  Advised her to call us with any questions or concerns.      Total face-to-face time spent 25 minutes, more than 50% of the time spent in counseling and coordination of care.         NGUYEN OWENS MD             D: 2019   T: 2019   MT: AS      Name:     ROSA BAINS   MRN:      [id number]        Account:      [id number]   :      1970           Visit Date:   2019      Document: [id number]

## 2019-04-05 ENCOUNTER — HOSPITAL ENCOUNTER (OUTPATIENT)
Dept: MAMMOGRAPHY | Facility: CLINIC | Age: 49
Discharge: HOME | End: 2019-04-05
Attending: INTERNAL MEDICINE | Admitting: INTERNAL MEDICINE
Payer: COMMERCIAL

## 2019-04-05 ENCOUNTER — RESULT FOLLOW UP (OUTPATIENT)
Dept: OBGYN | Facility: CLINIC | Age: 49
End: 2019-04-05

## 2019-04-05 ENCOUNTER — OFFICE VISIT (OUTPATIENT)
Dept: OBGYN | Facility: CLINIC | Age: 49
End: 2019-04-05
Payer: COMMERCIAL

## 2019-04-05 VITALS
DIASTOLIC BLOOD PRESSURE: 84 MMHG | HEIGHT: 67 IN | BODY MASS INDEX: 35.31 KG/M2 | WEIGHT: 225 LBS | SYSTOLIC BLOOD PRESSURE: 126 MMHG

## 2019-04-05 DIAGNOSIS — Z01.419 ENCOUNTER FOR GYNECOLOGICAL EXAMINATION WITHOUT ABNORMAL FINDING: Primary | ICD-10-CM

## 2019-04-05 DIAGNOSIS — R87.612 PAPANICOLAOU SMEAR OF CERVIX WITH LOW GRADE SQUAMOUS INTRAEPITHELIAL LESION (LGSIL): ICD-10-CM

## 2019-04-05 DIAGNOSIS — F17.200 SMOKING: ICD-10-CM

## 2019-04-05 DIAGNOSIS — Z12.31 VISIT FOR SCREENING MAMMOGRAM: ICD-10-CM

## 2019-04-05 PROCEDURE — 87624 HPV HI-RISK TYP POOLED RSLT: CPT | Performed by: OBSTETRICS & GYNECOLOGY

## 2019-04-05 PROCEDURE — 99406 BEHAV CHNG SMOKING 3-10 MIN: CPT | Performed by: OBSTETRICS & GYNECOLOGY

## 2019-04-05 PROCEDURE — 99396 PREV VISIT EST AGE 40-64: CPT | Performed by: OBSTETRICS & GYNECOLOGY

## 2019-04-05 PROCEDURE — G0124 SCREEN C/V THIN LAYER BY MD: HCPCS | Performed by: OBSTETRICS & GYNECOLOGY

## 2019-04-05 PROCEDURE — 77067 SCR MAMMO BI INCL CAD: CPT

## 2019-04-05 PROCEDURE — G0145 SCR C/V CYTO,THINLAYER,RESCR: HCPCS | Performed by: OBSTETRICS & GYNECOLOGY

## 2019-04-05 ASSESSMENT — ANXIETY QUESTIONNAIRES
6. BECOMING EASILY ANNOYED OR IRRITABLE: NOT AT ALL
1. FEELING NERVOUS, ANXIOUS, OR ON EDGE: NOT AT ALL
7. FEELING AFRAID AS IF SOMETHING AWFUL MIGHT HAPPEN: NOT AT ALL
3. WORRYING TOO MUCH ABOUT DIFFERENT THINGS: NOT AT ALL
GAD7 TOTAL SCORE: 1
5. BEING SO RESTLESS THAT IT IS HARD TO SIT STILL: NOT AT ALL
2. NOT BEING ABLE TO STOP OR CONTROL WORRYING: NOT AT ALL
IF YOU CHECKED OFF ANY PROBLEMS ON THIS QUESTIONNAIRE, HOW DIFFICULT HAVE THESE PROBLEMS MADE IT FOR YOU TO DO YOUR WORK, TAKE CARE OF THINGS AT HOME, OR GET ALONG WITH OTHER PEOPLE: SOMEWHAT DIFFICULT

## 2019-04-05 ASSESSMENT — PATIENT HEALTH QUESTIONNAIRE - PHQ9
SUM OF ALL RESPONSES TO PHQ QUESTIONS 1-9: 1
5. POOR APPETITE OR OVEREATING: SEVERAL DAYS

## 2019-04-05 ASSESSMENT — MIFFLIN-ST. JEOR: SCORE: 1670.28

## 2019-04-05 NOTE — PATIENT INSTRUCTIONS
Schedule your annual screening mammogram  Follow up with your primary care provider for your other medical problems.  Encouraged to quit smoking.  Continue self breast exam.  Increase physical activity and exercise.  Lab and pap smear results will be called to the patient.  Co-testing repeated today due to +endometrial cells on last pap smear.  If normal, will repeat in 5yrs.  Usual safety and preventative measures counseling done.  BMI >25  Weight loss encouraged.  Will arrange first colonoscopy later this year with PCP.

## 2019-04-05 NOTE — PROGRESS NOTES
Lima is a 48 year old  female who presents for annual exam.     Besides routine health maintenance, she has no other health concerns today .    HPI:  Here today for yearly exam --doing well.  Perimenopausal.  Had menses last April and again in  --nothing since that time.  Denies hot flushes or night sweats.  Not currently SA.  Denies vaginal dryness or pain.  No bowel/bladder issues.  No leaking or urgency issues.    ; 5 grown children; 5 grandchildren and #6 on the way in July!  All live in [place]  -trying to stay active but broke her ankle in January; has been laid up since that time; doing PT 2x/wk and exercises/stretches at home; has actually lost 10# since surgery; got out of her boot 3 weeks ago but still using a crutch  +mammo today at breast center; +SBE --no issues  PCP -Dr. Baltazar; follows meds, bloodwork, etc  +smoking --1/2ppd; not interested in quitting at this time; quit cold turkey x 3months in   -due for first colonoscopy this winter  -had endometrial cells on pap smear in  (collected during menses); remote hx of abnormal pap smears --last LGSIL with neg HPV in  --did not follow up until 2016      GYNECOLOGIC HISTORY:    Patient's last menstrual period was 2018 (approximate).  Her current contraception method is: none.  She  reports that she has been smoking cigarettes.  She has been smoking about 0.50 packs per day. she has never used smokeless tobacco.  Tobacco Cessation Action Plan: Self help information given to patient  Patient is not sexually active.  STD testing offered?  Declined  Last PHQ-9 score on record =   PHQ-9 SCORE 2019   PHQ-9 Total Score -   PHQ-9 Total Score 1     Last GAD7 score on record =   MARYAM-7 SCORE 2019   Total Score 1     Alcohol Score = 1    HEALTH MAINTENANCE:  Cholesterol:   Cholesterol   Date Value Ref Range Status   2018 162 <200 mg/dL Final   2015 171 <200 mg/dL Final     Comment:     LDL Cholesterol  is the primary guide to therapy.   The NCEP recommends further evaluation of: patients with cholesterol greater   than 200 mg/dL if additional risk factors are present, cholesterol greater   than   240 mg/dL, triglycerides greater than 150 mg/dL, or HDL less than 40 mg/dL.      18   Total= 162, Triglycerides=206, HDL=39, LDL=82  Last Mammo: one year ago, Result: normal, Next Mammo: today at [facility]   Pap:   Lab Results   Component Value Date    PAP OTHER-NIL, See Result 10/21/2016    PAP LSIL 2014    10/21/16 endo cells, HPV (-)neg  Colonoscopy:  NA, Result: not applicable, Next Colonoscopy: NA years.  Dexa:  NA    Health maintenance updated:  yes    HISTORY:  Obstetric History       T5      L4     SAB0   TAB0   Ectopic0   Multiple0   Live Births5       # Outcome Date GA Lbr Cezar/2nd Weight Sex Delivery Anes PTL Lv   5 Term     M    LIVE BIRTH   4 Term     F    BENJAMIN   3 Term     F    BENJAMIN   2 Term     M    BENJAMIN   1 Term     M    BENJAMIN          Patient Active Problem List   Diagnosis     Deep vein thrombosis (DVT) of lower extremity (H)     Pulmonary embolism (H)     Asthma     ADD (attention deficit disorder with hyperactivity)     Breast mass     CARDIOVASCULAR SCREENING; LDL GOAL LESS THAN 160     Protein S deficiency (H)     Tobacco use disorder     LSIL (low grade squamous intraepithelial lesion) on Pap smear     Spinal stenosis     Chronic pain     Long-term (current) use of anticoagulants [Z79.01]     Past Surgical History:   Procedure Laterality Date     ANKLE SURGERY Right 2019      Social History     Tobacco Use     Smoking status: Current Every Day Smoker     Packs/day: 0.50     Types: Cigarettes     Smokeless tobacco: Never Used   Substance Use Topics     Alcohol use: No     Alcohol/week: 0.0 oz      Problem (# of Occurrences) Relation (Name,Age of Onset)    Hypertension (1) Brother    Ovarian Cancer (1) Paternal Grandmother    Thyroid Disease (1) Mother: hyperactive        "Negative family history of: Breast Cancer            Current Outpatient Medications   Medication Sig     albuterol (PROAIR HFA) 108 (90 BASE) MCG/ACT Inhaler Inhale 2 puffs into the lungs every 4 hours as needed for shortness of breath / dyspnea     COMPRESSION STOCKINGS 24-30mmHg, thigh high  Use as directed     fluticasone-salmeterol (ADVAIR-HFA) 230-21 MCG/ACT inhaler Inhale 2 puffs into the lungs 2 times daily     order for DME Equipment being ordered: knee high compression stockings, medium pressure, length of use=99     order for DME Equipment being ordered: sharps container     oxyCODONE-acetaminophen (PERCOCET) 5-325 MG tablet Take 1-2 tablets by mouth every 4 hours as needed for pain     rivaroxaban ANTICOAGULANT (XARELTO) 20 MG TABS tablet Take 1 tablet (20 mg) by mouth daily (with dinner)     No current facility-administered medications for this visit.      Allergies   Allergen Reactions     Phosphoric Acid Hives     Terbutaline Hives     Trazodone      Other reaction(s): Other - Describe In Comment Field  Facial nerve pain  Other reaction(s): Other - Describe In Comment Field  Facial nerve pain     Wellbutrin [Bupropion]      Smoking cessation -- told never to take again ,       Past medical, surgical, social and family histories were reviewed and updated in EPIC.    ROS:   12 point review of systems negative other than symptoms noted below.    EXAM:  /84   Ht 1.689 m (5' 6.5\")   Wt 102.1 kg (225 lb)   LMP 06/06/2018 (Approximate)   Breastfeeding? No   BMI 35.77 kg/m     BMI: Body mass index is 35.77 kg/m .    PHYSICAL EXAM:  Constitutional:  Appearance: Well nourished, well developed, alert, in no acute distress  Neck:  Lymph Nodes:  No lymphadenopathy present    Thyroid:  Gland size normal, nontender, no nodules or masses present  on palpation  Chest:  Respiratory Effort:  Breathing unlabored  Cardiovascular:    Heart: Auscultation:  Regular rate, normal rhythm, no murmurs " present  Breasts: Inspection of Breasts:  No lymphadenopathy present., Palpation of Breasts and Axillae:  No masses present on palpation, no breast tenderness., Axillary Lymph Nodes:  No lymphadenopathy present. and No nodularity, asymmetry or nipple discharge bilaterally.  Gastrointestinal:   Abdominal Examination:  Abdomen nontender to palpation, tone normal without rigidity or guarding, no masses present, umbilicus without lesions   Liver and Spleen:  No hepatomegaly present, liver nontender to palpation    Hernias:  No hernias present  Lymphatic: Lymph Nodes:  No other lymphadenopathy present  Skin:  General Inspection:  No rashes present, no lesions present, no areas of  discoloration    Genitalia and Groin:  No rashes present, no lesions present, no areas of  discoloration, no masses present  Neurologic/Psychiatric:    Mental Status:  Oriented X3     Pelvic Exam:  External Genitalia:     Normal appearance for age, no discharge present, no tenderness present, no inflammatory lesions present, color normal  Vagina:     Normal vaginal vault without central or paravaginal defects, no discharge present, no inflammatory lesions present, no masses present  Bladder:     Nontender to palpation  Urethra:   Urethral Body:  Urethra palpation normal, urethra structural support normal   Urethral Meatus:  No erythema or lesions present  Cervix:     Appearance healthy, no lesions present, nontender to palpation, no bleeding present  Uterus:     Uterus: firm, normal sized and nontender, anteverted in position.   Adnexa:     No adnexal tenderness present, no adnexal masses present  Perineum:     Perineum within normal limits, no evidence of trauma, no rashes or skin lesions present  Anus:     Anus within normal limits, no hemorrhoids present  Inguinal Lymph Nodes:     No lymphadenopathy present  Pubic Hair:     Normal pubic hair distribution for age  Genitalia and Groin:     No rashes present, no lesions present, no areas of  discoloration, no masses present      COUNSELING:   Reviewed preventive health counseling, as reflected in patient instructions  Special attention given to:        Regular exercise       Healthy diet/nutrition       Colon cancer screening       (Ana)menopause management    BMI: Body mass index is 35.77 kg/m .  Weight management plan: Discussed healthy diet and exercise guidelines Patient was referred to their PCP to discuss a diet and exercise plan.    ASSESSMENT:  48 year old female with satisfactory annual exam.    ICD-10-CM    1. Encounter for gynecological examination without abnormal finding Z01.419    2. Smoking F17.200        PLAN:  Patient Instructions   Schedule your annual screening mammogram  Follow up with your primary care provider for your other medical problems.  Encouraged to quit smoking.  Continue self breast exam.  Increase physical activity and exercise.  Lab and pap smear results will be called to the patient.  Co-testing repeated today due to +endometrial cells on last pap smear.  If normal, will repeat in 5yrs.  Usual safety and preventative measures counseling done.  BMI >25  Weight loss encouraged.  Will arrange first colonoscopy later this year with PCP.      Sera Cabezas MD

## 2019-04-05 NOTE — LETTER
April 11, 2019    Lima Bueno  [address]  [place] [address]      Dear Ms.Ewing Bueno,      This letter is in regards to your recent cervical cancer screening (Pap smear and HPV test).    Your Pap smear result was reported as LSIL - low grade squamous intraepthelial lesion. This means that there were mildly abnormal cells found in the sample that we collected from your cervix. The vast majority of patients with this result do not have significant cervical abnormalities.     Your cervical sample was also tested for the presence of Human Papillomavirus (HPV). Your HPV test is NEGATIVE for high risk HPV, meaning that no HPV was found at this time.     Over time, your body can get rid of these abnormal cells, so it is recommended that you repeat your Pap smear and HPV test in 1 year.    If you have additional questions regarding this result, please call our registered nurse, Mary Ellen at [phone].    Please continue to be seen every year for an annual physical exam and other preventative tests.         Sincerely,    MD altaf Elizondos

## 2019-04-06 ASSESSMENT — ANXIETY QUESTIONNAIRES: GAD7 TOTAL SCORE: 1

## 2019-04-09 LAB
COPATH REPORT: ABNORMAL
PAP: ABNORMAL

## 2019-04-10 ENCOUNTER — TELEPHONE (OUTPATIENT)
Dept: FAMILY MEDICINE | Facility: CLINIC | Age: 49
End: 2019-04-10

## 2019-04-10 DIAGNOSIS — R41.840 DIFFICULTY CONCENTRATING: Primary | ICD-10-CM

## 2019-04-10 NOTE — TELEPHONE ENCOUNTER
To PCP:     Can we re-order referral for:     9035 - MENTAL HEALTH REFERRAL  - Adult; Psychiatry and Medication Management; Psychiatry; [facility]: [facility] [phone].  Medication management & future refills will be returned to G PCP upon completion of evaluation; We rey...    [facility] is stating that referral signed in 7/27/18 is too old.     Please advise,   Thank you,   Araceli SARKAR RN

## 2019-04-10 NOTE — TELEPHONE ENCOUNTER
Reason for Call: Request for an order or referral:    Order or referral being requested: psychiatry    Date needed: as soon as possible    Has the patient been seen by the PCP for this problem? YES    Additional comments: referral was made 6 months ago but [facility] indicated it was too old. Please resend. Thanks!    Phone number Patient can be reached at:  Cell number on file:    Telephone Information:   Mobile [phone]     Best Time:  any    Can we leave a detailed message on this number?  YES    Call taken on 4/10/2019 at 2:59 PM by Lisa Fernández

## 2019-04-11 LAB
FINAL DIAGNOSIS: NORMAL
HPV HR 12 DNA CVX QL NAA+PROBE: NEGATIVE
HPV16 DNA SPEC QL NAA+PROBE: NEGATIVE
HPV18 DNA SPEC QL NAA+PROBE: NEGATIVE
SPECIMEN DESCRIPTION: NORMAL
SPECIMEN SOURCE CVX/VAG CYTO: NORMAL

## 2019-04-11 NOTE — PROGRESS NOTES
2004 NIL pap  02/19/14 Pap= LSIL, Neg high risk HPV. Plan for colp per PCP.- COLP not completed  10/21/16 Pap= NIL, Neg HPV, Endometrial cells present that do correlate with LMP. Referred to see ObGyn due to no follow up on prior abnormal.no c  12/29/16 Consult with Gyn, no colp or EMB, repeat pap in 1 yr NOT at time of menses. Due 10/2017  4/5/19 LSIL pap, Neg HPV.  Plan: cotest in 1 year. CCT Tracking.

## 2019-04-11 NOTE — TELEPHONE ENCOUNTER
"Reached personal  greeting. Informed that \"new referral has been placed by \". Left phone no.to call for scheduling.  No further information left on   Mary Faustin RN    "

## 2019-04-12 ENCOUNTER — TELEPHONE (OUTPATIENT)
Dept: FAMILY MEDICINE | Facility: CLINIC | Age: 49
End: 2019-04-12

## 2019-04-12 NOTE — TELEPHONE ENCOUNTER
Huddled with Dr. Baltazar who said that if we can get an actual, legitimate eval in hand to review, that would be appropriate.  Will await follow up from patient.  Sonja Pettit RN

## 2019-04-12 NOTE — TELEPHONE ENCOUNTER
Reason for Call: Request for an order or referral:    Order or referral being requested:  Psyche       Date needed: n/a    Has the patient been seen by the PCP for this problem? YES    Additional comments: pt has questions regarding her refrral and her diagnoses. Please advise     Phone number Patient can be reached at:  Home number on file [phone] (home)    Best Time:  any    Can we leave a detailed message on this number?  YES    Call taken on 4/12/2019 at 12:23 PM by Ashutosh Dixon

## 2019-04-12 NOTE — TELEPHONE ENCOUNTER
I would recommend she retest as we do not have adequate documentation of the original diagnosis.    Oscar Baltazar MD

## 2019-04-12 NOTE — TELEPHONE ENCOUNTER
"Pt calling in reporting that she has previously already been evaluated/diagnosed with ADHD and went through whole eval with Dr. Teddy Kelly, and was previously prescribed by a Dr. Ramey.     Pt is hoping if we have clear documentation of previous diagnosis and medication, she will not have to complete another psych eval for Stratterra.   Will await forms for PCP review.     Found Dr. Teddy Kelly, at \"[facility]\" Phone: [phone] Left vm requesting fax of eval, and pt may need to do release.     Informed pt that no information about any history of Dx in chart, so pt will follow up with Dr. Ramey's clinic and Dr. Kelly's clinic, and sign HEATHER form to have these released for your review.    Pt asking to clarify if there is another reason she needs eval, or if all info from previous dx is faxed over, if that would suffice.  Sonja Pettit RN        "

## 2019-04-15 NOTE — TELEPHONE ENCOUNTER
Pt advised. Pt states that she will either make an appointment with [facility] or she will go a different route with a different doctor.

## 2019-04-15 NOTE — TELEPHONE ENCOUNTER
I received documentation with diagnosis of ADHD and is 19 years previous performed in 2000.  I am still recommending she establish with a psychiatrist to review this diagnosis and consider medication options    [facility] [phone].  Medication management & future refills will be returned to G PCP upon completion of evaluation    Oscar Baltazar MD

## 2019-05-29 RX ORDER — TRAMADOL HYDROCHLORIDE 50 MG/1
TABLET ORAL
Qty: 28 TABLET | Refills: 4 | OUTPATIENT
Start: 2019-05-29

## 2019-06-02 DIAGNOSIS — J20.9 ACUTE BRONCHITIS, UNSPECIFIED ORGANISM: ICD-10-CM

## 2019-06-02 DIAGNOSIS — J45.20 MILD INTERMITTENT ASTHMA WITHOUT COMPLICATION: ICD-10-CM

## 2019-06-02 DIAGNOSIS — J45.909 PERSISTENT ASTHMA WITHOUT COMPLICATION, UNSPECIFIED ASTHMA SEVERITY: ICD-10-CM

## 2019-06-03 NOTE — TELEPHONE ENCOUNTER
"fluticasone-salmeterol (ADVAIR-HFA) 230-21 MCG/ACT inhaler  Last Written Prescription Date:  2/9/18  Last Fill Quantity: 36 g ,  # refills: 3   Last office visit: 1/8/2019 with prescribing provider:  tere    Future Office Visit:  .    albuterol (PROAIR HFA) 108 (90 BASE) MCG/ACT Inhaler  Last Written Prescription Date:  2/7/18  Last Fill Quantity: 1,  # refills: 11   Last office visit: 1/8/2019 with prescribing provider:  tere    Future Office Visit:        Requested Prescriptions   Pending Prescriptions Disp Refills     DULERA 100-5 MCG/ACT inhaler [Pharmacy Med Name: DULERA 100-5MCG     AER]  3     Sig: INHALE 2 PUFFS BY MOUTH INTO LUNGS TWICE DAILY       Inhaled Steroids Protocol Failed - 6/2/2019  3:13 PM        Failed - Medication is active on med list        Passed - Patient is age 12 or older        Passed - Asthma control assessment score within normal limits in last 6 months     Please review ACT score.           Passed - Recent (6 mo) or future (30 days) visit within the authorizing provider's specialty     Patient had office visit in the last 6 months or has a visit in the next 30 days with authorizing provider or within the authorizing provider's specialty.  See \"Patient Info\" tab in inbasket, or \"Choose Columns\" in Meds & Orders section of the refill encounter.            ADVAIR -21 MCG/ACT inhaler [Pharmacy Med Name: ADVAIR /21   AER]  3     Sig: INHALE 2 PUFFS BY MOUTH TWICE DAILY       Inhaled Steroids Protocol Passed - 6/2/2019  3:13 PM        Passed - Patient is age 12 or older        Passed - Asthma control assessment score within normal limits in last 6 months     Please review ACT score.           Passed - Medication is active on med list        Passed - Recent (6 mo) or future (30 days) visit within the authorizing provider's specialty     Patient had office visit in the last 6 months or has a visit in the next 30 days with authorizing provider or within the authorizing " "provider's specialty.  See \"Patient Info\" tab in inbasket, or \"Choose Columns\" in Meds & Orders section of the refill encounter.              "

## 2019-06-04 RX ORDER — MOMETASONE FUROATE AND FORMOTEROL FUMARATE DIHYDRATE 100; 5 UG/1; UG/1
AEROSOL RESPIRATORY (INHALATION)
Qty: 1 INHALER | Refills: 3 | OUTPATIENT
Start: 2019-06-04

## 2019-06-04 RX ORDER — FLUTICASONE PROPIONATE AND SALMETEROL XINAFOATE 230; 21 UG/1; UG/1
AEROSOL, METERED RESPIRATORY (INHALATION)
Qty: 36 G | Refills: 0 | Status: SHIPPED | OUTPATIENT
Start: 2019-06-04 | End: 2020-09-18

## 2019-06-04 NOTE — TELEPHONE ENCOUNTER
ACT Total Scores 2/6/2018 7/27/2018 1/8/2019   ACT TOTAL SCORE - - -   ASTHMA ER VISITS - - -   ASTHMA HOSPITALIZATIONS - - -   ACT TOTAL SCORE (Goal Greater than or Equal to 20) 17 17 23   In the past 12 months, how many times did you visit the emergency room for your asthma without being admitted to the hospital? 0 0 0   In the past 12 months, how many times were you hospitalized overnight because of your asthma? 0 0 0     Advair -Medication is being filled for 1 time refill only due to:  Patient needs to be seen because due for OV with PCP.    Routing refill request to provider for review/approval because:    DULERA - was stopped on 12/31/18 at hospital discharge, no reason for discontinuation listed      Please advise  JESSICA Vásquez RN

## 2019-06-10 ENCOUNTER — TELEPHONE (OUTPATIENT)
Dept: OBGYN | Facility: CLINIC | Age: 49
End: 2019-06-10

## 2019-06-10 NOTE — TELEPHONE ENCOUNTER
Annual 4/5/19. Pt has not had a cycle since June of 2018. On June 8th 2019 she started bleeding. Bleeding has been heavy. Changing a pad every  mins. Bright red bleeding, Passing some large clots size of quarter. Today has gone through 7 pad already. Pt is on Xarelto for DVT. Pt informed it could be the Xarelto. Will review with Dr. Greco, on call. Dr. Greco states pt needs to be seen tomorrow, will need CBC. Not able to start OCP because of DVT and will not stop Xerelto due to DVT. Needs to ride it out. Pt informed. Pt states insurance has changed and she now has [facility] and needs to call and see who she will be able to see. Pt informed if she becomes dizzy, lightheaded, SOB or N&V needs to go to ED to be seen.

## 2019-07-03 DIAGNOSIS — J45.20 MILD INTERMITTENT ASTHMA WITHOUT COMPLICATION: ICD-10-CM

## 2019-07-03 NOTE — TELEPHONE ENCOUNTER
"Pending Prescriptions:                       Disp   Refills    albuterol (PROAIR HFA/PROVENTIL HFA/SARAH*       11           Sig: INHALE 2 PUFFS BY MOUTH EVERY 4 HOURS AS NEEDED           FOR SHORTNESS OF BREATH OR  DYSPNEA    Last Written Prescription Date:  6/4/19  Last Fill Quantity: 36 g,  # refills: 0   Last office visit: 1/8/2019 with prescribing provider:     Future Office Visit:    Requested Prescriptions   Pending Prescriptions Disp Refills     albuterol (PROAIR HFA/PROVENTIL HFA/VENTOLIN HFA) 108 (90 Base) MCG/ACT inhaler [Pharmacy Med Name: ALBUTEROL HFA 90MCG AER]  11     Sig: INHALE 2 PUFFS BY MOUTH EVERY 4 HOURS AS NEEDED FOR SHORTNESS OF BREATH OR  DYSPNEA       Asthma Maintenance Inhalers - Anticholinergics Passed - 7/3/2019  1:01 PM        Passed - Patient is age 12 years or older        Passed - Asthma control assessment score within normal limits in last 6 months     Please review ACT score.           Passed - Medication is active on med list        Passed - Recent (6 mo) or future (30 days) visit within the authorizing provider's specialty     Patient had office visit in the last 6 months or has a visit in the next 30 days with authorizing provider or within the authorizing provider's specialty.  See \"Patient Info\" tab in inbasket, or \"Choose Columns\" in Meds & Orders section of the refill encounter.              "

## 2019-07-05 RX ORDER — ALBUTEROL SULFATE 90 UG/1
AEROSOL, METERED RESPIRATORY (INHALATION)
Qty: 3 INHALER | Refills: 0 | Status: SHIPPED | OUTPATIENT
Start: 2019-07-05 | End: 2019-11-25

## 2019-07-08 ENCOUNTER — TELEPHONE (OUTPATIENT)
Dept: FAMILY MEDICINE | Facility: CLINIC | Age: 49
End: 2019-07-08

## 2019-07-08 NOTE — TELEPHONE ENCOUNTER
Prior Authorization Retail Medication Request    Medication/Dose: Advair -21 mcg/act  ICD code (if different than what is on RX):  J45.909  Previously Tried and Failed:  Dulera  Rationale:  Patient stable on medication    Insurance Name:  [employer]  Insurance ID:  [id number]      Pharmacy Information (if different than what is on RX)  Name:  [id number]  Phone:  [phone]

## 2019-07-15 NOTE — TELEPHONE ENCOUNTER
Central Prior Authorization Team  Phone: [phone]    PA Initiation    Medication: Advair /21  Insurance Company: [employer] - Phone [phone] Fax [phone]  Pharmacy Filling the Rx: [facility] [address] [place], [place] - [address] [place] [address]  Filling Pharmacy Phone: [phone]  Filling Pharmacy Fax:    Start Date: 7/15/2019

## 2019-07-16 NOTE — TELEPHONE ENCOUNTER
No pa needed. This medication is covered under patient's insurance benefit and no pa required. Confirmed with pharmacy that medication did go through insurance and it is ready for .

## 2019-07-23 ENCOUNTER — OFFICE VISIT (OUTPATIENT)
Dept: FAMILY MEDICINE | Facility: CLINIC | Age: 49
End: 2019-07-23
Payer: COMMERCIAL

## 2019-07-23 VITALS
HEIGHT: 67 IN | OXYGEN SATURATION: 97 % | TEMPERATURE: 98.1 F | SYSTOLIC BLOOD PRESSURE: 150 MMHG | DIASTOLIC BLOOD PRESSURE: 98 MMHG | HEART RATE: 91 BPM | BODY MASS INDEX: 35.77 KG/M2

## 2019-07-23 DIAGNOSIS — M25.562 PAIN IN BOTH KNEES, UNSPECIFIED CHRONICITY: ICD-10-CM

## 2019-07-23 DIAGNOSIS — Z87.81 STATUS POST ORIF OF FRACTURE OF ANKLE: ICD-10-CM

## 2019-07-23 DIAGNOSIS — M54.50 ACUTE BILATERAL LOW BACK PAIN WITHOUT SCIATICA: Primary | ICD-10-CM

## 2019-07-23 DIAGNOSIS — Z98.890 STATUS POST ORIF OF FRACTURE OF ANKLE: ICD-10-CM

## 2019-07-23 DIAGNOSIS — M25.561 PAIN IN BOTH KNEES, UNSPECIFIED CHRONICITY: ICD-10-CM

## 2019-07-23 PROCEDURE — 99213 OFFICE O/P EST LOW 20 MIN: CPT | Performed by: NURSE PRACTITIONER

## 2019-07-23 NOTE — PROGRESS NOTES
Subjective     Lima Bueno is a 48 year old female who presents to clinic today for the following health issues:    HPI   Chief Complaint   Patient presents with     Pain     pt states she is experiencing pain in her lower back, states she has been feeling symptoms for couple of months.      Wondering if she hurt herself from her fall 6 months ago   Has old injury of L4   Side lying is much better for  Pain   R foot tingling on occasion   Pain is low midline extending to bilateral glutes   No b/b changes, fevers   Hasn't been pushing herself so not walking a ton   Not taking anything for pain   Was on Tyl#3 this winter   Sleeping well with trazodone and on her side   Since her R ankle fracture she also has developed bilat knee pain and continues with R ankle pain and swelling       Past Medical History:   Diagnosis Date     ADD (attention deficit disorder with hyperactivity)      Asthma      Breast mass 11/1/2012    Patient yet to have biopsy as of 11/1/2012       DVT (deep venous thrombosis) (H) 10/12    right leg     LSIL (low grade squamous intraepithelial lesion) on Pap smear 02/19/14    Neg high risk HPV     Pulmonary embolism (H) 2012    Saw Dr Toledo, protein S deficiency     Family History   Problem Relation Age of Onset     Thyroid Disease Mother         hyperactive     Hypertension Brother      Ovarian Cancer Paternal Grandmother      Breast Cancer No family hx of      Past Surgical History:   Procedure Laterality Date     ANKLE SURGERY Right 01/2019     Social History     Tobacco Use     Smoking status: Current Every Day Smoker     Packs/day: 0.50     Types: Cigarettes     Smokeless tobacco: Never Used   Substance Use Topics     Alcohol use: No     Alcohol/week: 0.0 oz     Current Outpatient Medications   Medication Sig Dispense Refill     ADVAIR -21 MCG/ACT inhaler INHALE 2 PUFFS BY MOUTH TWICE DAILY 36 g 0     albuterol (PROAIR HFA/PROVENTIL HFA/VENTOLIN HFA) 108 (90 Base) MCG/ACT  "inhaler INHALE 2 PUFFS BY MOUTH EVERY 4 HOURS AS NEEDED FOR SHORTNESS OF BREATH OR  DYSPNEA 3 Inhaler 0     COMPRESSION STOCKINGS 24-30mmHg, thigh high  Use as directed 2 each 2     order for DME Equipment being ordered: knee high compression stockings, medium pressure, length of use=99 2 each 0     order for DME Equipment being ordered: sharps container 1 each 0     rivaroxaban ANTICOAGULANT (XARELTO) 20 MG TABS tablet Take 1 tablet (20 mg) by mouth daily (with dinner) 90 tablet 3     oxyCODONE-acetaminophen (PERCOCET) 5-325 MG tablet Take 1-2 tablets by mouth every 4 hours as needed for pain (Patient not taking: Reported on 7/23/2019) 30 tablet 0     Allergies   Allergen Reactions     Phosphoric Acid Hives     Terbutaline Hives     Trazodone      Other reaction(s): Other - Describe In Comment Field  Facial nerve pain  Other reaction(s): Other - Describe In Comment Field  Facial nerve pain     Wellbutrin [Bupropion]      Smoking cessation -- told never to take again ,       Reviewed and updated as needed this visit by clinical staff and provider      Review of Systems   Detailed as above         Objective    BP (!) 150/98 (BP Location: Right arm, Patient Position: Sitting, Cuff Size: Adult Regular)   Pulse 91   Temp 98.1  F (36.7  C) (Oral)   Ht 1.689 m (5' 6.5\")   LMP 06/06/2019 (Exact Date)   SpO2 97%   Breastfeeding? No   BMI 35.77 kg/m    Body mass index is 35.77 kg/m .  Physical Exam   Constitutional: She appears well-developed.   HENT:   Head: Normocephalic.   Eyes: Conjunctivae are normal.   Pulmonary/Chest: Effort normal.   Musculoskeletal:   Mild tenderness lumbar spine. Moves around room and chair without obvious difficulty.   Neurological: She is alert.   Slumped seated SLR bilaterally elicits midline pain   Psychiatric: She has a normal mood and affect. Judgment normal.        Assessment and Plan:       ICD-10-CM    1. Acute bilateral low back pain without sciatica M54.5 [facility], [facility], AND " CHIROPRACTIC REFERRAL   2. Pain in both knees, unspecified chronicity M25.561 [facility], HAND, AND CHIROPRACTIC REFERRAL    M25.562    3. Status post ORIF of fracture of ankle Z96.7 [facility], HAND, AND CHIROPRACTIC REFERRAL    Z87.81        Suspect LBP r/t recent R ankle ORIF and change in ambulation and prolonged inactivity. Will send to PT. Recommend Tylenol prn. Follow-up with PCP with persistent symptoms     MISTY Burt, CNP  [facility]

## 2019-07-23 NOTE — LETTER
[place] [address] [address] [facility] [address]  Phone: [phone]    July 23, 2019          To whom it may concern:    RE: Lima Bueno    Patient was seen and treated today at our clinic.    Please contact me for questions or concerns.      Sincerely,        MISTY Almonte CNP

## 2019-07-24 DIAGNOSIS — G89.29 OTHER CHRONIC PAIN: Primary | ICD-10-CM

## 2019-07-24 ASSESSMENT — ASTHMA QUESTIONNAIRES: ACT_TOTALSCORE: 19

## 2019-07-25 RX ORDER — TRAMADOL HYDROCHLORIDE 50 MG/1
50 TABLET ORAL DAILY
Refills: 3 | COMMUNITY
Start: 2019-05-02 | End: 2019-12-18

## 2019-07-25 NOTE — TELEPHONE ENCOUNTER
traMADol (ULTRAM) 50 MG tablet   5/2/2019  --   Sig - Route: Take 50 mg by mouth daily PRN - Oral   Class: Historical           Last Written Prescription Date:  05/02/2019  Last Fill Quantity: Unknown,   # refills: Unknown  Last Office Visit: 07/23/2019 Db and 07/27/2018   Future Office visit:   Unknown    Routing refill request to provider for review/approval because:  Medication is reported/historical

## 2019-07-25 NOTE — TELEPHONE ENCOUNTER
Routing refill request to provider for review/approval because:  Medication is reported/historical      Patient is followed by Oscar Baltazar for ongoing prescription of pain medication.  All refills should only be approved by this provider, or covering partner.    Medication(s): Tramadol.   Maximum quantity per month: 30  Clinic visit frequency required: Q 3 months      Controlled substance agreement:  Encounter-Level CSA - 07/27/2018:    Controlled Substance Agreement - Scan on 8/1/2018  8:25 AM: CONTROLLED SUBSTANCE AGREEMENT (below)       Encounter-Level CSA - 09/27/2016:    Controlled Substance Agreement - Scan on 10/5/2016  1:07 PM: CONTROLLED SUBSTANCE AGREEMENT (below)       Encounter-Level CSA - 09/02/2015:    Controlled Substance Agreement - Scan on 9/2/2015  3:58 PM: Controlled Substrance Agreement 9/2/15 (below)       Patient-Level CSA:    There are no patient-level csa.       Pain Clinic evaluation in the past: No    DIRE Total Score(s):  No flowsheet data found.    Last [facility] website verification:  done on 7/25/19 Pt received Rx from PCP in the past   Pt receiving Percocet from another provider. Last Rx from another provider 3/26/19        Please review and authorize if appropriate.    Thank you,   Yariel PATINO RN

## 2019-07-26 RX ORDER — TRAMADOL HYDROCHLORIDE 50 MG/1
TABLET ORAL
Qty: 30 TABLET | Refills: 0 | Status: SHIPPED | OUTPATIENT
Start: 2019-07-26 | End: 2019-08-23

## 2019-07-30 ENCOUNTER — TELEPHONE (OUTPATIENT)
Dept: FAMILY MEDICINE | Facility: CLINIC | Age: 49
End: 2019-07-30

## 2019-07-30 NOTE — TELEPHONE ENCOUNTER
Reason for Call:  Other prescription    Detailed comments: [facility] called requesting provider team to fax Therapy orders over to [phone].     Phone Number Patient can be reached at: Other phone number:  [phone]    Best Time: Anytime     Can we leave a detailed message on this number? YES    Call taken on 7/30/2019 at 12:03 PM by Hermila Prasad

## 2019-07-31 NOTE — TELEPHONE ENCOUNTER
I believe there are physical therapy orders from Liz from 07/23.  This may be the order requested    Oscar Baltazar MD

## 2019-07-31 NOTE — TELEPHONE ENCOUNTER
Pt is returning ph call and stated that she appt on 8/5 at [facility] . If any questions please call back

## 2019-08-05 ENCOUNTER — TRANSFERRED RECORDS (OUTPATIENT)
Dept: HEALTH INFORMATION MANAGEMENT | Facility: CLINIC | Age: 49
End: 2019-08-05

## 2019-08-06 DIAGNOSIS — F51.01 PRIMARY INSOMNIA: ICD-10-CM

## 2019-08-07 NOTE — TELEPHONE ENCOUNTER
Not on active med list   D/C'd by hematology 3/5/2019 not no mention in notes as to why it was stopped  Left VM for patient to callback IBAN LAO RN

## 2019-08-07 NOTE — TELEPHONE ENCOUNTER
"Requested Prescriptions   Pending Prescriptions Disp Refills     traZODone HCl 300 MG TABS [Pharmacy Med Name: TraZODone 300MG     TAB] 30 tablet 11     Sig: TAKE 1 TABLET BY MOUTH AT BEDTIME  Last Written Prescription Date:  8/6/19  Last Fill Quantity: 30 tab,  # refills: 11   Last office visit: 7/23/2019 with prescribing provider:  Db   Future Office Visit:         Serotonin Modulators Failed - 8/6/2019  8:06 PM        Failed - Medication is active on med list        Passed - Recent (12 mo) or future (30 days) visit within the authorizing provider's specialty     Patient had office visit in the last 12 months or has a visit in the next 30 days with authorizing provider or within the authorizing provider's specialty.  See \"Patient Info\" tab in inbasket, or \"Choose Columns\" in Meds & Orders section of the refill encounter.              Passed - Patient is age 18 or older        Passed - No active pregnancy on record        Passed - No positive pregnancy test in past 12 months          "

## 2019-08-12 RX ORDER — TRAZODONE HYDROCHLORIDE 300 MG/1
TABLET ORAL
Start: 2019-08-12

## 2019-08-12 RX ORDER — TRAZODONE HYDROCHLORIDE 300 MG/1
300 TABLET ORAL AT BEDTIME
Qty: 90 TABLET | Refills: 0 | Status: SHIPPED | OUTPATIENT
Start: 2019-08-12 | End: 2019-11-25

## 2019-08-12 NOTE — TELEPHONE ENCOUNTER
Fractured ankle and was on Percocet earlier this year so couldn't be on this while on her ankle was healing     However, she resumed taking in May    Pt IS requesting a refill     Will plan to schedule physical with PCP soon, but is waiting to get her insurance figured out first    Mary Ellen LAST RN

## 2019-08-12 NOTE — TELEPHONE ENCOUNTER
"Left VM #2 for pt   Denied refill for now with note \"patient should contact prescriber first\"  Aislinn LAO RN    "

## 2019-08-23 DIAGNOSIS — G89.29 OTHER CHRONIC PAIN: ICD-10-CM

## 2019-08-23 DIAGNOSIS — F11.90 CHRONIC, CONTINUOUS USE OF OPIOIDS: ICD-10-CM

## 2019-08-24 NOTE — TELEPHONE ENCOUNTER
traMADol (ULTRAM) 50 MG tablet      Last Written Prescription Date:  07/26/2019  Last Fill Quantity: 30 tablet,   # refills: 0  Last Office Visit: 07/23/2019  Future Office visit:       Routing refill request to provider for review/approval because:  Drug not on the [facility], [facility] or [facility] refill protocol or controlled substance

## 2019-08-26 NOTE — TELEPHONE ENCOUNTER
Routing refill request to provider for review/approval because:  Drug not on the [facility] refill protocol   Per LOV notes by provider on 7/23/2019: Follow-up with PCP with persistent symptoms      KIMBERLYN Molina, RN  [facility]

## 2019-08-26 NOTE — TELEPHONE ENCOUNTER
Non-detailed message left for patient to return call  WANDY ChristyN, RN  [facility]

## 2019-08-27 RX ORDER — TRAMADOL HYDROCHLORIDE 50 MG/1
TABLET ORAL
Qty: 30 TABLET | Refills: 0 | Status: SHIPPED | OUTPATIENT
Start: 2019-08-27 | End: 2019-11-25

## 2019-08-27 NOTE — TELEPHONE ENCOUNTER
Pt is not able to come in until October.  She had [facility] for insurance, she had it switched to [employer] [place], which we do not take.  She will have her insurance back in October.  She is going to PT currently per Lzi.  She is hoping she can get this refilled and follow up in October

## 2019-08-27 NOTE — TELEPHONE ENCOUNTER
Last [facility] website verification:  done on 8/27/19 [place]    [email or web address]    Routing refill request to provider for review/approval because:  Drug not on the [facility] refill protocol     Mary Ellen LAST RN

## 2019-11-25 DIAGNOSIS — G89.29 OTHER CHRONIC PAIN: ICD-10-CM

## 2019-11-25 DIAGNOSIS — F51.01 PRIMARY INSOMNIA: ICD-10-CM

## 2019-11-25 DIAGNOSIS — J45.20 MILD INTERMITTENT ASTHMA WITHOUT COMPLICATION: ICD-10-CM

## 2019-11-26 NOTE — TELEPHONE ENCOUNTER
"Requested Prescriptions   Pending Prescriptions Disp Refills     traZODone HCl 300 MG TABS [Pharmacy Med Name: TraZODone 300MG     TAB] 90 tablet 0     Sig: TAKE 1 TABLET BY MOUTH AT BEDTIME   Last Written Prescription Date:  8/12/2019  Last Fill Quantity: 90,  # refills: 0   Last office visit: 7/23/2019 with prescribing provider:  Db Coleman Office Visit:        Serotonin Modulators Passed - 11/25/2019  4:13 PM        Passed - Recent (12 mo) or future (30 days) visit within the authorizing provider's specialty     Patient has had an office visit with the authorizing provider or a provider within the authorizing providers department within the previous 12 mos or has a future within next 30 days. See \"Patient Info\" tab in inbasket, or \"Choose Columns\" in Meds & Orders section of the refill encounter.              Passed - Medication is active on med list        Passed - Patient is age 18 or older        Passed - No active pregnancy on record        Passed - No positive pregnancy test in past 12 months        traMADol (ULTRAM) 50 MG tablet [Pharmacy Med Name: TRAMADOL 50MG TAB] 30 tablet 0     Sig: TAKE 1 TABLET BY MOUTH EVERY 6 HOURS AS NEEDED FOR SEVERE PAIN   Last Written Prescription Date:  8/27/2019  Last Fill Quantity: 30,  # refills: 0   Last office visit: 7/23/2019 with prescribing provider:  Db Coleman Office Visit:        There is no refill protocol information for this order        VENTOLIN  (90 Base) MCG/ACT inhaler [Pharmacy Med Name: VENTOLIN HFA        AER]  0     Sig: INHALE 2 PUFFS BY MOUTH EVERY 4 HOURS AS NEEDED FOR SHORTNESS OF BREATH /DYSPNEA   Last Written Prescription Date:  7/5/2019  Last Fill Quantity: 3,  # refills: 0   Last office visit: 7/23/2019 with prescribing provider:  Db Coleman Office Visit:        Asthma Maintenance Inhalers - Anticholinergics Failed - 11/25/2019  4:13 PM        Failed - Asthma control assessment score within normal limits in last 6 months " "    Please review ACT score.           Passed - Patient is age 12 years or older        Passed - Medication is active on med list        Passed - Recent (6 mo) or future (30 days) visit within the authorizing provider's specialty     Patient had office visit in the last 6 months or has a visit in the next 30 days with authorizing provider or within the authorizing provider's specialty.  See \"Patient Info\" tab in inbasket, or \"Choose Columns\" in Meds & Orders section of the refill encounter.            ACT Total Scores 7/27/2018 1/8/2019 7/23/2019   ACT TOTAL SCORE - - -   ASTHMA ER VISITS - - -   ASTHMA HOSPITALIZATIONS - - -   ACT TOTAL SCORE (Goal Greater than or Equal to 20) 17 23 19   In the past 12 months, how many times did you visit the emergency room for your asthma without being admitted to the hospital? 0 0 0   In the past 12 months, how many times were you hospitalized overnight because of your asthma? 0 0 0     Routing refill request to provider for review/approval because:  Drug not on the [facility] refill protocol   Labs out of range:  ACT  Drug interaction    WANDY ChristyN, RN  [facility]          "

## 2019-11-27 RX ORDER — ALBUTEROL SULFATE 90 UG/1
AEROSOL, METERED RESPIRATORY (INHALATION)
Qty: 1 INHALER | Refills: 11 | Status: SHIPPED | OUTPATIENT
Start: 2019-11-27 | End: 2021-04-28

## 2019-11-27 RX ORDER — TRAZODONE HYDROCHLORIDE 300 MG/1
TABLET ORAL
Qty: 90 TABLET | Refills: 1 | Status: SHIPPED | OUTPATIENT
Start: 2019-11-27 | End: 2020-07-10

## 2019-11-27 RX ORDER — TRAMADOL HYDROCHLORIDE 50 MG/1
TABLET ORAL
Qty: 30 TABLET | Refills: 0 | Status: SHIPPED | OUTPATIENT
Start: 2019-11-27 | End: 2020-07-20

## 2019-12-18 ENCOUNTER — ANCILLARY PROCEDURE (OUTPATIENT)
Dept: GENERAL RADIOLOGY | Facility: CLINIC | Age: 49
End: 2019-12-18
Attending: PHYSICIAN ASSISTANT
Payer: COMMERCIAL

## 2019-12-18 ENCOUNTER — OFFICE VISIT (OUTPATIENT)
Dept: URGENT CARE | Facility: URGENT CARE | Age: 49
End: 2019-12-18
Payer: COMMERCIAL

## 2019-12-18 VITALS
HEART RATE: 121 BPM | OXYGEN SATURATION: 95 % | SYSTOLIC BLOOD PRESSURE: 138 MMHG | TEMPERATURE: 100.2 F | DIASTOLIC BLOOD PRESSURE: 84 MMHG | RESPIRATION RATE: 21 BRPM

## 2019-12-18 DIAGNOSIS — J11.1 INFLUENZA-LIKE ILLNESS: ICD-10-CM

## 2019-12-18 DIAGNOSIS — R06.2 WHEEZING: Primary | ICD-10-CM

## 2019-12-18 DIAGNOSIS — R06.2 WHEEZING: ICD-10-CM

## 2019-12-18 LAB
FLUAV+FLUBV AG SPEC QL: NEGATIVE
FLUAV+FLUBV AG SPEC QL: NEGATIVE
SPECIMEN SOURCE: NORMAL

## 2019-12-18 PROCEDURE — 94640 AIRWAY INHALATION TREATMENT: CPT | Performed by: PHYSICIAN ASSISTANT

## 2019-12-18 PROCEDURE — 99214 OFFICE O/P EST MOD 30 MIN: CPT | Mod: 25 | Performed by: PHYSICIAN ASSISTANT

## 2019-12-18 PROCEDURE — 87804 INFLUENZA ASSAY W/OPTIC: CPT | Performed by: PHYSICIAN ASSISTANT

## 2019-12-18 PROCEDURE — 71046 X-RAY EXAM CHEST 2 VIEWS: CPT

## 2019-12-18 RX ORDER — PREDNISONE 10 MG/1
40 TABLET ORAL ONCE
Status: COMPLETED | OUTPATIENT
Start: 2019-12-18 | End: 2019-12-18

## 2019-12-18 RX ORDER — PREDNISONE 20 MG/1
20 TABLET ORAL DAILY
Qty: 5 TABLET | Refills: 0 | Status: SHIPPED | OUTPATIENT
Start: 2019-12-18 | End: 2020-03-21

## 2019-12-18 RX ORDER — CODEINE PHOSPHATE/GUAIFENESIN 10-100MG/5
5 LIQUID (ML) ORAL EVERY 4 HOURS PRN
Qty: 120 ML | Refills: 0 | Status: SHIPPED | OUTPATIENT
Start: 2019-12-18 | End: 2021-08-13

## 2019-12-18 RX ORDER — OSELTAMIVIR PHOSPHATE 75 MG/1
75 CAPSULE ORAL 2 TIMES DAILY
Qty: 10 CAPSULE | Refills: 0 | Status: SHIPPED | OUTPATIENT
Start: 2019-12-18 | End: 2020-03-21

## 2019-12-18 RX ORDER — IPRATROPIUM BROMIDE AND ALBUTEROL SULFATE 2.5; .5 MG/3ML; MG/3ML
3 SOLUTION RESPIRATORY (INHALATION) ONCE
Status: COMPLETED | OUTPATIENT
Start: 2019-12-18 | End: 2019-12-18

## 2019-12-18 RX ADMIN — PREDNISONE 40 MG: 10 TABLET ORAL at 17:35

## 2019-12-18 RX ADMIN — IPRATROPIUM BROMIDE AND ALBUTEROL SULFATE 3 ML: 2.5; .5 SOLUTION RESPIRATORY (INHALATION) at 18:33

## 2019-12-18 NOTE — LETTER
[facility]  [address]  [place] [address]  Phone: [phone]  Fax: [phone]    December 18, 2019        Lima Bueno  [address]  [place] [address]          To whom it may concern:    RE: Lima Bueno    Patient was seen and treated today at our clinic. Please excuse from work 12/18 -12/19/2019. Allow patient to work from home on 12/20 - 12/21/2019.    Please contact me for questions or concerns.      Sincerely,        Jenae Cheney PA-C

## 2019-12-19 NOTE — PROGRESS NOTES
SUBJECTIVE:   Lima Bueno is a 48 year old female presenting with a chief complaint of fever, chills, body aches, cough and congestion.  Onset of symptoms was 2 day(s) ago.  Course of illness is worsening.    Severity moderately severe  Current and Associated symptoms: as above  Treatment measures tried include Rest.  Predisposing factors include exposure to influenza.  Patient did not get a flu shot this year.  She has a history of asthma and has been using her inhaler. She denies having chest pain, hemoptysis, pleurisy, weakness or syncope.   Past Medical History:   Diagnosis Date     ADD (attention deficit disorder with hyperactivity)      Asthma      Breast mass 11/1/2012    Patient yet to have biopsy as of 11/1/2012       DVT (deep venous thrombosis) (H) 10/12    right leg     LSIL (low grade squamous intraepithelial lesion) on Pap smear 02/19/14    Neg high risk HPV     Pulmonary embolism (H) 2012    Saw Dr Toledo, protein S deficiency     Current Outpatient Medications   Medication Sig Dispense Refill     ADVAIR -21 MCG/ACT inhaler INHALE 2 PUFFS BY MOUTH TWICE DAILY 36 g 0     guaiFENesin-codeine (GUAIFENESIN AC) 100-10 MG/5ML syrup Take 5 mLs by mouth every 4 hours as needed for congestion 120 mL 0     oseltamivir (TAMIFLU) 75 MG capsule Take 1 capsule (75 mg) by mouth 2 times daily for 5 days 10 capsule 0     predniSONE (DELTASONE) 20 MG tablet Take 1 tablet (20 mg) by mouth daily for 5 days 5 tablet 0     rivaroxaban ANTICOAGULANT (XARELTO) 20 MG TABS tablet Take 1 tablet (20 mg) by mouth daily (with dinner) 90 tablet 3     traMADol (ULTRAM) 50 MG tablet TAKE 1 TABLET BY MOUTH EVERY 6 HOURS AS NEEDED FOR SEVERE PAIN 30 tablet 0     traZODone HCl 300 MG TABS TAKE 1 TABLET BY MOUTH AT BEDTIME 90 tablet 1     VENTOLIN  (90 Base) MCG/ACT inhaler INHALE 2 PUFFS BY MOUTH EVERY 4 HOURS AS NEEDED FOR SHORTNESS OF BREATH /DYSPNEA 1 Inhaler 11     Social History     Tobacco Use     Smoking  status: Current Every Day Smoker     Packs/day: 0.50     Types: Cigarettes     Smokeless tobacco: Never Used   Substance Use Topics     Alcohol use: No     Alcohol/week: 0.0 standard drinks       ROS:  Review of systems negative except as stated above.    OBJECTIVE:  /84   Pulse 121   Temp 100.2  F (37.9  C) (Tympanic)   Resp 21   SpO2 95%   Breastfeeding No   GENERAL APPEARANCE: alert, mild distress and She is coughing  EYES: EOMI,  PERRL, conjunctiva clear  HENT: ear canals and TM's normal.  Nose and mouth without ulcers, erythema or lesions  NECK: supple, nontender, no lymphadenopathy  RESP: expiratory wheezes posterior lower lobes  CV: regular rates and rhythm, normal S1 S2, no murmur noted  NEURO: Normal strength and tone, sensory exam grossly normal,  normal speech and mentation  SKIN: no suspicious lesions or rashes    Results for orders placed or performed in visit on 12/18/19   Influenza A/B antigen     Status: None   Result Value Ref Range    Influenza A/B Agn Specimen Nasal     Influenza A Negative NEG^Negative    Influenza B Negative NEG^Negative     CXR -- negative for infiltrate  ASSESSMENT / PLAN:  1. Wheezing    - INHALATION/NEBULIZER TREATMENT, INITIAL; Standing  - predniSONE (DELTASONE) tablet 40 mg  - ipratropium - albuterol 0.5 mg/2.5 mg/3 mL (DUONEB) neb solution 3 mL  - Influenza A/B antigen  - INHALATION/NEBULIZER TREATMENT, INITIAL  - XR Chest 2 Views; Future    2. Influenza-like illness  - oseltamivir (TAMIFLU) 75 MG capsule; Take 1 capsule (75 mg) by mouth 2 times daily for 5 days  Dispense: 10 capsule; Refill: 0  - guaiFENesin-codeine (GUAIFENESIN AC) 100-10 MG/5ML syrup; Take 5 mLs by mouth every 4 hours as needed for congestion  Dispense: 120 mL; Refill: 0  - predniSONE (DELTASONE) 20 MG tablet; Take 1 tablet (20 mg) by mouth daily for 5 days  Dispense: 5 tablet; Refill: 0    47 year old patient presents to the clinic febrile and with a cough. She is not in respiratory  distress, but she is coughing throughout the exam. Lungs have wheezing. she was given Duoneb with improvement in symptoms and SOB. Her BP, pulse and RR returned to baseline after neb treatment and cough was calmed. CXR is negative for infiltrate. Flu test is negative. I do think that her symptoms are representative of the flu, treating with Tamiflu today. Encouraged fluids and rest. Black box warning discussed with Codeine cough syrup. Red flag symptoms discussed.    Diagnosis and treatment plan was reviewed with patient and/or family.   We went over any labs or imaging. Discussed worsening symptoms or little to no relief despite treatment plan to follow-up with PCP or return to clinic.  Patient verbalizes understanding. All questions were addressed and answered.   Jenae Cheney PA-C

## 2019-12-19 NOTE — PATIENT INSTRUCTIONS
Your flu test was negative, XR looks good.   You had close contact with someone with the flu, so I am going to treat you with Tamiflu today.  Fluids and rest, humidified air at night  Take Codeine cough syrup at night to help with cough, use sparingly, it can be addictive. Do not drink alcohol or drive while taking it  Start taking prednisone tomorrow to aid in wheezing  Continue with inhalers.

## 2020-01-21 ENCOUNTER — TRANSFERRED RECORDS (OUTPATIENT)
Dept: HEALTH INFORMATION MANAGEMENT | Facility: CLINIC | Age: 50
End: 2020-01-21

## 2020-03-12 DIAGNOSIS — G89.29 OTHER CHRONIC PAIN: ICD-10-CM

## 2020-03-12 NOTE — TELEPHONE ENCOUNTER
Requested Prescriptions   Pending Prescriptions Disp Refills     traMADol (ULTRAM) 50 MG tablet 30 tablet 0     Sig: TAKE 1 TABLET BY MOUTH EVERY 6 HOURS AS NEEDED FOR SEVERE PAIN       There is no refill protocol information for this order        Last Written Prescription Date:  11/27/19  Last Fill Quantity: 30 tablet,  # refills: 0   Last office visit: 7/27/2018 with prescribing provider:  Delaney   Future Office Visit:

## 2020-03-13 DIAGNOSIS — I82.4Y1 DEEP VEIN THROMBOSIS (DVT) OF PROXIMAL VEIN OF RIGHT LOWER EXTREMITY, UNSPECIFIED CHRONICITY (H): ICD-10-CM

## 2020-03-13 DIAGNOSIS — I26.99 OTHER PULMONARY EMBOLISM WITHOUT ACUTE COR PULMONALE, UNSPECIFIED CHRONICITY (H): ICD-10-CM

## 2020-03-13 NOTE — TELEPHONE ENCOUNTER
Routing refill request to provider for review/approval because:  Orin given x1 and patient did not follow up, please advise  Drug not on the [facility] refill protocol   Patient needs to be seen because it has been more than 1 year since last office visit.    WANDY MolinaN, RN  [facility]

## 2020-03-15 NOTE — TELEPHONE ENCOUNTER
Please schedule her for a phone visit to review her current medications and health maintenance.    Oscar Baltazar MD, MD

## 2020-03-16 NOTE — TELEPHONE ENCOUNTER
Left message for patient to return call to clinic and schedule Telephone visit with PCP.      Ailin MARTINEZ RN,BSN

## 2020-03-21 ENCOUNTER — NURSE TRIAGE (OUTPATIENT)
Dept: NURSING | Facility: CLINIC | Age: 50
End: 2020-03-21

## 2020-03-21 ENCOUNTER — OFFICE VISIT (OUTPATIENT)
Dept: URGENT CARE | Facility: URGENT CARE | Age: 50
End: 2020-03-21
Payer: MEDICAID

## 2020-03-21 VITALS
WEIGHT: 231 LBS | TEMPERATURE: 95.2 F | DIASTOLIC BLOOD PRESSURE: 74 MMHG | OXYGEN SATURATION: 98 % | SYSTOLIC BLOOD PRESSURE: 138 MMHG | HEART RATE: 103 BPM | BODY MASS INDEX: 36.73 KG/M2

## 2020-03-21 DIAGNOSIS — B30.9 VIRAL CONJUNCTIVITIS OF LEFT EYE: Primary | ICD-10-CM

## 2020-03-21 PROCEDURE — 99213 OFFICE O/P EST LOW 20 MIN: CPT | Performed by: NURSE PRACTITIONER

## 2020-03-22 NOTE — PROGRESS NOTES
SUBJECTIVE:  Chief Complaint:   Chief Complaint   Patient presents with     Conjunctivitis     EYES WOKE UP RED     Derm Problem     RASH ON HAND     History of Present Illness:  Lima Bueno is a 49 year old female who presents complaining of mild left eye mattering, redness for 10 hour(s).   Onset/timing: awoke with symptoms this am.    Associated Signs and Symptoms: none  Treatment measures tried include: flushed with water  and warm packs  Contact wearer : No    Visual acuity Normal    Past Medical History:   Diagnosis Date     ADD (attention deficit disorder with hyperactivity)      Asthma      Breast mass 11/1/2012    Patient yet to have biopsy as of 11/1/2012       DVT (deep venous thrombosis) (H) 10/12    right leg     LSIL (low grade squamous intraepithelial lesion) on Pap smear 02/19/14    Neg high risk HPV     Pulmonary embolism (H) 2012    Saw Dr Toledo, protein S deficiency     Current Outpatient Medications   Medication Sig Dispense Refill     ADVAIR -21 MCG/ACT inhaler INHALE 2 PUFFS BY MOUTH TWICE DAILY 36 g 0     rivaroxaban ANTICOAGULANT (XARELTO) 20 MG TABS tablet Take 1 tablet (20 mg) by mouth daily (with dinner) 90 tablet 0     traMADol (ULTRAM) 50 MG tablet TAKE 1 TABLET BY MOUTH EVERY 6 HOURS AS NEEDED FOR SEVERE PAIN 30 tablet 0     traZODone HCl 300 MG TABS TAKE 1 TABLET BY MOUTH AT BEDTIME 90 tablet 1     VENTOLIN  (90 Base) MCG/ACT inhaler INHALE 2 PUFFS BY MOUTH EVERY 4 HOURS AS NEEDED FOR SHORTNESS OF BREATH /DYSPNEA 1 Inhaler 11     guaiFENesin-codeine (GUAIFENESIN AC) 100-10 MG/5ML syrup Take 5 mLs by mouth every 4 hours as needed for congestion (Patient not taking: Reported on 3/21/2020) 120 mL 0        ROS:  CONSTITUTIONAL:NEGATIVE for fever, chills, change in weight  INTEGUMENTARY/SKIN: NEGATIVE for worrisome rashes, moles or lesions  ENT/MOUTH: NEGATIVE for ear, mouth and throat problems  RESP:NEGATIVE for significant cough or SOB    OBJECTIVE:  /74    Pulse 103   Temp 95.2  F (35.1  C) (Oral)   Wt 104.8 kg (231 lb)   SpO2 98%   BMI 36.73 kg/m    General: no acute distress  Eye exam: right eye normal lid, conjunctiva, cornea, pupil and fundus, left eye normal lid, cornea, pupil and fundus. Slight injection of the conjunctiva, no drainage  Ears: normal canals, TMs bilaterally, normal TM mobility  Nose: NORMAL - no drainage, turbinates normal in size.    ASSESSMENT:  Viral Conjunctivitis    PLAN:  Warm packs for comfort. Eye hygiene discussed as well as when to return.      Arabella Farmer, APRN, CNP  [facility] Provider

## 2020-03-22 NOTE — TELEPHONE ENCOUNTER
"    Reason for Disposition    [1] Eye with yellow/green discharge or eyelashes stick together AND [2] NO PCP standing order to call in antibiotic eye drops    Additional Information    Negative: SEVERE eye pain (e.g., excruciating)    Negative: [1] Eyelids are very swollen (shut or almost) AND [2] fever    Negative: [1] Eyelid (outer) is very red (or tender to touch) AND [2] fever    Negative: Patient sounds very sick or weak to the triager    Negative: MODERATE eye pain (e.g., interferes with normal activities)    Negative: Fever > 104 F (40 C)    Negative: Cloudy spot or sore seen on the cornea (clear part of the eye)    Negative: Blurred vision    Negative: Eye is very swollen (shut or almost)    Negative: [1] MILD eye pain or discomfort AND [2] wears contacts    Negative: Eyelid is red and painful (or tender to touch)    Negative: Discharge from penis    Negative: New or abnormal vaginal discharge    Negative: Fever present > 3 days (72 hours)    Negative: [1] Lots of yellow or green nasal discharge AND [2] present now AND [3] fever    Negative: Weak immune system (e.g., HIV positive, cancer chemo, splenectomy, organ transplant, chronic steroids)    Answer Assessment - Initial Assessment Questions  1. EYE DISCHARGE: \"Is the discharge in one or both eyes?\" \"What color is it?\" \"How much is there?\" \"When did the discharge start?\"       Yellow discharge from both eyes  2. REDNESS OF SCLERA: \"Is the redness in one or both eyes?\" \"When did the redness start?\"       Pink when she woke up this morning  3. EYELIDS: \"Are the eyelids red or swollen?\" If so, ask: \"How much?\"       no  4. VISION: \"Is there any difficulty seeing clearly?\"       no  5. PAIN: \"Is there any pain? If so, ask: \"How bad is it?\" (Scale 1-10; or mild, moderate, severe)     - MILD (1-3): doesn't interfere with normal activities      - MODERATE (4-7): interferes with normal activities or awakens from sleep     - SEVERE (8-10): excruciating pain, unable " "to do any normal activities        itchy  6. CONTACT LENS: \"Do you wear contacts?\"      no  7. OTHER SYMPTOMS: \"Do you have any other symptoms?\" (e.g., fever, runny nose, cough)      no  8. PREGNANCY: \"Is there any chance you are pregnant?\" \"When was your last menstrual period?\"      No  Rash on hand that looks to caller like freckles she has a blood clotting disorder.    Protocols used: EYE - PUS OR [id number]      "

## 2020-03-22 NOTE — PATIENT INSTRUCTIONS
Patient Education     Viral Conjunctivitis    Viral conjunctivitis (sometimes called pink eye) is a common infection of the eye. It is very contagious. Touching the infected eye, then touching another person passes this infection. It can also be spread from one eye to the other in this same way. The most common symptoms include redness, discharge from the eye, swollen eyelids, and a gritty or scratchy feeling in the eye.  This condition will take about 7 to 10 days to go away. Artificial tears (available without a prescription) are often recommended to moisten and clean the eyes. Antibiotic eye drops often are not recommended because they will not kill the virus. But sometimes they may be prescribed by eye doctors. This is to prevent a second, bacterial infection.  Home care    Apply a towel soaked in cool water to the affected eye 3 to 4 times a day (just before applying medicine to the eye).    It is common to have mucus drainage during the night, causing the eyelids to become crusted by morning. Use a warm, wet cloth to wipe this away.    Wash any cloths used to clean the eye after one use. Don't reuse them.    If antibiotic medicines are prescribed, take them exactly as directed. Don't stop taking them until you are told to.    You may use acetaminophen or ibuprofen to control pain, unless another medicine was prescribed. (Note: If you have chronic liver or kidney disease, or if you have ever had a stomach ulcer or gastrointestinal bleeding, talk with your healthcare provider before using these medicines.) Aspirin should never be used in anyone under 18 years of age who is ill with a fever. It may cause severe liver damage.    Wash your hands before and after touching the affected eye. This helps to prevent spreading the infection to your other eye and to others.    The infected person should avoid sharing towels, washcloths, and bedding with others. This is to prevent spreading the infection.    This illness  is contagious during the first week. Children with this illness should be kept out of day care and school until the redness clears.  Follow-up care  Follow up with your healthcare provider, or as advised.  When to seek medical advice  Call your healthcare provider right away if any of these occur:    Worsening vision    Increasing pain in the eye    Increasing swelling or redness of the eyelid    Redness spreading to the face around the eye    Large amount of green or yellow drainage from the eye    Severe itching in or around the eye    Fever of 100.4 F (38 C) or higher  Date Last Reviewed: 7/1/2017 2000-2019 [employer]. [address] [place] [address]. All rights reserved. This information is not intended as a substitute for professional medical care. Always follow your healthcare professional's instructions.

## 2020-07-02 ENCOUNTER — NURSE TRIAGE (OUTPATIENT)
Dept: NURSING | Facility: CLINIC | Age: 50
End: 2020-07-02

## 2020-07-02 DIAGNOSIS — I82.4Y1 DEEP VEIN THROMBOSIS (DVT) OF PROXIMAL VEIN OF RIGHT LOWER EXTREMITY, UNSPECIFIED CHRONICITY (H): ICD-10-CM

## 2020-07-02 DIAGNOSIS — I26.99 OTHER PULMONARY EMBOLISM WITHOUT ACUTE COR PULMONALE, UNSPECIFIED CHRONICITY (H): ICD-10-CM

## 2020-07-02 NOTE — TELEPHONE ENCOUNTER
"  S: Refill Request.  B: Has no doses left of Xarelto  and has not taken this evenings dose.  Writer called [facility] pharmacy [phone] and spoke to  pharmacist.   A: They can do an \"Emergency Fill\" of 1 week supply do to upcoming holiday weekend.    R: Writer will  send message to Dr. Carranza's office to write a new RX for Xarelto.  Writer called patient she will go to pharmacy and get medication now.  Devora Nuñez RN, [facility]    Reason for Disposition    [1] Request for URGENT new prescription or refill of \"essential\" medication (i.e., likelihood of harm to patient if not taken) AND [2] triager unable to fill per unit policy    Protocols used: MEDICATION QUESTION CALL-A-AH      "

## 2020-07-03 ENCOUNTER — TELEPHONE (OUTPATIENT)
Dept: ONCOLOGY | Facility: CLINIC | Age: 50
End: 2020-07-03

## 2020-07-03 NOTE — TELEPHONE ENCOUNTER
Writer sent a staff message to scheduling coordinators with the request to scheduled patient in the next 1 to 2 months.     Danya Wen RN

## 2020-07-03 NOTE — TELEPHONE ENCOUNTER
Per Georgina's staff message, called patient for scheduling.  Patient needs return visit 1-2 months with Dr. Carranza. Left message.

## 2020-07-08 RX ORDER — TRAMADOL HYDROCHLORIDE 50 MG/1
TABLET ORAL
Qty: 30 TABLET | Refills: 0 | OUTPATIENT
Start: 2020-07-08

## 2020-07-09 DIAGNOSIS — F51.01 PRIMARY INSOMNIA: ICD-10-CM

## 2020-07-10 RX ORDER — TRAZODONE HYDROCHLORIDE 300 MG/1
TABLET ORAL
Qty: 30 TABLET | Refills: 0 | Status: SHIPPED | OUTPATIENT
Start: 2020-07-10 | End: 2020-08-10

## 2020-07-10 NOTE — TELEPHONE ENCOUNTER
Overdue for appt with PCP  Called pt and scheduled appt  Prescription approved per [facility] Refill Protocol.  Aislinn LAO RN

## 2020-07-20 ENCOUNTER — VIRTUAL VISIT (OUTPATIENT)
Dept: FAMILY MEDICINE | Facility: CLINIC | Age: 50
End: 2020-07-20
Payer: COMMERCIAL

## 2020-07-20 DIAGNOSIS — G89.29 OTHER CHRONIC PAIN: ICD-10-CM

## 2020-07-20 DIAGNOSIS — M48.00 SPINAL STENOSIS, UNSPECIFIED SPINAL REGION: Primary | ICD-10-CM

## 2020-07-20 DIAGNOSIS — Z12.11 SPECIAL SCREENING FOR MALIGNANT NEOPLASMS, COLON: ICD-10-CM

## 2020-07-20 DIAGNOSIS — Z13.6 CARDIOVASCULAR SCREENING; LDL GOAL LESS THAN 160: ICD-10-CM

## 2020-07-20 DIAGNOSIS — J45.909 PERSISTENT ASTHMA WITHOUT COMPLICATION, UNSPECIFIED ASTHMA SEVERITY: ICD-10-CM

## 2020-07-20 PROCEDURE — 99214 OFFICE O/P EST MOD 30 MIN: CPT | Mod: GT | Performed by: INTERNAL MEDICINE

## 2020-07-20 RX ORDER — TRAMADOL HYDROCHLORIDE 50 MG/1
50 TABLET ORAL EVERY 6 HOURS PRN
Qty: 30 TABLET | Refills: 0 | Status: SHIPPED | OUTPATIENT
Start: 2020-07-20 | End: 2020-08-17

## 2020-07-20 ASSESSMENT — ANXIETY QUESTIONNAIRES
GAD7 TOTAL SCORE: 0
3. WORRYING TOO MUCH ABOUT DIFFERENT THINGS: NOT AT ALL
IF YOU CHECKED OFF ANY PROBLEMS ON THIS QUESTIONNAIRE, HOW DIFFICULT HAVE THESE PROBLEMS MADE IT FOR YOU TO DO YOUR WORK, TAKE CARE OF THINGS AT HOME, OR GET ALONG WITH OTHER PEOPLE: NOT DIFFICULT AT ALL
1. FEELING NERVOUS, ANXIOUS, OR ON EDGE: NOT AT ALL
2. NOT BEING ABLE TO STOP OR CONTROL WORRYING: NOT AT ALL
5. BEING SO RESTLESS THAT IT IS HARD TO SIT STILL: NOT AT ALL
7. FEELING AFRAID AS IF SOMETHING AWFUL MIGHT HAPPEN: NOT AT ALL
6. BECOMING EASILY ANNOYED OR IRRITABLE: NOT AT ALL

## 2020-07-20 ASSESSMENT — PATIENT HEALTH QUESTIONNAIRE - PHQ9
SUM OF ALL RESPONSES TO PHQ QUESTIONS 1-9: 0
5. POOR APPETITE OR OVEREATING: NOT AT ALL

## 2020-07-20 NOTE — PROGRESS NOTES
"Lima Bueno is a 49 year old female who is being evaluated via a billable video visit.      The patient has been notified of following:     \"This video visit will be conducted via a call between you and your physician/provider. We have found that certain health care needs can be provided without the need for an in-person physical exam.  This service lets us provide the care you need with a video conversation.  If a prescription is necessary we can send it directly to your pharmacy.  If lab work is needed we can place an order for that and you can then stop by our lab to have the test done at a later time.    Video visits are billed at different rates depending on your insurance coverage.  Please reach out to your insurance provider with any questions.    If during the course of the call the physician/provider feels a video visit is not appropriate, you will not be charged for this service.\"    Patient has given verbal consent for Video visit? Yes  How would you like to obtain your AVS? Mail a copy  If you are dropped from the video visit, the video invite should be resent to: Text to cell phone: [phone]  Will anyone else be joining your video visit? No    Subjective     Lima Bueno is a 49 year old female who presents today via video visit for the following health issues:    [facility] [place]           Video Start Time: 4:17       Other chronic pain  Spinal stenosis, unspecified spinal region  Special screening for malignant neoplasms, colon  CARDIOVASCULAR SCREENING; LDL GOAL LESS THAN 160  Persistent asthma without complication, unspecified asthma severity   Spoke to Ailin today with regards to her current health status and ongoing lower back pain.  She has been followed by orthopedics, pain clinic and physical therapy.  Most recently she was seen in physical therapy but had to quit due to COVID-19.  She would like to resume physical therapy if possible.  She is also having difficulty with walking " but she is able to get up and do some walking in the morning with her grandchild.  She would like to further investigate was going on with her lower back.  She has known history of lumbar spinal stenosis and spondylosis and does have an MRI from 2014 for lumbar spine.  She has no specific weakness of her legs.  But she does have some pain issues as above.      Patient Active Problem List   Diagnosis     Deep vein thrombosis (DVT) of lower extremity (H)     Pulmonary embolism (H)     Asthma     ADD (attention deficit disorder with hyperactivity)     Breast mass     CARDIOVASCULAR SCREENING; LDL GOAL LESS THAN 160     Protein S deficiency (H)     Tobacco use disorder     Papanicolaou smear of cervix with low grade squamous intraepithelial lesion (LGSIL)     Spinal stenosis     Chronic, continuous use of opioids     Long-term (current) use of anticoagulants [Z79.01]     Past Surgical History:   Procedure Laterality Date     ANKLE SURGERY Right 01/2019       Social History     Tobacco Use     Smoking status: Current Every Day Smoker     Packs/day: 0.50     Types: Cigarettes     Smokeless tobacco: Never Used   Substance Use Topics     Alcohol use: No     Alcohol/week: 0.0 standard drinks     Family History   Problem Relation Age of Onset     Thyroid Disease Mother         hyperactive     Hypertension Brother      Ovarian Cancer Paternal Grandmother      Breast Cancer No family hx of          Current Outpatient Medications   Medication Sig Dispense Refill     ADVAIR -21 MCG/ACT inhaler INHALE 2 PUFFS BY MOUTH TWICE DAILY 36 g 0     guaiFENesin-codeine (GUAIFENESIN AC) 100-10 MG/5ML syrup Take 5 mLs by mouth every 4 hours as needed for congestion 120 mL 0     rivaroxaban ANTICOAGULANT (XARELTO) 20 MG TABS tablet Take 1 tablet (20 mg) by mouth daily (with dinner) 90 tablet 1     traMADol (ULTRAM) 50 MG tablet Take 1 tablet (50 mg) by mouth every 6 hours as needed for severe pain 30 tablet 0     traZODone HCl 300 MG  TABS TAKE 1 TABLET BY MOUTH AT BEDTIME 30 tablet 0     VENTOLIN  (90 Base) MCG/ACT inhaler INHALE 2 PUFFS BY MOUTH EVERY 4 HOURS AS NEEDED FOR SHORTNESS OF BREATH /DYSPNEA 1 Inhaler 11     Allergies   Allergen Reactions     Phosphoric Acid Hives     Terbutaline Hives     Trazodone      Other reaction(s): Other - Describe In Comment Field  Facial nerve pain  Other reaction(s): Other - Describe In Comment Field  Facial nerve pain     Wellbutrin [Bupropion]      Smoking cessation -- told never to take again ,     Recent Labs   Lab Test 01/08/19  1341 07/27/18  1629 02/19/18  1536 10/04/17  0956 08/24/17  1634 07/03/17  1527 09/02/15  1008  04/04/14  1525   LDL  --  82  --   --   --   --  99  --   --    HDL  --  39*  --   --   --   --  52  --   --    TRIG  --  206*  --   --   --   --  102  --   --    ALT  --   --   --  23 28 24 27  --   --    CR 0.81  --  0.75 0.82  --  0.85 0.80   < >  --    GFRESTIMATED 85  --  83 74  --  72 77   < >  --    GFRESTBLACK >90  --  >90 90  --  87 >90   GFR Calc     < >  --    POTASSIUM 3.7  --   --  3.8  --  3.9 3.8   < >  --    TSH  --   --   --   --   --   --  0.94  --  0.91    < > = values in this interval not displayed.      BP Readings from Last 3 Encounters:   03/21/20 138/74   12/18/19 138/84   07/23/19 (!) 150/98    Wt Readings from Last 3 Encounters:   03/21/20 104.8 kg (231 lb)   04/05/19 102.1 kg (225 lb)   01/08/19 106.1 kg (234 lb)                    Reviewed and updated as needed this visit by Provider         Review of Systems   Constitutional, HEENT, cardiovascular, pulmonary, GI, , musculoskeletal, neuro, skin, endocrine and psych systems are negative, except as otherwise noted.      Objective             Physical Exam     GENERAL: Healthy, alert and no distress  EYES: Eyes grossly normal to inspection.  No discharge or erythema, or obvious scleral/conjunctival abnormalities.  RESP: No audible wheeze, cough, or visible cyanosis.  No visible  retractions or increased work of breathing.    SKIN: Visible skin clear. No significant rash, abnormal pigmentation or lesions.  NEURO: Cranial nerves grossly intact.  Mentation and speech appropriate for age.  PSYCH: Mentation appears normal, affect normal/bright, judgement and insight intact, normal speech and appearance well-groomed.      Diagnostic Test Results:  Labs reviewed in Epic        Assessment & Plan     1. Other chronic pain  We discussed the condition of her chronic pain and will recommend an MRI of her lumbar spine.  In addition continue tramadol for pain control.   - traMADol (ULTRAM) 50 MG tablet; Take 1 tablet (50 mg) by mouth every 6 hours as needed for severe pain  Dispense: 30 tablet; Refill: 0  - CBC with platelets; Future  - Comprehensive metabolic panel; Future    2. Spinal stenosis, unspecified spinal region  MRI lumbar spine was ordered.  I also referred her back to physical therapy with symptoms orthopedics.  Pending the results of the MRI we can continue to consider other additional referrals  - PHYSICAL THERAPY REFERRAL  - MR Lumbar Spine w/o Contrast; Future  - CBC with platelets; Future  - Comprehensive metabolic panel; Future    3. Special screening for malignant neoplasms, colon  Referral for colonoscopy placed  - GASTROENTEROLOGY ADULT REF PROCEDURE ONLY; Future  - CBC with platelets; Future  - Comprehensive metabolic panel; Future    4. CARDIOVASCULAR SCREENING; LDL GOAL LESS THAN 160  Check cholesterol  - CBC with platelets; Future  - Lipid panel reflex to direct LDL Fasting; Future  - Comprehensive metabolic panel; Future    5. Persistent asthma without complication, unspecified asthma severity  She will continue with Advair 2 puffs twice daily in addition to use of albuterol as needed       Tobacco Cessation:   reports that she has been smoking cigarettes. She has been smoking about 0.50 packs per day. She has never used smokeless tobacco.  Tobacco Cessation Action Plan:  Information offered: Patient not interested at this time            No follow-ups on file.    Oscar Baltazar MD, MD  [facility]      Video-Visit Details    Type of service:  Video Visit    Video End Time:4:55 PM    Originating Location (pt. Location): Home    Distant Location (provider location):  [facility]     Platform used for Video Visit: Viviana    No follow-ups on file.       Oscar Baltazar MD, MD

## 2020-07-21 ASSESSMENT — ANXIETY QUESTIONNAIRES: GAD7 TOTAL SCORE: 0

## 2020-07-22 ENCOUNTER — VIRTUAL VISIT (OUTPATIENT)
Dept: ONCOLOGY | Facility: CLINIC | Age: 50
End: 2020-07-22
Attending: INTERNAL MEDICINE
Payer: COMMERCIAL

## 2020-07-22 DIAGNOSIS — I82.4Y1 DEEP VEIN THROMBOSIS (DVT) OF PROXIMAL VEIN OF RIGHT LOWER EXTREMITY, UNSPECIFIED CHRONICITY (H): ICD-10-CM

## 2020-07-22 DIAGNOSIS — I26.99 OTHER PULMONARY EMBOLISM WITHOUT ACUTE COR PULMONALE, UNSPECIFIED CHRONICITY (H): ICD-10-CM

## 2020-07-22 PROCEDURE — 99213 OFFICE O/P EST LOW 20 MIN: CPT | Mod: GT | Performed by: INTERNAL MEDICINE

## 2020-07-22 PROCEDURE — 40001009 ZZH VIDEO/TELEPHONE VISIT; NO CHARGE

## 2020-07-22 NOTE — PROGRESS NOTES
"Lima Bueno is a 49 year old female who is being evaluated via a billable video visit.      The patient has been notified of following:     \"This video visit will be conducted via a call between you and your physician/provider. We have found that certain health care needs can be provided without the need for an in-person physical exam.  This service lets us provide the care you need with a video conversation.  If a prescription is necessary we can send it directly to your pharmacy.  If lab work is needed we can place an order for that and you can then stop by our lab to have the test done at a later time.    Video visits are billed at different rates depending on your insurance coverage.  Please reach out to your insurance provider with any questions.    If during the course of the call the physician/provider feels a video visit is not appropriate, you will not be charged for this service.\"    Patient has given verbal consent for Video visit? Yes  How would you like to obtain your AVS? [employer]    Please send text message to: [phone]    Will anyone else be joining your video visit? No        Video-Visit Details    Type of service:  Video Visit    Originating Location (pt. Location): Home    Distant Location (provider location):  [facility] [place]     Platform used for Video Visit: Viviana [facility]      "

## 2020-07-22 NOTE — PROGRESS NOTES
Visit Date:   07/22/2020     HEMATOLOGY HISTORY: Ms. Bueno is a female with history of DVT and pulmonary embolism. Protein S deficiency.      1.  Right lower extremity ultrasound on 10/05/2012 for pain and swelling revealed occlusive DVT extending from the proximal right femoral vein into popliteal vein and peroneal veins in the calf.   -CT chest angiogram on 10/05/2012 revealed small pulmonary emboli bilaterally.    -This was an unprovoked DVT and pulmonary embolism.    -She is on warfarin since 2012. She has been compliant.  INR generally has been therapeutic.      2.  Hypercoagulable workup done on 11/30/2012.   -Antithrombin III normal at 104.   -Lupus negative.   -Protein C of 36.   -Protein S of less than 10.     -Anticardiolipin antibody IgG and IgM normal.   -No Factor V Leiden mutation.     -No prothrombin gene mutation.      3.  Right lower extremity ultrasound on 01/12/2017 for foot swelling revealed chronic nonocclusive thrombus in the right mid femoral to popliteal vein.  Previously this was occlusive. There was no acute DVT.       4.  Right lower extremity ultrasound on 02/07/2018 for leg swelling reveals new occlusive thrombus in the right popliteal vein.  There is chronic nonocclusive thrombus in the right superficial femoral vein in the mid-thigh. INR was 3.4 on 02/06/2018.     5.  Warfarin was stopped and enoxaparin started on 02/07/2018.      6. On 02/19/2018:  -Protein C of 124.  It was previously low at 36.  -Protein S of 42.  Previously it was less than 10.      7. CT chest, abdomen and pelvis on 02/26/2018 does not reveal any evidence of malignancy.       8. On 03/30/2018, protein S of 38.     SUBJECTIVE:  Ms. 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  The patient is on indefinite anticoagulation.  She is currently on Xarelto.  She is tolerating it well.      No abnormal bleeding from any site.  No easy bruising.  No  headache.  No dizziness.  No chest pain.  No shortness of breath.  No abdominal pain, nausea or vomiting.  No urinary or bowel complaints.      Sometimes the right lower extremity swells.  She does massaging and it helps.      PHYSICAL EXAMINATION:    GENERAL:  She is alert, oriented x 3.  She is not in any distress.     RESPIRATORY:  No cough.  No labored breathing.   The rest of the systems not examined as this is a video visit.      ASSESSMENT:   1.  A 49-year-old female with recurrent thrombosis on indefinite anticoagulation with Xarelto.  She is doing well.   2.  Protein S deficiency.      PLAN:   1.  The patient is on Xarelto.  She will continue on it indefinitely.  No bleeding complications.  I advised her to go to the emergency room if she has bleeding or trauma.   2.  As she is on Xarelto, I advised her to have CBC checked once a year.  The patient recently saw Dr. Baltazar.  Labs including CBC have been ordered.  She is going to have it drawn in next few days.  3.  Patient is doing well on Xarelto.  Advised her to be compliant with it.  Advised her to ambulate every 1-2 hours if she goes on prolonged car or plane rides.  I will see her in 1 year's time.  Advised her to go to the emergency room if she has chest pain, shortness of breath, pain/swelling/redness in extremities, bleeding or trauma.         NGUYEN OWENS MD             D: 2020   T: 2020   MT: JERALD      Name:     ROSA BAINS   MRN:      [id number]        Account:      [id number]   :      1970           Visit Date:   2020      Document: [id number]      Video visit time of 8 minutes.

## 2020-07-22 NOTE — LETTER
"    7/22/2020         RE: Lima Bueno  [address]  [facility] [address]        Dear Colleague,    Thank you for referring your patient, Lima Bueno, to the [facility]. Please see a copy of my visit note below.    Lima Bueno is a 49 year old female who is being evaluated via a billable video visit.      The patient has been notified of following:     \"This video visit will be conducted via a call between you and your physician/provider. We have found that certain health care needs can be provided without the need for an in-person physical exam.  This service lets us provide the care you need with a video conversation.  If a prescription is necessary we can send it directly to your pharmacy.  If lab work is needed we can place an order for that and you can then stop by our lab to have the test done at a later time.    Video visits are billed at different rates depending on your insurance coverage.  Please reach out to your insurance provider with any questions.    If during the course of the call the physician/provider feels a video visit is not appropriate, you will not be charged for this service.\"    Patient has given verbal consent for Video visit? Yes  How would you like to obtain your AVS? [employer]    Please send text message to: [phone]    Will anyone else be joining your video visit? No        Video-Visit Details    Type of service:  Video Visit    Originating Location (pt. Location): Home    Distant Location (provider location):  [facility]     Platform used for Video Visit: Viviana Manning CMA        Visit Date:   07/22/2020     HEMATOLOGY HISTORY: Ms. Bueno is a female with history of DVT and pulmonary embolism. Protein S deficiency.      1.  Right lower extremity ultrasound on 10/05/2012 for pain and swelling revealed occlusive DVT extending from the proximal right femoral vein into popliteal vein and " peroneal veins in the calf.   -CT chest angiogram on 10/05/2012 revealed small pulmonary emboli bilaterally.    -This was an unprovoked DVT and pulmonary embolism.    -She is on warfarin since 2012. She has been compliant.  INR generally has been therapeutic.      2.  Hypercoagulable workup done on 11/30/2012.   -Antithrombin III normal at 104.   -Lupus negative.   -Protein C of 36.   -Protein S of less than 10.     -Anticardiolipin antibody IgG and IgM normal.   -No Factor V Leiden mutation.     -No prothrombin gene mutation.      3.  Right lower extremity ultrasound on 01/12/2017 for foot swelling revealed chronic nonocclusive thrombus in the right mid femoral to popliteal vein.  Previously this was occlusive. There was no acute DVT.       4.  Right lower extremity ultrasound on 02/07/2018 for leg swelling reveals new occlusive thrombus in the right popliteal vein.  There is chronic nonocclusive thrombus in the right superficial femoral vein in the mid-thigh. INR was 3.4 on 02/06/2018.     5.  Warfarin was stopped and enoxaparin started on 02/07/2018.      6. On 02/19/2018:  -Protein C of 124.  It was previously low at 36.  -Protein S of 42.  Previously it was less than 10.      7. CT chest, abdomen and pelvis on 02/26/2018 does not reveal any evidence of malignancy.       8. On 03/30/2018, protein S of 38.     SUBJECTIVE:  Ms. 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  The patient is on indefinite anticoagulation.  She is currently on Xarelto.  She is tolerating it well.      No abnormal bleeding from any site.  No easy bruising.  No headache.  No dizziness.  No chest pain.  No shortness of breath.  No abdominal pain, nausea or vomiting.  No urinary or bowel complaints.      Sometimes the right lower extremity swells.  She does massaging and it helps.      PHYSICAL EXAMINATION:    GENERAL:  She is alert, oriented x 3.  She is not in any  distress.     RESPIRATORY:  No cough.  No labored breathing.   The rest of the systems not examined as this is a video visit.      ASSESSMENT:   1.  A 49-year-old female with recurrent thrombosis on indefinite anticoagulation with Xarelto.  She is doing well.   2.  Protein S deficiency.      PLAN:   1.  The patient is on Xarelto.  She will continue on it indefinitely.  No bleeding complications.  I advised her to go to the emergency room if she has bleeding or trauma.   2.  As she is on Xarelto, I advised her to have CBC checked once a year.  The patient recently saw Dr. Baltazar.  Labs including CBC have been ordered.  She is going to have it drawn in next few days.  3.  Patient is doing well on Xarelto.  Advised her to be compliant with it.  Advised her to ambulate every 1-2 hours if she goes on prolonged car or plane rides.  I will see her in 1 year's time.  Advised her to go to the emergency room if she has chest pain, shortness of breath, pain/swelling/redness in extremities, bleeding or trauma.         NGUYEN OWENS MD             D: 2020   T: 2020   MT: JERALD      Name:     ROSA BAINS   MRN:      [id number]        Account:      [id number]   :      1970           Visit Date:   2020      Document: [id number]      Video visit time of 8 minutes.    Visit Date:   2020     HEMATOLOGY HISTORY: Ms. Bueno is a female with history of DVT and pulmonary embolism. Protein S deficiency.      1.  Right lower extremity ultrasound on 10/05/2012 for pain and swelling revealed occlusive DVT extending from the proximal right femoral vein into popliteal vein and peroneal veins in the calf.   -CT chest angiogram on 10/05/2012 revealed small pulmonary emboli bilaterally.    -This was an unprovoked DVT and pulmonary embolism.    -She is on warfarin since . She has been compliant.  INR generally has been therapeutic.      2.  Hypercoagulable workup done on  11/30/2012.   -Antithrombin III normal at 104.   -Lupus negative.   -Protein C of 36.   -Protein S of less than 10.     -Anticardiolipin antibody IgG and IgM normal.   -No Factor V Leiden mutation.     -No prothrombin gene mutation.      3.  Right lower extremity ultrasound on 01/12/2017 for foot swelling revealed chronic nonocclusive thrombus in the right mid femoral to popliteal vein.  Previously this was occlusive. There was no acute DVT.       4.  Right lower extremity ultrasound on 02/07/2018 for leg swelling reveals new occlusive thrombus in the right popliteal vein.  There is chronic nonocclusive thrombus in the right superficial femoral vein in the mid-thigh. INR was 3.4 on 02/06/2018.     5.  Warfarin was stopped and enoxaparin started on 02/07/2018.      6. On 02/19/2018:  -Protein C of 124.  It was previously low at 36.  -Protein S of 42.  Previously it was less than 10.      7. CT chest, abdomen and pelvis on 02/26/2018 does not reveal any evidence of malignancy.       8. On 03/30/2018, protein S of 38.     SUBJECTIVE:  Ms. 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  The patient is on indefinite anticoagulation.  She is currently on Xarelto.  She is tolerating it well.      No abnormal bleeding from any site.  No easy bruising.  No headache.  No dizziness.  No chest pain.  No shortness of breath.  No abdominal pain, nausea or vomiting.  No urinary or bowel complaints.      Sometimes the right lower extremity swells.  She does massaging and it helps.      PHYSICAL EXAMINATION:    GENERAL:  She is alert, oriented x 3.  She is not in any distress.     RESPIRATORY:  No cough.  No labored breathing.   The rest of the systems not examined as this is a video visit.      ASSESSMENT:   1.  A 49-year-old female with recurrent thrombosis on indefinite anticoagulation with Xarelto.  She is doing well.   2.  Protein S deficiency.      PLAN:   1.   The patient is on Xarelto.  She will continue on it indefinitely.  No bleeding complications.  I advised her to go to the emergency room if she has bleeding or trauma.   2.  As she is on Xarelto, I advised her to have CBC checked once a year.  The patient recently saw Dr. Baltazar.  Labs including CBC have been ordered.  She is going to have it drawn in next few days.  3.  Patient is doing well on Xarelto.  Advised her to be compliant with it.  Advised her to ambulate every 1-2 hours if she goes on prolonged car or plane rides.  I will see her in 1 year's time.  Advised her to go to the emergency room if she has chest pain, shortness of breath, pain/swelling/redness in extremities, bleeding or trauma.         NGUYEN OWENS MD             D: 2020   T: 2020   MT: JERALD      Name:     ROSA BAINS   MRN:      [id number]        Account:      [id number]   :      1970           Visit Date:   2020      Document: [id number]      Video visit time of 8 minutes.        Again, thank you for allowing me to participate in the care of your patient.        Sincerely,        Nguyen Owens MD

## 2020-07-22 NOTE — PROGRESS NOTES
Visit Date:   07/22/2020     HEMATOLOGY HISTORY: Ms. Bueno is a female with history of DVT and pulmonary embolism. Protein S deficiency.      1.  Right lower extremity ultrasound on 10/05/2012 for pain and swelling revealed occlusive DVT extending from the proximal right femoral vein into popliteal vein and peroneal veins in the calf.   -CT chest angiogram on 10/05/2012 revealed small pulmonary emboli bilaterally.    -This was an unprovoked DVT and pulmonary embolism.    -She is on warfarin since 2012. She has been compliant.  INR generally has been therapeutic.      2.  Hypercoagulable workup done on 11/30/2012.   -Antithrombin III normal at 104.   -Lupus negative.   -Protein C of 36.   -Protein S of less than 10.     -Anticardiolipin antibody IgG and IgM normal.   -No Factor V Leiden mutation.     -No prothrombin gene mutation.      3.  Right lower extremity ultrasound on 01/12/2017 for foot swelling revealed chronic nonocclusive thrombus in the right mid femoral to popliteal vein.  Previously this was occlusive. There was no acute DVT.       4.  Right lower extremity ultrasound on 02/07/2018 for leg swelling reveals new occlusive thrombus in the right popliteal vein.  There is chronic nonocclusive thrombus in the right superficial femoral vein in the mid-thigh. INR was 3.4 on 02/06/2018.     5.  Warfarin was stopped and enoxaparin started on 02/07/2018.      6. On 02/19/2018:  -Protein C of 124.  It was previously low at 36.  -Protein S of 42.  Previously it was less than 10.      7. CT chest, abdomen and pelvis on 02/26/2018 does not reveal any evidence of malignancy.       8. On 03/30/2018, protein S of 38.     SUBJECTIVE:  Ms. 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  The patient is on indefinite anticoagulation.  She is currently on Xarelto.  She is tolerating it well.      No abnormal bleeding from any site.  No easy bruising.  No  headache.  No dizziness.  No chest pain.  No shortness of breath.  No abdominal pain, nausea or vomiting.  No urinary or bowel complaints.      Sometimes the right lower extremity swells.  She does massaging and it helps.      PHYSICAL EXAMINATION:    GENERAL:  She is alert, oriented x 3.  She is not in any distress.     RESPIRATORY:  No cough.  No labored breathing.   The rest of the systems not examined as this is a video visit.      ASSESSMENT:   1.  A 49-year-old female with recurrent thrombosis on indefinite anticoagulation with Xarelto.  She is doing well.   2.  Protein S deficiency.      PLAN:   1.  The patient is on Xarelto.  She will continue on it indefinitely.  No bleeding complications.  I advised her to go to the emergency room if she has bleeding or trauma.   2.  As she is on Xarelto, I advised her to have CBC checked once a year.  The patient recently saw Dr. Baltazar.  Labs including CBC have been ordered.  She is going to have it drawn in next few days.  3.  Patient is doing well on Xarelto.  Advised her to be compliant with it.  Advised her to ambulate every 1-2 hours if she goes on prolonged car or plane rides.  I will see her in 1 year's time.  Advised her to go to the emergency room if she has chest pain, shortness of breath, pain/swelling/redness in extremities, bleeding or trauma.         NGUYEN OWENS MD             D: 2020   T: 2020   MT: JERALD      Name:     ROSA BAINS   MRN:      [id number]        Account:      [id number]   :      1970           Visit Date:   2020      Document: [id number]      Video visit time of 8 minutes.

## 2020-07-22 NOTE — LETTER
"    7/22/2020         RE: Lima Bueno  [address]  [facility] [address]        Dear Colleague,    Thank you for referring your patient, Lima Bueno, to the [facility]. Please see a copy of my visit note below.    Lima Bueno is a 49 year old female who is being evaluated via a billable video visit.      The patient has been notified of following:     \"This video visit will be conducted via a call between you and your physician/provider. We have found that certain health care needs can be provided without the need for an in-person physical exam.  This service lets us provide the care you need with a video conversation.  If a prescription is necessary we can send it directly to your pharmacy.  If lab work is needed we can place an order for that and you can then stop by our lab to have the test done at a later time.    Video visits are billed at different rates depending on your insurance coverage.  Please reach out to your insurance provider with any questions.    If during the course of the call the physician/provider feels a video visit is not appropriate, you will not be charged for this service.\"    Patient has given verbal consent for Video visit? Yes  How would you like to obtain your AVS? [employer]    Please send text message to: [phone]    Will anyone else be joining your video visit? No        Video-Visit Details    Type of service:  Video Visit    Originating Location (pt. Location): Home    Distant Location (provider location):  [facility]     Platform used for Video Visit: Viviana Manning CMA        Visit Date:   07/22/2020     HEMATOLOGY HISTORY: Ms. Bueno is a female with history of DVT and pulmonary embolism. Protein S deficiency.      1.  Right lower extremity ultrasound on 10/05/2012 for pain and swelling revealed occlusive DVT extending from the proximal right femoral vein into popliteal vein and " peroneal veins in the calf.   -CT chest angiogram on 10/05/2012 revealed small pulmonary emboli bilaterally.    -This was an unprovoked DVT and pulmonary embolism.    -She is on warfarin since 2012. She has been compliant.  INR generally has been therapeutic.      2.  Hypercoagulable workup done on 11/30/2012.   -Antithrombin III normal at 104.   -Lupus negative.   -Protein C of 36.   -Protein S of less than 10.     -Anticardiolipin antibody IgG and IgM normal.   -No Factor V Leiden mutation.     -No prothrombin gene mutation.      3.  Right lower extremity ultrasound on 01/12/2017 for foot swelling revealed chronic nonocclusive thrombus in the right mid femoral to popliteal vein.  Previously this was occlusive. There was no acute DVT.       4.  Right lower extremity ultrasound on 02/07/2018 for leg swelling reveals new occlusive thrombus in the right popliteal vein.  There is chronic nonocclusive thrombus in the right superficial femoral vein in the mid-thigh. INR was 3.4 on 02/06/2018.     5.  Warfarin was stopped and enoxaparin started on 02/07/2018.      6. On 02/19/2018:  -Protein C of 124.  It was previously low at 36.  -Protein S of 42.  Previously it was less than 10.      7. CT chest, abdomen and pelvis on 02/26/2018 does not reveal any evidence of malignancy.       8. On 03/30/2018, protein S of 38.     SUBJECTIVE:  Ms. 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  The patient is on indefinite anticoagulation.  She is currently on Xarelto.  She is tolerating it well.      No abnormal bleeding from any site.  No easy bruising.  No headache.  No dizziness.  No chest pain.  No shortness of breath.  No abdominal pain, nausea or vomiting.  No urinary or bowel complaints.      Sometimes the right lower extremity swells.  She does massaging and it helps.      PHYSICAL EXAMINATION:    GENERAL:  She is alert, oriented x 3.  She is not in any  distress.     RESPIRATORY:  No cough.  No labored breathing.   The rest of the systems not examined as this is a video visit.      ASSESSMENT:   1.  A 49-year-old female with recurrent thrombosis on indefinite anticoagulation with Xarelto.  She is doing well.   2.  Protein S deficiency.      PLAN:   1.  The patient is on Xarelto.  She will continue on it indefinitely.  No bleeding complications.  I advised her to go to the emergency room if she has bleeding or trauma.   2.  As she is on Xarelto, I advised her to have CBC checked once a year.  The patient recently saw Dr. Baltazar.  Labs including CBC have been ordered.  She is going to have it drawn in next few days.  3.  Patient is doing well on Xarelto.  Advised her to be compliant with it.  Advised her to ambulate every 1-2 hours if she goes on prolonged car or plane rides.  I will see her in 1 year's time.  Advised her to go to the emergency room if she has chest pain, shortness of breath, pain/swelling/redness in extremities, bleeding or trauma.         NGUYEN OWENS MD             D: 2020   T: 2020   MT: JERALD      Name:     ROSA BAINS   MRN:      [id number]        Account:      [id number]   :      1970           Visit Date:   2020      Document: [id number]      Video visit time of 8 minutes.    Visit Date:   2020     HEMATOLOGY HISTORY: Ms. Bueno is a female with history of DVT and pulmonary embolism. Protein S deficiency.      1.  Right lower extremity ultrasound on 10/05/2012 for pain and swelling revealed occlusive DVT extending from the proximal right femoral vein into popliteal vein and peroneal veins in the calf.   -CT chest angiogram on 10/05/2012 revealed small pulmonary emboli bilaterally.    -This was an unprovoked DVT and pulmonary embolism.    -She is on warfarin since . She has been compliant.  INR generally has been therapeutic.      2.  Hypercoagulable workup done on  11/30/2012.   -Antithrombin III normal at 104.   -Lupus negative.   -Protein C of 36.   -Protein S of less than 10.     -Anticardiolipin antibody IgG and IgM normal.   -No Factor V Leiden mutation.     -No prothrombin gene mutation.      3.  Right lower extremity ultrasound on 01/12/2017 for foot swelling revealed chronic nonocclusive thrombus in the right mid femoral to popliteal vein.  Previously this was occlusive. There was no acute DVT.       4.  Right lower extremity ultrasound on 02/07/2018 for leg swelling reveals new occlusive thrombus in the right popliteal vein.  There is chronic nonocclusive thrombus in the right superficial femoral vein in the mid-thigh. INR was 3.4 on 02/06/2018.     5.  Warfarin was stopped and enoxaparin started on 02/07/2018.      6. On 02/19/2018:  -Protein C of 124.  It was previously low at 36.  -Protein S of 42.  Previously it was less than 10.      7. CT chest, abdomen and pelvis on 02/26/2018 does not reveal any evidence of malignancy.       8. On 03/30/2018, protein S of 38.     SUBJECTIVE:  Ms. 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  The patient is on indefinite anticoagulation.  She is currently on Xarelto.  She is tolerating it well.      No abnormal bleeding from any site.  No easy bruising.  No headache.  No dizziness.  No chest pain.  No shortness of breath.  No abdominal pain, nausea or vomiting.  No urinary or bowel complaints.      Sometimes the right lower extremity swells.  She does massaging and it helps.      PHYSICAL EXAMINATION:    GENERAL:  She is alert, oriented x 3.  She is not in any distress.     RESPIRATORY:  No cough.  No labored breathing.   The rest of the systems not examined as this is a video visit.      ASSESSMENT:   1.  A 49-year-old female with recurrent thrombosis on indefinite anticoagulation with Xarelto.  She is doing well.   2.  Protein S deficiency.      PLAN:   1.   The patient is on Xarelto.  She will continue on it indefinitely.  No bleeding complications.  I advised her to go to the emergency room if she has bleeding or trauma.   2.  As she is on Xarelto, I advised her to have CBC checked once a year.  The patient recently saw Dr. Baltazar.  Labs including CBC have been ordered.  She is going to have it drawn in next few days.  3.  Patient is doing well on Xarelto.  Advised her to be compliant with it.  Advised her to ambulate every 1-2 hours if she goes on prolonged car or plane rides.  I will see her in 1 year's time.  Advised her to go to the emergency room if she has chest pain, shortness of breath, pain/swelling/redness in extremities, bleeding or trauma.         NGUYEN OWENS MD             D: 2020   T: 2020   MT: JERALD      Name:     ROSA BAINS   MRN:      [id number]        Account:      [id number]   :      1970           Visit Date:   2020      Document: [id number]      Video visit time of 8 minutes.        Again, thank you for allowing me to participate in the care of your patient.        Sincerely,        Nguyen Owens MD

## 2020-08-07 DIAGNOSIS — F51.01 PRIMARY INSOMNIA: ICD-10-CM

## 2020-08-10 RX ORDER — TRAZODONE HYDROCHLORIDE 300 MG/1
TABLET ORAL
Qty: 90 TABLET | Refills: 3 | Status: SHIPPED | OUTPATIENT
Start: 2020-08-10 | End: 2021-08-16

## 2020-08-10 NOTE — TELEPHONE ENCOUNTER
Routing refill request to provider for review/approval because:  Drug interaction warning    Please review and authorize if appropriate.    Thank you,   Yariel PATINO RN

## 2020-08-13 ENCOUNTER — HOSPITAL ENCOUNTER (OUTPATIENT)
Dept: MRI IMAGING | Facility: CLINIC | Age: 50
Discharge: HOME | End: 2020-08-13
Attending: INTERNAL MEDICINE | Admitting: INTERNAL MEDICINE
Payer: COMMERCIAL

## 2020-08-13 DIAGNOSIS — M48.00 SPINAL STENOSIS, UNSPECIFIED SPINAL REGION: ICD-10-CM

## 2020-08-13 PROCEDURE — 72148 MRI LUMBAR SPINE W/O DYE: CPT

## 2020-08-13 NOTE — LETTER
August 17, 2020      Lima Bueno  [address]  [place] [facility] [address]        Joes Amato,     I have had the opportunity to review your recent results and an interpretation is as follows:\     Your follow-up MRI does show stable spondylolisthesis and I would recommend continued follow-up with the spine and brain clinic at  [phone].       Resulted Orders   MR Lumbar Spine w/o Contrast    Narrative    MR LUMBAR SPINE WITHOUT CONTRAST 8/13/2020 9:09 PM     HISTORY: Spinal stenosis, unspecified spinal region.    TECHNIQUE: Multiplanar multisequence images were obtained through the  lumbar spine without contrast.    COMPARISON: 4/21/2014.    FINDINGS: Five lumbar-type vertebral bodies are presumed. Grade 1-2  spondylitic spondylolisthesis is again noted at L5-S1 measuring 1.1  cm. Posterior alignment is otherwise normal. Vertebral body heights  are maintained. Bone marrow signal intensity is normal. The conus  medullaris is normal in appearance with its tip at the L2 level. Cauda  equina nerve roots are normal. Paraspinal soft tissues and visualized  bony pelvis are unremarkable.    T12-L1: Normal.    L1-L2: Normal.    L2-L3: Normal discs. Minimal facet hypertrophy. No stenosis.    L3-L4: Normal discs. Mild facet hypertrophy. No stenosis.    L4-L5: Normal discs. Moderate facet hypertrophy. No stenosis.    L5-S1: Chronic grade 1-2 spondylitic spondylolisthesis is present with  severe bilateral neural foraminal stenosis but no significant central  canal stenosis. This measures approximately 1.1 cm where as on the  prior exam it measured 0.9 cm.      Impression    IMPRESSION: Grade 1-2 spondylitic spondylolisthesis measuring  approximately 1.1 cm with severe bilateral neural foraminal stenosis.  This has minimally progressed since 2014.    JOSE ALFREDO MURILLO MD       If you have any questions or concerns, please call the clinic at the number listed above.       Sincerely,     Oscar Hubbard  MD [place]

## 2020-08-14 NOTE — RESULT ENCOUNTER NOTE
Jose Amato,    I have had the opportunity to review your recent results and an interpretation is as follows:  Your follow-up MRI does show stable spondylolisthesis and I would recommend continued follow-up with the spine and brain clinic [phone]     Sincerely,  Oscar Baltazar MD

## 2020-08-17 DIAGNOSIS — G89.29 OTHER CHRONIC PAIN: ICD-10-CM

## 2020-08-17 RX ORDER — TRAMADOL HYDROCHLORIDE 50 MG/1
50 TABLET ORAL EVERY 6 HOURS PRN
Qty: 30 TABLET | Refills: 0 | Status: SHIPPED | OUTPATIENT
Start: 2020-08-17 | End: 2020-09-02

## 2020-08-17 NOTE — TELEPHONE ENCOUNTER
----- Message from hCristie Bruce MD sent at 5/29/2018  1:35 PM CDT -----  Please make sure they sent cytology   Patient has NOT made appointment yet with [facility] as recommended on recent MRI        Pending Prescriptions:                       Disp   Refills    traMADol (ULTRAM) 50 MG tablet            30 tab*0            Sig: Take 1 tablet (50 mg) by mouth every 6 hours as           needed for severe pain          Last Written Prescription Date:  7-20-20  Last Fill Quantity: 30,   # refills: 0  Last Office Visit: 7-20-20 Delaney  Future Office visit:   None    Routing refill request to provider for review/approval because:  Drug not on the [facility], P or [facility] refill protocol or controlled substance    RT Rachel (R)

## 2020-08-19 DIAGNOSIS — Z11.59 ENCOUNTER FOR SCREENING FOR OTHER VIRAL DISEASES: Primary | ICD-10-CM

## 2020-09-01 DIAGNOSIS — G89.29 OTHER CHRONIC PAIN: ICD-10-CM

## 2020-09-02 RX ORDER — TRAMADOL HYDROCHLORIDE 50 MG/1
TABLET ORAL
Qty: 30 TABLET | Refills: 0 | Status: SHIPPED | OUTPATIENT
Start: 2020-09-02 | End: 2020-09-18

## 2020-09-03 ENCOUNTER — TELEPHONE (OUTPATIENT)
Dept: FAMILY MEDICINE | Facility: CLINIC | Age: 50
End: 2020-09-03

## 2020-09-03 NOTE — TELEPHONE ENCOUNTER
Central Prior Authorization Team   Phone: [phone]      PA Initiation    Medication: Tramadol 50 mg  Insurance Company: [employer] - Phone [phone] Fax [phone]  Pharmacy Filling the Rx: [facility] [address] [place] [address]  Filling Pharmacy Phone: [phone]  Filling Pharmacy Fax:    Start Date: 9/3/2020

## 2020-09-03 NOTE — TELEPHONE ENCOUNTER
Prior Authorization Approval    Authorization Effective Date: 8/4/2020  Authorization Expiration Date: 9/3/2021  Medication: Tramadol 50 mg  Approved Dose/Quantity:    Reference #:     Insurance Company: [employer] - Phone [phone] Fax [phone]  Expected CoPay:       CoPay Card Available:      Foundation Assistance Needed:    Which Pharmacy is filling the prescription (Not needed for infusion/clinic administered): [facility] [address] [place] [address]  Pharmacy Notified: Yes  Patient Notified: Yes  **Instructed pharmacy to notify patient when script is ready to /ship.**

## 2020-09-03 NOTE — TELEPHONE ENCOUNTER
Prior Authorization Retail Medication Request    Medication/Dose: Tramadol 50 mg  ICD code (if different than what is on RX):  G89.29  Previously Tried and Failed:  Ibuprofen and Naproxen  Rationale:  Patient usees for chronic pain and is stable on medication    Insurance Name:  XY[employer]  Insurance ID:  [id number]      Pharmacy Information (if different than what is on RX)  Name:  [id number]  Phone:  [phone]    CoverMyMeds key: [id number]

## 2020-09-17 DIAGNOSIS — J45.909 PERSISTENT ASTHMA WITHOUT COMPLICATION, UNSPECIFIED ASTHMA SEVERITY: ICD-10-CM

## 2020-09-17 DIAGNOSIS — G89.29 OTHER CHRONIC PAIN: ICD-10-CM

## 2020-09-18 RX ORDER — TRAMADOL HYDROCHLORIDE 50 MG/1
TABLET ORAL
Qty: 30 TABLET | Refills: 0 | Status: SHIPPED | OUTPATIENT
Start: 2020-09-18 | End: 2020-10-28

## 2020-09-18 RX ORDER — FLUTICASONE PROPIONATE AND SALMETEROL XINAFOATE 230; 21 UG/1; UG/1
AEROSOL, METERED RESPIRATORY (INHALATION)
Qty: 12 G | Refills: 1 | Status: SHIPPED | OUTPATIENT
Start: 2020-09-18 | End: 2021-04-28

## 2020-09-18 NOTE — TELEPHONE ENCOUNTER
Tramadol 50mg       Last Written Prescription Date: 9/2/2020  Last Fill Quantity: 30,   # refills: 0 - 1 tab every 6 hours as needed for severe pain     Last Visit: 7/2/2020  Future Office visit:       Routing refill request to provider for review/approval because:  Drug not on the [facility], P or [facility] refill protocol or controlled substance    [facility]air - Routing refill request to provider for review/approval because:  Last ACT <20    Mary Ellen LAST RN    ACT Total Scores 7/27/2018 1/8/2019 7/23/2019   ACT TOTAL SCORE - - -   ASTHMA ER VISITS - - -   ASTHMA HOSPITALIZATIONS - - -   ACT TOTAL SCORE (Goal Greater than or Equal to 20) 17 23 19   In the past 12 months, how many times did you visit the emergency room for your asthma without being admitted to the hospital? 0 0 0   In the past 12 months, how many times were you hospitalized overnight because of your asthma? 0 0 0       Mary Ellen LAST RN

## 2020-10-26 DIAGNOSIS — G89.29 OTHER CHRONIC PAIN: ICD-10-CM

## 2020-10-28 RX ORDER — TRAMADOL HYDROCHLORIDE 50 MG/1
TABLET ORAL
Qty: 30 TABLET | Refills: 0 | Status: SHIPPED | OUTPATIENT
Start: 2020-10-28 | End: 2020-12-07

## 2020-10-28 NOTE — TELEPHONE ENCOUNTER
Routing refill request to provider for review/approval because:  Drug not on the [facility] refill protocol       Please review and authorize if appropriate,     Thank you,   Araceli MCCRAY RN

## 2020-11-11 DIAGNOSIS — Z11.59 ENCOUNTER FOR SCREENING FOR OTHER VIRAL DISEASES: Primary | ICD-10-CM

## 2020-12-06 DIAGNOSIS — G89.29 OTHER CHRONIC PAIN: ICD-10-CM

## 2020-12-07 ENCOUNTER — TELEPHONE (OUTPATIENT)
Dept: ONCOLOGY | Facility: CLINIC | Age: 50
End: 2020-12-07

## 2020-12-07 DIAGNOSIS — Z11.59 ENCOUNTER FOR SCREENING FOR OTHER VIRAL DISEASES: ICD-10-CM

## 2020-12-07 PROCEDURE — U0003 INFECTIOUS AGENT DETECTION BY NUCLEIC ACID (DNA OR RNA); SEVERE ACUTE RESPIRATORY SYNDROME CORONAVIRUS 2 (SARS-COV-2) (CORONAVIRUS DISEASE [COVID-19]), AMPLIFIED PROBE TECHNIQUE, MAKING USE OF HIGH THROUGHPUT TECHNOLOGIES AS DESCRIBED BY CMS-2020-01-R: HCPCS | Performed by: INTERNAL MEDICINE

## 2020-12-07 RX ORDER — TRAMADOL HYDROCHLORIDE 50 MG/1
TABLET ORAL
Qty: 30 TABLET | Refills: 0 | Status: SHIPPED | OUTPATIENT
Start: 2020-12-07 | End: 2021-02-12

## 2020-12-07 NOTE — TELEPHONE ENCOUNTER
Charron called to inquire if she needs to stop her blood thinner before her upcoming colonoscopy scheduled for Thursday 12/10/20 at 8 am.    Patient is on Xarelto and follows with Dr. 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    Routing to Dr. Carranza to advise.    Mateo Fam, WANDYN, RN, OCN  Oncology Care Coordinator  [facility]

## 2020-12-07 NOTE — TELEPHONE ENCOUNTER
Last Written Prescription Date:  10/28/20  Last Fill Quantity: 30,  # refills: 0   Last office visit: 7/20/2020 with prescribing provider:  Delaney   Future Office Visit:      Unable to check . Please update.    Thank you,  Yariel PATINO RN

## 2020-12-08 LAB
SARS-COV-2 RNA SPEC QL NAA+PROBE: NOT DETECTED
SPECIMEN SOURCE: NORMAL

## 2020-12-08 NOTE — TELEPHONE ENCOUNTER
Dr. Carranza is aware and instructs patient to hold Xarelto 24 hour prior to procedure and resume evening of procedure as long as no complications of bleeding.    Writer called and provided the above insttructions. Patient will hold 12/9 dose of Xeralto for 12/10 colonoscopy.     Danya Wen RN

## 2020-12-10 ENCOUNTER — HOSPITAL ENCOUNTER (OUTPATIENT)
Facility: CLINIC | Age: 50
Discharge: HOME | End: 2020-12-10
Attending: INTERNAL MEDICINE | Admitting: INTERNAL MEDICINE
Payer: COMMERCIAL

## 2020-12-10 VITALS
OXYGEN SATURATION: 95 % | BODY MASS INDEX: 37.57 KG/M2 | DIASTOLIC BLOOD PRESSURE: 95 MMHG | HEART RATE: 82 BPM | SYSTOLIC BLOOD PRESSURE: 135 MMHG | HEIGHT: 66 IN | RESPIRATION RATE: 22 BRPM | TEMPERATURE: 97.3 F

## 2020-12-10 LAB — COLONOSCOPY: NORMAL

## 2020-12-10 PROCEDURE — 88305 TISSUE EXAM BY PATHOLOGIST: CPT | Mod: TC | Performed by: INTERNAL MEDICINE

## 2020-12-10 PROCEDURE — 45380 COLONOSCOPY AND BIOPSY: CPT | Mod: PT | Performed by: INTERNAL MEDICINE

## 2020-12-10 PROCEDURE — 88305 TISSUE EXAM BY PATHOLOGIST: CPT | Mod: 26 | Performed by: PATHOLOGY

## 2020-12-10 PROCEDURE — 250N000011 HC RX IP 250 OP 636: Performed by: INTERNAL MEDICINE

## 2020-12-10 PROCEDURE — G0500 MOD SEDAT ENDO SERVICE >5YRS: HCPCS | Performed by: INTERNAL MEDICINE

## 2020-12-10 RX ORDER — FENTANYL CITRATE 50 UG/ML
INJECTION, SOLUTION INTRAMUSCULAR; INTRAVENOUS PRN
Status: DISCONTINUED | OUTPATIENT
Start: 2020-12-10 | End: 2020-12-10 | Stop reason: HOSPADM

## 2020-12-11 LAB — COPATH REPORT: NORMAL

## 2021-01-04 ENCOUNTER — PATIENT OUTREACH (OUTPATIENT)
Dept: OBGYN | Facility: CLINIC | Age: 51
End: 2021-01-04

## 2021-01-04 NOTE — LETTER
January 4, 2021      Lima Bueno  [address]  [place] [address]        Dear Ms.Chris Bueno,    At [facility], your health and wellness are our primary concern. That is why we are following up on your most recent abnormal Pap smear.    Please call [phone] to schedule an appointment for your recommended follow-up Pap smear and Human Papillomavirus (HPV) test at your earliest convenience.     If you have completed the appointment outside of the [facility] system, please have the records forwarded to our office. We will update your chart for your provider to review before your next annual wellness visit.     Thank you for choosing [facility]!      Sincerely,    Your [facility] Care Team

## 2021-02-03 NOTE — TELEPHONE ENCOUNTER
Patient due for Pap and HPV.    Reminder done today via telephone call- spoke with the pt, she will call to schedule

## 2021-02-10 DIAGNOSIS — G89.29 OTHER CHRONIC PAIN: ICD-10-CM

## 2021-02-10 NOTE — TELEPHONE ENCOUNTER
Controlled Substance Refill Request for   traMADol (ULTRAM) 50 MG tablet 30 tablet 0 12/7/2020       Problem List Complete:    Yes    Last Written Prescription Date:  12/7/2020  Last Fill Quantity: 30,   # refills: 0    THE MOST RECENT OFFICE VISIT MUST BE WITHIN THE PAST 3 MONTHS. AT LEAST ONE FACE TO FACE VISIT MUST OCCUR EVERY 6 MONTHS. ADDITIONAL VISITS CAN BE VIRTUAL.  (THIS STATEMENT SHOULD BE DELETED.)    Last Office Visit with [facility] primary care provider: 7/20/2020    Future Office visit: Unknown     Controlled substance agreement:   Encounter-Level CSA - 07/27/2018:    Controlled Substance Agreement - Scan on 8/1/2018  8:25 AM: CONTROLLED SUBSTANCE AGREEMENT     Encounter-Level CSA - 09/27/2016:    Controlled Substance Agreement - Scan on 10/5/2016  1:07 PM: CONTROLLED SUBSTANCE AGREEMENT     Encounter-Level CSA - 09/02/2015:    Controlled Substance Agreement - Scan on 9/2/2015  3:58 PM: Controlled Substrance Agreement 9/2/15     Patient-Level CSA:    There are no patient-level csa.         Last Urine Drug Screen: No results found for: CDAUT, No results found for: COMDAT,   Cannabinoids (21-ifm-2-carboxy-9-THC)   Date Value Ref Range Status   07/27/2018 Not Detected NDET^Not Detected ng/mL Final     Comment:     Cutoff for a negative cannabinoid is 50 ng/mL or less.     Phencyclidine (Phencyclidine)   Date Value Ref Range Status   07/27/2018 Not Detected NDET^Not Detected ng/mL Final     Comment:     Cutoff for a negative PCP is 25 ng/mL or less.     Cocaine (Benzoylecgonine)   Date Value Ref Range Status   07/27/2018 Not Detected NDET^Not Detected ng/mL Final     Comment:     Cutoff for a negative cocaine is 150 ng/ml or less.     Methamphetamine (d-Methamphetamine)   Date Value Ref Range Status   07/27/2018 Not Detected NDET^Not Detected ng/mL Final     Comment:     Cutoff for a negative methamphetamine is 500 ng/ml or less.     Opiates (Morphine)   Date Value Ref Range Status   07/27/2018 Not Detected  NDET^Not Detected ng/mL Final     Comment:     Cutoff for a negative opiate is 100 ng/ml or less.     Amphetamine (d-Amphetamine)   Date Value Ref Range Status   07/27/2018 Not Detected NDET^Not Detected ng/mL Final     Comment:     Cutoff for a negative amphetamine is 500 ng/mL or less.     Benzodiazepines (Nordiazepam)   Date Value Ref Range Status   07/27/2018 Not Detected NDET^Not Detected ng/mL Final     Comment:     Cutoff for a negative benzodiazepine is 150 ng/ml or less.     Tricyclic Antidepressants (Desipramine)   Date Value Ref Range Status   07/27/2018 Not Detected NDET^Not Detected ng/mL Final     Comment:     Cutoff for a negative tricyclic antidepressant is 300 ng/ml or less.     Methadone (Methadone)   Date Value Ref Range Status   07/27/2018 Not Detected NDET^Not Detected ng/mL Final     Comment:     Cutoff for a negative methadone is 200 ng/ml or less.     Barbiturates (Butalbital)   Date Value Ref Range Status   07/27/2018 Not Detected NDET^Not Detected ng/mL Final     Comment:     Cutoff for a negative barbituate is 200 ng/ml or less.     Oxycodone (Oxycodone)   Date Value Ref Range Status   07/27/2018 Not Detected NDET^Not Detected ng/mL Final     Comment:     Cutoff for a negative Oxycodone is 100 ng/mL or less.     Propoxyphene (Norpropoxyphene)   Date Value Ref Range Status   07/27/2018 Not Detected NDET^Not Detected ng/mL Final     Comment:     Cutoff for a negative propoxyphene is 300 ng/ml or less     Buprenorphine (Buprenorphine)   Date Value Ref Range Status   07/27/2018 Not Detected NDET^Not Detected ng/mL Final     Comment:     Cutoff for a negative buprenorphine is 10 ng/ml or less        Processing:  Rx to be electronically transmitted to pharmacy by provider     [email or web address]   checked in past 3 months?  No, route to RN (Last: 8/27/2019)

## 2021-02-12 RX ORDER — TRAMADOL HYDROCHLORIDE 50 MG/1
TABLET ORAL
Qty: 30 TABLET | Refills: 0 | Status: SHIPPED | OUTPATIENT
Start: 2021-02-12 | End: 2023-10-04

## 2021-03-02 ENCOUNTER — NURSE TRIAGE (OUTPATIENT)
Dept: NURSING | Facility: CLINIC | Age: 51
End: 2021-03-02

## 2021-03-02 DIAGNOSIS — Z20.822 EXPOSURE TO COVID-19 VIRUS: Primary | ICD-10-CM

## 2021-03-03 ENCOUNTER — OFFICE VISIT (OUTPATIENT)
Dept: URGENT CARE | Facility: URGENT CARE | Age: 51
End: 2021-03-03
Attending: INTERNAL MEDICINE
Payer: COMMERCIAL

## 2021-03-03 DIAGNOSIS — Z20.822 EXPOSURE TO COVID-19 VIRUS: ICD-10-CM

## 2021-03-03 LAB
LABORATORY COMMENT REPORT: NORMAL
SARS-COV-2 RNA RESP QL NAA+PROBE: NEGATIVE
SARS-COV-2 RNA RESP QL NAA+PROBE: NORMAL
SPECIMEN SOURCE: NORMAL
SPECIMEN SOURCE: NORMAL

## 2021-03-03 PROCEDURE — U0003 INFECTIOUS AGENT DETECTION BY NUCLEIC ACID (DNA OR RNA); SEVERE ACUTE RESPIRATORY SYNDROME CORONAVIRUS 2 (SARS-COV-2) (CORONAVIRUS DISEASE [COVID-19]), AMPLIFIED PROBE TECHNIQUE, MAKING USE OF HIGH THROUGHPUT TECHNOLOGIES AS DESCRIBED BY CMS-2020-01-R: HCPCS | Performed by: INTERNAL MEDICINE

## 2021-03-03 PROCEDURE — U0005 INFEC AGEN DETEC AMPLI PROBE: HCPCS | Performed by: INTERNAL MEDICINE

## 2021-03-03 NOTE — TELEPHONE ENCOUNTER
FYI to provider - Patient is lost to pap tracking follow-up. Attempts to contact pt have been made per reminder process and there has been no reply and/or no appt scheduled.       Mary Ellen Tucker RN BSN, Pap Tracking

## 2021-03-03 NOTE — TELEPHONE ENCOUNTER
Pt called confused about what to do re: exposure to Covid 19.  Has had first vaccine.     Was in a basketball tournament, and someone at the game tested positive for Covid after.  This was on the 27th and 28th  5 days would be tomorrow.   She was told to schedule appt, and isn't scheduled until Friday.      1. Can we order covid test for pt?  Pended.  2. She had first vaccine already, Moderna, can she still have 2nd vaccine on 18th if no fever/sx?  3. Had first Vaccine 2 weeks ago, if test is negative on day 5 - how long should she quarantine/cancel work appointments?      Call back is [phone], ok to leave message.   Sonja Pelaez, RN  [facility] RN Triage Team        Reason for Disposition    [1] CLOSE CONTACT COVID-19 EXPOSURE within last 14 days AND [2] NO symptoms    Protocols used: CORONAVIRUS (COVID-19) EXPOSURE-A-AH 1.3

## 2021-03-03 NOTE — TELEPHONE ENCOUNTER
Pt called on 2/27/21 and 2/28/2021 she was in a basketball game with her grand daughter. Pt reported she was told today that someone at the game tested positive for covid 19. Pt has no symptoms, and would like to know if she  Needs to home quarantine or get tested.      Per protocol apt was advised to home quarantine for 14 days and to monitor symptom development such as fever. Protocol care advice provided and to call back with concerning symptoms, pt verbalized understanding and stated okay. Pt was also advised to call he PCP and discuses her second covid 19 vaccine shot that is scheduled on march/18/2021.      Arturo Fernandez RN  [facility] Nurse Advisors       COVID 19 Nurse Triage Plan/Patient Instructions    Please be aware that novel coronavirus (COVID-19) may be circulating in the community. If you develop symptoms such as fever, cough, or SOB or if you have concerns about the presence of another infection including coronavirus (COVID-19), please contact your health care provider or visit [email or web address].     Disposition/Instructions    Home care recommended. Follow home care protocol based instructions.    Thank you for taking steps to prevent the spread of this virus.  o Limit your contact with others.  o Wear a simple mask to cover your cough.  o Wash your hands well and often.    Resources    [facility]: About COVID-19: [email or web address]    CDC: What to Do If You're Sick: www.cdc.gov/coronavirus/2019-ncov/about/steps-when-sick.html    CDC: Ending Home Isolation: www.cdc.gov/coronavirus/2019-ncov/hcp/disposition-in-home-patients.html     CDC: Caring for Someone: www.cdc.gov/coronavirus/2019-ncov/if-you-are-sick/care-for-someone.html     [facility]: Interim Guidance for Hospital Discharge to Home: www.health.[facility].[place].us/diseases/coronavirus/hcp/hospdischarge.pdf    [facility] clinical trials (COVID-19 research studies):  clinicalaffairs.[facility]-clinical-trials     Below are the COVID-19 hotlines at the [place] [facility] of [facility] ([facility]). Interpreters are available.   o For health questions: Call 092-209-2541 or 1-837.972.3790 (7 a.m. to 7 p.m.)  o For questions about schools and childcare: Call 425-858-3529 or 1-281.691.6393 (7 a.m. to 7 p.m.)          Reason for Disposition    [1] COVID-19 diagnosed by positive lab test AND [2] mild symptoms (e.g., cough, fever, others) AND [3] no complications or SOB    Additional Information    Negative: SEVERE difficulty breathing (e.g., struggling for each breath, speaks in single words)    Negative: Difficult to awaken or acting confused (e.g., disoriented, slurred speech)    Negative: Bluish (or gray) lips or face now    Negative: Shock suspected (e.g., cold/pale/clammy skin, too weak to stand, low BP, rapid pulse)    Negative: Sounds like a life-threatening emergency to the triager    Negative: [1] COVID-19 exposure AND [2] no symptoms    Negative: [1] Lives with someone known to have influenza (flu test positive) AND [2] flu-like symptoms (e.g., cough, runny nose, sore throat, SOB; with or without fever)    Negative: [1] Adult with possible COVID-19 symptoms AND [2] triager concerned about severity of symptoms or other causes    Negative: COVID-19 vaccine reaction suspected (e.g., fever, headache, muscle aches) occurring during days 1-3 after getting vaccine    Negative: COVID-19 vaccine, questions about    Negative: COVID-19 and breastfeeding, questions about    Negative: SEVERE or constant chest pain or pressure (Exception: mild central chest pain, present only when coughing)    Negative: MODERATE difficulty breathing (e.g., speaks in phrases, SOB even at rest, pulse 100-120)    Negative: [1] Headache AND [2] stiff neck (can't touch chin to chest)    Negative: MILD difficulty breathing (e.g., minimal/no SOB at rest, SOB with walking, pulse <100)    Negative: Chest pain or  pressure    Negative: Patient sounds very sick or weak to the triager    Negative: Fever > 103 F (39.4 C)    Negative: [1] Fever > 101 F (38.3 C) AND [2] age > 60    Negative: [1] Fever > 100.0 F (37.8 C) AND [2] bedridden (e.g., nursing home patient, CVA, chronic illness, recovering from surgery)    Negative: [1] HIGH RISK patient (e.g., age > 64 years, diabetes, heart or lung disease, weak immune system) AND [2] new or worsening symptoms    Negative: [1] HIGH RISK patient AND [2] influenza is widespread in the community AND [3] ONE OR MORE respiratory symptoms: cough, sore throat, runny or stuffy nose    Negative: [1] HIGH RISK patient AND [2] influenza exposure within the last 7 days AND [3] ONE OR MORE respiratory symptoms: cough, sore throat, runny or stuffy nose    Negative: Fever present > 3 days (72 hours)    Negative: [1] Fever returns after gone for over 24 hours AND [2] symptoms worse or not improved    Negative: [1] Continuous (nonstop) coughing interferes with work or school AND [2] no improvement using cough treatment per protocol    Negative: [1] COVID-19 infection suspected by caller or triager AND [2] mild symptoms (cough, fever, or others) AND [3] no complications or SOB    Negative: Cough present > 3 weeks    Protocols used: CORONAVIRUS (COVID-19) DIAGNOSED OR [id number] 1.3

## 2021-03-03 NOTE — TELEPHONE ENCOUNTER
Placed call.  Information given to patient from PCP.  States understood and agreed with advise.  Ailin Buchanan RN

## 2021-03-03 NOTE — TELEPHONE ENCOUNTER
OK for COVID testing,  And if no symptoms  Then OK to have second MRNA vaccine for COVID    Oscar Baltazar MD, MD

## 2021-03-04 NOTE — RESULT ENCOUNTER NOTE
Jose Amato,    I have had the opportunity to review your recent results and an interpretation is as follows:  Your follow-up COVID PCR test returned negative.  However, as per the report a negative result does not rule out the presence of real-time PCR inhibitors in the specimen, or COVID RNA and concentrations below the limit of detection      Sincerely,  Oscar Baltazar MD

## 2021-03-20 ENCOUNTER — HEALTH MAINTENANCE LETTER (OUTPATIENT)
Age: 51
End: 2021-03-20

## 2021-04-15 ENCOUNTER — TELEPHONE (OUTPATIENT)
Dept: FAMILY MEDICINE | Facility: CLINIC | Age: 51
End: 2021-04-15

## 2021-04-15 ENCOUNTER — NURSE TRIAGE (OUTPATIENT)
Dept: NURSING | Facility: CLINIC | Age: 51
End: 2021-04-15

## 2021-04-15 ENCOUNTER — HOSPITAL ENCOUNTER (EMERGENCY)
Facility: CLINIC | Age: 51
Discharge: HOME | End: 2021-04-15
Attending: EMERGENCY MEDICINE | Admitting: EMERGENCY MEDICINE
Payer: COMMERCIAL

## 2021-04-15 ENCOUNTER — OFFICE VISIT (OUTPATIENT)
Dept: FAMILY MEDICINE | Facility: CLINIC | Age: 51
End: 2021-04-15
Payer: COMMERCIAL

## 2021-04-15 VITALS
HEART RATE: 82 BPM | SYSTOLIC BLOOD PRESSURE: 138 MMHG | TEMPERATURE: 97.5 F | OXYGEN SATURATION: 97 % | WEIGHT: 245 LBS | DIASTOLIC BLOOD PRESSURE: 92 MMHG | BODY MASS INDEX: 39.85 KG/M2

## 2021-04-15 VITALS
RESPIRATION RATE: 18 BRPM | HEART RATE: 90 BPM | DIASTOLIC BLOOD PRESSURE: 93 MMHG | OXYGEN SATURATION: 96 % | SYSTOLIC BLOOD PRESSURE: 143 MMHG | TEMPERATURE: 97.1 F

## 2021-04-15 DIAGNOSIS — R04.0 EPISTAXIS: Primary | ICD-10-CM

## 2021-04-15 DIAGNOSIS — R04.0 EPISTAXIS: ICD-10-CM

## 2021-04-15 PROCEDURE — 30901 CONTROL OF NOSEBLEED: CPT

## 2021-04-15 PROCEDURE — 99284 EMERGENCY DEPT VISIT MOD MDM: CPT | Mod: 25

## 2021-04-15 PROCEDURE — 99213 OFFICE O/P EST LOW 20 MIN: CPT | Performed by: NURSE PRACTITIONER

## 2021-04-15 RX ORDER — TRANEXAMIC ACID 100 MG/ML
500 INJECTION, SOLUTION INTRAVENOUS ONCE
Status: DISCONTINUED | OUTPATIENT
Start: 2021-04-15 | End: 2021-04-15 | Stop reason: HOSPADM

## 2021-04-15 ASSESSMENT — ENCOUNTER SYMPTOMS
DIZZINESS: 0
VOMITING: 0
NAUSEA: 0
COUGH: 0
SHORTNESS OF BREATH: 0
LIGHT-HEADEDNESS: 0

## 2021-04-15 ASSESSMENT — ASTHMA QUESTIONNAIRES
QUESTION_5 LAST FOUR WEEKS HOW WOULD YOU RATE YOUR ASTHMA CONTROL: SOMEWHAT CONTROLLED
QUESTION_2 LAST FOUR WEEKS HOW OFTEN HAVE YOU HAD SHORTNESS OF BREATH: THREE TO SIX TIMES A WEEK
QUESTION_3 LAST FOUR WEEKS HOW OFTEN DID YOUR ASTHMA SYMPTOMS (WHEEZING, COUGHING, SHORTNESS OF BREATH, CHEST TIGHTNESS OR PAIN) WAKE YOU UP AT NIGHT OR EARLIER THAN USUAL IN THE MORNING: TWO OR THREE NIGHTS A WEEK
QUESTION_4 LAST FOUR WEEKS HOW OFTEN HAVE YOU USED YOUR RESCUE INHALER OR NEBULIZER MEDICATION (SUCH AS ALBUTEROL): ONE OR TWO TIMES PER DAY
QUESTION_1 LAST FOUR WEEKS HOW MUCH OF THE TIME DID YOUR ASTHMA KEEP YOU FROM GETTING AS MUCH DONE AT WORK, SCHOOL OR AT HOME: A LITTLE OF THE TIME
ACT_TOTALSCORE: 14

## 2021-04-15 NOTE — TELEPHONE ENCOUNTER
Patient is calling and says she was lying down and her nose started to bleed. Patient says it has been non-stop bleeding for about two minutes, and that a blood clot came out. Caller says she has used a large amount of toilet paper trying to absorb the bleeding. Triage guidelines recommend to see pcp within 24 hours. Caller verbalized and understands directives.  COVID 19 Nurse Triage Plan/Patient Instructions    Please be aware that novel coronavirus (COVID-19) may be circulating in the community. If you develop symptoms such as fever, cough, or SOB or if you have concerns about the presence of another infection including coronavirus (COVID-19), please contact your health care provider or visit [email or web address].     Disposition/Instructions    In-Person Visit with provider recommended. Reference Visit Selection Guide.    Thank you for taking steps to prevent the spread of this virus.  o Limit your contact with others.  o Wear a simple mask to cover your cough.  o Wash your hands well and often.    Resources    [facility]: About COVID-19: [email or web address]    CDC: What to Do If You're Sick: www.cdc.gov/coronavirus/2019-ncov/about/steps-when-sick.html    CDC: Ending Home Isolation: www.cdc.gov/coronavirus/2019-ncov/hcp/disposition-in-home-patients.html     CDC: Caring for Someone: www.cdc.gov/coronavirus/2019-ncov/if-you-are-sick/care-for-someone.html     [facility]: Interim Guidance for Hospital Discharge to Home: www.health.[facility].mn.us/diseases/coronavirus/hcp/hospdischarge.pdf    [facility] clinical trials (COVID-19 research studies): clinicalaffairs.[facility]/n-clinical-trials     Below are the COVID-19 hotlines at the [facility] of Health ([facility]). Interpreters are available.   o For health questions: Call 268-087-9101 or 1-492.952.1295 (7 a.m. to 7 p.m.)  o For questions about schools and childcare: Call 480-573-2134 or 1-711.229.6726 (7 a.m. to 7 p.m.)                      Additional Information    Negative: Fainted or too weak to stand following large blood loss    Negative: Sounds like a life-threatening emergency to the triager    Negative: Nosebleed followed a nose injury    Negative: [1] Bleeding present > 30 minutes AND [2] using correct method of direct pressure    Negative: [1] Bleeding now AND [2] second call after being instructed in correct technique of direct pressure    Negative: Dizziness or lightheadedness    Negative: Pale skin (pallor) of new onset or worsening    Negative: [1] Has nasal packing (inserted by health care provider to control bleeding) AND [2] new rash    Negative: [1] Has nasal packing AND [2] now bleeding around the packing (Exception: few drops or ooze)    Negative: Patient sounds very sick or weak to the triager    Negative: [1] Bleeding recurs 3 or more times in 24 hours AND [2] direct pressure applied correctly    Negative: [1] Skin bruises or bleeding gums AND [2] not caused by an injury    Negative: [1] Large amount of blood has been lost (e.g., 1 cup or 240 ml) AND [2] bleeding now controlled (stopped)    Negative: [1] Has nasal packing AND [2] fever > 100.5 F (38.1 C)    Taking Coumadin (warfarin) or other strong blood thinner, or known bleeding disorder (e.g., thrombocytopenia)    Protocols used: [facility]ED-[facility]

## 2021-04-15 NOTE — PROGRESS NOTES
Assessment & Plan   Problem List Items Addressed This Visit     None      Visit Diagnoses     Epistaxis    -  Primary         Suspect allergies playing a role.  Recommend claritin, benadryl at night prn. Use vaseline inside the nose. Cut back on smoking while the nose heals. Push the fluids to stay hydrated. Follow-up with concerns     MISTY Calloway CNP  [facility] STANISLAW Serrano is a 50 year old who presents for the following health issues     HPI     ED/UC Followup:    Facility:  [facility] Emergency Dept  Date of visit: 4/15/21 (today)  Reason for visit: Epistaxis  Current Status: pt has had a headache since cauterization     Had a nose bleed last night for the first time in her life   No significant bleeding since ED   Still has a headache. The pain developed when she got cautery   Didn't sleep much last night   Also has a cough but that is rather normal for her   She is a smoker       Review of Systems   Detailed as above         Objective    BP (!) 138/92 (BP Location: Right arm, Cuff Size: Adult Large)   Pulse 82   Temp 97.5  F (36.4  C) (Tympanic)   Wt 111.1 kg (245 lb)   SpO2 97%   BMI 39.85 kg/m    Body mass index is 39.85 kg/m .  Physical Exam  Constitutional:       Appearance: Normal appearance.   HENT:      Right Ear: Tympanic membrane, ear canal and external ear normal.      Nose:      Comments: No active bleeding of right naris. Moderate irritation noted.   Pulmonary:      Effort: Pulmonary effort is normal.   Neurological:      Mental Status: She is alert.   Psychiatric:         Mood and Affect: Mood normal.

## 2021-04-15 NOTE — TELEPHONE ENCOUNTER
Pt called     Went to the ER today     Was having continual trickle of drainage down that back of her head. Blew nose - blew huge blood clots. Nose was continually bleeding - another huge clot     ER packed her nose and cauterized it and recommended she follow up with our clinic     Mary Ellen LAST RN

## 2021-04-15 NOTE — ED TRIAGE NOTES
Heavy nose bleed after blowing nose.  On xarelto.  Bleeding was quite heavy.  Bleeding has stopped now, but noticed blood in her sputum

## 2021-04-15 NOTE — ED PROVIDER NOTES
History   Chief Complaint:  Epistaxis     The history is provided by the patient.      Lima Bueno is a 50 year old female who presents for evaluation of epistaxis.  Her right sided nose bleed started at 0028 and ended at 0121 after applying ice a pressure.  While sleeping tonight, she woke up to something in her throat and then went to spit and spit out a large blood clot which prompted her visit to the ER. She still feels a slow trickle of blood down the back of her throat. She has had no emesis, cough, or shortness of breath.  She does not feel lightheaded or dizzy.    Review of Systems   HENT: Positive for nosebleeds.    Respiratory: Negative for cough and shortness of breath.    Cardiovascular: Negative for chest pain.   Gastrointestinal: Negative for nausea and vomiting.   Neurological: Negative for dizziness and light-headedness.   All other systems reviewed and are negative.      Allergies:  Phosphoric Acid  Terbutaline  Trazodone  Wellbutrin     Medications:  Xarelto  Tramadol  Trazodone     Past Medical History:    ADD  Asthma  Breast mass  DVT   Pulmonary embolism   Chronic continuous use of opioids   Spinal stenosis   Tobacco use disorder   Protein S deficiency   Breast mass   ADD     Past Surgical History:    Ankle surgery     Family History:    Mother: thyroid disease   Brother: hypertension     Social History:  The patient presents to the ER with her son.   History of tobacco use.     Physical Exam     Patient Vitals for the past 24 hrs:   BP Temp Temp src Pulse Resp SpO2   04/15/21 0152 -- 97.1  F (36.2  C) Temporal -- -- --   04/15/21 0150 -- -- -- -- -- 96 %   04/15/21 0149 (!) 143/93 -- -- 90 18 --       Physical Exam  Constitutional: Well appearing.  HEENT: Atraumatic.  PERRL.  EOMI. Some dried blood present in the right nostril.  No active bleeding visible in the posterior pharynx.  Slight area potential source of bleeding on the anterior septum.  Moist mucous membranes.  Neck:  Soft.  Supple.   Cardiac: Regular rate and rhythm.  Norub.  Respiratory: Clear to auscultation bilaterally.  No respiratory distress.    Musculoskeletal: No edema.  Normal range of motion.  Neurologic: Alert and oriented x3.  Normal tone and bulk.  Normal gait.  Skin: No rashes.  No edema.  Psych: Normal affect.  Normal behavior.    Emergency Department Course     Procedures    Silver Nitrate Nasal Cautery     PROCEDURE NOTE: The patient's right nare was prepped with lidocaine and TXA.  The location of the patient's bleeding in the right nare was identified and cauterized with silver nitrate.  The patient tolerated the procedure well and there were no complications.  He was observed in the emergency department following the procedure and had no recurrence of his bleeding.       Emergency Department Course:    Reviewed:  I reviewed nursing notes, vitals and past medical history    Assessments:  0216 I obtained history and examined the patient as noted above.   0332 I performed the nasal cautery as noted above.     Interventions:   mg Right nostril    Disposition:  The patient was discharged to home.       Impression & Plan     Medical Decision Making:  Lima Bueno is a 50-year-old woman who is afebrile and hemodynamically stable.  I do not appreciate any active bleeding at this time.  She is in no distress and has no signs or symptoms of anemia.  I did place gauze soaked with lidocaine and TXA in the right nostril allowed to sit.  After this was removed, I did see an area on the anterior septum that may have been a source of bleeding we discussed cautery with silver nitrate she is in agreement.  This was performed as above.  She had no recurrent bleeding.  At this time, she does not require packing, however, I did caution her that if it were to recur or worsen, she may require packing and she is understanding.  I recommended she follow-up with ENT gave her clinic information for this.  Discussed  supportive care at home and return precautions were given.  She feels comfortable discharging home is in agreement this plan and her questions were answered.  She was in no distress at time of discharge.    Diagnosis:    ICD-10-CM    1. Epistaxis  R04.0        Scribe Disclosure:  I, Carol Llamasion, am serving as a scribe at 2:15 AM on 4/15/2021 to document services personally performed by Ariel Stallings MD based on my observations and the provider's statements to me.        Ariel Stallings MD  04/15/21 0700

## 2021-04-16 ASSESSMENT — ASTHMA QUESTIONNAIRES: ACT_TOTALSCORE: 14

## 2021-04-27 ENCOUNTER — E-VISIT (OUTPATIENT)
Dept: FAMILY MEDICINE | Facility: CLINIC | Age: 51
End: 2021-04-27

## 2021-04-27 DIAGNOSIS — H10.33 ACUTE CONJUNCTIVITIS OF BOTH EYES, UNSPECIFIED ACUTE CONJUNCTIVITIS TYPE: Primary | ICD-10-CM

## 2021-04-27 PROCEDURE — 99421 OL DIG E/M SVC 5-10 MIN: CPT | Performed by: NURSE PRACTITIONER

## 2021-04-28 DIAGNOSIS — J45.909 PERSISTENT ASTHMA WITHOUT COMPLICATION, UNSPECIFIED ASTHMA SEVERITY: ICD-10-CM

## 2021-04-28 DIAGNOSIS — J45.20 MILD INTERMITTENT ASTHMA WITHOUT COMPLICATION: ICD-10-CM

## 2021-04-28 RX ORDER — POLYMYXIN B SULFATE AND TRIMETHOPRIM 1; 10000 MG/ML; [USP'U]/ML
1-2 SOLUTION OPHTHALMIC EVERY 4 HOURS
Qty: 10 ML | Refills: 0 | Status: SHIPPED | OUTPATIENT
Start: 2021-04-28 | End: 2021-05-05

## 2021-04-28 RX ORDER — ALBUTEROL SULFATE 90 UG/1
2 AEROSOL, METERED RESPIRATORY (INHALATION) EVERY 4 HOURS PRN
Qty: 18 G | Refills: 0 | Status: SHIPPED | OUTPATIENT
Start: 2021-04-28 | End: 2021-08-13

## 2021-04-28 RX ORDER — FLUTICASONE PROPIONATE AND SALMETEROL XINAFOATE 230; 21 UG/1; UG/1
2 AEROSOL, METERED RESPIRATORY (INHALATION) 2 TIMES DAILY
Qty: 12 G | Refills: 0 | Status: SHIPPED | OUTPATIENT
Start: 2021-04-28 | End: 2023-04-10

## 2021-04-28 NOTE — PATIENT INSTRUCTIONS
Thank you for choosing us for your care. I have placed an order for a prescription so that you can start treatment. View your full visit summary for details by clicking on the link below. Your pharmacist will able to address any questions you may have about the medication.     If you re not feeling better within 2-3 days, please schedule an appointment.  You can schedule an appointment right here in [facility], or call [phone]  If the visit is for the same symptoms as your eVisit, we ll refund the cost of your eVisit if seen within seven days.      Conjunctivitis, Nonspecific    The membrane that covers the white part of your eye (the conjunctiva) is inflamed. Inflammation happens when your body responds to an injury, allergic reaction, infection, or illness. Symptoms of inflammation in the eye may include redness, irritation, itching, swelling, or burning. These symptoms should go away within the next 24 hours. Conjunctivitis may be related to a particle that was in your eye. If so, it may wash out with your tears or irrigation treatment. Being exposed to liquid chemicals or fumes may also cause this reaction.    Home care    Put a cold pack on the eye for 20 minutes at a time. This will reduce pain. To make a cold pack, put ice cubes in a plastic bag that seals at the top. Wrap the bag in a clean, thin towel or cloth.    Artificial tears may be prescribed to reduce irritation or redness. These should be used 3 to 4 times a day.    You may use acetaminophen or ibuprofen to control pain, unless another medicine was prescribed. If you have chronic liver or kidney disease, talk with your healthcare provider before using these medicines. Also talk with your provider if you have ever had a stomach ulcer or gastrointestinal bleeding.    Follow-up care  Follow up with your healthcare provider, or as advised.  When to seek medical advice  Call your healthcare provider right away if any of these occur:    Eyelid swells  more    Eye pain gets worse    Redness or drainage from the eye gets worse    Blurry vision gets worse or you have increased sensitivity to light    Normal vision does not return within 24 to 48 hours  Jailyn last reviewed this educational content on 2/1/2020 2000-2021 [employer]. All rights reserved. This information is not intended as a substitute for professional medical care. Always follow your healthcare professional's instructions.

## 2021-08-13 ENCOUNTER — APPOINTMENT (OUTPATIENT)
Dept: GENERAL RADIOLOGY | Facility: CLINIC | Age: 51
End: 2021-08-13
Attending: PHYSICIAN ASSISTANT
Payer: COMMERCIAL

## 2021-08-13 ENCOUNTER — HOSPITAL ENCOUNTER (EMERGENCY)
Facility: CLINIC | Age: 51
Discharge: HOME | End: 2021-08-13
Attending: PHYSICIAN ASSISTANT | Admitting: PHYSICIAN ASSISTANT
Payer: COMMERCIAL

## 2021-08-13 ENCOUNTER — NURSE TRIAGE (OUTPATIENT)
Dept: FAMILY MEDICINE | Facility: CLINIC | Age: 51
End: 2021-08-13

## 2021-08-13 VITALS
SYSTOLIC BLOOD PRESSURE: 135 MMHG | RESPIRATION RATE: 20 BRPM | TEMPERATURE: 98.6 F | DIASTOLIC BLOOD PRESSURE: 96 MMHG | OXYGEN SATURATION: 96 % | WEIGHT: 245 LBS | BODY MASS INDEX: 40.82 KG/M2 | HEART RATE: 107 BPM | HEIGHT: 65 IN

## 2021-08-13 DIAGNOSIS — F51.01 PRIMARY INSOMNIA: ICD-10-CM

## 2021-08-13 DIAGNOSIS — J45.909 ASTHMA: ICD-10-CM

## 2021-08-13 DIAGNOSIS — J06.9 URI (UPPER RESPIRATORY INFECTION): ICD-10-CM

## 2021-08-13 LAB — SARS-COV-2 RNA RESP QL NAA+PROBE: NEGATIVE

## 2021-08-13 PROCEDURE — 94640 AIRWAY INHALATION TREATMENT: CPT

## 2021-08-13 PROCEDURE — 99284 EMERGENCY DEPT VISIT MOD MDM: CPT | Mod: 25

## 2021-08-13 PROCEDURE — 250N000009 HC RX 250: Performed by: PHYSICIAN ASSISTANT

## 2021-08-13 PROCEDURE — C9803 HOPD COVID-19 SPEC COLLECT: HCPCS

## 2021-08-13 PROCEDURE — 87635 SARS-COV-2 COVID-19 AMP PRB: CPT | Performed by: PHYSICIAN ASSISTANT

## 2021-08-13 PROCEDURE — 250N000013 HC RX MED GY IP 250 OP 250 PS 637: Performed by: PHYSICIAN ASSISTANT

## 2021-08-13 PROCEDURE — 71045 X-RAY EXAM CHEST 1 VIEW: CPT

## 2021-08-13 RX ORDER — BENZONATATE 200 MG/1
200 CAPSULE ORAL 3 TIMES DAILY PRN
Qty: 15 CAPSULE | Refills: 0 | Status: SHIPPED | OUTPATIENT
Start: 2021-08-13 | End: 2022-04-18

## 2021-08-13 RX ORDER — CODEINE PHOSPHATE AND GUAIFENESIN 10; 100 MG/5ML; MG/5ML
1-2 SOLUTION ORAL
Qty: 118 ML | Refills: 0 | Status: SHIPPED | OUTPATIENT
Start: 2021-08-13 | End: 2022-01-11

## 2021-08-13 RX ORDER — ALBUTEROL SULFATE 90 UG/1
2 AEROSOL, METERED RESPIRATORY (INHALATION) EVERY 4 HOURS PRN
Qty: 8 G | Refills: 0 | Status: SHIPPED | OUTPATIENT
Start: 2021-08-13 | End: 2022-11-28

## 2021-08-13 RX ORDER — PREDNISONE 20 MG/1
TABLET ORAL
Qty: 10 TABLET | Refills: 0 | Status: SHIPPED | OUTPATIENT
Start: 2021-08-13 | End: 2022-01-11

## 2021-08-13 RX ORDER — ALBUTEROL SULFATE 0.83 MG/ML
2.5 SOLUTION RESPIRATORY (INHALATION) EVERY 6 HOURS PRN
Qty: 120 ML | Refills: 0 | Status: SHIPPED | OUTPATIENT
Start: 2021-08-13 | End: 2023-10-04

## 2021-08-13 RX ORDER — IPRATROPIUM BROMIDE AND ALBUTEROL SULFATE 2.5; .5 MG/3ML; MG/3ML
3 SOLUTION RESPIRATORY (INHALATION) ONCE
Status: COMPLETED | OUTPATIENT
Start: 2021-08-13 | End: 2021-08-13

## 2021-08-13 RX ORDER — ALBUTEROL SULFATE 90 UG/1
6 AEROSOL, METERED RESPIRATORY (INHALATION) ONCE
Status: COMPLETED | OUTPATIENT
Start: 2021-08-13 | End: 2021-08-13

## 2021-08-13 RX ADMIN — ALBUTEROL SULFATE 6 PUFF: 90 AEROSOL, METERED RESPIRATORY (INHALATION) at 16:26

## 2021-08-13 RX ADMIN — IPRATROPIUM BROMIDE AND ALBUTEROL SULFATE 3 ML: .5; 3 SOLUTION RESPIRATORY (INHALATION) at 16:27

## 2021-08-13 ASSESSMENT — ENCOUNTER SYMPTOMS
RHINORRHEA: 1
SHORTNESS OF BREATH: 1
COUGH: 1
FEVER: 0

## 2021-08-13 ASSESSMENT — MIFFLIN-ST. JEOR: SCORE: 1727.19

## 2021-08-13 NOTE — TELEPHONE ENCOUNTER
Pt called requesting cough syrup. Complains of productive constant cough keeping her up at night for 1 week. Also has clear nasal discharge, SOB, wheezing and using inhaler more frequently. Denies chest pain, fever, rash, or exposure to anyone with COVID. Advised she be seen at ER today. Pt verbalized agreement with plan.      Reason for Disposition    Difficulty breathing    Additional Information    Negative: Bluish (or gray) lips or face    Negative: Severe difficulty breathing (e.g., struggling for each breath, speaks in single words)    Negative: Rapid onset of cough and has hives    Negative: Coughing started suddenly after medicine, an allergic food or bee sting    Negative: Difficulty breathing after exposure to flames, smoke, or fumes    Negative: Sounds like a life-threatening emergency to the triager    Negative: Previous asthma attacks and this feels like asthma attack    Protocols used: COUGH-A-OH

## 2021-08-13 NOTE — ED PROVIDER NOTES
"  History   Chief Complaint:  Cough     The history is provided by the patient.      Lima Bueno is a 50 year old female with history of asthma, PE, and DVT, on Xarelto, who presents for evaluation of cough. The patient reports that over the last week she has developed a new cough with some rhinorrhea. She also notes mild shortness of breath, but denies chest pain and fever. She states that she called her PCP today for a steroid prescription but was informed she needed to be evaluated in the ER given her symptoms. She has had no known COVID exposures and is fully vaccinated for COVID as of March 2021. She also notes increased use of her albuterol inhaler.     Review of Systems   Constitutional: Negative for fever.   HENT: Positive for rhinorrhea.    Respiratory: Positive for cough and shortness of breath.    Cardiovascular: Negative for chest pain.   All other systems reviewed and are negative.    Allergies:  Phosphoric Acid  Terbutaline  Trazodone  Wellbutrin [Bupropion]    Medications:  Albuterol   Advair   Xarelto  Trazodone    Past Medical History:    ADD  Asthma  Breast Mass  DVT  Low grade squamous intraepithelial lesion on Pap smear  PE   Chronic opoid use  Spinal stenosis  Protein S deficiency     Past Surgical History:    Ankle Surgery  Colonoscopy     Family History:    Thyroid Disease  Hypertension    Social History:  The patient presents to the ED alone.     Physical Exam     Patient Vitals for the past 24 hrs:   BP Temp Temp src Pulse Resp SpO2 Height Weight   08/13/21 1605 -- -- -- -- -- 97 % -- --   08/13/21 1600 (!) 135/96 -- -- 104 -- -- -- --   08/13/21 1512 (!) 124/91 98.6  F (37  C) Oral 106 16 95 % 1.651 m (5' 5\") 111.1 kg (245 lb)       Physical Exam  General: Resting comfortably.  Alert.  Head:  The scalp, face, and head appear normal   Eyes:  Conjunctivae and sclerae are normal    ENT:    The oropharynx is normal     Uvula is in the midline       Structures are " symmetric. No tonsillar hypertrophy or exudate.     Bilateral TMs are normal without evidence of infection   Neck:  No lymphadenopathy   CV:  Regular rate and rhythm     Normal S1/S2   Resp:  Non-labored    No rales or focal lung findings.     Frequent cough noted.     Limited exam from poor air entry.     Expiratory wheezes noted.   MS:  Normal muscular tone   Skin:  No rash or acute skin lesions noted   Neuro: Speech is normal and fluent.     Emergency Department Course   Imaging:  XR Chest Port 1 View:  Portable chest. Lungs are clear. Heart is normal in size.   No pneumothorax. No definite pleural effusions.  Report per radiology     Laboratory:  Symptomatic COVID-19 PCR: Negative     Emergency Department Course:  Reviewed:  I reviewed nursing notes, vitals, past medical history and care everywhere    Assessments:  1548 I performed a physical exam of the patient. Findings as above.     1642 Patient rechecked and updated. Plan of care discussed and questions answered.     Interventions:  1626 Albuterol 108 MCG/ACT Inhaler 6 puffs Inhalation  1627 DuoNeb 3 mL nebulization     Disposition:  The patient was discharged to home.     Impression & Plan   Covid-19  Lima Bueno was evaluated during a global COVID-19 pandemic, which necessitated consideration that the patient might be at risk for infection with the SARS-CoV-2 virus that causes COVID-19.   Applicable protocols for evaluation were followed during the patient's care.   COVID-19 was considered as part of the patient's evaluation. The plan for testing is: a test was obtained during this visit.     Medical Decision Making:  Lima Bueno is a 50 year old female with a PMH of asthma who presents for evaluation of cough, rhinorrhea, and mild shortness of breath.  Please refer to the HPI for full details.  The patient denies any Covid exposure, and is fully Covid vaccinated.  The patient is also an asthmatic, and has noted an increase in  albuterol use.  She tried to contact her primary care doctor today who recommended she be seen in the ER given her symptoms.  She is vitally stable, afebreile and not hypoxic.  She is frequently coughing, and I feel this is likely the reason for her tachycardia.  There is wheezing present, but overall lung exam is difficult secondary to frequent coughing and coughing with deep breathing.  Chest x-ray was performed and was negative for any pneumonia, pneumothorax, or any obvious abnormality.  The patient does have a history of PE, but has been compliant with her anticoagulation, and therefore I think this is unlikely.  She also has an obvious URI like illness which can explain her symptoms. She feels improved after interventions here in ED. COVID test is negative and chest x-ray negative for any signs of acute processes. There are no signs at this point of any serious etiologies and there is no indication for hospitalization.  There is no hypoxia, or respiratory distress. The patient was discharged with prescriptions as below including a nebulizer machine with nebulizer treatments, a refill of her albuterol inhaler, Tessalon Perles to use during the day, and Robitussin-AC to use at night.  Prednisone prescription was also sent to the pharmacy as I feel an asthma exacerbation is likely contributing to her symptoms as well. She will follow-up with your primary care provider in the next 2-3 days for recheck. Red flag symptoms, and reasons for return were discussed and understood. All questions were answered prior to discharge. The patient understands and agrees to this plan.      Diagnosis:    ICD-10-CM    1. URI (upper respiratory infection)  J06.9    2. Asthma  J45.909        Discharge Medications:  Discharge Medication List as of 8/13/2021  4:57 PM      START taking these medications    Details   albuterol (PROVENTIL) (2.5 MG/3ML) 0.083% neb solution Take 1 vial (2.5 mg) by nebulization every 6 hours as needed for  shortness of breath / dyspnea or wheezing, Disp-120 mL, R-0, E-Prescribe      benzonatate (TESSALON) 200 MG capsule Take 1 capsule (200 mg) by mouth 3 times daily as needed for cough, Disp-15 capsule, R-0, E-Prescribe      predniSONE (DELTASONE) 20 MG tablet Take two tablets (= 40mg) each day for 5 (five) days, Disp-10 tablet, R-0, E-Prescribe           Scribe Disclosure:  I, Manuel Carnes, am serving as a scribe at 3:41 PM on 8/13/2021 to document services personally performed by Milagros Wade PA based on my observations and the provider's statements to me.     [facility]         Milagros Wade PA-C  08/13/21 1705       Milagros Wade PA-C  08/13/21 5114

## 2021-08-13 NOTE — DISCHARGE INSTRUCTIONS
Discharge Instructions  Upper Respiratory Infection    The upper respiratory tract includes the sinuses, nasal passages, pharynx, and larynx. A URI, or upper respiratory infection, is an infection of any of the parts of the upper airway. Symptoms include runny nose, congestion, sneezing, sore throat, cough, and fever. URIs are almost always caused by a virus. Antibiotics do not help with viral infections, so are generally not prescribed. A URI is very contagious through coughing and nasal secretions; make sure you wash your hands often and clean surfaces after sneezing, coughing or touching them. While you should start to improve in 3 - 5 days, remember that sometimes a cough can linger for several weeks.    Generally, every Emergency Department visit should have a follow-up clinic visit with either a primary or a specialty clinic/provider. Please follow-up as instructed by your emergency provider today.    Return to the Emergency Department if:  Any of your symptoms get much worse.  You seem very sick, like being too weak to get up.  You have chest pain or shortness of breath.   You have a severe headache.  You are vomiting (throwing up) so much you cannot keep fluids or medicines down.  You have confusion or seem unusually drowsy.  You have a seizure.    What can I do to help myself?  Fill any prescriptions the provider gave you and take them right away  If you have a fever, get plenty of rest and drink lots of fluids, especially water.  Using a humidifier or saline nose spray will also help loosen mucous.   Clothes or blankets will not change your fever. Do what is comfortable for you.  Bathing or sponging in lukewarm water may help you feel better.  Acetaminophen (Tylenol ) or ibuprofen (Advil , Motrin ) will help bring fever down and may help you feel more comfortable. Be sure to read and follow the package directions, and ask your provider if you have questions.  Do not drink alcohol.  Decongestants may help  you feel better. You may use decongestant nose sprays Afrin  (oxymetazoline) or Jose-Synephrine  (phenylephrine hydrochloride) for up to 3 days, or may use a decongestant tablet like Sudafed  (pseudoephedrine).  If you were given a prescription for medicine here today, be sure to read all of the information (including the package insert) that comes with your prescription.  This will include important information about the medicine, its side effects, and any warnings that you need to know about.  The pharmacist who fills the prescription can provide more information and answer questions you may have about the medicine.  If you have questions or concerns that the pharmacist cannot address, please call or return to the Emergency Department.   Remember that you can always come back to the Emergency Department if you are not able to see your regular provider in the amount of time listed above, if you get any new symptoms, or if there is anything that worries you.    Discharge Instructions  Asthma    Asthma is a condition causing narrowing and inflammation of the airways that can make it hard to breathe.  Asthma can also cause cough, wheezing, noisy breathing and tightness in the chest.  Asthma can be brought on or  triggered  by many things, including dust, mold, pollen, cigarette smoke, exercise, stress and infections (like the common cold).     Generally, every Emergency Department visit should have a follow-up clinic visit with either a primary or a specialty clinic/provider. Please follow-up as instructed by your emergency provider today.    Return to the Emergency Department if:  Your breathing gets worse.  You need to use your inhaler more often than every 4 hours, or cannot get relief from your inhaler.  You are very weak, or feel much more ill.  You develop new symptoms, such as chest pain.  You cough up blood.  You are vomiting (throwing up) enough that you cannot keep fluids or your medicine down.    What can I  do to help myself?  Fill any prescriptions the provider gave you and take them right away--especially antibiotics. Be sure to finish the whole antibiotic prescription.  You may be given a prescription for an inhaler, which can help loosen tight air passages.  Use this as needed, but not more often than directed. Inhalers work much better when used with a spacer.   You may be given a prescription for a steroid to reduce inflammation. Used long-term, these can have some side effects, but for short-term use they are safe. You may notice restlessness or increased appetite (eating more).      You may use non-prescription cough or cold medicines. Cough medicines may help, but do not make the cough go away completely.   Avoid smoke, because this can make you feel worse. If you smoke, this may be a good time to quit! Consider using nicotine lozenges, gum, or patches to reduce cravings.   If you have a fever, Tylenol  (acetaminophen), Motrin  (ibuprofen), or Advil  (ibuprofen), may help bring fever down and may help you feel more comfortable. Be sure to read and follow the package directions, and ask your provider if you have questions.  Be sure to get your flu shot each year.  For certain age groups, the pneumonia shot can help prevent pneumonia.  It is important that you follow up with your regular provider, to be sure that you are improving from this spell (an acute asthma exacerbation), and also to do what you can to keep from having trouble again. Sometimes you need long-term medicines to keep your asthma under control.   If you were given a prescription for medicine here today, be sure to read all of the information (including the package insert) that comes with your prescription.  This will include important information about the medicine, its side effects, and any warnings that you need to know about.  The pharmacist who fills the prescription can provide more information and answer questions you may have about the  medicine.  If you have questions or concerns that the pharmacist cannot address, please call or return to the Emergency Department.   Remember that you can always come back to the Emergency Department if you are not able to see your regular provider in the amount of time listed above, if you get any new symptoms, or if there is anything that worries you.

## 2021-08-16 NOTE — TELEPHONE ENCOUNTER
Routing refill request to provider for review/approval because:  Drug interaction warning    Last office vist: 4/15/2021    Pended 30 day supply & 0 refills. Please Review.    Next office visit: none scheduled      Janis Spangler RN  [facility]

## 2021-08-17 RX ORDER — TRAZODONE HYDROCHLORIDE 300 MG/1
TABLET ORAL
Qty: 30 TABLET | Refills: 0 | Status: SHIPPED | OUTPATIENT
Start: 2021-08-17 | End: 2021-09-07

## 2021-08-24 ENCOUNTER — DOCUMENTATION ONLY (OUTPATIENT)
Dept: ONCOLOGY | Facility: CLINIC | Age: 51
End: 2021-08-24

## 2021-08-24 DIAGNOSIS — I26.99 OTHER PULMONARY EMBOLISM WITHOUT ACUTE COR PULMONALE, UNSPECIFIED CHRONICITY (H): ICD-10-CM

## 2021-08-24 DIAGNOSIS — I82.4Y1 DEEP VEIN THROMBOSIS (DVT) OF PROXIMAL VEIN OF RIGHT LOWER EXTREMITY, UNSPECIFIED CHRONICITY (H): ICD-10-CM

## 2021-08-24 NOTE — NURSING NOTE
Writer received a message from [facility] that patient 's Xeralto prescription has .    New prescription e-prescribed to [facility].     Danya Wen RN

## 2021-08-27 ENCOUNTER — VIRTUAL VISIT (OUTPATIENT)
Dept: FAMILY MEDICINE | Facility: CLINIC | Age: 51
End: 2021-08-27
Payer: COMMERCIAL

## 2021-08-27 DIAGNOSIS — G47.00 INSOMNIA, UNSPECIFIED TYPE: Primary | ICD-10-CM

## 2021-08-27 PROCEDURE — 99213 OFFICE O/P EST LOW 20 MIN: CPT | Mod: 95 | Performed by: INTERNAL MEDICINE

## 2021-08-27 RX ORDER — ZOLPIDEM TARTRATE 5 MG/1
5 TABLET ORAL
Qty: 12 TABLET | Refills: 0 | Status: SHIPPED | OUTPATIENT
Start: 2021-08-27 | End: 2021-09-08

## 2021-08-27 ASSESSMENT — ANXIETY QUESTIONNAIRES
2. NOT BEING ABLE TO STOP OR CONTROL WORRYING: NOT AT ALL
7. FEELING AFRAID AS IF SOMETHING AWFUL MIGHT HAPPEN: NOT AT ALL
1. FEELING NERVOUS, ANXIOUS, OR ON EDGE: SEVERAL DAYS
6. BECOMING EASILY ANNOYED OR IRRITABLE: NOT AT ALL
5. BEING SO RESTLESS THAT IT IS HARD TO SIT STILL: NOT AT ALL
GAD7 TOTAL SCORE: 1
3. WORRYING TOO MUCH ABOUT DIFFERENT THINGS: NOT AT ALL
IF YOU CHECKED OFF ANY PROBLEMS ON THIS QUESTIONNAIRE, HOW DIFFICULT HAVE THESE PROBLEMS MADE IT FOR YOU TO DO YOUR WORK, TAKE CARE OF THINGS AT HOME, OR GET ALONG WITH OTHER PEOPLE: NOT DIFFICULT AT ALL

## 2021-08-27 ASSESSMENT — PATIENT HEALTH QUESTIONNAIRE - PHQ9
SUM OF ALL RESPONSES TO PHQ QUESTIONS 1-9: 0
5. POOR APPETITE OR OVEREATING: NOT AT ALL

## 2021-08-27 NOTE — PROGRESS NOTES
Yandy Serrano is a 50 year old who is being evaluated via a billable telephone visit.      What phone number would you like to be contacted at? [phone]  How would you like to obtain your AVS? Darian Damon   Yandy Serarno is a 50 year old who presents for the following health issues     HPI     ED/UC Followup:    Facility:  [facility] Emergency Dept  Date of visit: 08/13/2021  Reason for visit: URI, Asthma  Current Status:        Asthma   Lima Bueno was recently seen in the emergency department on 08/13 for asthma exacerbation.   She is taking cough medicine and still coughing at night.  She has had improvement in her breathing with prednisone and use of nebulizers.  She has not needed antibiotics thus far.  She would like to continue with current inhalers as she has been prescribed.  Insomnia   Her main concern is that of worsening insomnia related to closing on a house next Tuesday.  She has been struggling getting sleep and would like to consider medication to help her with her sleep issues.    Review of Systems   Constitutional, HEENT, cardiovascular, pulmonary, GI, , musculoskeletal, neuro, skin, endocrine and psych systems are negative, except as otherwise noted.      Objective           Vitals:  No vitals were obtained today due to virtual visit.    Physical Exam   healthy, alert and no distress  PSYCH: Alert and oriented times 3; coherent speech, normal   rate and volume, able to articulate logical thoughts, able   to abstract reason, no tangential thoughts, no hallucinations   or delusions  Her affect is normal  RESP: No cough, no audible wheezing, able to talk in full sentences  Remainder of exam unable to be completed due to telephone visits      (G47.00) Insomnia, unspecified type  (primary encounter diagnosis)  Comment: OK to take ambien as needed   Plan: zolpidem (AMBIEN) 5 MG tablet                   Phone call duration: 15 minutes

## 2021-08-27 NOTE — PROGRESS NOTES
"Yandy Serrano is a 50 year old who is being evaluated via a billable telephone visit.      What phone number would you like to be contacted at? ***  How would you like to obtain your AVS? {AVS Preference:454829}    {PROVIDER CHARTING PREFERENCE:775412}    Subjective   Yandy Serrano is a 50 year old who presents for the following health issues {ACCOMPANIED BY STATEMENT (Optional):894723}    HPI     {SUPERLIST (Optional):164724}  {additonal problems for provider to add (Optional):166925}    Review of Systems   {ROS COMP (Optional):509608}      Objective           Vitals:  No vitals were obtained today due to virtual visit.    Physical Exam   {GENERAL APPEARANCE:50::\"healthy\",\"alert\",\"no distress\"}  PSYCH: Alert and oriented times 3; coherent speech, normal   rate and volume, able to articulate logical thoughts, able   to abstract reason, no tangential thoughts, no hallucinations   or delusions  Her affect is { :1904804::\"normal\"}  RESP: No cough, no audible wheezing, able to talk in full sentences  Remainder of exam unable to be completed due to telephone visits    {Diagnostic Test Results (Optional):250838}    {AMBULATORY ATTESTATION (Optional):117084}        Phone call duration: *** minutes  "

## 2021-08-28 ASSESSMENT — ANXIETY QUESTIONNAIRES: GAD7 TOTAL SCORE: 1

## 2021-09-07 DIAGNOSIS — F51.01 PRIMARY INSOMNIA: ICD-10-CM

## 2021-09-07 NOTE — TELEPHONE ENCOUNTER
Routing refill request to provider for review/approval because:  Drug interaction   high warning.     Graciela Arshad RN  [facility] Triage

## 2021-09-08 RX ORDER — TRAZODONE HYDROCHLORIDE 300 MG/1
1 TABLET ORAL AT BEDTIME
Qty: 90 TABLET | Refills: 3 | Status: SHIPPED | OUTPATIENT
Start: 2021-09-08 | End: 2021-12-06

## 2021-10-24 ENCOUNTER — HEALTH MAINTENANCE LETTER (OUTPATIENT)
Age: 51
End: 2021-10-24

## 2021-12-06 ENCOUNTER — OFFICE VISIT (OUTPATIENT)
Dept: FAMILY MEDICINE | Facility: CLINIC | Age: 51
End: 2021-12-06
Payer: COMMERCIAL

## 2021-12-06 VITALS
OXYGEN SATURATION: 98 % | BODY MASS INDEX: 40.77 KG/M2 | RESPIRATION RATE: 16 BRPM | TEMPERATURE: 97.7 F | HEIGHT: 65 IN | DIASTOLIC BLOOD PRESSURE: 94 MMHG | SYSTOLIC BLOOD PRESSURE: 136 MMHG | HEART RATE: 88 BPM

## 2021-12-06 DIAGNOSIS — I26.99 OTHER PULMONARY EMBOLISM WITHOUT ACUTE COR PULMONALE, UNSPECIFIED CHRONICITY (H): ICD-10-CM

## 2021-12-06 DIAGNOSIS — F51.01 PRIMARY INSOMNIA: ICD-10-CM

## 2021-12-06 DIAGNOSIS — Z00.00 ROUTINE GENERAL MEDICAL EXAMINATION AT A HEALTH CARE FACILITY: Primary | ICD-10-CM

## 2021-12-06 DIAGNOSIS — Z23 HIGH PRIORITY FOR 2019-NCOV VACCINE: ICD-10-CM

## 2021-12-06 DIAGNOSIS — Z13.220 SCREENING FOR HYPERLIPIDEMIA: ICD-10-CM

## 2021-12-06 DIAGNOSIS — R41.840 ATTENTION DEFICIT: ICD-10-CM

## 2021-12-06 DIAGNOSIS — I82.4Y1 DEEP VEIN THROMBOSIS (DVT) OF PROXIMAL VEIN OF RIGHT LOWER EXTREMITY, UNSPECIFIED CHRONICITY (H): ICD-10-CM

## 2021-12-06 LAB
ERYTHROCYTE [DISTWIDTH] IN BLOOD BY AUTOMATED COUNT: 15.6 % (ref 10–15)
HBA1C MFR BLD: 6.8 % (ref 0–5.6)
HCT VFR BLD AUTO: 45.7 % (ref 35–47)
HGB BLD-MCNC: 14.9 G/DL (ref 11.7–15.7)
MCH RBC QN AUTO: 27.9 PG (ref 26.5–33)
MCHC RBC AUTO-ENTMCNC: 32.6 G/DL (ref 31.5–36.5)
MCV RBC AUTO: 86 FL (ref 78–100)
PLATELET # BLD AUTO: 281 10E3/UL (ref 150–450)
RBC # BLD AUTO: 5.34 10E6/UL (ref 3.8–5.2)
WBC # BLD AUTO: 7.7 10E3/UL (ref 4–11)

## 2021-12-06 PROCEDURE — 99212 OFFICE O/P EST SF 10 MIN: CPT | Mod: 25 | Performed by: INTERNAL MEDICINE

## 2021-12-06 PROCEDURE — 86803 HEPATITIS C AB TEST: CPT | Performed by: INTERNAL MEDICINE

## 2021-12-06 PROCEDURE — 80061 LIPID PANEL: CPT | Performed by: INTERNAL MEDICINE

## 2021-12-06 PROCEDURE — 83036 HEMOGLOBIN GLYCOSYLATED A1C: CPT | Performed by: INTERNAL MEDICINE

## 2021-12-06 PROCEDURE — 91306 COVID-19,PF,MODERNA (18+ YRS BOOSTER .25ML): CPT | Performed by: INTERNAL MEDICINE

## 2021-12-06 PROCEDURE — 85027 COMPLETE CBC AUTOMATED: CPT | Performed by: INTERNAL MEDICINE

## 2021-12-06 PROCEDURE — 99396 PREV VISIT EST AGE 40-64: CPT | Performed by: INTERNAL MEDICINE

## 2021-12-06 PROCEDURE — 0064A COVID-19,PF,MODERNA (18+ YRS BOOSTER .25ML): CPT | Performed by: INTERNAL MEDICINE

## 2021-12-06 PROCEDURE — 36415 COLL VENOUS BLD VENIPUNCTURE: CPT | Performed by: INTERNAL MEDICINE

## 2021-12-06 PROCEDURE — 80053 COMPREHEN METABOLIC PANEL: CPT | Performed by: INTERNAL MEDICINE

## 2021-12-06 RX ORDER — TRAZODONE HYDROCHLORIDE 150 MG/1
300 TABLET ORAL AT BEDTIME
Qty: 180 TABLET | Refills: 3 | Status: SHIPPED | OUTPATIENT
Start: 2021-12-06 | End: 2022-11-28

## 2021-12-06 ASSESSMENT — ENCOUNTER SYMPTOMS
FREQUENCY: 0
JOINT SWELLING: 0
WEAKNESS: 0
DIARRHEA: 0
ARTHRALGIAS: 0
CHILLS: 0
DYSURIA: 0
HEMATOCHEZIA: 0
DIZZINESS: 0
HEMATURIA: 0
MYALGIAS: 0
PALPITATIONS: 0
EYE PAIN: 0
NERVOUS/ANXIOUS: 0
FEVER: 0
HEADACHES: 0
COUGH: 0
SORE THROAT: 0
CONSTIPATION: 0
BREAST MASS: 0
PARESTHESIAS: 0
SHORTNESS OF BREATH: 0
ABDOMINAL PAIN: 0
HEARTBURN: 0
NAUSEA: 0

## 2021-12-06 NOTE — PATIENT INSTRUCTIONS
(Z00.00) Routine general medical examination at a health care facility  (primary encounter diagnosis)  Comment: For routine exam, we will draw labs as ordered, cholesterol, diabetes mellitus check, liver function, renal function,  [facility] (also performs diagnostic mammogram, ultrasound and biopsy) [phone].  We will also update vaccination history.  Plan: COMPREHENSIVE METABOLIC PANEL, CBC with         Platelets  , Lipid panel reflex to direct LDL         Fasting, MA SCREENING DIGITAL BILAT - Future          (s+30), Ob/Gyn Referral, Hemoglobin A1c,         Hepatitis C Screen Reflex to HCV RNA Quant and         Genotype            (Z13.220) Screening for hyperlipidemia  Comment: check fasting lipid panel   Plan:     (Z23) High priority for 2019-nCoV vaccine  Comment:   Plan: COVID-19,PF,MODERNA (18+ Yrs BOOSTER .25mL)            (R41.840) Attention deficit  Comment: Refer for mental health assessment  Plan: MENTAL HEALTH REFERRAL  - Adult; Assessments         and Testing; ADHD; [facility]         [phone]; We will contact you to schedule        the appointment or please call with any         questions            (F51.01) Primary insomnia  Comment: We will update dose to to 150 mg tablets  Plan: traZODone (DESYREL) 150 MG tablet            (I82.4Y1) Deep vein thrombosis (DVT) of proximal vein of right lower extremity, unspecified chronicity (H)  Comment:   Plan: rivaroxaban ANTICOAGULANT (XARELTO) 20 MG TABS         tablet            (I26.99) Other pulmonary embolism without acute cor pulmonale, unspecified chronicity (H)  Comment:   Plan: rivaroxaban ANTICOAGULANT (XARELTO) 20 MG TABS         tablet

## 2021-12-06 NOTE — PROGRESS NOTES
SUBJECTIVE:   CC: Lima Bueno is an 50 year old woman who presents for preventive health visit.     Patient has been advised of split billing requirements and indicates understanding: Yes  Healthy Habits:    Getting at least 3 servings of Calcium per day:  Yes    Bi-annual eye exam:  Yes    Dental care twice a year:  Yes    Sleep apnea or symptoms of sleep apnea:  None    Diet:  Regular (no restrictions)    Frequency of exercise:  1 day/week    Duration of exercise:  15-30 minutes    Taking medications regularly:  Yes    Medication side effects:  None    PHQ-2 Total Score:    Additional concerns today:  No      Today's PHQ-2 Score:   PHQ-2 ( 1999 Pfizer) 12/6/2021   Q1: Little interest or pleasure in doing things 0   Q2: Feeling down, depressed or hopeless 0   PHQ-2 Score 0   PHQ-2 Total Score (12-17 Years)- Positive if 3 or more points; Administer PHQ-A if positive -   Q1: Little interest or pleasure in doing things Not at all   Q2: Feeling down, depressed or hopeless Not at all   PHQ-2 Score 0       Abuse: Current or Past (Physical, Sexual or Emotional) - No  Do you feel safe in your environment? Yes    Have you ever done Advance Care Planning? (For example, a Health Directive, POLST, or a discussion with a medical provider or your loved ones about your wishes): No, advance care planning information given to patient to review.  Patient plans to discuss their wishes with loved ones or provider.      Social History     Tobacco Use     Smoking status: Current Every Day Smoker     Packs/day: 0.50     Types: Cigarettes     Smokeless tobacco: Never Used   Substance Use Topics     Alcohol use: No     Alcohol/week: 0.0 standard drinks         Alcohol Use 12/6/2021   Prescreen: >3 drinks/day or >7 drinks/week? No       Reviewed orders with patient.  Reviewed health maintenance and updated orders accordingly - Yes  Lab work is in process  Labs reviewed in EPIC    Breast Cancer Screening:  Any new diagnosis of  family breast, ovarian, or bowel cancer?     FHS-7: No flowsheet data found.  click delete button to remove this line now    Pertinent mammograms are reviewed under the imaging tab.    History of abnormal Pap smear: NO - age 30-65 PAP every 5 years with negative HPV co-testing recommended  PAP / HPV Latest Ref Rng & Units 4/5/2019 10/21/2016 2/19/2014   PAP (Historical) - LSIL(A) OTHER-NIL, See Result LSIL(A)   HPV16 NEG:Negative Negative Negative -   HPV18 NEG:Negative Negative Negative -   HRHPV NEG:Negative Negative Negative -     Reviewed and updated as needed this visit by clinical staff                Reviewed and updated as needed this visit by Provider               Past Medical History:   Diagnosis Date     ADD (attention deficit disorder with hyperactivity)      Asthma      Breast mass 11/1/2012    Patient yet to have biopsy as of 11/1/2012       DVT (deep venous thrombosis) (H) 10/12    right leg     LSIL (low grade squamous intraepithelial lesion) on Pap smear 02/19/14    Neg high risk HPV     Pulmonary embolism (H) 2012    Saw Dr [place], protein S deficiency         Attention deficit   Notes concerns for difficulty focusing and wonders about possibility of ADHD  Primary insomnia   Requests refill of trazodone  Deep vein thrombosis (DVT) of proximal vein of right lower extremity, unspecified chronicity (H)  Other pulmonary embolism without acute cor pulmonale, unspecified chronicity (H)   Has continued on warfarin - no bleeding concerns      Review of Systems  CONSTITUTIONAL: NEGATIVE for fever, chills, change in weight  INTEGUMENTARY/SKIN: NEGATIVE for worrisome rashes, moles or lesions  EYES: NEGATIVE for vision changes or irritation  ENT: NEGATIVE for ear, mouth and throat problems  RESP: NEGATIVE for significant cough or SOB  BREAST: NEGATIVE for masses, tenderness or discharge  CV: NEGATIVE for chest pain, palpitations or peripheral edema  GI: NEGATIVE for nausea, abdominal pain, heartburn, or change  "in bowel habits  : NEGATIVE for unusual urinary or vaginal symptoms. No vaginal bleeding.  MUSCULOSKELETAL: NEGATIVE for significant arthralgias or myalgia  NEURO: NEGATIVE for weakness, dizziness or paresthesias  PSYCHIATRIC: NEGATIVE for changes in mood or affect      OBJECTIVE:   BP (!) 136/94 (BP Location: Right arm, Patient Position: Sitting, Cuff Size: Adult Large)   Pulse 88   Temp 97.7  F (36.5  C) (Temporal)   Resp 16   Ht 1.651 m (5' 5\")   SpO2 98%   BMI 40.77 kg/m    Physical Exam  GENERAL: healthy, alert and no distress  EYES: Eyes grossly normal to inspection, PERRL and conjunctivae and sclerae normal  HENT: ear canals and TM's normal,    NECK: no adenopathy, no asymmetry, masses, or scars and thyroid normal to palpation  RESP: lungs clear to auscultation - no rales, rhonchi or wheezes  CV: regular rate and rhythm, normal S1 S2, no S3 or S4, no murmur,   ABDOMEN: obese, soft, nontender, no hepatosplenomegaly, no masses and bowel sounds normal  MS: no gross musculoskeletal defects noted, no edema  SKIN: no suspicious lesions or rashes  NEURO: Normal strength and tone, mentation intact and speech normal  PSYCH: mentation appears normal, affect normal/bright    Diagnostic Test Results:  Labs reviewed in Epic    ASSESSMENT/PLAN:     Patient Instructions   (Z00.00) Routine general medical examination at a health care facility  (primary encounter diagnosis)  Comment: For routine exam, we will draw labs as ordered, cholesterol, diabetes mellitus check, liver function, renal function,  [facility] (also performs diagnostic mammogram, ultrasound and biopsy) [phone].  We will also update vaccination history.  Plan: COMPREHENSIVE METABOLIC PANEL, CBC with         Platelets  , Lipid panel reflex to direct LDL         Fasting, MA SCREENING DIGITAL BILAT - Future          (s+30), Ob/Gyn Referral, Hemoglobin A1c,         Hepatitis C Screen Reflex to HCV RNA Quant and         " "Genotype            (Z13.220) Screening for hyperlipidemia  Comment: check fasting lipid panel   Plan:     (Z23) High priority for 2019-nCoV vaccine  Comment:   Plan: COVID-19,PF,MODERNA (18+ Yrs BOOSTER .25mL)            (R41.840) Attention deficit  Comment: Refer for mental health assessment  Plan: MENTAL HEALTH REFERRAL  - Adult; Assessments         and Testing; ADHD; [facility]         [phone]; We will contact you to schedule        the appointment or please call with any         questions            (F51.01) Primary insomnia  Comment: We will update dose to to 150 mg tablets  Plan: traZODone (DESYREL) 150 MG tablet            (I82.4Y1) Deep vein thrombosis (DVT) of proximal vein of right lower extremity, unspecified chronicity (H)  Comment:   Plan: rivaroxaban ANTICOAGULANT (XARELTO) 20 MG TABS         tablet            (I26.99) Other pulmonary embolism without acute cor pulmonale, unspecified chronicity (H)  Comment:   Plan: rivaroxaban ANTICOAGULANT (XARELTO) 20 MG TABS         tablet             Elevated blood pressure  Comment; Plan to recheck when returns for INR     Patient has been advised of split billing requirements and indicates understanding: Yes  COUNSELING:  Reviewed preventive health counseling, as reflected in patient instructions    Estimated body mass index is 40.77 kg/m  as calculated from the following:    Height as of 8/13/21: 1.651 m (5' 5\").    Weight as of 8/13/21: 111.1 kg (245 lb).        She reports that she has been smoking cigarettes. She has been smoking about 0.50 packs per day. She has never used smokeless tobacco.  Tobacco Cessation Action Plan:   Information offered: Patient not interested at this time      Counseling Resources:  ATP IV Guidelines  Pooled Cohorts Equation Calculator  Breast Cancer Risk Calculator  BRCA-Related Cancer Risk Assessment: FHS-7 Tool  FRAX Risk Assessment  ICSI Preventive Guidelines  Dietary Guidelines for Americans, " 2010  USDA's MyPlate  ASA Prophylaxis  Lung CA Screening    Oscar Baltazar MD, MD  [facility]

## 2021-12-07 ASSESSMENT — ASTHMA QUESTIONNAIRES: ACT_TOTALSCORE: 25

## 2021-12-08 ENCOUNTER — TELEPHONE (OUTPATIENT)
Dept: FAMILY MEDICINE | Facility: CLINIC | Age: 51
End: 2021-12-08
Payer: COMMERCIAL

## 2021-12-08 LAB
ALBUMIN SERPL-MCNC: 3.4 G/DL (ref 3.4–5)
ALP SERPL-CCNC: 117 U/L (ref 40–150)
ALT SERPL W P-5'-P-CCNC: 27 U/L (ref 0–50)
ANION GAP SERPL CALCULATED.3IONS-SCNC: 8 MMOL/L (ref 3–14)
AST SERPL W P-5'-P-CCNC: 15 U/L (ref 0–45)
BILIRUB SERPL-MCNC: 0.2 MG/DL (ref 0.2–1.3)
BUN SERPL-MCNC: 12 MG/DL (ref 7–30)
CALCIUM SERPL-MCNC: 8.5 MG/DL (ref 8.5–10.1)
CHLORIDE BLD-SCNC: 108 MMOL/L (ref 94–109)
CO2 SERPL-SCNC: 25 MMOL/L (ref 20–32)
CREAT SERPL-MCNC: 0.85 MG/DL (ref 0.52–1.04)
GFR SERPL CREATININE-BSD FRML MDRD: 80 ML/MIN/1.73M2
GLUCOSE BLD-MCNC: 82 MG/DL (ref 70–99)
HCV AB SERPL QL IA: NONREACTIVE
POTASSIUM BLD-SCNC: 3.9 MMOL/L (ref 3.4–5.3)
PROT SERPL-MCNC: 8 G/DL (ref 6.8–8.8)
SODIUM SERPL-SCNC: 141 MMOL/L (ref 133–144)

## 2021-12-09 ENCOUNTER — MYC MEDICAL ADVICE (OUTPATIENT)
Dept: FAMILY MEDICINE | Facility: CLINIC | Age: 51
End: 2021-12-09
Payer: COMMERCIAL

## 2021-12-09 NOTE — TELEPHONE ENCOUNTER
Patient Contact    Attempt # 1    Was call answered?  No.  Left message on voicemail with information to call back.    Mary Ellen LAST, Triage RN  [facility]

## 2021-12-09 NOTE — TELEPHONE ENCOUNTER
Can we call Lima Bueno and let her know that     Jose Amato,    I had the opportunity to review your recent labs and a summary of your labs reads as follows:    Your complete blood counts show no sign of anemia, normal white blood cell count and platelets.  Your comprehensive metabolic panel showed normal renal function, normal liver function, and normal fasting blood glucose     Interestingly, your hemoglobin A1c is quite elevated and is greater than 6.5 which surprisingly is over the threshold for diabetes mellitus.  We should schedule a virtual visit to review discuss       Sincerely,  Oscar Baltazar MD

## 2021-12-09 NOTE — TELEPHONE ENCOUNTER
Patient given result message from Dr. Baltazar. Patient was very surprised by elevated A1C. She is scheduled for virtual visit on 12/17 with Dr. Baltazar. Anel Watson RN

## 2021-12-09 NOTE — TELEPHONE ENCOUNTER
Spoke to patient  about her results and she has a phone visit next week.     Graciela Arshad RN  [facility]

## 2021-12-09 NOTE — RESULT ENCOUNTER NOTE
Jose Amato,    I had the opportunity to review your recent labs and a summary of your labs reads as follows:    Your complete blood counts show no sign of anemia, normal white blood cell count and platelets.  Your comprehensive metabolic panel showed normal renal function, normal liver function, and normal fasting blood glucose     Interestingly, your hemoglobin A1c is quite elevated and is greater than 6.5 which surprisingly is over the threshold for diabetes mellitus.  We should schedule a virtual visit to review discuss      Sincerely,  Oscar Baltazar MD

## 2021-12-15 LAB
CHOLEST SERPL-MCNC: 165 MG/DL
FASTING STATUS PATIENT QL REPORTED: NO
HDLC SERPL-MCNC: 42 MG/DL
LDLC SERPL CALC-MCNC: 108 MG/DL
NONHDLC SERPL-MCNC: 123 MG/DL
TRIGL SERPL-MCNC: 76 MG/DL

## 2021-12-16 ENCOUNTER — TELEPHONE (OUTPATIENT)
Dept: FAMILY MEDICINE | Facility: CLINIC | Age: 51
End: 2021-12-16
Payer: COMMERCIAL

## 2021-12-16 DIAGNOSIS — E78.5 HYPERLIPIDEMIA LDL GOAL <100: Primary | ICD-10-CM

## 2021-12-16 RX ORDER — ATORVASTATIN CALCIUM 20 MG/1
20 TABLET, FILM COATED ORAL DAILY
Qty: 90 TABLET | Refills: 3 | Status: CANCELLED | OUTPATIENT
Start: 2021-12-16

## 2021-12-16 NOTE — RESULT ENCOUNTER NOTE
Jose Amato,    I had the opportunity to review your recent labs and a summary of your labs reads as follows:    Your fasting lipid panel show  - normal HDL (good) cholesterol -as your goal is greater than 40  - elevated LDL (bad) cholesterol as your goal is less than 100  - normal triglyceride levels    I would recommend we consider initiation of statin and recheck your fasting lipid panel in 3 months     Sincerely,  Oscar Baltazar MD

## 2021-12-16 NOTE — TELEPHONE ENCOUNTER
Can we call Lima Bueno and let her know that       Jose Amato,    I had the opportunity to review your recent labs and a summary of your labs reads as follows:    Your fasting lipid panel show  - normal HDL (good) cholesterol -as your goal is greater than 40  - elevated LDL (bad) cholesterol as your goal is less than 100  - normal triglyceride levels    I would recommend we consider initiation of statin and recheck your fasting lipid panel in 3 months     Sincerely,  Oscar Baltazar MD

## 2021-12-17 ENCOUNTER — VIRTUAL VISIT (OUTPATIENT)
Dept: FAMILY MEDICINE | Facility: CLINIC | Age: 51
End: 2021-12-17
Payer: COMMERCIAL

## 2021-12-17 DIAGNOSIS — E78.5 HYPERLIPIDEMIA LDL GOAL <100: ICD-10-CM

## 2021-12-17 DIAGNOSIS — E11.9 TYPE 2 DIABETES MELLITUS WITHOUT COMPLICATION, WITHOUT LONG-TERM CURRENT USE OF INSULIN (H): Primary | ICD-10-CM

## 2021-12-17 PROCEDURE — 99214 OFFICE O/P EST MOD 30 MIN: CPT | Mod: 95 | Performed by: INTERNAL MEDICINE

## 2021-12-17 RX ORDER — ATORVASTATIN CALCIUM 20 MG/1
20 TABLET, FILM COATED ORAL DAILY
Qty: 90 TABLET | Refills: 3 | Status: SHIPPED | OUTPATIENT
Start: 2021-12-17 | End: 2022-11-28

## 2021-12-17 NOTE — PROGRESS NOTES
Lima is a 50 year old who is being evaluated via a billable video visit.      How would you like to obtain your AVS? MyChart  If the video visit is dropped, the invitation should be resent by: Text to cell phone: [phone]  Will anyone else be joining your video visit? No    Video Start Time: 3:30 PM        Subjective   Lima is a 50 year old who presents for the following health issues     HPI        Type 2 diabetes mellitus without complication, without long-term current use of insulin (H)  Hyperlipidemia LDL goal <100   Spoke to Ailin today via video chat to review her recent diagnosis of type 2 diabetes mellitus which was made based upon her an elevated fasting blood glucose and an A1c.  She has previously had elevated fasting sugars in the past but never to the level diagnostic of diabetes.  She has no significant symptoms of diabetes at this point.  She denies any increased thirst.  She has no weakness or confusion.  She not have any neuropathy.  Her vision has been stable.  She not taking any medications at the moment for management of blood sugar.  She does have access to a glucometer as her son was recently diagnosed diabetes and she could use his chronic knee be to check her sugars.      Review of Systems   Constitutional, HEENT, cardiovascular, pulmonary, GI, , musculoskeletal, neuro, skin, endocrine and psych systems are negative, except as otherwise noted.      Objective           Vitals:  No vitals were obtained today due to virtual visit.    Physical Exam   GENERAL: Healthy, alert and no distress  EYES: Eyes grossly normal to inspection.  No discharge or erythema, or obvious scleral/conjunctival abnormalities.  RESP: No audible wheeze, cough, or visible cyanosis.  No visible retractions or increased work of breathing.    SKIN: Visible skin clear. No significant rash, abnormal pigmentation or lesions.  NEURO: Cranial nerves grossly intact.  Mentation and speech appropriate for age.  PSYCH:  Mentation appears normal, affect normal/bright, judgement and insight intact, normal speech and appearance well-groomed.        (E11.9) Type 2 diabetes mellitus without complication, without long-term current use of insulin (H)  (primary encounter diagnosis)  Comment: We reviewed the significance of the diagnosis of diabetes and necessary follow-up.  We reviewed specific therapy complications that could arise from untreated diabetes including nephropathy, neuropathy and retinopathy amongst others.  We also discussed the possibility of macrovascular complications including stroke and heart disease.  I encouraged her to schedule an ophthalmology visit annually to continue to monitor retinopathy.  She will return in a few months for recheck of her A1c as well as urine albumin.  We will also monitor for neuropathy at that point.  I recommended she be established with diabetic education to obtain her old glucometer and review methods of monitoring.  For the time being, no day-to-day monitoring is recommended however as she is not needing to make any day-to-day changes in her regiment.  I recommended she work more on improving her diet, minimizing glucose elevations by lowering carb intake and caloric intake.  She is understanding of this, will continue to try to work on her improved diet.  Plan: Lipid panel reflex to direct LDL Fasting,         Comprehensive metabolic panel, Hemoglobin A1c,         Albumin Random Urine Quantitative with Creat         Ratio, AMB Adult Diabetes Educator Referral            (E78.5) Hyperlipidemia LDL goal <100  Comment: We also discussed the need for atorvastatin for stabilizing and existing plaque in preventing further plaque deposition in preventing stroke and heart disease.  She is agreeable to starting this prescription today and rechecking labs in a few months  Plan: atorvastatin (LIPITOR) 20 MG tablet, Lipid         panel reflex to direct LDL Fasting,         Comprehensive metabolic  panel, Hemoglobin A1c,         Albumin Random Urine Quantitative with Creat         Ratio                   Video-Visit Details    Type of service:  Video Visit    Video End Time:3:48 PM    Originating Location (pt. Location): Home    Distant Location (provider location):  [facility]     Platform used for Video Visit: Viviana

## 2021-12-20 NOTE — TELEPHONE ENCOUNTER
Patient given result message from Dr. Baltazar.  Patient states that she started the atorvastatin. Agrees to return for labs in March. Pended orders signed. Anel Watson RN

## 2021-12-20 NOTE — TELEPHONE ENCOUNTER
Patient Contact    Attempt # 1    Was call answered?  No.  Left message on voicemail with information to call back.    Mary Ellen LAST, Triage RN  [facility]

## 2022-01-11 ENCOUNTER — NURSE TRIAGE (OUTPATIENT)
Dept: FAMILY MEDICINE | Facility: CLINIC | Age: 52
End: 2022-01-11

## 2022-01-11 ENCOUNTER — VIRTUAL VISIT (OUTPATIENT)
Dept: INTERNAL MEDICINE | Facility: CLINIC | Age: 52
End: 2022-01-11
Payer: COMMERCIAL

## 2022-01-11 DIAGNOSIS — J06.9 UPPER RESPIRATORY TRACT INFECTION, UNSPECIFIED TYPE: ICD-10-CM

## 2022-01-11 DIAGNOSIS — J20.9 ACUTE BRONCHITIS WITH COEXISTING CONDITION REQUIRING PROPHYLACTIC TREATMENT: ICD-10-CM

## 2022-01-11 DIAGNOSIS — R05.9 COUGH: Primary | ICD-10-CM

## 2022-01-11 DIAGNOSIS — J45.31 MILD PERSISTENT ASTHMA WITH EXACERBATION: ICD-10-CM

## 2022-01-11 PROCEDURE — 99214 OFFICE O/P EST MOD 30 MIN: CPT | Mod: 95 | Performed by: PHYSICIAN ASSISTANT

## 2022-01-11 RX ORDER — CODEINE PHOSPHATE AND GUAIFENESIN 10; 100 MG/5ML; MG/5ML
1-2 SOLUTION ORAL
Qty: 118 ML | Refills: 0 | Status: SHIPPED | OUTPATIENT
Start: 2022-01-11 | End: 2022-04-18

## 2022-01-11 RX ORDER — PREDNISONE 20 MG/1
20 TABLET ORAL DAILY
Qty: 5 TABLET | Refills: 0 | Status: SHIPPED | OUTPATIENT
Start: 2022-01-11 | End: 2022-04-18

## 2022-01-11 NOTE — PATIENT INSTRUCTIONS
Instructions for Patients  It is recommended that you have a test for coronavirus (COVID-19). This illness can cause fever, cough and trouble breathing. Many people get a mild case and get better on their own. Some people can get very sick.     Please follow these steps:    1. We will call to schedule your test.  2. A member of our care team will ask you some questions. Then, they will use a swab to collect samples from your nose and throat.     Our testing team will send you your test results.    How can I protect others?    Stay home and away from others (self-isolate) until:    You ve had no fever--and no medicine that reduces fever--for 1 full day (24 hours). And      Your other symptoms have resolved (gotten better). For example, your cough or breathing has improved. And     At least 10 days have passed since your symptoms started.    Stay at least 6 feet away from others. (If someone will drive you to your test, stay in the backseat, as far away from the  as you can.)     Don t go to work, school or anywhere else. When it s time for your test, go straight to the testing site. Don t make any stops on the way there or back.     Wash your hands and face often. Use soap and water.     Cover your mouth and nose with a mask, tissue or washcloth.     Don t touch anyone. No hugging, kissing or handshakes.    How can I take care of myself?    1. Get lots of rest. Drink extra fluids (unless a doctor has told you not to).     2. Take Tylenol (acetaminophen) for fever or pain. If you have liver or kidney problems, ask your family doctor if it's okay to take Tylenol.     Adults can take either:     650 mg (two 325 mg pills) every 4 to 6 hours, or     1,000 mg (two 500 mg pills) every 8 hours as needed.     Note: Don't take more than 3,000 mg in one day.   Acetaminophen is found in many medicines (both prescribed and over-the-counter medicines). Read all labels to be sure you don't take too much.   For children, check  the Tylenol bottle for the right dose. The dose is based on  the child's age or weight.    3. If you have other health problems (like cancer, heart failure, an organ transplant or severe kidney disease): Call your specialty clinic if you don't feel better in the next 2 days.    4. Know when to call 911: If your breathing is so bad that it keeps you from doing normal activities, call 911 or go to the emergency room. Tell them that you've been staying home and may have COVID-19.      Thank you for taking steps to prevent the spread of this virus.  o Limit your contact with others.  o Wear a simple mask to cover your cough.  o Wash your hands well and often.  o If you need medical care, go to [email or web address] or contact your health care provider.     For more about COVID-19 and caring for yourself at home, visit the CDC website at https://www.cdc.gov/coronavirus/2019-ncov/about/steps-when-sick.html.     To learn about care at [facility], please go to [email or web address]     [facility] clinical trials (COVID-19 research studies): [email or web address]-[email or web address].    Below are the COVID-19 hotlines at the [facility] ([facility]). Interpreters are available.     For health questions: Call 887-863-9441 or 1-947.967.1769 (7 a.m. to 7 p.m.)    For questions about schools and childcare: Call 800-310-9059 or [phone] (7 a.m. to 7 p.m.)

## 2022-01-11 NOTE — PROGRESS NOTES
Lima is a 51 year old who is being evaluated via a billable video visit.      How would you like to obtain your AVS? MyChart  If the video visit is dropped, the invitation should be resent by: Send to e-mail at: [email or web address]  Will anyone else be joining your video visit? No      Video Start Time: 12: 59 pm    Assessment & Plan     Cough    - Symptomatic; Yes; 1/4/2022 COVID-19 Virus (Coronavirus) by PCR Nose; Future  - predniSONE (DELTASONE) 20 MG tablet; Take 1 tablet (20 mg) by mouth daily    Mild persistent asthma with exacerbation    - predniSONE (DELTASONE) 20 MG tablet; Take 1 tablet (20 mg) by mouth daily  - guaiFENesin-codeine (ROBITUSSIN AC) 100-10 MG/5ML solution; Take 5-10 mLs by mouth nightly as needed for cough  - amoxicillin-clavulanate (AUGMENTIN) 875-125 MG tablet; Take 1 tablet by mouth 2 times daily for 10 days    Upper respiratory tract infection, unspecified type    - predniSONE (DELTASONE) 20 MG tablet; Take 1 tablet (20 mg) by mouth daily    Acute bronchitis with coexisting condition requiring prophylactic treatment    - amoxicillin-clavulanate (AUGMENTIN) 875-125 MG tablet; Take 1 tablet by mouth 2 times daily for 10 days             Fluids rest   Monitor  Reviewed to get covid testing - to help if any personal contacts are getting ill too.     No follow-ups on file.    ANDI Treviño [facility]   Lima is a 51 year old who presents for the following health issues     HPI     Acute Illness  Acute illness concerns: Cough  Onset/Duration: longer than a week-   Symptoms:  Fever: no  Chills/Sweats: no  Headache (location?): no  Sinus Pressure: no  Conjunctivitis:  no  Ear Pain: no  Rhinorrhea: YES  Congestion: YES  Sore Throat: no  Cough: YES-non-productive, productive of clear sputum  Wheeze: no  Decreased Appetite: no  Nausea: no  Vomiting: no  Diarrhea: no  Dysuria/Freq.: no  Dysuria or Hematuria: no  Fatigue/Achiness:  YES  Sick/Strep Exposure: no  Therapies tried and outcome: None  Sick contacts.   Covid vaccination and booster done        Review of Systems   Constitutional, HEENT, cardiovascular, pulmonary, gi and gu systems are negative, except as otherwise noted.      Objective           Vitals:  No vitals were obtained today due to virtual visit.    Physical Exam   GENERAL: Healthy, alert and no distress  EYES: Eyes grossly normal to inspection.  No discharge or erythema, or obvious scleral/conjunctival abnormalities.  RESP: No audible wheeze, mild cough noted, no visible cyanosis.  No visible retractions or increased work of breathing.    SKIN: Visible skin clear. No significant rash, abnormal pigmentation or lesions.  NEURO: Cranial nerves grossly intact.  Mentation and speech appropriate for age.  PSYCH: Mentation appears normal, affect normal/bright, judgement and insight intact, normal speech and appearance well-groomed.                Video-Visit Details    Type of service:  Video Visit    Video End Time:1:11 PM    Originating Location (pt. Location): Home    Distant Location (provider location):  [facility]     Platform used for Video Visit: NathalieWell

## 2022-01-11 NOTE — TELEPHONE ENCOUNTER
"Patient called reports cough:    CC: cough  Onset: week   Symptoms; congestion, Body soreness  Recurrent: yes bronchitis, coughing pills/steroids/abx  Lung hx: asthmatic  Uses nebs- haven't tried  Using inhalers- has used, helping  cough\"getting less but still there\"  Fever denies: no  Cardiac hx: high cholesterol   Denies shortness of breath, chest pain    Writer scheduled appt:    JAN 11 2022 01:00 PM - Provider Visit  [facility] - Zakiya Orourke PA-C     Triaged per Epic Triage Protocol, gave care advice which patient plans to follow.  See Care advice tab for more information.  Patent to call back if further questions or concerns.    Baudilio Newell RN  [facility]      Reason for Disposition    Wheezing is present    Additional Information    Negative: Bluish (or gray) lips or face    Negative: Severe difficulty breathing (e.g., struggling for each breath, speaks in single words)    Negative: Rapid onset of cough and has hives    Negative: Coughing started suddenly after medicine, an allergic food or bee sting    Negative: Difficulty breathing after exposure to flames, smoke, or fumes    Negative: Sounds like a life-threatening emergency to the triager    Negative: Previous asthma attacks and this feels like asthma attack    Negative: Chest pain present when not coughing    Negative: Difficulty breathing    Negative: Passed out (i.e., fainted, collapsed and was not responding)    Negative: Patient sounds very sick or weak to the triager    Negative: Coughed up > 1 tablespoon (15 ml) blood (Exception: blood-tinged sputum)    Negative: Fever > 103 F (39.4 C)    Negative: Fever > 101 F (38.3 C) and over 60 years of age    Negative: Fever > 100.0 F (37.8 C) and has diabetes mellitus or a weak immune system (e.g., HIV positive, cancer chemotherapy, organ transplant, splenectomy, chronic steroids)    Negative: Fever > 100.0 F (37.8 C) and bedridden (e.g., " nursing home patient, stroke, chronic illness, recovering from surgery)    Negative: Increasing ankle swelling    Protocols used: COUGH-A-OH

## 2022-01-14 ENCOUNTER — VIRTUAL VISIT (OUTPATIENT)
Dept: PSYCHOLOGY | Facility: CLINIC | Age: 52
End: 2022-01-14
Attending: INTERNAL MEDICINE
Payer: COMMERCIAL

## 2022-01-14 DIAGNOSIS — F90.2 ATTENTION DEFICIT HYPERACTIVITY DISORDER, COMBINED TYPE: Primary | ICD-10-CM

## 2022-01-14 DIAGNOSIS — R41.840 ATTENTION DEFICIT: ICD-10-CM

## 2022-01-14 PROCEDURE — 90791 PSYCH DIAGNOSTIC EVALUATION: CPT | Mod: GT | Performed by: PSYCHOLOGIST

## 2022-01-14 ASSESSMENT — COLUMBIA-SUICIDE SEVERITY RATING SCALE - C-SSRS
1. IN THE PAST MONTH, HAVE YOU WISHED YOU WERE DEAD OR WISHED YOU COULD GO TO SLEEP AND NOT WAKE UP?: NO
1. IN THE PAST MONTH, HAVE YOU WISHED YOU WERE DEAD OR WISHED YOU COULD GO TO SLEEP AND NOT WAKE UP?: NO
2. HAVE YOU ACTUALLY HAD ANY THOUGHTS OF KILLING YOURSELF?: NO
2. HAVE YOU ACTUALLY HAD ANY THOUGHTS OF KILLING YOURSELF LIFETIME?: NO

## 2022-01-14 ASSESSMENT — ANXIETY QUESTIONNAIRES
7. FEELING AFRAID AS IF SOMETHING AWFUL MIGHT HAPPEN: NOT AT ALL
6. BECOMING EASILY ANNOYED OR IRRITABLE: SEVERAL DAYS
1. FEELING NERVOUS, ANXIOUS, OR ON EDGE: NOT AT ALL
5. BEING SO RESTLESS THAT IT IS HARD TO SIT STILL: NOT AT ALL
GAD7 TOTAL SCORE: 1
3. WORRYING TOO MUCH ABOUT DIFFERENT THINGS: NOT AT ALL
2. NOT BEING ABLE TO STOP OR CONTROL WORRYING: NOT AT ALL
IF YOU CHECKED OFF ANY PROBLEMS ON THIS QUESTIONNAIRE, HOW DIFFICULT HAVE THESE PROBLEMS MADE IT FOR YOU TO DO YOUR WORK, TAKE CARE OF THINGS AT HOME, OR GET ALONG WITH OTHER PEOPLE: NOT DIFFICULT AT ALL

## 2022-01-14 ASSESSMENT — PATIENT HEALTH QUESTIONNAIRE - PHQ9
SUM OF ALL RESPONSES TO PHQ QUESTIONS 1-9: 1
5. POOR APPETITE OR OVEREATING: NOT AT ALL
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
## 2022-01-15 ASSESSMENT — ANXIETY QUESTIONNAIRES: GAD7 TOTAL SCORE: 1

## 2022-01-26 ENCOUNTER — VIRTUAL VISIT (OUTPATIENT)
Dept: FAMILY MEDICINE | Facility: CLINIC | Age: 52
End: 2022-01-26
Payer: COMMERCIAL

## 2022-01-26 ENCOUNTER — NURSE TRIAGE (OUTPATIENT)
Dept: NURSING | Facility: CLINIC | Age: 52
End: 2022-01-26

## 2022-01-26 DIAGNOSIS — Z20.822 EXPOSURE TO 2019 NOVEL CORONAVIRUS: Primary | ICD-10-CM

## 2022-01-26 PROCEDURE — 99212 OFFICE O/P EST SF 10 MIN: CPT | Mod: TEL | Performed by: NURSE PRACTITIONER

## 2022-01-26 NOTE — PROGRESS NOTES
Lima is a 51 year old who is being evaluated via a billable telephone visit.      What phone number would you like to be contacted at? [phone]  How would you like to obtain your AVS? MyChart    Assessment & Plan     Exposure to 2019 novel coronavirus  Recent exposure to somebody suspected of having COVID-19.  She is asymptomatic.  I reviewed CDC guidelines with her that indicates she does not have to isolate unless she develops symptoms.  In the meantime, we will proceed with testing  - Asymptomatic COVID-19 Virus (Coronavirus) by PCR; Future    Morris Coates, [facility]   Lima is a 51 year old who presents for the following health issues     HPI     Had a coworker coming close contact with her.  The coworkers suspected of having COVID-19 due to multiple close contacts with known COVID-19 patients.        Review of Systems   Constitutional, HEENT, cardiovascular, pulmonary, gi and gu systems are negative, except as otherwise noted.      Objective           Vitals:  No vitals were obtained today due to virtual visit.    Physical Exam   healthy, alert and no distress  PSYCH: Alert and oriented times 3; coherent speech, normal   rate and volume, able to articulate logical thoughts, able   to abstract reason, no tangential thoughts, no hallucinations   or delusions  Her affect is normal  RESP: No cough, no audible wheezing, able to talk in full sentences  Remainder of exam unable to be completed due to telephone visits        Phone call duration: 6 minutes

## 2022-01-26 NOTE — TELEPHONE ENCOUNTER
Exposed to Covid-19 on 01/25/22.  No symptoms noted noted.  Patient if fully vaccinated and boosted.  Triage guidelines recommended to call pcp within 24 hours.   Caller verbalized and understands directives.  COVID 19 Nurse Triage Plan/Patient Instructions    Please be aware that novel coronavirus (COVID-19) may be circulating in the community. If you develop symptoms such as fever, cough, or SOB or if you have concerns about the presence of another infection including coronavirus (COVID-19), please contact your health care provider or visit [email or web address].     Disposition/Instructions    Virtual Visit with provider recommended. Reference Visit Selection Guide.    Thank you for taking steps to prevent the spread of this virus.  o Limit your contact with others.  o Wear a simple mask to cover your cough.  o Wash your hands well and often.    Resources    [facility]: About COVID-19: [email or web address]    CDC: What to Do If You're Sick: www.cdc.gov/coronavirus/2019-ncov/about/steps-when-sick.html    CDC: Ending Home Isolation: www.cdc.gov/coronavirus/2019-ncov/hcp/disposition-in-home-patients.html     CDC: Caring for Someone: www.cdc.gov/coronavirus/2019-ncov/if-you-are-sick/care-for-someone.html     [facility]: Interim Guidance for Hospital Discharge to Home: www.health.[facility].[place].us/diseases/coronavirus/hcp/hospdischarge.pdf    [facility] clinical trials (COVID-19 research studies): [email or web address]-clinical-trials     Below are the COVID-19 hotlines at the [facility] of Health ([facility]). Interpreters are available.   o For health questions: Call 366-588-4408 or [phone] (7 a.m. to 7 p.m.)  o For questions about schools and childcare: Call 641-322-6164 or 1-984.335.2874 (7 a.m. to 7 p.m.)                     Reason for Disposition    [1] CLOSE CONTACT COVID-19 EXPOSURE within last 14 days AND [2] needs COVID-19 lab test to return to work AND [3] NO  symptoms    Additional Information    Negative: COVID-19 lab test positive    Negative: [1] Lives with someone known to have influenza (flu test positive) AND [2] flu-like symptoms (e.g., cough, runny nose, sore throat, SOB; with or without fever)    Negative: [1] Symptoms of COVID-19 (e.g., cough, fever, SOB, or others) AND [2] HCP diagnosed COVID-19 based on symptoms    Negative: [1] Symptoms of COVID-19 (e.g., cough, fever, SOB, or others) AND [2] lives in an area with community spread    Negative: [1] Symptoms of COVID-19 (e.g., cough, fever, SOB, or others) AND [2] within 14 days of EXPOSURE (close contact) with diagnosed or suspected COVID-19 patient    Negative: [1] Symptoms of COVID-19 (e.g., cough, fever, SOB, or others) AND [2] within 14 days of travel from high-risk area for COVID-19 community spread (identified by CDC)    Negative: [1] Difficulty breathing (shortness of breath) occurs AND [2] onset > 14 days after COVID-19 EXPOSURE (Close Contact)    Negative: [1] Dry cough occurs AND [2] onset > 14 days after COVID-19 EXPOSURE    Negative: [1] Wet cough (i.e., white-yellow, yellow, green, or jane colored sputum) AND [2] onset > 14 days after COVID-19 EXPOSURE    Negative: [1] Common cold symptoms AND [2] onset > 14 days after COVID-19 EXPOSURE    Negative: COVID-19 vaccine reaction suspected (e.g., fever, headache, muscle aches) occurring during days 1-3 after getting vaccine    Negative: COVID-19 vaccine, questions about    Protocols used: CORONAVIRUS (COVID-19) EXPOSURE-A- 8.25.2021

## 2022-01-28 ENCOUNTER — LAB (OUTPATIENT)
Dept: URGENT CARE | Facility: URGENT CARE | Age: 52
End: 2022-01-28
Attending: NURSE PRACTITIONER
Payer: COMMERCIAL

## 2022-01-28 DIAGNOSIS — Z20.822 EXPOSURE TO 2019 NOVEL CORONAVIRUS: ICD-10-CM

## 2022-01-28 PROCEDURE — U0005 INFEC AGEN DETEC AMPLI PROBE: HCPCS

## 2022-01-28 PROCEDURE — U0003 INFECTIOUS AGENT DETECTION BY NUCLEIC ACID (DNA OR RNA); SEVERE ACUTE RESPIRATORY SYNDROME CORONAVIRUS 2 (SARS-COV-2) (CORONAVIRUS DISEASE [COVID-19]), AMPLIFIED PROBE TECHNIQUE, MAKING USE OF HIGH THROUGHPUT TECHNOLOGIES AS DESCRIBED BY CMS-2020-01-R: HCPCS

## 2022-01-29 LAB — SARS-COV-2 RNA RESP QL NAA+PROBE: NEGATIVE

## 2022-02-04 ENCOUNTER — VIRTUAL VISIT (OUTPATIENT)
Dept: PSYCHOLOGY | Facility: CLINIC | Age: 52
End: 2022-02-04
Payer: COMMERCIAL

## 2022-02-04 DIAGNOSIS — F90.2 ATTENTION DEFICIT HYPERACTIVITY DISORDER, COMBINED TYPE: Primary | ICD-10-CM

## 2022-02-04 PROCEDURE — 90834 PSYTX W PT 45 MINUTES: CPT | Mod: GT | Performed by: PSYCHOLOGIST
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
## 2022-04-10 ENCOUNTER — HEALTH MAINTENANCE LETTER (OUTPATIENT)
Age: 52
End: 2022-04-10

## 2022-04-18 ENCOUNTER — VIRTUAL VISIT (OUTPATIENT)
Dept: FAMILY MEDICINE | Facility: CLINIC | Age: 52
End: 2022-04-18
Payer: COMMERCIAL

## 2022-04-18 DIAGNOSIS — J45.20 MILD INTERMITTENT ASTHMA WITHOUT COMPLICATION: ICD-10-CM

## 2022-04-18 DIAGNOSIS — R05.9 COUGH: Primary | ICD-10-CM

## 2022-04-18 PROCEDURE — 99213 OFFICE O/P EST LOW 20 MIN: CPT | Mod: 95 | Performed by: PHYSICIAN ASSISTANT

## 2022-04-18 RX ORDER — BENZONATATE 200 MG/1
200 CAPSULE ORAL 3 TIMES DAILY PRN
Qty: 20 CAPSULE | Refills: 0 | Status: SHIPPED | OUTPATIENT
Start: 2022-04-18 | End: 2022-11-28

## 2022-04-18 RX ORDER — GUAIFENESIN/DEXTROMETHORPHAN 100-10MG/5
10 SYRUP ORAL EVERY 4 HOURS PRN
Qty: 118 ML | Refills: 0 | Status: SHIPPED | OUTPATIENT
Start: 2022-04-18 | End: 2023-10-04

## 2022-04-18 RX ORDER — PREDNISONE 20 MG/1
40 TABLET ORAL DAILY
Qty: 10 TABLET | Refills: 0 | Status: SHIPPED | OUTPATIENT
Start: 2022-04-18 | End: 2022-05-20

## 2022-04-18 NOTE — PATIENT INSTRUCTIONS
https://www.cdc.gov/coronavirus/2019-ncov/your-health/quarantine-isolation.html    Get plenty of fluids and rest    Covid test

## 2022-04-18 NOTE — PROGRESS NOTES
Lima is a 51 year old who is being evaluated via a billable video visit.      How would you like to obtain your AVS? MyChart  If the video visit is dropped, the invitation should be resent by: Text to cell phone: EMILEE [phone]  Will anyone else be joining your video visit? No      Video Start Time: 5:36    Assessment and Plan:     (R05.9) Cough  (primary encounter diagnosis)  Comment: productive, no cp, no leg swelling, no hemoptysis, breathing tight at times but relieved with albuterol, no known covid contacts  Plan: predniSONE (DELTASONE) 20 MG tablet,         Symptomatic; Yes; 4/15/2022 COVID-19 Virus         (Coronavirus) by PCR,         guaiFENesin-dextromethorphan (ROBITUSSIN DM)         100-10 MG/5ML syrup, benzonatate (TESSALON) 200        MG capsule  Fluids/rest, note for work provided  Discussed when to be seen promptly     (J45.20) Mild intermittent asthma without complication  Comment: on advaird, alb mdi and nebs  Plan: prednisone, cont above      Kayla Wells PA-C      Subjective   Lima is a 51 year old who presents for the following health issues  HPI     Has had cough x 4 days  Cough is productive--yellow sputum, no blood  Fully vaccinated w/booster   Her breathing feels tight  She denies chest pain, leg swelling, sore throat, ear pain  She had subjective fever 2 days ago which resolved  She denies any known covid exposure, she hasn't taken a home test      Review of Systems   See above      Objective           Vitals:  No vitals were obtained today due to virtual visit.    Physical Exam   GENERAL: Healthy, alert and no distress  EYES: Eyes grossly normal to inspection.  No discharge or erythema, or obvious scleral/conjunctival abnormalities.  RESP: No audible wheeze, cough, or visible cyanosis.  No visible retractions or increased work of breathing.    SKIN: Visible skin clear. No significant rash, abnormal pigmentation or lesions.  NEURO: Cranial nerves grossly intact.  Mentation  and speech appropriate for age.  PSYCH: Mentation appears normal, affect normal/bright, judgement and insight intact, normal speech and appearance well-groomed.          Video-Visit Details    Type of service:  Video Visit    Video End Time:5:47 PM    Originating Location (pt. Location): Home    Distant Location (provider location):  [facility]     Platform used for Video Visit: Rayna

## 2022-04-18 NOTE — LETTER
April 18, 2022    RE:  Lima Bueno                                                                                                                                                       [id number] [facility] [address]            To whom it may concern:    Lima Bueno is under my professional care and was seen in the clinic on 4/18/22.  She  may return to work without restrictions on 4/20/22 if covid testing is negative.      Sincerely,        Kayla Wells PA-C

## 2022-04-19 ENCOUNTER — LAB (OUTPATIENT)
Dept: URGENT CARE | Facility: URGENT CARE | Age: 52
End: 2022-04-19
Attending: PHYSICIAN ASSISTANT
Payer: COMMERCIAL

## 2022-04-19 PROCEDURE — U0005 INFEC AGEN DETEC AMPLI PROBE: HCPCS | Performed by: PHYSICIAN ASSISTANT

## 2022-04-19 PROCEDURE — U0003 INFECTIOUS AGENT DETECTION BY NUCLEIC ACID (DNA OR RNA); SEVERE ACUTE RESPIRATORY SYNDROME CORONAVIRUS 2 (SARS-COV-2) (CORONAVIRUS DISEASE [COVID-19]), AMPLIFIED PROBE TECHNIQUE, MAKING USE OF HIGH THROUGHPUT TECHNOLOGIES AS DESCRIBED BY CMS-2020-01-R: HCPCS | Performed by: PHYSICIAN ASSISTANT

## 2022-04-20 LAB — SARS-COV-2 RNA RESP QL NAA+PROBE: NEGATIVE

## 2022-05-04 ENCOUNTER — HOSPITAL ENCOUNTER (OUTPATIENT)
Dept: ULTRASOUND IMAGING | Facility: CLINIC | Age: 52
Discharge: HOME | End: 2022-05-04
Attending: INTERNAL MEDICINE | Admitting: INTERNAL MEDICINE
Payer: COMMERCIAL

## 2022-05-04 ENCOUNTER — OFFICE VISIT (OUTPATIENT)
Dept: FAMILY MEDICINE | Facility: CLINIC | Age: 52
End: 2022-05-04
Payer: COMMERCIAL

## 2022-05-04 ENCOUNTER — NURSE TRIAGE (OUTPATIENT)
Dept: FAMILY MEDICINE | Facility: CLINIC | Age: 52
End: 2022-05-04
Payer: COMMERCIAL

## 2022-05-04 VITALS
HEIGHT: 65 IN | TEMPERATURE: 97.3 F | RESPIRATION RATE: 16 BRPM | HEART RATE: 100 BPM | OXYGEN SATURATION: 98 % | DIASTOLIC BLOOD PRESSURE: 90 MMHG | SYSTOLIC BLOOD PRESSURE: 132 MMHG | BODY MASS INDEX: 39.49 KG/M2 | WEIGHT: 237 LBS

## 2022-05-04 DIAGNOSIS — I10 ESSENTIAL HYPERTENSION, BENIGN: ICD-10-CM

## 2022-05-04 DIAGNOSIS — E11.9 TYPE 2 DIABETES MELLITUS WITHOUT COMPLICATION, WITHOUT LONG-TERM CURRENT USE OF INSULIN (H): ICD-10-CM

## 2022-05-04 DIAGNOSIS — E66.01 MORBID OBESITY (H): ICD-10-CM

## 2022-05-04 DIAGNOSIS — I82.4Y1 DEEP VEIN THROMBOSIS (DVT) OF PROXIMAL VEIN OF RIGHT LOWER EXTREMITY, UNSPECIFIED CHRONICITY (H): ICD-10-CM

## 2022-05-04 DIAGNOSIS — R60.0 LOWER EXTREMITY EDEMA: Primary | ICD-10-CM

## 2022-05-04 LAB
ERYTHROCYTE [DISTWIDTH] IN BLOOD BY AUTOMATED COUNT: 15.4 % (ref 10–15)
HBA1C MFR BLD: 8.5 % (ref 0–5.6)
HCT VFR BLD AUTO: 44.4 % (ref 35–47)
HGB BLD-MCNC: 14.8 G/DL (ref 11.7–15.7)
MCH RBC QN AUTO: 28.1 PG (ref 26.5–33)
MCHC RBC AUTO-ENTMCNC: 33.3 G/DL (ref 31.5–36.5)
MCV RBC AUTO: 84 FL (ref 78–100)
PLATELET # BLD AUTO: 268 10E3/UL (ref 150–450)
RBC # BLD AUTO: 5.26 10E6/UL (ref 3.8–5.2)
WBC # BLD AUTO: 6.7 10E3/UL (ref 4–11)

## 2022-05-04 PROCEDURE — 36415 COLL VENOUS BLD VENIPUNCTURE: CPT | Performed by: INTERNAL MEDICINE

## 2022-05-04 PROCEDURE — 85027 COMPLETE CBC AUTOMATED: CPT | Performed by: INTERNAL MEDICINE

## 2022-05-04 PROCEDURE — 84443 ASSAY THYROID STIM HORMONE: CPT | Performed by: INTERNAL MEDICINE

## 2022-05-04 PROCEDURE — 93970 EXTREMITY STUDY: CPT

## 2022-05-04 PROCEDURE — 83036 HEMOGLOBIN GLYCOSYLATED A1C: CPT | Performed by: INTERNAL MEDICINE

## 2022-05-04 PROCEDURE — 99214 OFFICE O/P EST MOD 30 MIN: CPT | Performed by: INTERNAL MEDICINE

## 2022-05-04 PROCEDURE — 93000 ELECTROCARDIOGRAM COMPLETE: CPT | Performed by: INTERNAL MEDICINE

## 2022-05-04 RX ORDER — HYDROCHLOROTHIAZIDE 12.5 MG/1
12.5 TABLET ORAL DAILY
Qty: 30 TABLET | Refills: 11 | Status: SHIPPED | OUTPATIENT
Start: 2022-05-04 | End: 2022-08-30

## 2022-05-04 ASSESSMENT — PAIN SCALES - GENERAL: PAINLEVEL: SEVERE PAIN (7)

## 2022-05-04 NOTE — TELEPHONE ENCOUNTER
"Received a call from the patient stating she has developed swelling at the end of her fibula on both legs. Swelling started yesterday. L leg is bigger than the R leg. Patient states she is having some discomfort at rest and with ambulation. Patient states the discomfort is relieved when she elevates her legs.  Patient does have a history of DVT and is on Xarelto.     1. ONSET: \"When did the swelling start?\" (e.g., minutes, hours, days)      Yesterday   2. LOCATION: \"What part of the leg is swollen?\"  \"Are both legs swollen or just one leg?\"      Both left and right, L swelling is bigger compared to the one of the R, end of the fibula   3. SEVERITY: \"How bad is the swelling?\" (e.g., localized; mild, moderate, severe)   - Localized - small area of swelling localized to one leg      Can press down and puffs right back up (immediately)  4. REDNESS: \"Does the swelling look red or infected?\"      No redness  5. PAIN: \"Is the swelling painful to touch?\" If so, ask: \"How painful is it?\"   (Scale 1-10; mild, moderate or severe)      Pain with ambulation especially on R foot, pain continuously, feels better when patient elevates the legs  6. FEVER: \"Do you have a fever?\" If so, ask: \"What is it, how was it measured, and when did it start?\"       No fever  7. CAUSE: \"What do you think is causing the leg swelling?\"      Unknown   8. MEDICAL HISTORY: \"Do you have a history of heart failure, kidney disease, liver failure, or cancer?\"      Does have a history of DVTs (in R leg)  9. RECURRENT SYMPTOM: \"Have you had leg swelling before?\" If so, ask: \"When was the last time?\" \"What happened that time?\"      DVT in R leg in the past   10. OTHER SYMPTOMS: \"Do you have any other symptoms?\" (e.g., chest pain, difficulty breathing)        No chest pain, no difficulty breathing    Triage protocol recommending ED/UC/office with PCP approval. Routing to PCP for recommendations. Appointment available at 2:30 (patient states she is able to " make that time slot) or she is also happy to go into urgent care.     Reason for Disposition    Thigh, calf, or ankle swelling in both legs, but one side is definitely more swollen (Exception: longstanding difference between legs)    Additional Information    Negative: Sounds like a life-threatening emergency to the triager    Negative: Chest pain    Negative: Small area of swelling and followed an insect bite to the area    Negative: Followed a knee injury    Negative: Ankle or foot injury    Negative: Pregnant with leg swelling or edema    Negative: Difficulty breathing at rest    Negative: Entire foot is cool or blue in comparison to other side    Negative: SEVERE swelling (e.g., swelling extends above knee, entire leg is swollen, weeping fluid)    Negative: Thigh or calf pain and only 1 side and present > 1 hour    Negative: Thigh, calf, or ankle swelling in only one leg    Protocols used: LEG SWELLING AND EDEMA-A-OH    Gabrielle Jean-Baptiste RN

## 2022-05-04 NOTE — PROGRESS NOTES
"      Subjective   Lima is a 51 year old who presents for the following health issues     HPI     Lower extremity edema   Lima Bueno has had some increased aching and swelling in both ankles.  She has not been walking due to her right ankle pain.  She does not recall any trauma to her ankles.   She is taking xarelto.  Weight loss   She would like to work on weight loss.   Elevated blood pressure   She has had elevated blood pressure today and notably worsening fluid retention.    Type 2 diabetes mellitus   Has not been watching her diet too closely and weight has increased as above.       Review of Systems   Constitutional, HEENT, cardiovascular, pulmonary - recent bronchitis - using inhalers, GI, , musculoskeletal, neuro, skin, endocrine and psych systems are negative, except as otherwise noted.      Objective    BP (!) 132/90 (BP Location: Right arm, Patient Position: Sitting, Cuff Size: Adult Large)   Pulse 100   Temp 97.3  F (36.3  C) (Temporal)   Resp 16   Ht 1.651 m (5' 5\")   Wt 107.5 kg (237 lb)   SpO2 98%   BMI 39.44 kg/m    Body mass index is 39.44 kg/m .  Physical Exam   GENERAL: healthy, alert and no distress  EYES: Eyes grossly normal to inspection,    NECK: no adenopathy, no asymmetry,   RESP: lungs clear to auscultation - no rales, rhonchi or wheezes  CV: regular rate and rhythm, normal S1 S2, no S3 or S4, no murmur, click or rub, trace peripheral edema  ABDOMEN: obese, soft, nontender,    MS: no gross musculoskeletal defects noted, no edema  SKIN: no suspicious lesions or rashes  NEURO: Normal strength and tone, mentation intact and speech normal  PSYCH: mentation appears normal, affect normal/bright    Patient Instructions   (R60.0) Lower extremity edema  (primary encounter diagnosis)  Comment: We will check ultrasound of your lower extremity to rule out deep vein thrombosis and we will continue xarelto.  We will also start hydrochlorothiazide 12.5 mg daily and we will check " labs   Plan: hydrochlorothiazide (HYDRODIURIL) 12.5 MG         tablet            (I82.4Y1) Deep vein thrombosis (DVT) of proximal vein of right lower extremity, unspecified chronicity (H)  Comment: as above - continue xarelto  Plan: US Lower Extremity Venous Duplex Bilateral            (E66.01) Morbid obesity (H)  Comment: check labs   Plan: Comprehensive Weight Management            (E11.9) Type 2 diabetes mellitus without complication, without long-term current use of insulin (H)  Comment: We will check labs today and consider metformin  Plan: EKG 12-lead complete w/read - Clinics,         Hemoglobin A1c, CBC with platelets            (I10) Essential hypertension, benign  Comment: blood pressure is elevated today and we will start a low dose of hydrochlorothiazide 12.5 mg daily  Plan: Comprehensive Weight Management, EKG 12-lead         complete w/read - Clinics, hydrochlorothiazide         (HYDRODIURIL) 12.5 MG tablet

## 2022-05-04 NOTE — TELEPHONE ENCOUNTER
Pt was called OV was scheduled today. Advised she check in 20 minutes before appt.Pt is agreeable with plan.

## 2022-05-04 NOTE — TELEPHONE ENCOUNTER
Provider Response to 2nd Level Triage Request    I have reviewed the RN documentation. My recommendation is:  Face To Face Visit. Same Day: place patient on my schedule, as I have availability.  -OK to overbook this afternoon

## 2022-05-04 NOTE — PATIENT INSTRUCTIONS
(R60.0) Lower extremity edema  (primary encounter diagnosis)  Comment: We will check ultrasound of your lower extremity to rule out deep vein thrombosis and we will continue xarelto.  We will also start hydrochlorothiazide 12.5 mg daily and we will check labs   Plan: hydrochlorothiazide (HYDRODIURIL) 12.5 MG         tablet            (I82.4Y1) Deep vein thrombosis (DVT) of proximal vein of right lower extremity, unspecified chronicity (H)  Comment: as above - continue xarelto  Plan: US Lower Extremity Venous Duplex Bilateral            (E66.01) Morbid obesity (H)  Comment: check labs   Plan: Comprehensive Weight Management            (E11.9) Type 2 diabetes mellitus without complication, without long-term current use of insulin (H)  Comment: We will check labs today and consider metformin  Plan: EKG 12-lead complete w/read - Clinics,         Hemoglobin A1c, CBC with platelets            (I10) Essential hypertension, benign  Comment: blood pressure is elevated today and we will start a low dose of hydrochlorothiazide 12.5 mg daily  Plan: Comprehensive Weight Management, EKG 12-lead         complete w/read - Clinics, hydrochlorothiazide         (HYDRODIURIL) 12.5 MG tablet

## 2022-05-05 LAB — TSH SERPL DL<=0.005 MIU/L-ACNC: 0.74 MU/L (ref 0.4–4)

## 2022-05-10 NOTE — RESULT ENCOUNTER NOTE
Jose Amato,    I have had the opportunity to review your recent results and an interpretation is as follows:  Your thyroid function returned stable  Your complete blood counts shows stable hemoglobin and white blood cell count  Your hemoglobin A1c unfortunately is further elevated and we should follow up in the clinic to review and start a new medication to help lower your blood glucose and treat diabetes mellitus effectively     Sincerely,  Oscar Baltazar MD

## 2022-05-20 ENCOUNTER — OFFICE VISIT (OUTPATIENT)
Dept: FAMILY MEDICINE | Facility: CLINIC | Age: 52
End: 2022-05-20
Payer: COMMERCIAL

## 2022-05-20 ENCOUNTER — TELEPHONE (OUTPATIENT)
Dept: FAMILY MEDICINE | Facility: CLINIC | Age: 52
End: 2022-05-20

## 2022-05-20 VITALS
WEIGHT: 237 LBS | OXYGEN SATURATION: 96 % | BODY MASS INDEX: 39.49 KG/M2 | HEIGHT: 65 IN | DIASTOLIC BLOOD PRESSURE: 93 MMHG | HEART RATE: 88 BPM | SYSTOLIC BLOOD PRESSURE: 142 MMHG | TEMPERATURE: 97.1 F

## 2022-05-20 DIAGNOSIS — I10 ESSENTIAL HYPERTENSION, BENIGN: Primary | ICD-10-CM

## 2022-05-20 DIAGNOSIS — L73.9 FOLLICULITIS: ICD-10-CM

## 2022-05-20 DIAGNOSIS — D68.59 PROTEIN S DEFICIENCY (H): ICD-10-CM

## 2022-05-20 DIAGNOSIS — E11.9 TYPE 2 DIABETES MELLITUS WITHOUT COMPLICATION, WITHOUT LONG-TERM CURRENT USE OF INSULIN (H): ICD-10-CM

## 2022-05-20 DIAGNOSIS — I26.99 OTHER PULMONARY EMBOLISM WITHOUT ACUTE COR PULMONALE, UNSPECIFIED CHRONICITY (H): ICD-10-CM

## 2022-05-20 DIAGNOSIS — E66.01 MORBID OBESITY (H): ICD-10-CM

## 2022-05-20 PROCEDURE — 99214 OFFICE O/P EST MOD 30 MIN: CPT | Performed by: INTERNAL MEDICINE

## 2022-05-20 PROCEDURE — 36415 COLL VENOUS BLD VENIPUNCTURE: CPT | Performed by: INTERNAL MEDICINE

## 2022-05-20 PROCEDURE — 80048 BASIC METABOLIC PNL TOTAL CA: CPT | Performed by: INTERNAL MEDICINE

## 2022-05-20 RX ORDER — CEPHALEXIN 500 MG/1
500 CAPSULE ORAL 3 TIMES DAILY
Qty: 21 CAPSULE | Refills: 0 | Status: SHIPPED | OUTPATIENT
Start: 2022-05-20 | End: 2022-11-28

## 2022-05-20 RX ORDER — METFORMIN HCL 500 MG
500 TABLET, EXTENDED RELEASE 24 HR ORAL 2 TIMES DAILY WITH MEALS
Qty: 180 TABLET | Refills: 3 | Status: SHIPPED | OUTPATIENT
Start: 2022-05-20 | End: 2022-10-04

## 2022-05-20 RX ORDER — MUPIROCIN 20 MG/G
OINTMENT TOPICAL 3 TIMES DAILY
Qty: 30 G | Refills: 3 | Status: SHIPPED | OUTPATIENT
Start: 2022-05-20 | End: 2024-05-22

## 2022-05-20 RX ORDER — LISINOPRIL 5 MG/1
5 TABLET ORAL DAILY
Qty: 90 TABLET | Refills: 3 | Status: SHIPPED | OUTPATIENT
Start: 2022-05-20 | End: 2022-11-28

## 2022-05-20 RX ORDER — CEPHALEXIN 500 MG/1
500 CAPSULE ORAL 3 TIMES DAILY
Qty: 21 CAPSULE | Refills: 0 | Status: SHIPPED | OUTPATIENT
Start: 2022-05-20 | End: 2022-05-20

## 2022-05-20 ASSESSMENT — PAIN SCALES - GENERAL: PAINLEVEL: NO PAIN (0)

## 2022-05-20 NOTE — PROGRESS NOTES
"      Subjective   Lima is a 51 year old who presents for the following health issues     HPI     Obesity   Type 2 diabetes mellitus   Lima Bueno returnss to the clinic today for follow up of her recent labs.  Her hemoglobin A1c was quite elevated.  She notes that her diet has not been very nutritious over the past year and she intends to start eating better and is looking forward to meeting with bariatric medicine.  Given the elevated blood glucose she would be willing to consider starting a new medication for diabetes mellitus   Hypertension   Blood pressure is still a bit elevated.  Tolerating hydrochlorothiazide 12.5 mg daily     Review of Systems   Constitutional, HEENT, cardiovascular, pulmonary, GI, , musculoskeletal, neuro, skin+ folliculitis, endocrine and psych systems are negative, except as otherwise noted.      Objective    BP (!) 142/93 (BP Location: Left arm, Patient Position: Sitting, Cuff Size: Adult Large)   Pulse 88   Temp 97.1  F (36.2  C) (Temporal)   Ht 1.651 m (5' 5\")   Wt 107.5 kg (237 lb)   SpO2 96%   BMI 39.44 kg/m    Body mass index is 39.44 kg/m .  Physical Exam   GENERAL: healthy, alert and no distress,   ABDOMEN: soft, nontender,  MS: no gross musculoskeletal defects noted, no edema  : visible erythrematous raised area on right pubic area with tenderness  SKIN: as above   NEURO: Normal strength and tone,    PSYCH: mentation appears normal, affect normal/bright    Patient Instructions   (I10) Essential hypertension, benign  (primary encounter diagnosis)  Comment: blood pressure is still a bit elevated,  Recommend daily use of lisinopril 5 mg daily and plan to follow up in 1 month to recheck blood pressure and labs   Plan: lisinopril (ZESTRIL) 5 MG tablet, Basic         metabolic panel  (Ca, Cl, CO2, Creat, Gluc, K,         Na, BUN)            (E66.01) Morbid obesity (H)  Comment: Weight gain noted.  Will follow up in weight management clinic and discuss options " for medications   Plan:     (E11.9) Type 2 diabetes mellitus without complication, without long-term current use of insulin (H)  Comment: Discussed options for management.  Referal to diabetic education and we will start metformin 500 mg twice per day   Plan: metFORMIN (GLUCOPHAGE XR) 500 MG 24 hr tablet            (I26.99) Other pulmonary embolism without acute cor pulmonale, unspecified chronicity (H)  Comment: continue blood thinners as per previous  Plan:     (D68.59) Protein S deficiency (H)  Comment:   Plan:      Folliculitis  Comment; We will treat empiricallyw keflex 500 mg three times daily x 7 days and trial of mupirocin ointment          30 minutes spent with patient.  Over 50% of time counseling

## 2022-05-20 NOTE — PATIENT INSTRUCTIONS
(I10) Essential hypertension, benign  (primary encounter diagnosis)  Comment: blood pressure is still a bit elevated,  Recommend daily use of lisinopril 5 mg daily and plan to follow up in 1 month to recheck blood pressure and labs   Plan: lisinopril (ZESTRIL) 5 MG tablet, Basic         metabolic panel  (Ca, Cl, CO2, Creat, Gluc, K,         Na, BUN)            (E66.01) Morbid obesity (H)  Comment: Weight gain noted.  Will follow up in weight management clinic and discuss options for medications   Plan:     (E11.9) Type 2 diabetes mellitus without complication, without long-term current use of insulin (H)  Comment: Discussed options for management.  Referal to diabetic education and we will start metformin 500 mg twice per day   Plan: metFORMIN (GLUCOPHAGE XR) 500 MG 24 hr tablet            (I26.99) Other pulmonary embolism without acute cor pulmonale, unspecified chronicity (H)  Comment: continue blood thinners as per previous  Plan:     (D68.59) Protein S deficiency (H)  Comment:   Plan:      Folliculitis  Comment; We will treat empiricallyw keflex 500 mg three times daily x 7 days and trial of mupirocin ointment

## 2022-05-20 NOTE — TELEPHONE ENCOUNTER
Patient was prescribed a glucometer, test strips, and lancets.  Directions are to test as directed, but insurance needs more specific directions.    How many times per day should patient be testing their blood glucose?    Let us know by calling the pharmacy or responding to this encounter and then we can fill the prescriptions for the patient.    Thank you,    Lam Starks, PharmD  [facility]  [phone]

## 2022-05-21 LAB
ANION GAP SERPL CALCULATED.3IONS-SCNC: 1 MMOL/L (ref 3–14)
BUN SERPL-MCNC: 16 MG/DL (ref 7–30)
CALCIUM SERPL-MCNC: 9.1 MG/DL (ref 8.5–10.1)
CHLORIDE BLD-SCNC: 107 MMOL/L (ref 94–109)
CO2 SERPL-SCNC: 32 MMOL/L (ref 20–32)
CREAT SERPL-MCNC: 0.97 MG/DL (ref 0.52–1.04)
GFR SERPL CREATININE-BSD FRML MDRD: 70 ML/MIN/1.73M2
GLUCOSE BLD-MCNC: 222 MG/DL (ref 70–99)
POTASSIUM BLD-SCNC: 3.7 MMOL/L (ref 3.4–5.3)
SODIUM SERPL-SCNC: 140 MMOL/L (ref 133–144)

## 2022-05-22 NOTE — RESULT ENCOUNTER NOTE
Jose Amato,    I have had the opportunity to review your recent results and an interpretation is as follows:  Your follow up basic metabolic panel shows stable renal function and electrolytes     Sincerely,  Oscar Baltazar MD

## 2022-05-23 ENCOUNTER — TELEPHONE (OUTPATIENT)
Dept: FAMILY MEDICINE | Facility: CLINIC | Age: 52
End: 2022-05-23
Payer: COMMERCIAL

## 2022-05-23 NOTE — TELEPHONE ENCOUNTER
Diabetes Education Scheduling Outreach #1:    Call to patient to schedule. Left message with phone number to call to schedule.    Also sent [employer] message for second attempt. Requested patient to call to schedule.    Mary Ellen Hutch[facility] [employer]  Diabetes and Nutrition Scheduling

## 2022-06-29 NOTE — TELEPHONE ENCOUNTER
Should pt still be on this medication?    Last Written Prescription Date:  11/19/2018, 30 tabs, discontinued on 3/5/2019 by Oncology    Last office visit: 1/8/2019 with prescribing provider:  Db   Future Office Visit:      Medication was discontinued.     Requested Prescriptions   Pending Prescriptions Disp Refills     traMADol (ULTRAM) 50 MG tablet [Pharmacy Med Name: TRAMADOL 50MG TAB] 28 tablet 4     Sig: TAKE 1 TABLET BY MOUTH EVERY 6 HOURS AS NEEDED FOR SEVERE PAIN       There is no refill protocol information for this order           Quality 110: Preventive Care And Screening: Influenza Immunization: Influenza Immunization Administered during Influenza season Quality 111:Pneumonia Vaccination Status For Older Adults: Pneumococcal Vaccination not Administered or Previously Received, Reason not Otherwise Specified Quality 431: Preventive Care And Screening: Unhealthy Alcohol Use - Screening: Patient screened for unhealthy alcohol use using a single question and scores less than 2 times per year Quality 47: Advance Care Plan: Advance Care Planning discussed and documented; advance care plan or surrogate decision maker documented in the medical record. Quality 154 Part B: Falls: Risk Screening (Should Be Reported With Measure 155.): Patient screened for future fall risk; documentation of no falls in the past year or only one fall without injury in the past year Quality 137: Melanoma: Continuity Of Care - Recall System: Recall system not utilized, reason not otherwise specified Quality 154 Part A: Falls: Risk Assessment (Should Be Reported With Measure 155.): Falls risk assessment completed and documented in the past 12 months. Quality 226: Preventive Care And Screening: Tobacco Use: Screening And Cessation Intervention: Patient screened for tobacco use and is an ex/non-smoker Detail Level: Detailed Additional Notes: Due to COVID-19, at today’s office visit we utilized face masks, wipes, and other additional supplies,materials, and clinical staff time over and above those usually included in an office visit, which was performed during the Coronavirus-related Public Health Emergency.

## 2022-07-15 ENCOUNTER — OFFICE VISIT (OUTPATIENT)
Dept: FAMILY MEDICINE | Facility: CLINIC | Age: 52
End: 2022-07-15
Payer: COMMERCIAL

## 2022-07-15 VITALS
SYSTOLIC BLOOD PRESSURE: 93 MMHG | HEART RATE: 109 BPM | HEIGHT: 65 IN | DIASTOLIC BLOOD PRESSURE: 63 MMHG | OXYGEN SATURATION: 95 % | BODY MASS INDEX: 36.49 KG/M2 | TEMPERATURE: 97.1 F | RESPIRATION RATE: 16 BRPM | WEIGHT: 219 LBS

## 2022-07-15 DIAGNOSIS — R10.10 UPPER ABDOMINAL PAIN: Primary | ICD-10-CM

## 2022-07-15 DIAGNOSIS — K86.2 PANCREATIC CYST: ICD-10-CM

## 2022-07-15 DIAGNOSIS — M54.9 ACUTE BACK PAIN, UNSPECIFIED BACK LOCATION, UNSPECIFIED BACK PAIN LATERALITY: ICD-10-CM

## 2022-07-15 LAB
ERYTHROCYTE [DISTWIDTH] IN BLOOD BY AUTOMATED COUNT: 14.3 % (ref 10–15)
HCT VFR BLD AUTO: 47.6 % (ref 35–47)
HGB BLD-MCNC: 15.6 G/DL (ref 11.7–15.7)
MCH RBC QN AUTO: 27.2 PG (ref 26.5–33)
MCHC RBC AUTO-ENTMCNC: 32.8 G/DL (ref 31.5–36.5)
MCV RBC AUTO: 83 FL (ref 78–100)
PLATELET # BLD AUTO: 285 10E3/UL (ref 150–450)
RBC # BLD AUTO: 5.73 10E6/UL (ref 3.8–5.2)
WBC # BLD AUTO: 8.5 10E3/UL (ref 4–11)

## 2022-07-15 PROCEDURE — 36415 COLL VENOUS BLD VENIPUNCTURE: CPT | Performed by: NURSE PRACTITIONER

## 2022-07-15 PROCEDURE — 80053 COMPREHEN METABOLIC PANEL: CPT | Performed by: NURSE PRACTITIONER

## 2022-07-15 PROCEDURE — 99214 OFFICE O/P EST MOD 30 MIN: CPT | Performed by: NURSE PRACTITIONER

## 2022-07-15 PROCEDURE — 85027 COMPLETE CBC AUTOMATED: CPT | Performed by: NURSE PRACTITIONER

## 2022-07-15 RX ORDER — FAMOTIDINE 20 MG/1
20 TABLET, FILM COATED ORAL DAILY
Qty: 30 TABLET | Refills: 0 | Status: SHIPPED | OUTPATIENT
Start: 2022-07-15 | End: 2022-11-28

## 2022-07-15 NOTE — PROGRESS NOTES
"  Assessment & Plan   Problem List Items Addressed This Visit    None     Visit Diagnoses     Upper abdominal pain    -  Primary    Relevant Medications    famotidine (PEPCID) 20 MG tablet    Other Relevant Orders    Comprehensive metabolic panel (BMP + Alb, Alk Phos, ALT, AST, Total. Bili, TP)    CBC with platelets (Completed)    US Abdomen Complete    Acute back pain, unspecified back location, unspecified back pain laterality        Relevant Orders    Comprehensive metabolic panel (BMP + Alb, Alk Phos, ALT, AST, Total. Bili, TP)    CBC with platelets (Completed)    US Abdomen Complete           New abdominal pain concerning for biliary etiology.  We will complete labs and ultrasound today.  In the interim we will trial Pepcid daily.  She is slightly tachycardic.  We discussed is important for her to push fluids which she says she could be better at.  If symptoms become worse she should go to the ER.  Of note her son recently passed away which clearly could be playing a role in symptoms.    MISTY Calloway CNP  [facility] STANISLAW Amato is a 51 year old, presenting for the following health issues:  No chief complaint on file.      HPI     Upper abdominal and back pain ongoing since monday  She does not feel like eating much  No fevers   Some nausea without vomiting   Diarrhea.   No heartburn   Her son passed away recently at the end of May.  He was waiting for a replacement of his pacemaker and passed away before that happened.  This has obviously caused a lot of grief    Review of Systems   Detailed as above         Objective    BP 93/63 (BP Location: Right arm, Patient Position: Sitting, Cuff Size: Adult Regular)   Pulse 109   Temp 97.1  F (36.2  C)   Resp 16   Ht 1.651 m (5' 5\")   Wt 99.3 kg (219 lb)   SpO2 95%   BMI 36.44 kg/m    There is no height or weight on file to calculate BMI.  Physical Exam  Constitutional:       Appearance: Normal appearance. "   Cardiovascular:      Rate and Rhythm: Regular rhythm. Tachycardia present.      Heart sounds: Normal heart sounds.   Pulmonary:      Effort: Pulmonary effort is normal.   Abdominal:      General: Bowel sounds are normal. There is no distension.      Palpations: Abdomen is soft.      Tenderness: There is abdominal tenderness (upper abd R>L).   Musculoskeletal:      Comments: Mild tenderness mid back right greater than left   Neurological:      Mental Status: She is alert.   Psychiatric:         Mood and Affect: Mood normal.                    .  ..

## 2022-07-15 NOTE — LETTER
[address], [address]  [facility] [address]  Phone: [phone]    July 15, 2022        L[place]  [id number] [facility] [address]          To whom it may concern:    RE: Lima Bueno    Patient was seen and treated today at our clinic. Please excuse her Monday 7/18/22.     Please contact me for questions or concerns.      Sincerely,        MISTY Calloway CNP

## 2022-07-15 NOTE — PATIENT INSTRUCTIONS
Please call [facility] to schedule your test: [phone]     Fasting for 8 hours prior to ultrasound     Pepcid daily for 2 weeks

## 2022-07-16 LAB
ALBUMIN SERPL-MCNC: 3.8 G/DL (ref 3.4–5)
ALP SERPL-CCNC: 117 U/L (ref 40–150)
ALT SERPL W P-5'-P-CCNC: 29 U/L (ref 0–50)
ANION GAP SERPL CALCULATED.3IONS-SCNC: 5 MMOL/L (ref 3–14)
AST SERPL W P-5'-P-CCNC: 12 U/L (ref 0–45)
BILIRUB SERPL-MCNC: 0.6 MG/DL (ref 0.2–1.3)
BUN SERPL-MCNC: 14 MG/DL (ref 7–30)
CALCIUM SERPL-MCNC: 9.6 MG/DL (ref 8.5–10.1)
CHLORIDE BLD-SCNC: 103 MMOL/L (ref 94–109)
CO2 SERPL-SCNC: 28 MMOL/L (ref 20–32)
CREAT SERPL-MCNC: 1.33 MG/DL (ref 0.52–1.04)
GFR SERPL CREATININE-BSD FRML MDRD: 48 ML/MIN/1.73M2
GLUCOSE BLD-MCNC: 112 MG/DL (ref 70–99)
POTASSIUM BLD-SCNC: 3.7 MMOL/L (ref 3.4–5.3)
PROT SERPL-MCNC: 8 G/DL (ref 6.8–8.8)
SODIUM SERPL-SCNC: 136 MMOL/L (ref 133–144)

## 2022-07-18 NOTE — RESULT ENCOUNTER NOTE
Lima, it looks like you're a little dehydrated. Make sure you are pushing fluids. We'll wait to see what the ultrasound shows.   Liz

## 2022-07-27 ENCOUNTER — ANCILLARY PROCEDURE (OUTPATIENT)
Dept: ULTRASOUND IMAGING | Facility: CLINIC | Age: 52
End: 2022-07-27
Attending: NURSE PRACTITIONER
Payer: COMMERCIAL

## 2022-07-27 ENCOUNTER — TELEPHONE (OUTPATIENT)
Dept: SURGERY | Facility: CLINIC | Age: 52
End: 2022-07-27

## 2022-07-27 DIAGNOSIS — M54.9 ACUTE BACK PAIN, UNSPECIFIED BACK LOCATION, UNSPECIFIED BACK PAIN LATERALITY: ICD-10-CM

## 2022-07-27 DIAGNOSIS — R10.10 UPPER ABDOMINAL PAIN: ICD-10-CM

## 2022-07-27 PROCEDURE — 76700 US EXAM ABDOM COMPLETE: CPT

## 2022-07-27 NOTE — RESULT ENCOUNTER NOTE
Jose Amato,   Thanks for getting the ultrasound done. As you can see the gallbladder looks great. Your liver shows fatty liver which could be causing your symptoms. A healthy diet can help reverse this and help with your symptoms.   Also, there is a cyst on the pancreas which I suspect is benign but we should look at it just to be sure. I will order an MRI so Please call [facility] to schedule your test: [phone]   Let me know if you have questions  Liz

## 2022-07-27 NOTE — TELEPHONE ENCOUNTER
Left voicemail message for patient reminding them to complete the new patient questionnaire before their appointment.    Ernestina COREY MA

## 2022-07-28 ENCOUNTER — OFFICE VISIT (OUTPATIENT)
Dept: SURGERY | Facility: CLINIC | Age: 52
End: 2022-07-28
Payer: COMMERCIAL

## 2022-07-28 VITALS
HEART RATE: 84 BPM | BODY MASS INDEX: 35.45 KG/M2 | SYSTOLIC BLOOD PRESSURE: 111 MMHG | WEIGHT: 220.6 LBS | OXYGEN SATURATION: 99 % | HEIGHT: 66 IN | DIASTOLIC BLOOD PRESSURE: 77 MMHG

## 2022-07-28 VITALS — HEIGHT: 66 IN | BODY MASS INDEX: 35.45 KG/M2 | WEIGHT: 220.6 LBS

## 2022-07-28 DIAGNOSIS — E66.01 MORBID OBESITY (H): Primary | ICD-10-CM

## 2022-07-28 DIAGNOSIS — F17.200 TOBACCO USE DISORDER: ICD-10-CM

## 2022-07-28 DIAGNOSIS — E66.01 MORBID OBESITY (H): ICD-10-CM

## 2022-07-28 DIAGNOSIS — E11.9 TYPE 2 DIABETES MELLITUS WITHOUT COMPLICATION, WITHOUT LONG-TERM CURRENT USE OF INSULIN (H): ICD-10-CM

## 2022-07-28 PROBLEM — I10 ESSENTIAL HYPERTENSION, BENIGN: Status: ACTIVE | Noted: 2022-07-28

## 2022-07-28 PROCEDURE — 99215 OFFICE O/P EST HI 40 MIN: CPT | Performed by: PHYSICIAN ASSISTANT

## 2022-07-28 PROCEDURE — 97802 MEDICAL NUTRITION INDIV IN: CPT | Performed by: DIETITIAN, REGISTERED

## 2022-07-28 NOTE — PROGRESS NOTES
"New Medical Weight Management Consult    PATIENT:  Lima Bueno   MRN:         [id number]   :         1970  MABEL:         2022      Dear Osacr Baltazar MD, MD,    I had the pleasure of seeing your patient, Lima Bueno. Full intake/assessment was done to determine barriers to weight loss success and develop a treatment plan. Lima Bueno is a 51 year old female interested in treatment of medical problems associated with excess weight. She has a height of 5' 5.5\", a weight of 220 lbs 9.6 oz, and the calculated Body mass index is 36.15 kg/m .    Assessment & Plan   Problem List Items Addressed This Visit     Tobacco use disorder     Smoking cessation counseling addressed.  Pt not ready to quit.  Will follow up.            Type 2 diabetes mellitus without complication, without long-term current use of insulin (H)     Will start Ozempic. Pt will continue 500 mg of metformin.            Relevant Medications    semaglutide (OZEMPIC) 2 MG/1.5ML SOPN pen    Morbid obesity (H) - Primary     MWL Initial MKD 220lb BMI 36 Ozempic Start, Bariatric surgery Candidate    We discussed healthy habits to assist with weight loss. We discussed medication that may assist with weight loss. Ozempic was prescribed. Risks/ benefits and possible side effects were discussed and questions were answered. Written information was given.                Relevant Medications    semaglutide (OZEMPIC) 2 MG/1.5ML SOPN pen           PROGRAM OVERVIEW  Reviewed options at [facility] including provider visits, dietician, 24 week healthy lifestyle program, health coaching, food supplements, Get Moving program, and psychological support.  All questions about weight loss program were answered.    SURGICAL WEIGHT LOSS   Option presented given pt BMI and current comorbid conditions. She is interested but would like to look at other options first.      MEDICATIONS:  We discussed healthy habits " "to assist with weight loss. We reviewed medications associated with weight gain. We discussed the role of pharmacological agents in the treatment of obesity and the \"off-label\" use of medications in this practice. We reviewed medication that may assist with weight loss. Indications, contraindications, risks/benefits, and potential side effects were discussed.   Ozempic was prescribed. Discussed that medications must always be used together with lifestyle changes such as improvements in diet choices, portion control and establishing and maintaining a regular exercise program.       CURRENT GOALS:   Start having breakfast before work daily.   Have something with protein for each meal even breakfast.    FOLLOW-UP:    Please call [phone] to schedule your next visit 8-10 weeks.       65 minutes spent on the date of the encounter doing chart review, history and exam, review test results, counseling, developing plan of care, documentation, and further activities as noted above.     Weight Related Co-morbidities:          I have the following health issues associated with obesity: Type II diabetes, HTN, Hyperlipidemia, Asthma   I have the following symptoms associated with obesity:      Was referred by her PCP whom recommended surgery.  Pt not ready for this yet.  Pt is grieving. She just lost her son.  She is down 17 lbs since May when this happened. Is not ready for this level of commitment.  Wants to try other ways of weight loss first.       Weight History    Previously had weight loss surgery: No   Age pt became overweight:   In 30's   The following factors contributed to weight gain:    unknown   I have tried the following methods to lose weight:   Grief, diets   My lowest weight since age 17 was: 135 lbs   My highest weight since age 17 was: 236  lbs   The most weight I have ever lost was: (lbs) 60 lbs   How has your weight changed over the last year?  100 lbs in the 10 years     Generally doesn't think about " food until 1 PM. She takes her medication after dinner.  After taking her metformin she get hungry and get diarrhea.      Diet Recall     Wake Up: 6:30-7 am When new job starts.    Breakfast -   Lunch 1:00 pm Whatever available   Supper; 6-8 PM Dinner veggie, protein, and a starch   Bedtime:   Snack between meals:   Snack in evening:       Sometimes an ice cream, 6-8 cookies         Typical snacks: Soda- Pepsi,  Juice- simply lemonade, late night expresso   Caloric beverages per day. What type:    Water consumed daily:    Low dana/diet drinks:   Alcohol:   Foods you crave:       Minimal 1 every 2 years.    Peach Cobbler, cake        Sleep History    How many hours do you sleep at night? 11 PM on weeknights, 2 AM on weekends   Do you think you have sleep apnea?  No   Do you snore so loudly some one can hear you through a closed door? No   Has anyone seen or heard you stop breathing during your sleep?  No   Do you often feel tired, fatigued, or sleepy during the day? No         Eating Habits (Yes/No) (Never, Daily, Several Days, Weekly,Monthly)   Healthy Eater No      Generally, eat a lot of Simple Carbs/processed boxed foods No   Generally, eat a lot of fatty foods No   Eat fast food  Monthly      Eat Take out/sit-down restaurant. Monthly      Eat most meals in front of screens No      Skip meals Yes   Grazes all day No      Snacks between meals. No, liquid calories      Eat most food at end of day. Yes      Eat in middle of night  No-       Eat  to prevent or correct low blood sugar. No      Eat to prevent GERD No      Worry about not having enough food to eat. No      Constant Dieter No      I emotionally eat. (stressed, depressed, anxious, bored) Yes      I feel hungry all the time-even if I just have eaten. No      Feeling full is important to me. No      I finish all the food on my plate-even if I am already full. No      I eat/snack without noticing that I am eating. No      I eat when I am preparing the meal.  No      I have trouble not eating junk if they are around the house. Yes      I think about food all day. No      Who does the grocery shopping? Pt   Who does the cooking? Pt       Eating Disorder Screen    I binge eat No          Activity/Exercise History    What do you do for exercise? Walks farther away,  Walk around [employer]   For how long to your exercise for?    What keeps you from being more active? Grief     Son  due to Sepsis at age 33.      Past Medical History:   Diagnosis Date     ADD (attention deficit disorder with hyperactivity)      Asthma      Breast mass 2012    Patient yet to have biopsy as of 2012       DVT (deep venous thrombosis) (H) 10/12    right leg     LSIL (low grade squamous intraepithelial lesion) on Pap smear 14    Neg high risk HPV     Pulmonary embolism (H)     Saw Dr [place], protein S deficiency          Work/Social History Reviewed With Patient    My employment status is: [employer].    My job is: - start next week   How much of your job is spent on the computer or phone?    How many hours do you spend commuting to work daily?  Hybrid schedule   What is your marital status?    If in a relationship, is your significant other overweight?    Do you have children? 5 children- 2    If you have children, are they overweight?    Who do you live with?  2 living with her, also has grandchildren   Are they supportive of your health goals?    Who does the food shopping?       Social History     Tobacco Use     Smoking status: Current Every Day Smoker     Packs/day: 1.00     Years: 30.00     Pack years: 30.00     Types: Cigarettes     Smokeless tobacco: Never Used   Substance Use Topics     Alcohol use: No     Alcohol/week: 0.0 standard drinks     Drug use: No    Not ready to quit.  She understands her emotional connection with smoking.  She is ot concerned with stopping this. He stopped cold turkey twice in her life.     MEDICATIONS:   Current  Outpatient Medications   Medication     albuterol (PROAIR HFA) 108 (90 Base) MCG/ACT inhaler     albuterol (PROVENTIL) (2.5 MG/3ML) 0.083% neb solution     atorvastatin (LIPITOR) 20 MG tablet     benzonatate (TESSALON) 200 MG capsule     blood glucose (NO BRAND SPECIFIED) test strip     blood glucose monitoring (NO BRAND SPECIFIED) meter device kit     famotidine (PEPCID) 20 MG tablet     fluticasone-salmeterol (ADVAIR HFA) 230-21 MCG/ACT inhaler     guaiFENesin-dextromethorphan (ROBITUSSIN DM) 100-10 MG/5ML syrup     hydrochlorothiazide (HYDRODIURIL) 12.5 MG tablet     ketotifen (ZADITOR) 0.025 % ophthalmic solution     Lancets 30G MISC     lisinopril (ZESTRIL) 5 MG tablet     metFORMIN (GLUCOPHAGE XR) 500 MG 24 hr tablet     mupirocin (BACTROBAN) 2 % external ointment     rivaroxaban ANTICOAGULANT (XARELTO) 20 MG TABS tablet     semaglutide (OZEMPIC) 2 MG/1.5ML SOPN pen     traMADol (ULTRAM) 50 MG tablet     traZODone (DESYREL) 150 MG tablet     cephALEXin (KEFLEX) 500 MG capsule     No current facility-administered medications for this visit.        ALLERGIES:      Allergies   Allergen Reactions     Phosphoric Acid Hives     Terbutaline Hives     Trazodone      Other reaction(s): Other - Describe In Comment Field  Facial nerve pain  Other reaction(s): Other - Describe In Comment Field  Facial nerve pain     Wellbutrin [Bupropion]      Smoking cessation -- told never to take again ,        ROS:    HEENT  H/O glaucoma:  no  Cardiovascular    Gastrointestinal  GERD:   no  Constipation:   no  Gastroparesis:  no  Liver Dz:   no  H/O Pancreatitis:  No, small lesion found on US recently MRI warranted.    H/O Gallbladder Dz: no  Psychiatric    Depression:  Yes, grieving son's death.      H/O ETOH/Drug Use no  H/O eating disorder: no  Endocrine  PMH/FMH of MTC or MEN2:  no      Kidney disease:  no    Labs/Records Reviewed:  No results found for: A1C, VITDT, TSH, NA, POTASSIUM, CHLORIDE, CO2, ANIONGAP, GLC, BUN, CR,  "GFRESTIMATED, DANIELLE, BILITOTAL, ALKPHOS, ALT, AST, CHOL, HDL, LDL, TRIG, WBC, HGB, HCT, MCV, PLT      PHYSICAL EXAM:  Ht 5' 9\" (1.753 m)   Wt 195 lb (88.5 kg)   BMI 28.80 kg/m    GENERAL: Healthy, alert and no distress  EYES: Eyes grossly normal to inspection.  No discharge or erythema, or obvious scleral/conjunctival abnormalities.  RESP: No audible wheeze, cough, or visible cyanosis.  No visible retractions or increased work of breathing.    SKIN: Visible skin clear. No significant rash, abnormal pigmentation or lesions.  NEURO: Cranial nerves grossly intact.  Mentation and speech appropriate for age.  PSYCH: Mentation appears normal, affect normal/bright, judgement and insight intact, normal speech and appearance well-groomed.    COUNSELING:   Reviewed obesity as a chronic disease and comprehensive management stratagies.      We discussed Bariatric Basics including:  -eating 3 meals daily  -eating protein first  -eating slowly, chewing food well  -avoiding/limiting calorie containing beverages  -limiting carbohydrates and changing to whole grains  -limiting restaurant or cafeteria eating to twice a week or less    We discussed the importance of restorative sleep and stress management in maintaining a healthy weight.  We discussed insulin resistance and glycemic index as it relates to appetite and weight control.   We discussed the importance of physical activity including cardiovascular and strength training in maintaining a healthier weight and explored viable options.  Patient education of above written in AVS.      Sincerely,      Krystal Calix PA-C    "

## 2022-07-28 NOTE — Clinical Note
Thank you for referring this patient to our bariatric clinic for an initial evaluation.  I look forward to working with you during this process.  Here is a copy of my visit.    Sincerely,   Krystal Calix PA-C

## 2022-07-28 NOTE — PROGRESS NOTES
"Pt seen for Ozempic teach.  Given demo on how to prep pen and provide injection.  Written info given.  Patient verbalized understanding and is agreeable to plan.    Pt requested the prescription be called to  [facility] instead of sent to [facility].  This was done and spoke with [facility] pharmacist who stated would be ready in about 20\".  Callie Casillas, MS, RD, RN    "

## 2022-07-28 NOTE — PATIENT INSTRUCTIONS
"Nice to talk with you today. Thank you for allowing me the privilege of caring for you. We hope we provided you with the excellent service you deserve.     To ensure the quality of our services you may receive a patient satisfaction survey from an independent monitoring company.  The greatest compliment you can give is \"Likely to Recommend\"    Below is our plan we discussed.-  ANDI Rice      Plan:  Start Ozempic 0.25 mg injection once weekly.    Goals:  Start having breakfast before work daily.   Have something with protein for each meal even breakfast.    FOLLOW-UP:    Please call [phone] to schedule your next visit 8-10 weeks.     MEDICATION STARTED AT THIS APPOINTMENT    We are starting a GLP-1 (Glucagon-like Peptide-1) medication. Examples of this medication include Byetta, Bydureon, or Victoza, etc. The medication chosen will depend on insurance coverage, and can be changed to the medication that is covered under your plan. These medications work the same, in that they can help you lose weight and control your blood sugar. One of the ways it works is by slowing down the rate that food leaves your stomach. You feel lopez and will eat less.  It also helps regulate hormones that can help improve your blood sugars.    Types of GLP-1 medications include:    Ozempic: Starting dose is 0.25 mg once weekly for 4 weeks, and then increase to 0.5 mg weekly. If after 4 weeks of being on 0.5 mg weekly dosing and still wanting additional weight loss and/or blood sugar benefits, check with your doctor to see if they want to increase the dose to 1 mg weekly. Pen needles come in the Ozempic pen box.    Side effects of GLP- Medications include: The most common side effects are all GI related and consist of: nausea, constipation, diarrhea, burping, or gassiness. Patients are advised to eat slowly and less, and nausea typically passes if people can stick it out.     The risk of pancreatitis (inflammation of the pancreas) " has been associated with this type of medication, but is very rare.  If you have had pancreatitis in the past, this medication may not be for you. Please let us know about any past history of pancreas problems.      Symptoms of pancreatitis include: Pain in your upper stomach area which  may travel to your back and be worse after eating. Your stomach area may be tender to the touch.  You may have vomiting or nausea and/or have a fever. If you should develop any of these symptoms, stop the medication and contact your primary care doctor. They will do a blood test to check for pancreatitis.         There is a small chance you may have some low blood sugar after taking the medication.   The signs of low blood sugar are:  Weakness  Shaky   Hungry  Sweating  Confusion      See below for ways to treat low blood sugar without adding in lots of extra calories.      Treating Low Blood Sugar    If you have symptoms of low blood sugar (sweating, shaking, dizzy, confused) eat 15 grams of carbs and wait 15 minutes:    Glucose Tabs are best for sugars under 70 -  Dex4 or BD Glucose tablets are good, you will need to take 3-4 of these to equal 15 grams.     One small box of raisins  4 oz fruit juice box or   cup fruit juice  1 small apple  1 small banana    cup canned fruit in water    English muffin or a slice of bread with jelly   1 low fat frozen waffle with sugar-free syrup    cup cottage cheese with   cup frozen or fresh blueberries  1 cup skim or low-fat milk    cup whole grain cereal  4-6 crackers such as Triscuits      This medication is usually covered by insurance for patients with a diagnosis of Type 2 Diabetes. Depending on insurance coverage, the medication may be changed to a different formulary, but they all work in the same way. Sometimes a prior authorization is required, which may take up to 1-2 weeks for an insurance company to make a decision if they will cover the medication. Please be patient, you will be  notified either way.     Contact the nurse via [employer] or call [phone] if you have any questions or concerns. (Do not stop taking it if you don't think it's working. For some people it works without them knowing it.)     In order to get refills of this or any medication we prescribe you must be seen in the medical weight mgmt clinic every 2-4 months.        10 Healthy Habits  Eat Better ? Move More ? Live Well   Eat breakfast daily.     Try to eat within the first hour after you wake up. This helps you control your appetite during the day, and you are less likely to snack in the evening.    80% of people who skip meals are overweight or obese.    2.   Include protein with every meal, even breakfast.    Protein will suppress your appetite longer than other types of food. This helps you  feel more satisfied with the amount of food you have eaten.    3.  Fill up on Fiber   Fiber comes from plants--fruits, veggies, whole grains, nuts/seeds and beans   Fiber is low in calories, high in phytonutrients and helps you stay full longer    4.  Eat S-L-O-W-L-Y   Take 20-30 minutes to eat each meal by taking small bites, chewing foods to applesauce consistency or 20-30 times before you swallow   Eating foods too fast can delay satiety/fullness signals and increase overeating     5.  Avoid Mindless Eating   Be present when you eat--take note of the smell, taste and quality of your food   Make a list of alternative activities you could do to prevent boredom/stress eating  Go for a walk, call a friend, chew gum, paint your nails    6.  Keep a Journal   Writing down what you eat, how you feel and when you are active helps you identify new changes to work on from week to week         Look for ways to cut 100 calories from your current diet 2-3 times per day    7.  Drink 64 ounces of Non Calorie drinks between meals   Water   Zero calorie Propel , Vitamin Water , Crystal Light    Avoid regular soda pop    8.  Stop thinking about  food choices as a  diet.    Instead, think of them as healthy, lifelong habits--an effort toward a new way of eating.   Weigh yourself once a week instead of everyday.     9.  Get enough sleep--at least 7 to 8 hours each night.    Lack of sleep is one reason that fat builds up around the waist.    10.  Exercise five to six times per week    Do a combination aerobic exercise (fast walking, biking, swimming) and strength training (Dumbbells, pushups, weight machines, resistance bands).   Start at 10 minutes per day and slowly work your way up to 30 minutes or more.  Taking the stairs instead of the elevator, park at the far end of the parking lot, pace while on the phone, take the dog for a walk.     [email or web address]

## 2022-07-28 NOTE — ASSESSMENT & PLAN NOTE
MWL Initial MKD 220lb BMI 36 Ozempic Start, Bariatric surgery Candidate    We discussed healthy habits to assist with weight loss. We discussed medication that may assist with weight loss. Ozempic was prescribed. Risks/ benefits and possible side effects were discussed and questions were answered. Written information was given.

## 2022-08-18 ENCOUNTER — TELEPHONE (OUTPATIENT)
Dept: FAMILY MEDICINE | Facility: CLINIC | Age: 52
End: 2022-08-18

## 2022-08-18 NOTE — TELEPHONE ENCOUNTER
Patient Quality Outreach    Patient is due for the following:   Diabetes -  A1C and Eye Exam  Asthma  -  ACT needed  Breast Cancer Screening - Mammogram  Cervical Cancer Screening - PAP Needed    Next Steps:   Needs to schedule an appointment    Type of outreach:    Sent [employer] message.      Questions for provider review:         Philomena Stoll, CMA

## 2022-08-26 NOTE — PROGRESS NOTES
"Video-Visit Details    Type of service:  Video Visit    Video Start Time (time video started): 2:59    Video End Time (time video stopped): 3:27    Originating Location (pt. Location): Home    Distant Location (provider location):  [facility] [place]     Mode of Communication:  Video Conference via [facility] WEIGHT LOSS FOLLOW UP      DIAGNOSIS:  Class II Obesity- at lat RD visit (pt does not have a scale)    NUTRITION HISTORY:    Breakfast:occasionally skips  Vanilla or strawberry Greek yogurt, fruit, granola before 11 am     Lunch:  [employer] one time per week or bring sandwich, small bag chips, fruit @ 1-2 pm     Dinner: occasionally skips meatloaf, mac and cheese, cooked veggie or Ramen noodles @ 6-7:30 pm     Snacks:  Occasionally fruit-radhames, banana or cherries     Beverage Choices:  32-64 oz water, 1-1.5 can or bottle of soda per week, cold latte     Exercise: walking 4X per week-20-25 minutes     Other: She feels like her clothes are fitting her better. She is struggling with grief from the loss of her son (passed away in May). She is doing therapist to therapist counseling. 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 is concerned she is going of run our of syringes this week (message sent to  Her provider via [employer])    Medication for weight loss:  Ozempic    ANTHROPOMETRICS:    Initial Weight: 220.6 pounds    Height: 65.5\"    Current Weight:  n/a-does not own a scale    Weight Change: n/a    BMI: n/a kg/m2    MEDICATIONS:  Ozempic    EVALUATION/PROGRESS TOWARDS GOALS:  Previous Goals:  Eat meals off a smaller plate-met  Eat 3 meals per day/avoid skipping meals-improving    Previous Nutrition Diagnosis:  Obese class II related to excess energy intake as evidence by BMI of 36.15  kg/m2     Current Nutrition Diagnosis:   Obese related to excess energy intake as evidence by BMI of n/a kg/m2  No change      INTERVENTION:    Nutrition Prescription:  Recommend " modified nutrient intake by decreasing energy intake    Implementation:    Meals and Snacks: 2.5 on average/0    Nutrition Education (Content):    Discussed previous goals and determined new goals    Encourage physical activity    Supported patient in attempted weight loss and behavior changes    Congratulated patient on successful behavior changes    Patient verbalizes understanding of diet by wanting to try protein drinks as meal replacement.    Anticipate good compliance    Goals:  Eat 3 meals per day or have protein drink for 1 meal per day  Criteria for selecting a protein drink/8-12 ounces:  < 250 calories  15-30 grams protein  < 10 grams total fat  < 10 grams sugar  Drink at least 48 oz (3-16 oz glasses) water per day  Focus on eating protein at each meal    Follow Up/Monitoring:  Other  -  patient to follow up in 4 weeks    Time Spent With Patient:  27 Minutes  Hayder Elizabeth RD, LD  [facility]

## 2022-08-29 ENCOUNTER — VIRTUAL VISIT (OUTPATIENT)
Dept: SURGERY | Facility: CLINIC | Age: 52
End: 2022-08-29
Payer: COMMERCIAL

## 2022-08-29 PROCEDURE — 97803 MED NUTRITION INDIV SUBSEQ: CPT | Mod: GT | Performed by: DIETITIAN, REGISTERED

## 2022-08-29 NOTE — PATIENT INSTRUCTIONS
Jose Amato-   It was great to visit with you and learn about your progress. Below are the goals we discussed.  Goals:  Eat 3 meals per day or have protein drink for 1 meal per day  Criteria for selecting a protein drink/8-12 ounces:  < 250 calories  15-30 grams protein  < 10 grams total fat  < 10 grams sugar  Drink at least 48 oz (3-16 oz glasses) water per day  Focus on eating protein at each meal    Please call [phone] to schedule your next visit with a Dietitian in 1 month.  Thanks!  Hayder Elizabeth RD, [facility]

## 2022-08-30 ENCOUNTER — OFFICE VISIT (OUTPATIENT)
Dept: FAMILY MEDICINE | Facility: CLINIC | Age: 52
End: 2022-08-30
Payer: COMMERCIAL

## 2022-08-30 VITALS
HEIGHT: 65 IN | WEIGHT: 222.7 LBS | SYSTOLIC BLOOD PRESSURE: 107 MMHG | HEART RATE: 91 BPM | RESPIRATION RATE: 18 BRPM | BODY MASS INDEX: 37.1 KG/M2 | OXYGEN SATURATION: 95 % | TEMPERATURE: 97.1 F | DIASTOLIC BLOOD PRESSURE: 80 MMHG

## 2022-08-30 DIAGNOSIS — I10 ESSENTIAL HYPERTENSION, BENIGN: ICD-10-CM

## 2022-08-30 DIAGNOSIS — K86.2 CYST OF PANCREAS: ICD-10-CM

## 2022-08-30 DIAGNOSIS — E11.9 TYPE 2 DIABETES MELLITUS WITHOUT COMPLICATION, WITHOUT LONG-TERM CURRENT USE OF INSULIN (H): Primary | ICD-10-CM

## 2022-08-30 DIAGNOSIS — F40.240 CLAUSTROPHOBIA: ICD-10-CM

## 2022-08-30 DIAGNOSIS — R60.0 LOWER EXTREMITY EDEMA: ICD-10-CM

## 2022-08-30 LAB — HBA1C MFR BLD: 6.8 % (ref 0–5.6)

## 2022-08-30 PROCEDURE — 80053 COMPREHEN METABOLIC PANEL: CPT | Performed by: INTERNAL MEDICINE

## 2022-08-30 PROCEDURE — 36415 COLL VENOUS BLD VENIPUNCTURE: CPT | Performed by: INTERNAL MEDICINE

## 2022-08-30 PROCEDURE — 90714 TD VACC NO PRESV 7 YRS+ IM: CPT | Performed by: INTERNAL MEDICINE

## 2022-08-30 PROCEDURE — 83036 HEMOGLOBIN GLYCOSYLATED A1C: CPT | Performed by: INTERNAL MEDICINE

## 2022-08-30 PROCEDURE — 90471 IMMUNIZATION ADMIN: CPT | Performed by: INTERNAL MEDICINE

## 2022-08-30 PROCEDURE — 99214 OFFICE O/P EST MOD 30 MIN: CPT | Mod: 25 | Performed by: INTERNAL MEDICINE

## 2022-08-30 RX ORDER — LORAZEPAM 0.5 MG/1
TABLET ORAL
Qty: 2 TABLET | Refills: 0 | Status: SHIPPED | OUTPATIENT
Start: 2022-08-30 | End: 2022-11-28

## 2022-08-30 ASSESSMENT — PAIN SCALES - GENERAL: PAINLEVEL: NO PAIN (0)

## 2022-08-30 ASSESSMENT — ASTHMA QUESTIONNAIRES: ACT_TOTALSCORE: 22

## 2022-08-30 NOTE — PATIENT INSTRUCTIONS
(E11.9) Type 2 diabetes mellitus without complication, without long-term current use of insulin (H)  (primary encounter diagnosis)  Comment: We will check hemoglobin A1c today and comprehensive metabolic panel.  No changes in medications   Plan: HEMOGLOBIN A1C, Comprehensive metabolic panel         (BMP + Alb, Alk Phos, ALT, AST, Total. Bili,         TP), TD PRESERV FREE, IM (7+ YRS)         (DECAVAC/TENIVAC)            (K86.2) Cyst of pancreas  Comment: recommend MRI of pancreas - [facility] phone #[phone]   Plan:       (R60.0) Lower extremity edema  Comment: We will plan to stop hydrochlorothiazide and monitor blood pressure closely  Plan:

## 2022-08-30 NOTE — PROGRESS NOTES
Prior to immunization administration, verified patients identity using patient s name and date of birth. Please see Immunization Activity for additional information.     Screening Questionnaire for Adult Immunization    Are you sick today?   No   Do you have allergies to medications, food, a vaccine component or latex?   No   Have you ever had a serious reaction after receiving a vaccination?   No   Do you have a long-term health problem with heart, lung, kidney, or metabolic disease (e.g., diabetes), asthma, a blood disorder, no spleen, complement component deficiency, a cochlear implant, or a spinal fluid leak?  Are you on long-term aspirin therapy?   No   Do you have cancer, leukemia, HIV/AIDS, or any other immune system problem?   No   Do you have a parent, brother, or sister with an immune system problem?   No   In the past 3 months, have you taken medications that affect  your immune system, such as prednisone, other steroids, or anticancer drugs; drugs for the treatment of rheumatoid arthritis, Crohn s disease, or psoriasis; or have you had radiation treatments?   No   Have you had a seizure, or a brain or other nervous system problem?   No   During the past year, have you received a transfusion of blood or blood    products, or been given immune (gamma) globulin or antiviral drug?   No   For women: Are you pregnant or is there a chance you could become       pregnant during the next month?   No   Have you received any vaccinations in the past 4 weeks?   No     Immunization questionnaire answers were all negative.        Per orders of Dr. Baltazar, injection of TD given by Any Perez CMA. Patient instructed to remain in clinic for 15 minutes afterwards, and to report any adverse reaction to me immediately.       Screening performed by Any Perez CMA on 8/30/2022 at 5:07 PM.

## 2022-08-30 NOTE — PROGRESS NOTES
"      Subjective   Lima is a 51 year old, presenting for the following health issues:  Follow Up      HPI   Cyst of pancreas  Type 2 diabetes mellitus   Lima Bueno has regained her appetite somewhat since the death of her son.  She has lost 17 pounds due to the lack of appetite and has followed up with weight management clinic.  She is dong well with ozempic as well as low dose of metformin.  No side effects.  We higher dose of metformin she had loose stools.  She is considering stopping metformin in the future.      Review of Systems   Constitutional, HEENT, cardiovascular, pulmonary, GI, , musculoskeletal, neuro, skin, endocrine and psych systems are negative, except as otherwise noted.      Objective    /80 (BP Location: Right arm, Patient Position: Sitting)   Pulse 91   Temp 97.1  F (36.2  C)   Resp 18   Ht 1.651 m (5' 5\")   Wt 101 kg (222 lb 11.2 oz)   SpO2 95%   BMI 37.06 kg/m    Body mass index is 37.06 kg/m .  Physical Exam   GENERAL: healthy, alert and no distress  EYES: Eyes grossly normal to inspection   HENT: ear canals and TM's normal ns  NECK: no adenopathy, no asymmetry   RESP: lungs clear to auscultation - no rales, rhonchi or wheezes  CV: regular rate and rhythm, normal S1 S2, no S3 or S4, no murmur,   ABDOMEN: soft, nontender, no hepatosplenomegaly   MS: no gross musculoskeletal defects noted, no edema  SKIN: no suspicious lesions or rashes  NEURO: Normal strength and tone   PSYCH: mentation appears normal, affect normal/bright    Recent Results (from the past 240 hour(s))   HEMOGLOBIN A1C    Collection Time: 08/30/22  5:10 PM   Result Value Ref Range    Hemoglobin A1C 6.8 (H) 0.0 - 5.6 %         Patient Instructions   (E11.9) Type 2 diabetes mellitus without complication, without long-term current use of insulin (H)  (primary encounter diagnosis)  Comment: We will check hemoglobin A1c today and comprehensive metabolic panel.  No changes in medications   Plan: " HEMOGLOBIN A1C, Comprehensive metabolic panel         (BMP + Alb, Alk Phos, ALT, AST, Total. Bili,         TP), TD PRESERV FREE, IM (7+ YRS)         (DECAVAC/TENIVAC)            (K86.2) Cyst of pancreas  Comment: recommend MRI of pancreas - [facility] phone #[phone]   Plan:       (R60.0) Lower extremity edema  Comment: We will plan to stop hydrochlorothiazide and monitor blood pressure closely  Plan:                      .  ..

## 2022-08-31 LAB
ALBUMIN SERPL-MCNC: 3.6 G/DL (ref 3.4–5)
ALP SERPL-CCNC: 118 U/L (ref 40–150)
ALT SERPL W P-5'-P-CCNC: 29 U/L (ref 0–50)
ANION GAP SERPL CALCULATED.3IONS-SCNC: 8 MMOL/L (ref 3–14)
AST SERPL W P-5'-P-CCNC: 11 U/L (ref 0–45)
BILIRUB SERPL-MCNC: 0.4 MG/DL (ref 0.2–1.3)
BUN SERPL-MCNC: 13 MG/DL (ref 7–30)
CALCIUM SERPL-MCNC: 8.9 MG/DL (ref 8.5–10.1)
CHLORIDE BLD-SCNC: 103 MMOL/L (ref 94–109)
CO2 SERPL-SCNC: 28 MMOL/L (ref 20–32)
CREAT SERPL-MCNC: 0.91 MG/DL (ref 0.52–1.04)
GFR SERPL CREATININE-BSD FRML MDRD: 76 ML/MIN/1.73M2
GLUCOSE BLD-MCNC: 90 MG/DL (ref 70–99)
POTASSIUM BLD-SCNC: 3.5 MMOL/L (ref 3.4–5.3)
PROT SERPL-MCNC: 7.3 G/DL (ref 6.8–8.8)
SODIUM SERPL-SCNC: 139 MMOL/L (ref 133–144)

## 2022-09-01 NOTE — RESULT ENCOUNTER NOTE
Jose Amato,    I have had the opportunity to review your recent results and an interpretation is as follows:  Your follow-up comprehensive metabolic panel shows much improved kidney function and normal liver function tests  Your A1c is also much improved, down to 6.8.  This is excellent!  We can continue with your current plan and follow-up with the weight loss clinic    Sincerely,  Oscar Baltazar MD

## 2022-10-02 NOTE — PROGRESS NOTES
Lima is a 51 year old who is being evaluated via a billable video visit.      If the video visit is dropped, the invitation should be resent by: Text to cell phone: [phone]  Will anyone else be joining your video visit? No      Video-Visit Details    Type of service:  Video Visit    Video Start Time: 3:28 PM    Video End Time: 3:50 PM    Originating Location (pt. Location): Home    Distant Location (provider location):  [facility] [place]     Platform used for Video Visit: U[employer] Video    10/4/2022      Return Medical Weight Management Note     Lima Bueno  MRN:  [id number]  :  1970    Dear Oscar Baltazar MD, MD,    I had the pleasure of seeing your patient Lima Bueno. She is a 51 year old female who I am continuing to see for treatment of obesity related to:    No flowsheet data found.    Assessment & Plan   Problem List Items Addressed This Visit     Type 2 diabetes mellitus without complication, without long-term current use of insulin (H)     Continue Ozempic weekly.  Stop metformin.  Will check HgA1C in 3 months.            Relevant Medications    semaglutide (OZEMPIC) 2 MG/1.5ML SOPN pen    Morbid obesity (H) - Primary     Patient was congratulated on weight loss success thus far. She is down 10% of Initial weight.  Healthy habits to assist with further weight loss were discussed. Lima will continue the Ozempic at 0.5 mg weekly.              Relevant Medications    semaglutide (OZEMPIC) 2 MG/1.5ML SOPN pen           PATIENT INSTRUCTIONS:  Plan:  Continue Ozempic 0.5 mg weekly.  (can take it this Thursday to get back on track)  Stop Metformin.     Goals:  Continue working on having something with protein for breakfast  Focus on your activity.   See dietician within the month.     FOLLOW-UP:    Please call [phone] to schedule your next visit in 3 months.        28 minutes spent on the date of the encounter doing chart review,  history and exam, result review, counseling, developing plan of care, documentation, and further activities as noted      INTERVAL HISTORY:  Lima returns for medical weight management follow up.  [facility] Started Ozempic.  Is currently 0.5 mg.  Feels good on this dose. Denies SE.  Recent HgA1C down to 6.8.  Helps decrease cravings.   He is eating smaller portions. Recently lost her son,  and has been emotional eating where she gets up in the middle of the night to eat because this is something we did in the past.     Got off her regular routine last week and forgot to take her ozempic until Saturday.  Regular day is THur.  Wants to know if she can take it this Thursday to get on track.    Activity:  Is walking a lot more.  Has a coworker who has given her new walking routes to do.  Even feels comfortable doing these on her own. .  Does walking at last 3 times a week.  [facility] and he walks the big box stores as well.     Diet:  Is having breakfast before work daily.  Has something with protein at each meal.  Decreased soda to 1 pepsi a day.    TYPE II DM:  Dr. Baltazar talked about having her go of metformin if she was tolerating the Ozempic.  Is wondering I we can do this? Metformin can cause bloating and diarrhea on the morning that interferes with starting work.     WEIGHT METRICS:  Body mass index is 32.28 kg/m .   Current Weight: 197 lb (89.4 kg) (Pt was unsure but thinks her weight is at 197 lbs)  Last Visits Weight: 197 lb (89.4 kg)  Initial Weight (lbs): 220 lbs  Cumulative weight loss (lbs): 23  Weight Loss Percentage: 10.45%    Wt Readings from Last 10 Encounters:   10/04/22 197 lb (89.4 kg)   08/30/22 222 lb 11.2 oz (101 kg)   07/28/22 220 lb 9.6 oz (100.1 kg)   07/28/22 220 lb 9.6 oz (100.1 kg)   07/15/22 219 lb (99.3 kg)   05/20/22 237 lb (107.5 kg)   05/04/22 237 lb (107.5 kg)   08/13/21 245 lb (111.1 kg)   04/15/21 245 lb (111.1 kg)   03/21/20 231 lb (104.8 kg)         MEDICATIONS:   Current Outpatient Medications   Medication Sig Dispense Refill     albuterol (PROAIR HFA) 108 (90 Base) MCG/ACT inhaler Inhale 2 puffs into the lungs every 4 hours as needed for shortness of breath / dyspnea 8 g 0     albuterol (PROVENTIL) (2.5 MG/3ML) 0.083% neb solution Take 1 vial (2.5 mg) by nebulization every 6 hours as needed for shortness of breath / dyspnea or wheezing 120 mL 0     atorvastatin (LIPITOR) 20 MG tablet Take 1 tablet (20 mg) by mouth daily 90 tablet 3     benzonatate (TESSALON) 200 MG capsule Take 1 capsule (200 mg) by mouth 3 times daily as needed for cough 20 capsule 0     blood glucose (NO BRAND SPECIFIED) test strip 1 Box of Test Strips for patients meter - test daily 100 strip 3     blood glucose monitoring (NO BRAND SPECIFIED) meter device kit Use to test blood sugar as directed. 1 kit 0     cephALEXin (KEFLEX) 500 MG capsule Take 1 capsule (500 mg) by mouth 3 times daily 21 capsule 0     famotidine (PEPCID) 20 MG tablet Take 1 tablet (20 mg) by mouth daily 30 tablet 0     fluticasone-salmeterol (ADVAIR HFA) 230-21 MCG/ACT inhaler Inhale 2 puffs into the lungs 2 times daily Due for appointment. Please schedule: [phone] 12 g 0     guaiFENesin-dextromethorphan (ROBITUSSIN DM) 100-10 MG/5ML syrup Take 10 mLs by mouth every 4 hours as needed for cough 118 mL 0     ketotifen (ZADITOR) 0.025 % ophthalmic solution Place 1 drop into both eyes 2 times daily 1 drop in affected eye(s) 2 times a day 5 mL 1     Lancets 30G MISC 120 30 gauge lancets (substitute as needed) - test daily 120 each 3     lisinopril (ZESTRIL) 5 MG tablet Take 1 tablet (5 mg) by mouth daily 90 tablet 3     LORazepam (ATIVAN) 0.5 MG tablet Take 0.5 mg 1 hour prior to MRI 2 tablet 0     mupirocin (BACTROBAN) 2 % external ointment Apply topically 3 times daily 30 g 3     rivaroxaban ANTICOAGULANT (XARELTO) 20 MG TABS tablet Take 1 tablet (20 mg) by mouth daily (with dinner) 90 tablet 3     semaglutide  (OZEMPIC) 2 MG/1.5ML SOPN pen Inject 0.5 mg subcutaneously once weekly 2 mL 3     traMADol (ULTRAM) 50 MG tablet TAKE 1 TABLET BY MOUTH EVERY 6 HOURS AS NEEDED FOR SEVERE PAIN (SCHEDULE  APPOINTMENT  TO  DISCUSS  FURTHER  REFILLS  [phone]) 30 tablet 0     traZODone (DESYREL) 150 MG tablet Take 2 tablets (300 mg) by mouth At Bedtime 180 tablet 3       LABS:  Hemoglobin A1C   Date Value Ref Range Status   08/30/2022 6.8 (H) 0.0 - 5.6 % Final     Comment:     Normal <5.7%   Prediabetes 5.7-6.4%    Diabetes 6.5% or higher     Note: Adopted from ADA consensus guidelines.     TSH   Date Value Ref Range Status   05/04/2022 0.74 0.40 - 4.00 mU/L Final   09/02/2015 0.94 0.40 - 4.00 mU/L Final     Sodium   Date Value Ref Range Status   08/30/2022 139 133 - 144 mmol/L Final   10/04/2017 140 133 - 144 mmol/L Final     Potassium   Date Value Ref Range Status   08/30/2022 3.5 3.4 - 5.3 mmol/L Final   01/08/2019 3.7 3.4 - 5.3 mmol/L Final     Chloride   Date Value Ref Range Status   08/30/2022 103 94 - 109 mmol/L Final   10/04/2017 107 94 - 109 mmol/L Final     Carbon Dioxide   Date Value Ref Range Status   10/04/2017 25 20 - 32 mmol/L Final     Carbon Dioxide (CO2)   Date Value Ref Range Status   08/30/2022 28 20 - 32 mmol/L Final     Anion Gap   Date Value Ref Range Status   08/30/2022 8 3 - 14 mmol/L Final   10/04/2017 8 3 - 14 mmol/L Final     Glucose   Date Value Ref Range Status   08/30/2022 90 70 - 99 mg/dL Final   10/04/2017 101 (H) 70 - 99 mg/dL Final     Comment:     Non Fasting     Urea Nitrogen   Date Value Ref Range Status   08/30/2022 13 7 - 30 mg/dL Final   10/04/2017 10 7 - 30 mg/dL Final     Creatinine   Date Value Ref Range Status   08/30/2022 0.91 0.52 - 1.04 mg/dL Final   01/08/2019 0.81 0.52 - 1.04 mg/dL Final     GFR Estimate   Date Value Ref Range Status   08/30/2022 76 >60 mL/min/1.73m2 Final     Comment:     Effective December 21, 2021 eGFRcr in adults is calculated using the 2021 CKD-EPI creatinine  equation which includes age and gender (Rosa Isela felder al., NEJ, DOI: 10.1056/XKGXwg6433364)   01/08/2019 85 >60 mL/min/[1.73_m2] Final     Comment:     Non  GFR Calc  Starting 12/18/2018, serum creatinine based estimated GFR (eGFR) will be   calculated using the Chronic Kidney Disease Epidemiology Collaboration   (CKD-EPI) equation.       Calcium   Date Value Ref Range Status   08/30/2022 8.9 8.5 - 10.1 mg/dL Final   10/04/2017 9.0 8.5 - 10.1 mg/dL Final     Bilirubin Total   Date Value Ref Range Status   08/30/2022 0.4 0.2 - 1.3 mg/dL Final   10/04/2017 0.3 0.2 - 1.3 mg/dL Final     Alkaline Phosphatase   Date Value Ref Range Status   08/30/2022 118 40 - 150 U/L Final   10/04/2017 96 40 - 150 U/L Final     ALT   Date Value Ref Range Status   08/30/2022 29 0 - 50 U/L Final   10/04/2017 23 0 - 50 U/L Final     AST   Date Value Ref Range Status   08/30/2022 11 0 - 45 U/L Final   10/04/2017 14 0 - 45 U/L Final     Cholesterol   Date Value Ref Range Status   12/06/2021 165 <200 mg/dL Final   07/27/2018 162 <200 mg/dL Final     HDL Cholesterol   Date Value Ref Range Status   07/27/2018 39 (L) >49 mg/dL Final     Direct Measure HDL   Date Value Ref Range Status   12/06/2021 42 (L) >=50 mg/dL Final     LDL Cholesterol Calculated   Date Value Ref Range Status   12/06/2021 108 (H) <=100 mg/dL Final   07/27/2018 82 <100 mg/dL Final     Comment:     Desirable:       <100 mg/dl     Triglycerides   Date Value Ref Range Status   12/06/2021 76 <150 mg/dL Final   07/27/2018 206 (H) <150 mg/dL Final     Comment:     Borderline high:  150-199 mg/dl  High:             200-499 mg/dl  Very high:       >499 mg/dl  Non Fasting       WBC   Date Value Ref Range Status   02/19/2018 5.6 4.0 - 11.0 10e9/L Final     WBC Count   Date Value Ref Range Status   07/15/2022 8.5 4.0 - 11.0 10e3/uL Final     Hemoglobin   Date Value Ref Range Status   07/15/2022 15.6 11.7 - 15.7 g/dL Final   01/08/2019 14.6 11.7 - 15.7 g/dL Final  "    Hematocrit   Date Value Ref Range Status   07/15/2022 47.6 (H) 35.0 - 47.0 % Final   02/19/2018 42.4 35.0 - 47.0 % Final     MCV   Date Value Ref Range Status   07/15/2022 83 78 - 100 fL Final   02/19/2018 86 78 - 100 fl Final     Platelet Count   Date Value Ref Range Status   07/15/2022 285 150 - 450 10e3/uL Final   02/19/2018 224 150 - 450 10e9/L Final         PE:  Ht 5' 5.5\" (1.664 m)   Wt 197 lb (89.4 kg)   BMI 32.28 kg/m    GENERAL: Healthy, alert and no distress  EYES: Eyes grossly normal to inspection.  No discharge or erythema, or obvious scleral/conjunctival abnormalities.  RESP: No audible wheeze, cough, or visible cyanosis.  No visible retractions or increased work of breathing.    SKIN: Visible skin clear. No significant rash, abnormal pigmentation or lesions.  NEURO: Cranial nerves grossly intact.  Mentation and speech appropriate for age.  PSYCH: Mentation appears normal, affect normal/bright, judgement and insight intact, normal speech and appearance well-groomed.      Sincerely,      Krystal Calix PA-C        "

## 2022-10-04 ENCOUNTER — VIRTUAL VISIT (OUTPATIENT)
Dept: SURGERY | Facility: CLINIC | Age: 52
End: 2022-10-04
Payer: COMMERCIAL

## 2022-10-04 VITALS — BODY MASS INDEX: 31.66 KG/M2 | WEIGHT: 197 LBS | HEIGHT: 66 IN

## 2022-10-04 DIAGNOSIS — E66.01 MORBID OBESITY (H): Primary | ICD-10-CM

## 2022-10-04 DIAGNOSIS — E11.9 TYPE 2 DIABETES MELLITUS WITHOUT COMPLICATION, WITHOUT LONG-TERM CURRENT USE OF INSULIN (H): ICD-10-CM

## 2022-10-04 PROCEDURE — 99213 OFFICE O/P EST LOW 20 MIN: CPT | Mod: 95 | Performed by: PHYSICIAN ASSISTANT

## 2022-10-04 NOTE — PATIENT INSTRUCTIONS
"Nice to talk with you today. Thank you for allowing me the privilege of caring for you. We hope we provided you with the excellent service you deserve.     To ensure the quality of our services you may receive a patient satisfaction survey from an independent monitoring company.  The greatest compliment you can give is \"Likely to Recommend\"    Below is our plan we discussed.-  ANDI Rice      Plan:  Continue Ozempic 0.5 mg weekly. (can take it this Thursday to get back on track)  Stop Metformin.     Goals:  Continue working on having something with protein for breakfast  Focus on your activity.   See dietician within the month.     FOLLOW-UP:    Please call [phone] to schedule your next visit in 3 months.      "

## 2022-10-04 NOTE — ASSESSMENT & PLAN NOTE
Patient was congratulated on weight loss success thus far. She is down 10% of Initial weight.  Healthy habits to assist with further weight loss were discussed. Lima will continue the Ozempic at 0.5 mg weekly.

## 2022-10-08 ENCOUNTER — HOSPITAL ENCOUNTER (OUTPATIENT)
Dept: MRI IMAGING | Facility: CLINIC | Age: 52
Discharge: HOME | End: 2022-10-08
Attending: NURSE PRACTITIONER | Admitting: NURSE PRACTITIONER
Payer: COMMERCIAL

## 2022-10-08 DIAGNOSIS — K86.2 PANCREATIC CYST: ICD-10-CM

## 2022-10-08 PROCEDURE — 255N000002 HC RX 255 OP 636: Performed by: NURSE PRACTITIONER

## 2022-10-08 PROCEDURE — A9585 GADOBUTROL INJECTION: HCPCS | Performed by: NURSE PRACTITIONER

## 2022-10-08 PROCEDURE — 74183 MRI ABD W/O CNTR FLWD CNTR: CPT

## 2022-10-08 RX ORDER — GADOBUTROL 604.72 MG/ML
8 INJECTION INTRAVENOUS ONCE
Status: COMPLETED | OUTPATIENT
Start: 2022-10-08 | End: 2022-10-08

## 2022-10-08 RX ADMIN — GADOBUTROL 8 ML: 604.72 INJECTION INTRAVENOUS at 18:46

## 2022-10-10 NOTE — RESULT ENCOUNTER NOTE
FYI to PCP     Jose Amato, looks like you have multiple benign cysts of your pancreas which is great news. Bring this up with Dr Baltazar since it looks like they recommend monitoring this. Let me know if you have any questions   Liz

## 2022-10-16 ENCOUNTER — HEALTH MAINTENANCE LETTER (OUTPATIENT)
Age: 52
End: 2022-10-16

## 2022-11-28 ENCOUNTER — TELEPHONE (OUTPATIENT)
Dept: GASTROENTEROLOGY | Facility: CLINIC | Age: 52
End: 2022-11-28

## 2022-11-28 ENCOUNTER — VIRTUAL VISIT (OUTPATIENT)
Dept: FAMILY MEDICINE | Facility: CLINIC | Age: 52
End: 2022-11-28
Payer: COMMERCIAL

## 2022-11-28 DIAGNOSIS — Z12.4 CERVICAL CANCER SCREENING: ICD-10-CM

## 2022-11-28 DIAGNOSIS — J45.909 PERSISTENT ASTHMA WITHOUT COMPLICATION, UNSPECIFIED ASTHMA SEVERITY: ICD-10-CM

## 2022-11-28 DIAGNOSIS — I10 ESSENTIAL HYPERTENSION, BENIGN: ICD-10-CM

## 2022-11-28 DIAGNOSIS — K86.2 CYST OF PANCREAS: ICD-10-CM

## 2022-11-28 DIAGNOSIS — D49.0 IPMN (INTRADUCTAL PAPILLARY MUCINOUS NEOPLASM): Primary | ICD-10-CM

## 2022-11-28 DIAGNOSIS — F51.01 PRIMARY INSOMNIA: ICD-10-CM

## 2022-11-28 DIAGNOSIS — I82.4Y1 DEEP VEIN THROMBOSIS (DVT) OF PROXIMAL VEIN OF RIGHT LOWER EXTREMITY, UNSPECIFIED CHRONICITY (H): ICD-10-CM

## 2022-11-28 DIAGNOSIS — E78.5 HYPERLIPIDEMIA LDL GOAL <100: ICD-10-CM

## 2022-11-28 DIAGNOSIS — F43.21 GRIEF: ICD-10-CM

## 2022-11-28 DIAGNOSIS — I26.99 OTHER PULMONARY EMBOLISM WITHOUT ACUTE COR PULMONALE, UNSPECIFIED CHRONICITY (H): ICD-10-CM

## 2022-11-28 DIAGNOSIS — E11.9 TYPE 2 DIABETES MELLITUS WITHOUT COMPLICATION, WITHOUT LONG-TERM CURRENT USE OF INSULIN (H): ICD-10-CM

## 2022-11-28 DIAGNOSIS — K21.9 GASTROESOPHAGEAL REFLUX DISEASE WITHOUT ESOPHAGITIS: ICD-10-CM

## 2022-11-28 PROCEDURE — 99213 OFFICE O/P EST LOW 20 MIN: CPT | Mod: 95 | Performed by: INTERNAL MEDICINE

## 2022-11-28 RX ORDER — FAMOTIDINE 20 MG/1
20 TABLET, FILM COATED ORAL DAILY
Qty: 90 TABLET | Refills: 3 | Status: SHIPPED | OUTPATIENT
Start: 2022-11-28 | End: 2023-07-06

## 2022-11-28 RX ORDER — TRAZODONE HYDROCHLORIDE 150 MG/1
300 TABLET ORAL AT BEDTIME
Qty: 180 TABLET | Refills: 3 | Status: SHIPPED | OUTPATIENT
Start: 2022-11-28 | End: 2023-10-04

## 2022-11-28 RX ORDER — ALBUTEROL SULFATE 90 UG/1
2 AEROSOL, METERED RESPIRATORY (INHALATION) EVERY 4 HOURS PRN
Qty: 8 G | Refills: 11 | Status: SHIPPED | OUTPATIENT
Start: 2022-11-28 | End: 2023-10-04

## 2022-11-28 RX ORDER — LISINOPRIL 5 MG/1
5 TABLET ORAL DAILY
Qty: 90 TABLET | Refills: 3 | Status: SHIPPED | OUTPATIENT
Start: 2022-11-28 | End: 2023-10-04

## 2022-11-28 RX ORDER — ATORVASTATIN CALCIUM 20 MG/1
20 TABLET, FILM COATED ORAL DAILY
Qty: 90 TABLET | Refills: 3 | Status: SHIPPED | OUTPATIENT
Start: 2022-11-28 | End: 2023-10-04

## 2022-11-28 NOTE — TELEPHONE ENCOUNTER
[facility]     Referring provider:  Oscar Baltazar MD    Referred to: [facility]     Provider Requested:  NA     Referral Received: 11/28/22    Records received: EPIC     Images received: PACS    Evaluation for: IPMN     Clinical History (per RN review):     Per Referring Provider:  IPMN (intraductal papillary mucinous neoplasm)              Her recent MRI of her pancreas did show what appears to be IPMN and she is recommended to recheck an MRI in 6 months.  She is not having any symptoms of abdominal pain at this time.  She otherwise feels well    MR Pancreas 10-8-22                                                                   IMPRESSION:  1.  Multiple cystic lesions in the pancreas, most consistent with side branch IPMNs, largest measuring up to 1.4 cm. No associated suspicious features. Consider follow-up described below.  2.  Persistent hepatic steatosis.       MD review date: 11/28/22    MD Decision for clinic consultation/Orders:            Referral updates/Patient contacted:

## 2022-11-28 NOTE — PROGRESS NOTES
Lima is a 51 year old who is being evaluated via a billable video visit.      How would you like to obtain your AVS? Ombu  If the video visit is dropped, the invitation should be resent by: Other e-mail: [employer] VIDEO  Will anyone else be joining your video visit? No        Subjective   Lima is a 51 year old, presenting for the following health issues:  Follow Up      HPI         Type 2 diabetes mellitus without complication, without long-term current use of insulin (H)   I spoke to Ailin over video visit today with regards to her ongoing management of diabetes.  She recently stopped metformin was continued on Ozempic at the direction of her bariatric team.  She is working hard on weight loss and improving her diet.  Cervical cancer screening   She is due for Pap smear and will plan to schedule with a gynecologist  Hyperlipidemia LDL goal <100   She requests refill of atorvastatin  Primary insomnia   Also requests refill of trazodone  IPMN (intraductal papillary mucinous neoplasm)   Her recent MRI of her pancreas did show what appears to be IPMN and she is recommended to recheck an MRI in 6 months.  She is not having any symptoms of abdominal pain at this time.  She otherwise feels well  Essential hypertension, benign   Her blood pressures been stable and recently checked  Deep vein thrombosis (DVT) of proximal vein of right lower extremity, unspecified chronicity (H)   She continues on Xarelto for blood clot prevention  Gastroesophageal reflux disease without esophagitis   She is taking famotidine daily with good results  Grief   Her main concern is that of ongoing grief with the loss of her son Rory.  She has had difficulty processing this and is wondering if there may be an option to speak with a therapist.  She not interested in medications to manage her grief at this time    Review of Systems   Constitutional, HEENT, cardiovascular, pulmonary, GI, , musculoskeletal, neuro, skin, endocrine and  psych systems are negative, except as otherwise noted.      Objective           Vitals:  No vitals were obtained today due to virtual visit.    Physical Exam   GENERAL: Healthy, alert and no distress  EYES: Eyes grossly normal to inspection.  No discharge or erythema, or obvious scleral/conjunctival abnormalities.  RESP: No audible wheeze, cough, or visible cyanosis.  No visible retractions or increased work of breathing.    SKIN: Visible skin clear. No significant rash, abnormal pigmentation or lesions.  NEURO: Cranial nerves grossly intact.  Mentation and speech appropriate for age.  PSYCH: Mentation appears sad, affect mood congruent, judgement and insight intact, normal speech and appearance well-groomed.    (D49.0) IPMN (intraductal papillary mucinous neoplasm)  (primary encounter diagnosis)  Comment: Referral to [facility] to the gastroenterology with focus on pancreatic cysts placed today.  We will try to coordinate 6-month follow-up MRI in April if unable to be seen in the GI clinic  Plan: Adult GI  Referral - Consult Only            (E11.9) Type 2 diabetes mellitus without complication, without long-term current use of insulin (H)  Comment: Recommend recheck A1c  Plan: Hemoglobin A1c            (Z12.4) Cervical cancer screening  Comment: Recommend Pap smear and she will schedule this with her gynecologist  Plan:     (E78.5) Hyperlipidemia LDL goal <100  Comment: Continue atorvastatin  Plan: atorvastatin (LIPITOR) 20 MG tablet            (F51.01) Primary insomnia  Comment: Continue trazodone  Plan: traZODone (DESYREL) 150 MG tablet            (I10) Essential hypertension, benign  Comment: Blood pressure is stable  Plan: lisinopril (ZESTRIL) 5 MG tablet            (I82.4Y1) Deep vein thrombosis (DVT) of proximal vein of right lower extremity, unspecified chronicity (H)  Comment: Continue rivaroxaban  Plan: rivaroxaban ANTICOAGULANT (XARELTO) 20 MG TABS         tablet            (I26.99)  Other pulmonary embolism without acute cor pulmonale, unspecified chronicity (H)  Comment:   Plan: rivaroxaban ANTICOAGULANT (XARELTO) 20 MG TABS         tablet            (K86.2) Cyst of pancreas  Comment:   Plan: Adult GI  Referral - Consult Only            (J45.909) Persistent asthma without complication, unspecified asthma severity  Comment: Refill of albuterol placed  Plan: albuterol (PROAIR HFA) 108 (90 Base) MCG/ACT         inhaler            (K21.9) Gastroesophageal reflux disease without esophagitis  Comment: Refill of famotidine  Plan: famotidine (PEPCID) 20 MG tablet            (F43.21) Grief  Comment: Referral to mental health placed today  Plan: Adult Mental Health  Referral                     Video-Visit Details    Video Start Time: 3:12 PM    Type of service:  Video Visit    Video End Time:3:36 PM    Originating Location (pt. Location): Home    Distant Location (provider location):  On-site    Platform used for Video Visit: Viviana

## 2022-11-29 NOTE — TELEPHONE ENCOUNTER
Per Dr. Cedillo  Lets start with a nonurgent clinic visit which can be followed by an EUS with deep sedation at [facility]     Message sent to pool    FLORENTINO

## 2022-12-03 ENCOUNTER — HEALTH MAINTENANCE LETTER (OUTPATIENT)
Age: 52
End: 2022-12-03

## 2022-12-10 ENCOUNTER — NURSE TRIAGE (OUTPATIENT)
Dept: NURSING | Facility: CLINIC | Age: 52
End: 2022-12-10

## 2022-12-11 NOTE — TELEPHONE ENCOUNTER
Nurse Triage SBAR    Situation:   -Covid exposure    Background:   -Patient calling, It is okay to leave a detailed message at this number.     Assessment:   -No symptoms  -Grand daughter tested positive for Covid today  -See care advice section    Recommendation:   -Call back with any other questions or concerns or if you develop symptoms and would like triage    LEIA HOANG RN on 12/10/2022 at 7:35 PM      Reason for Disposition    [1] CLOSE CONTACT COVID-19 EXPOSURE within last 14 days AND [2] NO symptoms    Additional Information    Negative: COVID-19 lab test positive    Negative: [1] Lives with someone known to have influenza (flu test positive) AND [2] flu-like symptoms (e.g., cough, runny nose, sore throat, SOB; with or without fever)    Negative: [1] Symptoms of COVID-19 (e.g., cough, fever, SOB, or others) AND [2] doctor (or NP/PA) diagnosed COVID-19 based on symptoms    Negative: [1] Symptoms of COVID-19 (e.g., cough, fever, SOB, or others) AND [2] lives in an area with community spread    Negative: [1] Symptoms of COVID-19 (e.g., cough, fever, SOB, or others) AND [2] within 14 days of EXPOSURE (close contact) with diagnosed or suspected COVID-19 patient    Negative: [1] Symptoms of COVID-19 (e.g., cough, fever, SOB, or others) AND [2] within 14 days of travel from high-risk area for COVID-19 community spread (identified by CDC)    Negative: [1] Difficulty breathing (shortness of breath) occurs AND [2] onset > 14 days after COVID-19 EXPOSURE (Close Contact)    Negative: [1] Dry cough occurs AND [2] onset > 14 days after COVID-19 EXPOSURE    Negative: [1] Wet cough (i.e., white-yellow, yellow, green, or jane colored sputum) AND [2] onset > 14 days after COVID-19 EXPOSURE    Negative: [1] Common cold symptoms AND [2] onset > 14 days after COVID-19 EXPOSURE    Negative: COVID-19 vaccine reaction suspected (e.g., fever, headache, muscle aches) occurring during days 1 to 3 after getting vaccine     Negative: COVID-19 vaccine, questions about    Negative: [1] CLOSE CONTACT COVID-19 EXPOSURE within last 14 days AND [2] exposed person is a  (e.g., police or paramedic) AND [3] NO symptoms    Negative: [1] CLOSE CONTACT COVID-19 EXPOSURE within last 14 days AND [2] exposed person is a healthcare worker who was NOT using all recommended personal protective equipment (e.g., a respirator-N95 mask, eye protection, gloves, and gown) AND [3] NO symptoms    Negative: [1] CLOSE CONTACT COVID-19 EXPOSURE within last 14 days AND [2] needs COVID-19 lab test to return to work AND [3] NO symptoms    Negative: [1] Living or working in a correctional facility, long-term care facility, or shelter (i.e., congregate setting; densely populated) AND [2] where an outbreak has occurred AND [3] NO symptoms    Negative: [1] CLOSE CONTACT COVID-19 EXPOSURE within last 14 days AND [2] weak immune system (e.g., HIV positive, cancer chemo, splenectomy, organ transplant, chronic steroids) AND [3] NO symptoms    Protocols used: CORONAVIRUS (COVID-19) EXPOSURE-A- 1.18.2022

## 2022-12-26 ENCOUNTER — NURSE TRIAGE (OUTPATIENT)
Dept: FAMILY MEDICINE | Facility: CLINIC | Age: 52
End: 2022-12-26

## 2022-12-26 NOTE — TELEPHONE ENCOUNTER
Nurse Triage SBAR    Is this a 2nd Level Triage? NO    Situation: Patient was exposed to Covid 19 in her office. Last exposure was Wednesday. Patient agrees to do home test and follow current CDC guidelines    Background: Patient received first 2 Pfizer vaccines and no boosters.     Assessment: COVID 19 exposure with no symptoms    Protocol Recommended Disposition:   Per current CDC guidelines, patient to wear mask for a full 10 days from last exposure. Test day 6. If positive need to isolate for 5 days from positive test.  If negative mask around others for a full 10 days.  If any viral symptoms, retest.    Recommendation: not routed.      not routed    Does the patient meet one of the following criteria for ADS visit consideration? 16+ years old, with an [facility] PCP     TIP  Providers, please consider if this condition is appropriate for management at one of our Acute and Diagnostic Services sites.     If patient is a good candidate, please use dotphrase <dot>triageresponse and select Refer to ADS to document.        Additional Information    Negative: COVID-19 lab test positive    Negative: [1] Lives with someone known to have influenza (flu test positive) AND [2] flu-like symptoms (e.g., cough, runny nose, sore throat, SOB; with or without fever)    Negative: [1] Symptoms of COVID-19 (e.g., cough, fever, SOB, or others) AND [2] doctor (or NP/PA) diagnosed COVID-19 based on symptoms    Negative: [1] Symptoms of COVID-19 (e.g., cough, fever, SOB, or others) AND [2] lives in an area with community spread    Negative: [1] Symptoms of COVID-19 (e.g., cough, fever, SOB, or others) AND [2] within 14 days of EXPOSURE (close contact) with diagnosed or suspected COVID-19 patient    Negative: [1] Symptoms of COVID-19 (e.g., cough, fever, SOB, or others) AND [2] within 14 days of travel from high-risk area for COVID-19 community spread (identified by CDC)    Negative: [1] Difficulty breathing (shortness of breath) occurs AND  "[2] onset > 14 days after COVID-19 EXPOSURE (Close Contact)    Negative: [1] Cough occurs AND [2] onset > 14 days after COVID-19 EXPOSURE    Negative: [1] Common cold symptoms AND [2] onset > 14 days after COVID-19 EXPOSURE    Negative: COVID-19 vaccine reaction suspected (e.g., fever, headache, muscle aches) occurring during days 1-3 after getting vaccine    Negative: COVID-19 vaccine, questions about    Negative: [1] CLOSE CONTACT COVID-19 EXPOSURE within last 14 days AND [2] needs COVID-19 lab test to return to work AND [3] NO symptoms    Negative: [1] CLOSE CONTACT COVID-19 EXPOSURE within last 14 days AND [2] exposed person is a  (e.g., police or paramedic) AND [3] NO symptoms    Negative: [1] CLOSE CONTACT COVID-19 EXPOSURE within last 14 days AND [2] exposed person is a healthcare worker who was NOT using all recommended personal protective equipment (e.g., a respirator-N95 mask, eye protection, gloves, and gown) AND [3] NO symptoms    Negative: [1] Living or working in a correctional facility, long-term care facility, or shelter (i.e., congregate setting; densely populated) AND [2] where an outbreak has occurred AND [3] NO symptoms    Negative: [1] CLOSE CONTACT COVID-19 EXPOSURE within last 14 days AND [2] weak immune system (e.g., HIV positive, cancer chemo, splenectomy, organ transplant, chronic steroids) AND [3] NO symptoms    Answer Assessment - Initial Assessment Questions  1. COVID-19 EXPOSURE: \"Please describe how you were exposed to someone with a COVID-19 infection.\"      Same office as Covid 19 positive person last Tuesday and Wednesday.   2. PLACE of CONTACT: \"Where were you when you were exposed to COVID-19?\" (e.g., home, school, medical waiting room; which city?)      Work   3. TYPE of CONTACT: \"How much contact was there?\" (e.g., sitting next to, live in same house, work in same office, same building)      Work in same office  4. DURATION of CONTACT: \"How long were you in " "contact with the COVID-19 patient?\" (e.g., a few seconds, passed by person, a few minutes, 15 minutes or longer, live with the patient)      15 minutes or longer without a mask.   5. MASK: \"Were you wearing a mask?\" \"Was the other person wearing a mask?\" Note: wearing a mask reduces the risk of an otherwise close contact.      Not wearing a mask.   6. DATE of CONTACT: \"When did you have contact with a COVID-19 patient?\" (e.g., how many days ago)      5 days since last contact  7. COMMUNITY SPREAD: \"Are there lots of cases of COVID-19 (community spread) where you live?\" (See public health department website, if unsure)        Works for [employer]. 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   8. SYMPTOMS: \"Do you have any symptoms?\" (e.g., fever, cough, breathing difficulty, loss of taste or smell)      None at this time.   9. VACCINE: \"Have you gotten the COVID-19 vaccine?\" If Yes, ask: \"Which one, how many shots, when did you get it?\"      Yes, Moderna, 2  10. BOOSTER: \"Have you received your COVID-19 booster?\" If Yes, ask: \"Which one and when did you get it?\"        Not received any boosters.   11. PREGNANCY OR POSTPARTUM: \"Is there any chance you are pregnant?\" \"When was your last menstrual period?\" \"Did you deliver in the last 2 weeks?\"        NA  12. HIGH RISK: \"Do you have any heart or lung problems?\" (e.g., asthma , COPD, heart failure) \"Do you have a weak immune system or other risk factors?\" (e.g., HIV positive, chemotherapy, renal failure, diabetes mellitus, sickle cell anemia, obesity)        asthma  13. TRAVEL: \"Have you traveled out of the country recently?\" If Yes, ask: \"When and where?\"  Note: Travel becomes less relevant if there is widespread community transmission where the patient lives.        no    Protocols used: CORONAVIRUS (COVID-19) EXPOSURE-A-[place]    Anel Watson RN    "

## 2023-01-17 ENCOUNTER — LAB (OUTPATIENT)
Dept: LAB | Facility: CLINIC | Age: 53
End: 2023-01-17
Payer: COMMERCIAL

## 2023-01-17 ENCOUNTER — TELEPHONE (OUTPATIENT)
Dept: SURGERY | Facility: CLINIC | Age: 53
End: 2023-01-17

## 2023-01-17 DIAGNOSIS — Z13.220 SCREENING FOR HYPERLIPIDEMIA: ICD-10-CM

## 2023-01-17 DIAGNOSIS — E11.9 TYPE 2 DIABETES MELLITUS WITHOUT COMPLICATION, WITHOUT LONG-TERM CURRENT USE OF INSULIN (H): ICD-10-CM

## 2023-01-17 LAB
CHOLEST SERPL-MCNC: 134 MG/DL
CREAT UR-MCNC: 134 MG/DL
HBA1C MFR BLD: 6.4 % (ref 0–5.6)
HDLC SERPL-MCNC: 44 MG/DL
LDLC SERPL CALC-MCNC: 72 MG/DL
MICROALBUMIN UR-MCNC: <12 MG/L
MICROALBUMIN/CREAT UR: NORMAL MG/G{CREAT}
NONHDLC SERPL-MCNC: 90 MG/DL
TRIGL SERPL-MCNC: 88 MG/DL

## 2023-01-17 PROCEDURE — 82043 UR ALBUMIN QUANTITATIVE: CPT

## 2023-01-17 PROCEDURE — 80061 LIPID PANEL: CPT

## 2023-01-17 PROCEDURE — 82570 ASSAY OF URINE CREATININE: CPT

## 2023-01-17 PROCEDURE — 36415 COLL VENOUS BLD VENIPUNCTURE: CPT

## 2023-01-17 PROCEDURE — 83036 HEMOGLOBIN GLYCOSYLATED A1C: CPT

## 2023-01-17 NOTE — TELEPHONE ENCOUNTER
Called pt - has appt to discuss wt gain with provider 1/19/23.  Is still on Ozempic and states has gained 17# since last seen 10/4/23.  Clothes don't fit differently but is concerned.  Callie Casillas MS, RD, RN

## 2023-01-17 NOTE — TELEPHONE ENCOUNTER
Patient had her weight checked at her clinic today & she has gained 17 lbs. Her weight is now 214.4 lbs. She is very concerned about her weight gain. Patient would like to speak with Krystal Calix or a nurse.    [phone] okay to leave VM

## 2023-01-18 NOTE — RESULT ENCOUNTER NOTE
Jose Amato,    I had the opportunity to review your recent labs and a summary of your labs reads as follows:      Your fasting lipid panel show  - normal HDL (good) cholesterol -as your goal is greater than 40  - low LDL (bad) cholesterol as your goal is less than 100  - normal triglyceride levels  Your A1c shows excellent average blood loss    Congratulations on your much improved results    Sincerely,  Oscar Baltazar MD

## 2023-01-18 NOTE — PROGRESS NOTES
Lima is a 52 year old who is being evaluated via a billable video visit.      If the video visit is dropped, the invitation should be resent by: Text to cell phone: [phone]  Will anyone else be joining your video visit? No      Video-Visit Details    Type of service:  Video Visit    Video Start Time: 8:36 AM    Video End Time:9:06 AM    Originating Location (pt. Location): Home    Distant Location (provider location):  [facility] [place]     Platform used for Video Visit: [employer]        2023      Return Medical Weight Management Note     Lima Bueno  MRN:  [id number]  :  1970    Dear Oscar Baltazar MD, MD,    I had the pleasure of seeing your patient Lima Bueno. She is a 52 year old female who I am continuing to see for treatment of obesity related to:    No flowsheet data found.    Assessment & Plan   Problem List Items Addressed This Visit     Type 2 diabetes mellitus without complication, without long-term current use of insulin (H) - Primary    Relevant Medications    Semaglutide, 1 MG/DOSE, 4 MG/3ML SOPN    Other Relevant Orders    Med Therapy Management Referral    Class 2 severe obesity due to excess calories with serious comorbidity and body mass index (BMI) of 35.0 to 35.9 in adult (H)     Patient was congratulated on wt loss success thus far. Healthy habits to assist with further weight loss were discussed. Lima will continue the Ozempic but increase her dose.  She will switch to diet pepsi and start having breakfast daily.  She will follow up with the pharmacist in 6 weeks and the dietician now.  In 3 months if we do not see much progress we will re discuss bariatric surgery.            Relevant Medications    Semaglutide, 1 MG/DOSE, 4 MG/3ML SOPN    Other Relevant Orders    Med Therapy Management Referral        PATIENT INSTRUCTIONS:  Increase Ozempic to 1 mg weekly  See dietician this month  Make appt to see  [facility]    Goals:  Have breakfast every morning (Will looking pre made protien drinks on days she has less time)  Switch to diet Pepsi.     FOLLOW-UP:  Please call [phone] to schedule your next visit in 3 months    32 minutes spent on the date of the encounter doing chart review, history and exam, result review, counseling, developing plan of care, documentation, and further activities as noted      INTERVAL HISTORY:  Lima returns for medical weight management follow up.  Last seen 10/4/2022.  Is on 0.5 mg of ozempic.  States she has gained 17 lbs since last visit. (I think this is a mistaked entry).  She states the 197 lb was given to her when she went into her home clinic a few days before our last appt and then recorded at her virtual visit with us on 10/4/22 by verbal report. However see no records of vitals around that time.     Over the weekend she could get a jacket on that she couldn't fit in last year.  Also her stomach is smaller and her family states she looks smaller.        On Ozempic Feels portions are controlled. About 3x a week is snacking on a small portion controlled bag of peanuts from store.  Drinks Pepsi 12-24 oz oz daily.       Goals:  Continue working on having something with protein for breakfast- occurring 3 times a week.   Focus on your activity. - not met (Was doing better until it got cold now averages walking 2x a week.   See dietician within the month. - not met    Wt Readings from Last 10 Encounters:   01/19/23 214 lb 6.4 oz (97.3 kg)   10/04/22 197 lb (89.4 kg)   08/30/22 222 lb 11.2 oz (101 kg)   07/28/22 220 lb 9.6 oz (100.1 kg)   07/28/22 220 lb 9.6 oz (100.1 kg)   07/15/22 219 lb (99.3 kg)   05/20/22 237 lb (107.5 kg)   05/04/22 237 lb (107.5 kg)   08/13/21 245 lb (111.1 kg)   04/15/21 245 lb (111.1 kg)       WEIGHT METRICS:  Body mass index is 35.14 kg/m .   Current Weight: 214 lb 6.4 oz (97.3 kg) (Pt reported)  Last Visits Weight: 197 lb (89.4 kg)  Initial Weight  (lbs): 220.6 lbs  Cumulative weight loss (lbs): 6.2  Weight Loss Percentage: 2.81%    Wt Readings from Last 10 Encounters:   01/19/23 214 lb 6.4 oz (97.3 kg)   10/04/22 197 lb (89.4 kg)   08/30/22 222 lb 11.2 oz (101 kg)   07/28/22 220 lb 9.6 oz (100.1 kg)   07/28/22 220 lb 9.6 oz (100.1 kg)   07/15/22 219 lb (99.3 kg)   05/20/22 237 lb (107.5 kg)   05/04/22 237 lb (107.5 kg)   08/13/21 245 lb (111.1 kg)   04/15/21 245 lb (111.1 kg)        MEDICATIONS:   Current Outpatient Medications   Medication Sig Dispense Refill     albuterol (PROAIR HFA) 108 (90 Base) MCG/ACT inhaler Inhale 2 puffs into the lungs every 4 hours as needed for shortness of breath / dyspnea 8 g 11     albuterol (PROVENTIL) (2.5 MG/3ML) 0.083% neb solution Take 1 vial (2.5 mg) by nebulization every 6 hours as needed for shortness of breath / dyspnea or wheezing 120 mL 0     atorvastatin (LIPITOR) 20 MG tablet Take 1 tablet (20 mg) by mouth daily 90 tablet 3     blood glucose (NO BRAND SPECIFIED) test strip 1 Box of Test Strips for patients meter - test daily 100 strip 3     blood glucose monitoring (NO BRAND SPECIFIED) meter device kit Use to test blood sugar as directed. 1 kit 0     famotidine (PEPCID) 20 MG tablet Take 1 tablet (20 mg) by mouth daily 90 tablet 3     fluticasone-salmeterol (ADVAIR HFA) 230-21 MCG/ACT inhaler Inhale 2 puffs into the lungs 2 times daily Due for appointment. Please schedule: [phone] 12 g 0     guaiFENesin-dextromethorphan (ROBITUSSIN DM) 100-10 MG/5ML syrup Take 10 mLs by mouth every 4 hours as needed for cough 118 mL 0     ketotifen (ZADITOR) 0.025 % ophthalmic solution Place 1 drop into both eyes 2 times daily 1 drop in affected eye(s) 2 times a day 5 mL 1     Lancets 30G MISC 120 30 gauge lancets (substitute as needed) - test daily 120 each 3     lisinopril (ZESTRIL) 5 MG tablet Take 1 tablet (5 mg) by mouth daily 90 tablet 3     mupirocin (BACTROBAN) 2 % external ointment Apply topically 3 times daily 30 g 3      rivaroxaban ANTICOAGULANT (XARELTO) 20 MG TABS tablet Take 1 tablet (20 mg) by mouth daily (with dinner) 90 tablet 3     semaglutide (OZEMPIC) 2 MG/1.5ML SOPN pen Inject 0.5 mg subcutaneously once weekly 2 mL 3     Semaglutide, 1 MG/DOSE, 4 MG/3ML SOPN Inject 1 mg Subcutaneous once a week 9 mL 0     traMADol (ULTRAM) 50 MG tablet TAKE 1 TABLET BY MOUTH EVERY 6 HOURS AS NEEDED FOR SEVERE PAIN (SCHEDULE  APPOINTMENT  TO  DISCUSS  FURTHER  REFILLS  [phone]) 30 tablet 0     traZODone (DESYREL) 150 MG tablet Take 2 tablets (300 mg) by mouth At Bedtime 180 tablet 3       LABS:  Hemoglobin A1C   Date Value Ref Range Status   01/17/2023 6.4 (H) 0.0 - 5.6 % Final     Comment:     Normal <5.7%   Prediabetes 5.7-6.4%    Diabetes 6.5% or higher     Note: Adopted from ADA consensus guidelines.     TSH   Date Value Ref Range Status   05/04/2022 0.74 0.40 - 4.00 mU/L Final   09/02/2015 0.94 0.40 - 4.00 mU/L Final     Sodium   Date Value Ref Range Status   08/30/2022 139 133 - 144 mmol/L Final   10/04/2017 140 133 - 144 mmol/L Final     Potassium   Date Value Ref Range Status   08/30/2022 3.5 3.4 - 5.3 mmol/L Final   01/08/2019 3.7 3.4 - 5.3 mmol/L Final     Chloride   Date Value Ref Range Status   08/30/2022 103 94 - 109 mmol/L Final   10/04/2017 107 94 - 109 mmol/L Final     Carbon Dioxide   Date Value Ref Range Status   10/04/2017 25 20 - 32 mmol/L Final     Carbon Dioxide (CO2)   Date Value Ref Range Status   08/30/2022 28 20 - 32 mmol/L Final     Anion Gap   Date Value Ref Range Status   08/30/2022 8 3 - 14 mmol/L Final   10/04/2017 8 3 - 14 mmol/L Final     Glucose   Date Value Ref Range Status   08/30/2022 90 70 - 99 mg/dL Final   10/04/2017 101 (H) 70 - 99 mg/dL Final     Comment:     Non Fasting     Urea Nitrogen   Date Value Ref Range Status   08/30/2022 13 7 - 30 mg/dL Final   10/04/2017 10 7 - 30 mg/dL Final     Creatinine   Date Value Ref Range Status   08/30/2022 0.91 0.52 - 1.04 mg/dL Final   01/08/2019 0.81  0.52 - 1.04 mg/dL Final     GFR Estimate   Date Value Ref Range Status   08/30/2022 76 >60 mL/min/1.73m2 Final     Comment:     Effective December 21, 2021 eGFRcr in adults is calculated using the 2021 CKD-EPI creatinine equation which includes age and gender (Rosa Isela felder al., NEJ, DOI: 10.1056/HTYYfn8451337)   01/08/2019 85 >60 mL/min/[1.73_m2] Final     Comment:     Non  GFR Calc  Starting 12/18/2018, serum creatinine based estimated GFR (eGFR) will be   calculated using the Chronic Kidney Disease Epidemiology Collaboration   (CKD-EPI) equation.       Calcium   Date Value Ref Range Status   08/30/2022 8.9 8.5 - 10.1 mg/dL Final   10/04/2017 9.0 8.5 - 10.1 mg/dL Final     Bilirubin Total   Date Value Ref Range Status   08/30/2022 0.4 0.2 - 1.3 mg/dL Final   10/04/2017 0.3 0.2 - 1.3 mg/dL Final     Alkaline Phosphatase   Date Value Ref Range Status   08/30/2022 118 40 - 150 U/L Final   10/04/2017 96 40 - 150 U/L Final     ALT   Date Value Ref Range Status   08/30/2022 29 0 - 50 U/L Final   10/04/2017 23 0 - 50 U/L Final     AST   Date Value Ref Range Status   08/30/2022 11 0 - 45 U/L Final   10/04/2017 14 0 - 45 U/L Final     Cholesterol   Date Value Ref Range Status   01/17/2023 134 <200 mg/dL Final   07/27/2018 162 <200 mg/dL Final     HDL Cholesterol   Date Value Ref Range Status   07/27/2018 39 (L) >49 mg/dL Final     Direct Measure HDL   Date Value Ref Range Status   01/17/2023 44 (L) >=50 mg/dL Final     LDL Cholesterol Calculated   Date Value Ref Range Status   01/17/2023 72 <=100 mg/dL Final   07/27/2018 82 <100 mg/dL Final     Comment:     Desirable:       <100 mg/dl     Triglycerides   Date Value Ref Range Status   01/17/2023 88 <150 mg/dL Final   07/27/2018 206 (H) <150 mg/dL Final     Comment:     Borderline high:  150-199 mg/dl  High:             200-499 mg/dl  Very high:       >499 mg/dl  Non Fasting       WBC   Date Value Ref Range Status   02/19/2018 5.6 4.0 - 11.0 10e9/L Final     WBC  "Count   Date Value Ref Range Status   07/15/2022 8.5 4.0 - 11.0 10e3/uL Final     Hemoglobin   Date Value Ref Range Status   07/15/2022 15.6 11.7 - 15.7 g/dL Final   01/08/2019 14.6 11.7 - 15.7 g/dL Final     Hematocrit   Date Value Ref Range Status   07/15/2022 47.6 (H) 35.0 - 47.0 % Final   02/19/2018 42.4 35.0 - 47.0 % Final     MCV   Date Value Ref Range Status   07/15/2022 83 78 - 100 fL Final   02/19/2018 86 78 - 100 fl Final     Platelet Count   Date Value Ref Range Status   07/15/2022 285 150 - 450 10e3/uL Final   02/19/2018 224 150 - 450 10e9/L Final         BP Readings from Last 6 Encounters:   08/30/22 107/80   07/28/22 111/77   07/15/22 93/63   05/20/22 (!) 142/93   05/04/22 (!) 132/90   12/06/21 (!) 136/94       Pulse Readings from Last 6 Encounters:   08/30/22 91   07/28/22 84   07/15/22 109   05/20/22 88   05/04/22 100   12/06/21 88       PE:  Ht 5' 5.5\" (1.664 m)   Wt 214 lb 6.4 oz (97.3 kg)   BMI 35.14 kg/m    GENERAL: Healthy, alert and no distress  EYES: Eyes grossly normal to inspection.  No discharge or erythema, or obvious scleral/conjunctival abnormalities.  RESP: No audible wheeze, cough, or visible cyanosis.  No visible retractions or increased work of breathing.    SKIN: Visible skin clear. No significant rash, abnormal pigmentation or lesions.  NEURO: Cranial nerves grossly intact.  Mentation and speech appropriate for age.  PSYCH: Mentation appears normal, affect normal/bright, judgement and insight intact, normal speech and appearance well-groomed.      Sincerely,      Krystal Calix PA-C        "

## 2023-01-19 ENCOUNTER — VIRTUAL VISIT (OUTPATIENT)
Dept: SURGERY | Facility: CLINIC | Age: 53
End: 2023-01-19
Payer: COMMERCIAL

## 2023-01-19 VITALS — BODY MASS INDEX: 34.46 KG/M2 | HEIGHT: 66 IN | WEIGHT: 214.4 LBS

## 2023-01-19 DIAGNOSIS — E66.01 CLASS 2 SEVERE OBESITY DUE TO EXCESS CALORIES WITH SERIOUS COMORBIDITY AND BODY MASS INDEX (BMI) OF 35.0 TO 35.9 IN ADULT (H): ICD-10-CM

## 2023-01-19 DIAGNOSIS — E11.9 TYPE 2 DIABETES MELLITUS WITHOUT COMPLICATION, WITHOUT LONG-TERM CURRENT USE OF INSULIN (H): Primary | ICD-10-CM

## 2023-01-19 DIAGNOSIS — E66.812 CLASS 2 SEVERE OBESITY DUE TO EXCESS CALORIES WITH SERIOUS COMORBIDITY AND BODY MASS INDEX (BMI) OF 35.0 TO 35.9 IN ADULT (H): ICD-10-CM

## 2023-01-19 PROCEDURE — 99214 OFFICE O/P EST MOD 30 MIN: CPT | Mod: 95 | Performed by: PHYSICIAN ASSISTANT

## 2023-01-19 NOTE — PATIENT INSTRUCTIONS
"Nice to talk with you today. Thank you for allowing me the privilege of caring for you. We hope we provided you with the excellent service you deserve.     To ensure the quality of our services you may receive a patient satisfaction survey from an independent monitoring company.  The greatest compliment you can give is \"Likely to Recommend\"    Below is our plan we discussed.-  ANDI Rice      Plan:  Increase Ozempic to 1 mg weekly  See dietician this month   Please call [phone] to schedule with medical therapy management with Lauren Bloch, MTM Pharmacist     Goals:  Have breakfast every morning (Will looking pre made protien drinks on days she has less time)  Switch to diet Pepsi.     FOLLOW-UP:  Please call [phone] to schedule your next visit in 3 months and the dietician now.     10 Healthy Habits  Eat Better ? Move More ? Live Well   Eat breakfast daily.     Try to eat within the first hour after you wake up. This helps you control your appetite during the day, and you are less likely to snack in the evening.    80% of people who skip meals are overweight or obese.    2.   Include protein with every meal, even breakfast.    Protein will suppress your appetite longer than other types of food. This helps you  feel more satisfied with the amount of food you have eaten.    3.  Fill up on Fiber   Fiber comes from plants--fruits, veggies, whole grains, nuts/seeds and beans   Fiber is low in calories, high in phytonutrients and helps you stay full longer    4.  Eat S-L-O-W-L-Y   Take 20-30 minutes to eat each meal by taking small bites, chewing foods to applesauce consistency or 20-30 times before you swallow   Eating foods too fast can delay satiety/fullness signals and increase overeating     5.  Avoid Mindless Eating   Be present when you eat--take note of the smell, taste and quality of your food   Make a list of alternative activities you could do to prevent boredom/stress eating  Go for a walk, call " a friend, chew gum, paint your nails    6.  Keep a Journal   Writing down what you eat, how you feel and when you are active helps you identify new changes to work on from week to week         Look for ways to cut 100 calories from your current diet 2-3 times per day    7.  Drink 64 ounces of Non Calorie drinks between meals   Water   Zero calorie Propel , Vitamin Water , Crystal Light    Avoid regular soda pop    8.  Stop thinking about food choices as a  diet.    Instead, think of them as healthy, lifelong habits--an effort toward a new way of eating.   Weigh yourself once a week instead of everyday.     9.  Get enough sleep--at least 7 to 8 hours each night.    Lack of sleep is one reason that fat builds up around the waist.    10.  Exercise five to six times per week    Do a combination aerobic exercise (fast walking, biking, swimming) and strength training (Dumbbells, pushups, weight machines, resistance bands).   Start at 10 minutes per day and slowly work your way up to 30 minutes or more.  Taking the stairs instead of the elevator, park at the far end of the parking lot, pace while on the phone, take the dog for a walk.     [email or web address]

## 2023-01-19 NOTE — ASSESSMENT & PLAN NOTE
Patient was congratulated on wt loss success thus far. Healthy habits to assist with further weight loss were discussed. Lima will continue the Ozempic but increase her dose.  She will switch to diet pepsi and start having breakfast daily.  She will follow up with the pharmacist in 6 weeks and the dietician now.  In 3 months if we do not see much progress we will re discuss bariatric surgery.

## 2023-01-25 ENCOUNTER — TELEPHONE (OUTPATIENT)
Dept: SURGERY | Facility: CLINIC | Age: 53
End: 2023-01-25
Payer: COMMERCIAL

## 2023-01-25 NOTE — TELEPHONE ENCOUNTER
MTM referral from: [place] clinic visit (referral by provider) for weight management    MTM referral outreach attempt #2 on January 25, 2023 at 10:11 AM      Outcome: Patient not reachable after several attempts, will route to MTM Pharmacist/Provider as an FYI.  [facility] scheduling number is [phone].  Thank you for the referral.    Elizabeth Mercer, [facility]     Patient has [place] coverage

## 2023-01-26 ENCOUNTER — TELEPHONE (OUTPATIENT)
Dept: LAB | Facility: CLINIC | Age: 53
End: 2023-01-26
Payer: COMMERCIAL

## 2023-01-26 NOTE — TELEPHONE ENCOUNTER
Prior Authorization Retail Medication Request    Medication/Dose: Ozempic req prior auth  ICD code (if different than what is on RX):    Previously Tried and Failed:    Rationale:      Insurance Name:  [place] MEDICAID  Insurance ID:  [id number]      Pharmacy Information (if different than what is on RX)  Name:    Phone:

## 2023-01-31 NOTE — TELEPHONE ENCOUNTER
Central Prior Authorization Team   Phone: [phone]    PA Initiation    Medication: Ozempic req prior auth  Insurance Company:    Pharmacy Filling the Rx: [address]  Filling Pharmacy Phone: [phone]  Filling Pharmacy Fax: [phone]  Start Date: 1/31/2023

## 2023-02-01 NOTE — TELEPHONE ENCOUNTER
MA insurance states patient has another insurance that is primary.  I will need to contact pharmacy to get a screen shot of what primary insurance paid, then send it to MA.

## 2023-02-22 ENCOUNTER — TELEPHONE (OUTPATIENT)
Dept: FAMILY MEDICINE | Facility: CLINIC | Age: 53
End: 2023-02-22
Payer: COMMERCIAL

## 2023-02-22 NOTE — TELEPHONE ENCOUNTER
Patient Quality Outreach    Patient is due for the following:   Diabetes -  Eye Exam  Asthma  -  ACT needed  Depression  -  PHQ-9 needed and MARYAM    Next Steps:   Schedule a office visit for Eye Exam    Type of outreach:    Sent [facility]A message.      Questions for provider review:    None     Philomena Stoll, CMA

## 2023-03-26 ENCOUNTER — HEALTH MAINTENANCE LETTER (OUTPATIENT)
Age: 53
End: 2023-03-26

## 2023-04-06 DIAGNOSIS — E66.812 CLASS 2 SEVERE OBESITY DUE TO EXCESS CALORIES WITH SERIOUS COMORBIDITY AND BODY MASS INDEX (BMI) OF 35.0 TO 35.9 IN ADULT (H): ICD-10-CM

## 2023-04-06 DIAGNOSIS — E11.9 TYPE 2 DIABETES MELLITUS WITHOUT COMPLICATION, WITHOUT LONG-TERM CURRENT USE OF INSULIN (H): ICD-10-CM

## 2023-04-06 DIAGNOSIS — E66.01 CLASS 2 SEVERE OBESITY DUE TO EXCESS CALORIES WITH SERIOUS COMORBIDITY AND BODY MASS INDEX (BMI) OF 35.0 TO 35.9 IN ADULT (H): ICD-10-CM

## 2023-04-06 NOTE — TELEPHONE ENCOUNTER
Attempted to LM for pt to see MC msg and get back to clinic either via MC; however pt's mailbox full.  Sent MC message to ask if desires refill and remind to make appt.  Callie Casillas, MS, RD, RN

## 2023-04-07 ENCOUNTER — NURSE TRIAGE (OUTPATIENT)
Dept: FAMILY MEDICINE | Facility: CLINIC | Age: 53
End: 2023-04-07
Payer: COMMERCIAL

## 2023-04-07 ENCOUNTER — HOSPITAL ENCOUNTER (EMERGENCY)
Facility: CLINIC | Age: 53
Discharge: HOME | End: 2023-04-07
Attending: EMERGENCY MEDICINE | Admitting: EMERGENCY MEDICINE
Payer: COMMERCIAL

## 2023-04-07 VITALS
TEMPERATURE: 98.7 F | DIASTOLIC BLOOD PRESSURE: 98 MMHG | OXYGEN SATURATION: 97 % | HEART RATE: 91 BPM | SYSTOLIC BLOOD PRESSURE: 153 MMHG | RESPIRATION RATE: 16 BRPM

## 2023-04-07 DIAGNOSIS — R04.0 EPISTAXIS: ICD-10-CM

## 2023-04-07 PROCEDURE — 250N000009 HC RX 250: Performed by: EMERGENCY MEDICINE

## 2023-04-07 PROCEDURE — 30901 CONTROL OF NOSEBLEED: CPT | Mod: RT

## 2023-04-07 PROCEDURE — 99283 EMERGENCY DEPT VISIT LOW MDM: CPT | Mod: 25

## 2023-04-07 RX ORDER — TRANEXAMIC ACID 100 MG/ML
500 INJECTION, SOLUTION INTRAVENOUS ONCE
Status: COMPLETED | OUTPATIENT
Start: 2023-04-07 | End: 2023-04-07

## 2023-04-07 RX ADMIN — TRANEXAMIC ACID 500 MG: 1 INJECTION, SOLUTION INTRAVENOUS at 14:00

## 2023-04-07 ASSESSMENT — ENCOUNTER SYMPTOMS
DIARRHEA: 0
VOMITING: 0
COUGH: 0
ABDOMINAL PAIN: 0
FEVER: 0

## 2023-04-07 ASSESSMENT — ACTIVITIES OF DAILY LIVING (ADL)
ADLS_ACUITY_SCORE: 35
ADLS_ACUITY_SCORE: 35

## 2023-04-07 NOTE — ED PROVIDER NOTES
History     Chief Complaint:  Epistaxis       The history is provided by the patient.      Lima Bueno is a 52 year old female, anticoagulated on Xarelto, with a history of Protein S deficiency, DVT/PE, and hypertension who presents with epistaxis. Earlier today at approximately 0930, she was at work when she suddenly noticed some blood coming from her nose and it eventually resolved on its own. Then about a hour ago, she had another episode that continued to persist, so EMS was called and she was brought to the ED. While en route, the bleeding finally began to slow down. She is unsure which nostril the blood originated from, but noticed more was coming out the right side. She had a similar episode of epistaxis 2 years ago and had to have it cauterized at the time. She denies fever, cough, vomiting, diarrhea, chest pain, or abdominal pain.     Independent Historian: None     Review of External Notes: None      ROS:  Review of Systems   Constitutional: Negative for fever.   HENT: Positive for nosebleeds.    Respiratory: Negative for cough.    Cardiovascular: Negative for chest pain.   Gastrointestinal: Negative for abdominal pain, diarrhea and vomiting.   All other systems reviewed and are negative.      Allergies:  Phosphoric Acid  Terbutaline  Trazodone  Wellbutrin [Bupropion]     Medications:    Albuterol   Lipitor   Advair   Zestril   Xarelto   Ozempic   Semaglutide   Ultram   Desyrel     Past Medical History:    ADD   Asthma   Breast mass   DVT   PE   Protein S deficiency   Tobacco use disorder   Spinal stenosis   Type 2 DM   HLD   Severe obesity   HTN   Pancreatic cyst     Past Surgical History:    Unspecified ankle surgery   Colonoscopy w/ polypectomy and bx      Family History:    Brother - HTN   Father - schizophrenia   Mother - thyroid disease     Social History:  Hx of smoking (currently); denies smokeless tobacco use, current alcohol use, or drug use   PCP: Oscar Baltazar  patient presents to the ED alone via EMS    Physical Exam     Patient Vitals for the past 24 hrs:   BP Temp Pulse Resp SpO2   04/07/23 1328 -- -- -- -- 97 %   04/07/23 1254 -- 98.7  F (37.1  C) -- -- --   04/07/23 1251 -- -- -- 16 --   04/07/23 1250 -- -- -- -- 94 %   04/07/23 1249 (!) 153/98 -- 91 -- --        Physical Exam  Vitals and nursing note reviewed.   Constitutional:       General: She is not in acute distress.     Appearance: She is not ill-appearing.   HENT:      Head: Normocephalic and atraumatic.      Right Ear: External ear normal.      Left Ear: External ear normal.      Nose:      Right Nostril: Epistaxis present.   Eyes:      Extraocular Movements: Extraocular movements intact.      Conjunctiva/sclera: Conjunctivae normal.   Pulmonary:      Effort: Pulmonary effort is normal. No respiratory distress.   Musculoskeletal:         General: No deformity or signs of injury.   Skin:     Coloration: Skin is not pale.      Findings: No rash.   Neurological:      Mental Status: She is alert.   Psychiatric:         Mood and Affect: Mood normal.         Behavior: Behavior normal.           Emergency Department Course   Procedures   PROCEDURE: Anterior Nasal Packing and Cauterization    PROCEDURE NOTE: The patient's right nare was prepped with 4% Lidocaine.  The patient's epistaxis could not be stopped with pressure.  I packed his nose with surgical patties soaked in Afrin and TXA. After hemostasis was achieved, I cauterized with silver nitrate to right anterior septum at the likely source of bleeding. The patient tolerated the procedure well and there were no complications.  She was observed in the emergency department following the procedure and had no recurrence of his bleeding.    Emergency Department Course & Assessments:    Interventions:  1400 Cyklokapron 500 mg Nasal     Independent Interpretation (X-rays, CTs, rhythm strip):  N/A     Consultations/Discussion of Management or Tests:  N/A     Social  Determinants of Health affecting care:  None     Assessments:  1300 I obtained history and examined the patient as noted above.  1341 I rechecked the patient and administered an anaesthetic.  1419 I rechecked the patient and a nasal packing was performed as outlined in the procedure note above. The patient tolerated well and there were no complications.    Disposition:  The patient was discharged to home.     Impression & Plan    Medical Decision Makin-year-old female presenting with epistaxis.  She is on Xarelto which likely contributed.  No signs of significant blood loss and she is hemodynamically stable.  Bleeding did not stop with pressure so the right nostril was packed with surgical patties soaked in lidocaine 4%, TXA, oxymetazoline.  Hemostasis was achieved using this.  After removal of the surgical patties, the suspected area of bleeding was cauterized with silver nitrate.  Patient was observed in the ED for 20 to 30 minutes without any further rebleeding noted.  Discussed return precautions and I will refer to ENT for follow-up.    Diagnosis:    ICD-10-CM    1. Epistaxis  R04.0 Adult ENT  Referral           Scribe Disclosure:  I, Kobi Marquez, am serving as a scribe at 1:15 PM on 2023 to document services personally performed by Salvador Diaz MD based on my observations and the provider's statements to me.    2023   Salvador Diaz MD Goodwin, Shaun M, MD  [phone]

## 2023-04-07 NOTE — ED TRIAGE NOTES
Pt present via EMS, per EMS pt had a bloody nose that started about 0930 today and stopped on its own and pt had another blood nose that started about 1 hr pta and has been continuously bleeding.    Pt states she has a blood clotting disorder pt states r/t vitamin k deficiency

## 2023-04-07 NOTE — TELEPHONE ENCOUNTER
Pt was called with providers message. Future OV was scheduled. Pt was also advised on UC hours and location if needed.

## 2023-04-07 NOTE — ED NOTES
Bed: ED06  Expected date:   Expected time:   Means of arrival:   Comments:  [facility] 52 F nosebleed clotting disorder eta 1249

## 2023-04-07 NOTE — TELEPHONE ENCOUNTER
Nurse Triage SBAR    Is this a 2nd Level Triage? NO    Situation: Patient called stating that she had a moderate bloody nose this morning that lasted for about 2 minutes.     Background: Patient has had a blood nose in the past in the same nostril and had to have it cauterized. Patient is currently taking Xarelto 20 mg daily due to history of DVT. Patient was able to get her nosebleed stopped and denies having any lightheadedness or dizziness.     Assessment: See below    Protocol Recommended Disposition:   See in Office Today    Recommendation: Per protocol patient should be seen in the office today, but there are no openings in the clinic. Patient is wondering if it would be okay to wait until Monday to be seen by Liz or Dr. Baltazar. Patient stated that she will go into UC/ED if another nosebleed happens in the meantime. Routing to provider for approval with this plan.    Routed to provider    Does the patient meet one of the following criteria for ADS visit consideration? 16+ years old, with an [facility] PCP     TIP  Providers, please consider if this condition is appropriate for management at one of our Acute and Diagnostic Services sites.     If patient is a good candidate, please use dotphrase <dot>triageresponse and select Refer to ADS to document.      Reason for Disposition    Taking Coumadin (warfarin) or other strong blood thinner, or known bleeding disorder (e.g., thrombocytopenia)    Additional Information    Negative: Fainted (passed out), or too weak to stand following large blood loss    Negative: Sounds like a life-threatening emergency to the triager    Negative: Nosebleed followed nose injury    Negative: Bleeding present > 30 minutes and using correct method of direct pressure    Negative: Bleeding now and second call after being instructed in correct technique of direct pressure    Negative: Lightheadedness or dizziness    Negative: Pale skin (pallor) of new-onset or worsening    Negative: Has  "nasal packing (inserted by health care provider to control bleeding) and now has new rash    Negative: Has nasal packing and now has bleeding around the packing  (Exception: Few drops or ooze.)    Negative: Patient sounds very sick or weak to the triager    Negative: Large amount of blood has been lost (e.g., one cup)    Negative: Bleeding recurs 3 or more times in 24 hours despite direct pressure    Answer Assessment - Initial Assessment Questions  1. AMOUNT OF BLEEDING: \"How bad is the bleeding?\" \"How much blood was lost?\" \"Has the bleeding stopped?\"    - MILD: needed a couple tissues    - MODERATE: needed many tissues    - SEVERE: large blood clots, soaked many tissues, lasted more than 30 minutes         Moderate and it has now stopped had it for about 2 minutes    2. ONSET: \"When did the nosebleed start?\"         This morning    3. FREQUENCY: \"How many nosebleeds have you had in the last 24 hours?\"         Just this one    4. RECURRENT SYMPTOMS: \"Have there been other recent nosebleeds?\" If Yes, ask: \"How long did it take you to stop the bleeding?\" \"What worked best?\"         Yes, and the same nostril     5. CAUSE: \"What do you think caused this nosebleed?\"        Unsure    6. LOCAL FACTORS: \"Do you have any cold symptoms?\", \"Have you been rubbing or picking at your nose?\"        None    7. SYSTEMIC FACTORS: \"Do you have high blood pressure or any bleeding problems?\"        History of blood clotts    8. BLOOD THINNERS: \"Do you take any blood thinners?\" (e.g., aspirin, clopidogrel / Plavix, coumadin, heparin). Notes: Other strong blood thinners include: Arixtra (fondaparinux), Eliquis (apixaban), Pradaxa (dabigatran), and Xarelto (rivaroxaban).        Xarelto    9. OTHER SYMPTOMS: \"Do you have any other symptoms?\" (e.g., lightheadedness)        None    10. PREGNANCY: \"Is there any chance you are pregnant?\" \"When was your last menstrual period?\"          No    Protocols used: NOSEBLEED-A-OH    SEE IN OFFICE " TODAY:   * You need to be examined today. Let me give you an appointment.  * IF NO AVAILABLE APPOINTMENTS: You need to be seen in the Urgent Care Center. Go to the one at ____________. Go there today. A nearby Urgent Care Center is often a good source of care. Another choice is to go to the Emergency Department.      FIRST AID ADVICE FOR NOSEBLEED:  * First blow the nose to clear out any large blood clots.  * Placing your thumb and index finger over each side of the soft lower portion of the nose, firmly pinch the nostrils together. Pinch the nostrils together for 10-15 minutes.  * Lean slightly forward. This keeps the blood from trickling down the back of your throat.      PINCH THE NOSTRILS TO STOP A NOSEBLEED:  * First gently blow the nose to clear out any large clots.  * LEAN FORWARD: Sit down and lean forward. Reason: Blood makes people choke if they lean backwards.  * PINCH THE NOSE: Gently squeeze the soft parts of the lower nose (nostrils) together. Use your thumb and your index finger in a pinching manner. Do this for 15 minutes. Use a clock or watch to measure the time. Goal: Apply constant pressure to the bleeding point.  * If the bleeding continues after 15 minutes of squeezing, move your point of pressure and repeat again for another 15 minutes.      PREVENTION:   * Dry air in your house or workplace can increase the chance of nosebleeds occurring. If the air is dry, use a humidifier in your bedroom to keep the nose from drying out. You can also apply petroleum jelly to the center wall (septum) inside the nose twice daily to reduce cracking and to promote healing.  * Bleeding can start again if you rub your nose or blow the nose too hard. Avoid touching your nose and nose picking. Avoid blowing the nose.  * Do not take aspirin or other anti-inflammatory medications (e.g., ibuprofen, Advil, Motrin, Aleve), unless you have been instructed to by your physician.      CALL BACK IF:  * Nosebleed lasts longer  than 30 minutes with using direct pressure  * Lightheadedness or weakness occurs  * Nosebleeds become worse  * You become worse        Patient/Caregiver understands and will follow care advice? Other, see documentation          Taylor AGUIRRE RN  [facility] Triage Team

## 2023-04-10 ENCOUNTER — OFFICE VISIT (OUTPATIENT)
Dept: FAMILY MEDICINE | Facility: CLINIC | Age: 53
End: 2023-04-10
Payer: COMMERCIAL

## 2023-04-10 VITALS
RESPIRATION RATE: 16 BRPM | BODY MASS INDEX: 36.71 KG/M2 | SYSTOLIC BLOOD PRESSURE: 133 MMHG | HEART RATE: 98 BPM | WEIGHT: 224 LBS | TEMPERATURE: 97.7 F | OXYGEN SATURATION: 100 % | DIASTOLIC BLOOD PRESSURE: 87 MMHG

## 2023-04-10 DIAGNOSIS — R04.0 EPISTAXIS: Primary | ICD-10-CM

## 2023-04-10 DIAGNOSIS — J45.909 PERSISTENT ASTHMA WITHOUT COMPLICATION, UNSPECIFIED ASTHMA SEVERITY: ICD-10-CM

## 2023-04-10 PROCEDURE — 99214 OFFICE O/P EST MOD 30 MIN: CPT | Performed by: NURSE PRACTITIONER

## 2023-04-10 RX ORDER — FLUTICASONE PROPIONATE AND SALMETEROL XINAFOATE 230; 21 UG/1; UG/1
2 AEROSOL, METERED RESPIRATORY (INHALATION) 2 TIMES DAILY
Qty: 12 G | Refills: 1 | Status: SHIPPED | OUTPATIENT
Start: 2023-04-10 | End: 2023-10-04

## 2023-04-10 RX ORDER — FEXOFENADINE HCL 180 MG/1
180 TABLET ORAL DAILY
Qty: 30 TABLET | Refills: 3 | Status: SHIPPED | OUTPATIENT
Start: 2023-04-10 | End: 2023-10-04

## 2023-04-10 RX ORDER — BENZONATATE 100 MG/1
100-200 CAPSULE ORAL 3 TIMES DAILY PRN
Qty: 45 CAPSULE | Refills: 1 | Status: SHIPPED | OUTPATIENT
Start: 2023-04-10 | End: 2023-10-04

## 2023-04-10 ASSESSMENT — ANXIETY QUESTIONNAIRES
3. WORRYING TOO MUCH ABOUT DIFFERENT THINGS: NOT AT ALL
GAD7 TOTAL SCORE: 0
IF YOU CHECKED OFF ANY PROBLEMS ON THIS QUESTIONNAIRE, HOW DIFFICULT HAVE THESE PROBLEMS MADE IT FOR YOU TO DO YOUR WORK, TAKE CARE OF THINGS AT HOME, OR GET ALONG WITH OTHER PEOPLE: NOT DIFFICULT AT ALL
1. FEELING NERVOUS, ANXIOUS, OR ON EDGE: NOT AT ALL
GAD7 TOTAL SCORE: 0
2. NOT BEING ABLE TO STOP OR CONTROL WORRYING: NOT AT ALL
6. BECOMING EASILY ANNOYED OR IRRITABLE: NOT AT ALL
5. BEING SO RESTLESS THAT IT IS HARD TO SIT STILL: NOT AT ALL
7. FEELING AFRAID AS IF SOMETHING AWFUL MIGHT HAPPEN: NOT AT ALL

## 2023-04-10 ASSESSMENT — ASTHMA QUESTIONNAIRES: ACT_TOTALSCORE: 16

## 2023-04-10 ASSESSMENT — PATIENT HEALTH QUESTIONNAIRE - PHQ9
5. POOR APPETITE OR OVEREATING: NOT AT ALL
10. IF YOU CHECKED OFF ANY PROBLEMS, HOW DIFFICULT HAVE THESE PROBLEMS MADE IT FOR YOU TO DO YOUR WORK, TAKE CARE OF THINGS AT HOME, OR GET ALONG WITH OTHER PEOPLE: NOT DIFFICULT AT ALL
SUM OF ALL RESPONSES TO PHQ QUESTIONS 1-9: 1
SUM OF ALL RESPONSES TO PHQ QUESTIONS 1-9: 1

## 2023-04-10 ASSESSMENT — PAIN SCALES - GENERAL: PAINLEVEL: NO PAIN (0)

## 2023-04-10 NOTE — TELEPHONE ENCOUNTER
Patient contacted via phone call, states she is currently taking the ozempic 1mg, has one more dose that she will take this Thursday. Requested to be transferred to the call center to schedule follow up appointment. Attempted to transfer call but call was disconnected. Call center will call pt directly to set up appointment.

## 2023-04-10 NOTE — TELEPHONE ENCOUNTER
Attempted to call pt to confirm refill request, pt answered, however, asked if writer can call back at 10:30am.

## 2023-04-10 NOTE — PATIENT INSTRUCTIONS
[facility]   Located in: [facility]  Address: [address], [place] [address]  Phone: [phone]

## 2023-04-10 NOTE — PROGRESS NOTES
Assessment & Plan     (R04.0) Epistaxis  (primary encounter diagnosis)  Comment: Has not had any episodes of epistaxis since cauterization was performed in the ER. Discussed using saline nasal spray as needed to keep nose moist and only to lightly blow nose when she needs to.   Plan: Schedule appointment with ENT.     (J45.909) Persistent asthma without complication, unspecified asthma severity  Comment: Pt hasn't been using her inhalers as she has been out of her advair and thought she didn't have any refills for albuterol. Re-ordered Advair today. Plan to also start daily allegra to help with allergy triggers with asthma. Pt requested refill of tessalon for cough.  Plan: fluticasone-salmeterol (ADVAIR HFA) 230-21 MCG/ACT inhaler, benzonatate (TESSALON) 100 MG capsule, fexofenadine (ALLEGRA) 180 MG tablet           Follow up with any questions or concerns.       MISTY Calloway CNP  [facility] STANISLAW Amato is a 52 year old, presenting for the following health issues:  No chief complaint on file.         View : No data to display.              HPI     ED/UC Followup:    Facility:  [facility]  Date of visit: 04/07/2023  Reason for visit: epistaxis    Seen in ED for epistaxis. They did packing followed by cautery.  Hasn't had a nosebleed since Friday. Feels like she has a big booger deep in her nose.   Has referral placed for ENT, but hasn't been able to get an appointment.   Was told her ears are full of blood.   Following discharge instructions.   Using humidifier for dryness.     Asthma is not well controlled, has been coughing more lately worse with spring allergies-needs refill of inhalers.              Review of Systems   Detailed as above.       Objective    /87 (BP Location: Right arm, Patient Position: Sitting, Cuff Size: Adult Large)   Pulse 98   Temp 97.7  F (36.5  C) (Temporal)   Resp 16   Wt 101.6 kg (224 lb)   SpO2  100%   BMI 36.71 kg/m    Body mass index is 36.71 kg/m .  Physical Exam  Constitutional:       Appearance: Normal appearance.   HENT:      Right Ear: Tympanic membrane, ear canal and external ear normal.      Left Ear: Tympanic membrane, ear canal and external ear normal.      Ears:      Comments: Slight injection       Nose:      Right Turbinates: Swollen.      Comments: Bilateral nares with clear drainage  Cardiovascular:      Rate and Rhythm: Normal rate.   Pulmonary:      Effort: Pulmonary effort is normal.   Skin:     General: Skin is warm and dry.   Neurological:      General: No focal deficit present.      Mental Status: She is alert.   Psychiatric:         Mood and Affect: Mood normal.                  I saw this patient in collaboration with Danielle Lopez NP student       I was present with the APRN/PA student who participated in the service and in the documentation of the services provided. I have verified the history and personally performed the physical exam and medical decision making, as documented by the student and edited by me.     Liz England, MISTY, CNP    Danielle Lopez NP Student

## 2023-04-11 RX ORDER — SEMAGLUTIDE 1.34 MG/ML
INJECTION, SOLUTION SUBCUTANEOUS
Qty: 9 ML | Refills: 0 | Status: SHIPPED | OUTPATIENT
Start: 2023-04-11 | End: 2023-04-25

## 2023-04-20 NOTE — PROGRESS NOTES
"Lima is a 52 year old who is being evaluated via a billable video visit.    2  The patient has been notified of following:     \"This video visit will be conducted via a call between you and your physician/provider. We have found that certain health care needs can be provided without the need for an in-person physical exam.  This service lets us provide the care you need with a video conversation.  If a prescription is necessary we can send it directly to your pharmacy.  If lab work is needed we can place an order for that and you can then stop by our lab to have the test done at a later time.    Video visits are billed at different rates depending on your insurance coverage.  Please reach out to your insurance provider with any questions.    If during the course of the call the physician/provider feels a video visit is not appropriate, you will not be charged for this service.\"    Patient has given verbal consent for Video visit? Yes    How would you like to obtain your AVS? MyChart    If the video visit is dropped, the invitation should be resent by: Text to cell phone: [phone]    Will anyone else be joining your video visit? No    I    Video-Visit Details    Type of service:  Video Visit    Video Start Time: 9:00 AM    Video End Time:9:28 AM    Originating Location (pt. Location): Home    Distant Location (provider location):  [facility] [place]     Platform used for Video Visit: Scoopshot          2023      Return Medical Weight Management Note     Lima Bueno  MRN:  [id number]  :  1970    Dear Oscar Baltazar MD, MD,    I had the pleasure of seeing your patient Lima Bueno. She is a 52 year old female who I am continuing to see for treatment of obesity related to:         View : No data to display.                Assessment & Plan   Problem List Items Addressed This Visit     Type 2 diabetes mellitus without complication, without " long-term current use of insulin (H)    Relevant Medications    Semaglutide, 2 MG/DOSE, (OZEMPIC, 2 MG/DOSE,) 8 MG/3ML pen    Morbid obesity (H) - Primary      Healthy habits to assist with further weight loss were discussed. Lima will continue the Ozempic but increase to 2 mg weekly.  She will see the dietician this Friday. She is going to start walking 3x a week.  Additional goals in pt instructions. It will be coming up on 1 year since starting the program and her BMI is still 37.  In 3 months, if no real progress, she will see myself and the dietician for a barisrinivasai initial evaluation in clinic. She will review the info session prior to that visit.                Relevant Medications    Semaglutide, 2 MG/DOSE, (OZEMPIC, 2 MG/DOSE,) 8 MG/3ML pen        PATIENT INSTRUCTIONS:  Increase Ozempic 2 mg daily  Please view bariatric online info session. Link below:     [email or web address]   See dietician this Friday    Goals:  Portion out 1/2 cup of cashews   Exercise: Walk 3x a week    FOLLOW-UP:    Please call [phone] to schedule your next visit in 3 months     35 minutes spent on the date of the encounter doing chart review, history and exam, result review, counseling, developing plan of care, documentation, and further activities as noted      INTERVAL HISTORY:  Lima returns for medical weight management follow up.  Last seen on 1/19/2023.  We increase Ozempic to 1 mg weekly. Has no hunger between her meals. Feels is is working but feels the medication wears off around day 5.   Does not know why her weight is up 10 lbs.      Diet Recall  6:30 Breakfast: Coffee and Protein Drink in the morning.  (:00 am 1-2 handfuls of Cashews   3:00 Lunch: Fried Chicken [place]  7 PM Dinner Sweet potatoes, hermila greens, backed chicken    Exercise: none     Previous Goals:    Have breakfast every morning (Will looking pre made protien drinks on days she has less  time)-met  Switch to diet Pepsi. - did not discuss  See dietician this month - not met  Please call [phone] to schedule with medical therapy management with Lauren Bloch, MTM Pharmacist- not met    WEIGHT METRICS:  Body mass index is 37.28 kg/m .   Current Weight: 224 lb (101.6 kg) (Pt reported)  Last Visits Weight: 214 lb 6.4 oz (97.3 kg)  Initial Weight (lbs): 220.6 lbs  Cumulative weight loss (lbs): -3.4  Weight Loss Percentage: -1.54%    Wt Readings from Last 10 Encounters:   04/25/23 224 lb (101.6 kg)   04/10/23 224 lb (101.6 kg)   01/19/23 214 lb 6.4 oz (97.3 kg)   10/04/22 197 lb (89.4 kg)   08/30/22 222 lb 11.2 oz (101 kg)   07/28/22 220 lb 9.6 oz (100.1 kg)   07/28/22 220 lb 9.6 oz (100.1 kg)   07/15/22 219 lb (99.3 kg)   05/20/22 237 lb (107.5 kg)   05/04/22 237 lb (107.5 kg)          MEDICATIONS:   Current Outpatient Medications   Medication Sig Dispense Refill     albuterol (PROAIR HFA) 108 (90 Base) MCG/ACT inhaler Inhale 2 puffs into the lungs every 4 hours as needed for shortness of breath / dyspnea 8 g 11     albuterol (PROVENTIL) (2.5 MG/3ML) 0.083% neb solution Take 1 vial (2.5 mg) by nebulization every 6 hours as needed for shortness of breath / dyspnea or wheezing 120 mL 0     atorvastatin (LIPITOR) 20 MG tablet Take 1 tablet (20 mg) by mouth daily 90 tablet 3     benzonatate (TESSALON) 100 MG capsule Take 1-2 capsules (100-200 mg) by mouth 3 times daily as needed for cough 45 capsule 1     blood glucose (NO BRAND SPECIFIED) test strip 1 Box of Test Strips for patients meter - test daily 100 strip 3     blood glucose monitoring (NO BRAND SPECIFIED) meter device kit Use to test blood sugar as directed. 1 kit 0     famotidine (PEPCID) 20 MG tablet Take 1 tablet (20 mg) by mouth daily 90 tablet 3     fexofenadine (ALLEGRA) 180 MG tablet Take 1 tablet (180 mg) by mouth daily 30 tablet 3     fluticasone-salmeterol (ADVAIR HFA) 230-21 MCG/ACT inhaler Inhale 2 puffs into the lungs 2 times daily  12 g 1     guaiFENesin-dextromethorphan (ROBITUSSIN DM) 100-10 MG/5ML syrup Take 10 mLs by mouth every 4 hours as needed for cough 118 mL 0     ketotifen (ZADITOR) 0.025 % ophthalmic solution Place 1 drop into both eyes 2 times daily 1 drop in affected eye(s) 2 times a day 5 mL 1     Lancets 30G MISC 120 30 gauge lancets (substitute as needed) - test daily 120 each 3     lisinopril (ZESTRIL) 5 MG tablet Take 1 tablet (5 mg) by mouth daily 90 tablet 3     mupirocin (BACTROBAN) 2 % external ointment Apply topically 3 times daily 30 g 3     OZEMPIC, 1 MG/DOSE, 4 MG/3ML pen INJECT 1 MG UNDER THE SKIN ONCE A WEEK 9 mL 0     rivaroxaban ANTICOAGULANT (XARELTO) 20 MG TABS tablet Take 1 tablet (20 mg) by mouth daily (with dinner) 90 tablet 3     semaglutide (OZEMPIC) 2 MG/1.5ML SOPN pen Inject 0.5 mg subcutaneously once weekly 2 mL 3     Semaglutide, 2 MG/DOSE, (OZEMPIC, 2 MG/DOSE,) 8 MG/3ML pen Inject 2 mg Subcutaneous once a week 24 mL 1     traMADol (ULTRAM) 50 MG tablet TAKE 1 TABLET BY MOUTH EVERY 6 HOURS AS NEEDED FOR SEVERE PAIN (SCHEDULE  APPOINTMENT  TO  DISCUSS  FURTHER  REFILLS  [phone]) 30 tablet 0     traZODone (DESYREL) 150 MG tablet Take 2 tablets (300 mg) by mouth At Bedtime 180 tablet 3       LABS:  Hemoglobin A1C   Date Value Ref Range Status   01/17/2023 6.4 (H) 0.0 - 5.6 % Final     Comment:     Normal <5.7%   Prediabetes 5.7-6.4%    Diabetes 6.5% or higher     Note: Adopted from ADA consensus guidelines.     TSH   Date Value Ref Range Status   05/04/2022 0.74 0.40 - 4.00 mU/L Final   09/02/2015 0.94 0.40 - 4.00 mU/L Final     Sodium   Date Value Ref Range Status   08/30/2022 139 133 - 144 mmol/L Final   10/04/2017 140 133 - 144 mmol/L Final     Potassium   Date Value Ref Range Status   08/30/2022 3.5 3.4 - 5.3 mmol/L Final   01/08/2019 3.7 3.4 - 5.3 mmol/L Final     Chloride   Date Value Ref Range Status   08/30/2022 103 94 - 109 mmol/L Final   10/04/2017 107 94 - 109 mmol/L Final     Carbon  Dioxide   Date Value Ref Range Status   10/04/2017 25 20 - 32 mmol/L Final     Carbon Dioxide (CO2)   Date Value Ref Range Status   08/30/2022 28 20 - 32 mmol/L Final     Anion Gap   Date Value Ref Range Status   08/30/2022 8 3 - 14 mmol/L Final   10/04/2017 8 3 - 14 mmol/L Final     Glucose   Date Value Ref Range Status   08/30/2022 90 70 - 99 mg/dL Final   10/04/2017 101 (H) 70 - 99 mg/dL Final     Comment:     Non Fasting     Urea Nitrogen   Date Value Ref Range Status   08/30/2022 13 7 - 30 mg/dL Final   10/04/2017 10 7 - 30 mg/dL Final     Creatinine   Date Value Ref Range Status   08/30/2022 0.91 0.52 - 1.04 mg/dL Final   01/08/2019 0.81 0.52 - 1.04 mg/dL Final     GFR Estimate   Date Value Ref Range Status   08/30/2022 76 >60 mL/min/1.73m2 Final     Comment:     Effective December 21, 2021 eGFRcr in adults is calculated using the 2021 CKD-EPI creatinine equation which includes age and gender (Rosa Isela et al., NEJM, DOI: 10.1056/KJEKkf2818662)   01/08/2019 85 >60 mL/min/[1.73_m2] Final     Comment:     Non  GFR Calc  Starting 12/18/2018, serum creatinine based estimated GFR (eGFR) will be   calculated using the Chronic Kidney Disease Epidemiology Collaboration   (CKD-EPI) equation.       Calcium   Date Value Ref Range Status   08/30/2022 8.9 8.5 - 10.1 mg/dL Final   10/04/2017 9.0 8.5 - 10.1 mg/dL Final     Bilirubin Total   Date Value Ref Range Status   08/30/2022 0.4 0.2 - 1.3 mg/dL Final   10/04/2017 0.3 0.2 - 1.3 mg/dL Final     Alkaline Phosphatase   Date Value Ref Range Status   08/30/2022 118 40 - 150 U/L Final   10/04/2017 96 40 - 150 U/L Final     ALT   Date Value Ref Range Status   08/30/2022 29 0 - 50 U/L Final   10/04/2017 23 0 - 50 U/L Final     AST   Date Value Ref Range Status   08/30/2022 11 0 - 45 U/L Final   10/04/2017 14 0 - 45 U/L Final     Cholesterol   Date Value Ref Range Status   01/17/2023 134 <200 mg/dL Final   07/27/2018 162 <200 mg/dL Final     HDL Cholesterol   Date  "Value Ref Range Status   07/27/2018 39 (L) >49 mg/dL Final     Direct Measure HDL   Date Value Ref Range Status   01/17/2023 44 (L) >=50 mg/dL Final     LDL Cholesterol Calculated   Date Value Ref Range Status   01/17/2023 72 <=100 mg/dL Final   07/27/2018 82 <100 mg/dL Final     Comment:     Desirable:       <100 mg/dl     Triglycerides   Date Value Ref Range Status   01/17/2023 88 <150 mg/dL Final   07/27/2018 206 (H) <150 mg/dL Final     Comment:     Borderline high:  150-199 mg/dl  High:             200-499 mg/dl  Very high:       >499 mg/dl  Non Fasting       WBC   Date Value Ref Range Status   02/19/2018 5.6 4.0 - 11.0 10e9/L Final     WBC Count   Date Value Ref Range Status   07/15/2022 8.5 4.0 - 11.0 10e3/uL Final     Hemoglobin   Date Value Ref Range Status   07/15/2022 15.6 11.7 - 15.7 g/dL Final   01/08/2019 14.6 11.7 - 15.7 g/dL Final     Hematocrit   Date Value Ref Range Status   07/15/2022 47.6 (H) 35.0 - 47.0 % Final   02/19/2018 42.4 35.0 - 47.0 % Final     MCV   Date Value Ref Range Status   07/15/2022 83 78 - 100 fL Final   02/19/2018 86 78 - 100 fl Final     Platelet Count   Date Value Ref Range Status   07/15/2022 285 150 - 450 10e3/uL Final   02/19/2018 224 150 - 450 10e9/L Final         BP Readings from Last 6 Encounters:   04/10/23 133/87   04/07/23 (!) 153/98   08/30/22 107/80   07/28/22 111/77   07/15/22 93/63   05/20/22 (!) 142/93       Pulse Readings from Last 6 Encounters:   04/10/23 98   04/07/23 91   08/30/22 91   07/28/22 84   07/15/22 109   05/20/22 88       PE:  Ht 5' 5\" (1.651 m)   Wt 224 lb (101.6 kg)   BMI 37.28 kg/m    GENERAL: Healthy, alert and no distress  EYES: Eyes grossly normal to inspection.  No discharge or erythema, or obvious scleral/conjunctival abnormalities.  RESP: No audible wheeze, cough, or visible cyanosis.  No visible retractions or increased work of breathing.    SKIN: Visible skin clear. No significant rash, abnormal pigmentation or lesions.  NEURO: Cranial " nerves grossly intact.  Mentation and speech appropriate for age.  PSYCH: Mentation appears normal, affect normal/bright, judgement and insight intact, normal speech and appearance well-groomed.      Sincerely,      Krystal Calix PA-C

## 2023-04-25 ENCOUNTER — VIRTUAL VISIT (OUTPATIENT)
Dept: SURGERY | Facility: CLINIC | Age: 53
End: 2023-04-25
Payer: COMMERCIAL

## 2023-04-25 VITALS — BODY MASS INDEX: 37.32 KG/M2 | WEIGHT: 224 LBS | HEIGHT: 65 IN

## 2023-04-25 DIAGNOSIS — E66.01 MORBID OBESITY (H): Primary | ICD-10-CM

## 2023-04-25 DIAGNOSIS — E11.9 TYPE 2 DIABETES MELLITUS WITHOUT COMPLICATION, WITHOUT LONG-TERM CURRENT USE OF INSULIN (H): ICD-10-CM

## 2023-04-25 PROCEDURE — 99214 OFFICE O/P EST MOD 30 MIN: CPT | Mod: VID | Performed by: PHYSICIAN ASSISTANT

## 2023-04-25 RX ORDER — SEMAGLUTIDE 2.68 MG/ML
2 INJECTION, SOLUTION SUBCUTANEOUS WEEKLY
Qty: 24 ML | Refills: 1 | Status: SHIPPED | OUTPATIENT
Start: 2023-04-25 | End: 2023-10-04

## 2023-04-25 NOTE — PATIENT INSTRUCTIONS
"  To ensure quality you may receive a patient satisfaction survey. The greatest compliment you can give is \"Likely to Recommend.\"    Nice to talk with you today.  Thank you for your trust. Below is the plan discussed.-  ANDI Rice      Plan:  Increase Ozempic 2 mg daily  Please view bariatric online info session. Link below:     [email or web address]   See dietician this Friday    Goals:  Portion out 1/2 cup of cashews   Exercise: Walk 3x a week    FOLLOW-UP:    Please call [phone] to schedule your next visit in 3 months     "

## 2023-04-25 NOTE — ASSESSMENT & PLAN NOTE
Healthy habits to assist with further weight loss were discussed. Lima will continue the Ozempic but increase to 2 mg weekly.  She will see the dietician this Friday. She is going to start walking 3x a week.  Additional goals in pt instructions. It will be coming up on 1 year since starting the program and her BMI is still 37.  In 3 months, if no real progress, she will see myself and the dietician for a sybil initial evaluation in clinic. She will review the info session prior to that visit.

## 2023-04-28 ENCOUNTER — VIRTUAL VISIT (OUTPATIENT)
Dept: SURGERY | Facility: CLINIC | Age: 53
End: 2023-04-28
Payer: COMMERCIAL

## 2023-04-28 VITALS — WEIGHT: 224 LBS | BODY MASS INDEX: 36 KG/M2 | HEIGHT: 66 IN

## 2023-04-28 DIAGNOSIS — E66.01 MORBID OBESITY (H): Primary | ICD-10-CM

## 2023-04-28 PROCEDURE — 97803 MED NUTRITION INDIV SUBSEQ: CPT | Mod: VID

## 2023-04-28 NOTE — PATIENT INSTRUCTIONS
Jose Amato!      It was great chatting with you again today! Here's a summary of the goals we discussed:    1. Eat balanced meals at regular intervals  - Have your first meal within 1 hour of waking up, then eat a meal every 4-6 hours  - For example: breakfast by 8, lunch by 1, dinner by 6-7    2. Practice portion control with nuts  - Limit self to 1/4c per day  - Try keeping a 1/4c measuring cup in the container, or even moving the nuts out of the bedroom entirely    3. Increase water  - Drinking a minimum of 48oz per day    4. Add fruit alongside your protein shake in the morning  - This helps add fiber, which helps keep us lopez for longer    5. Increase movement throughout the day  - Try adding in 5-10 minute bursts of activity throughout the day  - Consider keeping a set of resistance bands at your desk to do some strength training between tasks      Plan on following up in 1 month. This can be scheduled via our call center at . Reach out sooner with any questions or concerns. Have a great day!      Janina Boggs, GLADIS, LD?  Clinical Dietitian

## 2023-04-28 NOTE — PROGRESS NOTES
Lima is a 52 year old who is being evaluated via a billable video visit.      How would you like to obtain your AVS? MyChart  If the video visit is dropped, the invitation should be resent by: Text to cell phone: [phone]  Will anyone else be joining your video visit? No            Video-Visit Details    Type of service:  Video Visit   Video Start Time: 2:31pm  Video End Time:2:49pm    Originating Location (pt. Location): Home    Distant Location (provider location):  Off-site  Platform used for Video Visit: [facility]      MEDICAL WEIGHT LOSS FOLLOW UP    Reason for visit: medical weight loss     DIAGNOSIS:  Class II Obesity    ANTHROPOMETRICS:  Initial Weight: 220.6 pounds  Previous Weight: n/a  Current Weight:  224 pounds  BMI: 37.28 kg/m2    Weight Loss Medications:   Ozempic    NUTRITION HISTORY:  Breakfast: [wakes at 6:30-7am, eats at 8-9am] protein shake in coffee +/- fruit  Lunch: [3pm] chicken sandwich  Mexican  stir-davis  Dinner: [7-7:30pm] potatoes + green beans + chicken  stew + rice   Snacks: 2 handfuls of nuts (cashews; keeps at bedside)   Beverage choices: water (12oz), coffee (24oz; caramel + vanilla syrup), soda (1-2 cans; Pepsi), protein drink   Eating behaviors: emotional eating     Dining Out:  How often do you dine out: 3x/week  What types of food do you order: pizza, stir-davis, chicken       Exercise:  None/inconsistent    Additional Information: Pt lost to follow up since last summer. She has gained 10 lbs and is frustrated. Acknowledges irregularities in her meal patterns, as well as lack of exercise. Was unaware of caloric burden of cashews, of which she emotionally grazes on throughout the evenings.      EVALUATION/PROGRESS TOWARDS GOALS:  Previous Goals:   (remote)    Previous Nutrition Diagnosis:  Obese related to excess energy intake and self-monitoring deficit as evidenced by BMI of n/a kg/m2.  No change, modified below     Current Nutrition Diagnosis:   Obese class II related to  excess energy intake and self-monitoring deficit as evidenced by BMI of 37.28 kg/m2.    INTERVENTION:    Nutrition Prescription:  Recommend modified nutrient intake by decreasing energy intake.    Implementation:    Nutrition Education (Content):    Discussed previous goals and determined new goals    Encourage physical activity    Supported patient in attempted weight loss and behavior changes    Patient verbalizes understanding of diet by stating she will eat balanced meals at regular intervals.    Anticipate good compliance    Goals:  Eat your first meal within 1h of waking, then eat every 4-6 hours  Limit cashews to 1/4c per day  Increase water to 48oz per day  Add a piece of fruit alongside morning protein shake  Add movement to your day - short walks, resistance bands, etc    Follow Up/Monitoring:  Other  -  patient to follow up in 4 weeks    Time Spent With Patient:  18 Minutes      Janina Boggs RD, LD  Clinical Dietitian

## 2023-05-30 ENCOUNTER — VIRTUAL VISIT (OUTPATIENT)
Dept: FAMILY MEDICINE | Facility: CLINIC | Age: 53
End: 2023-05-30
Payer: COMMERCIAL

## 2023-05-30 ENCOUNTER — HOSPITAL ENCOUNTER (OUTPATIENT)
Dept: MAMMOGRAPHY | Facility: CLINIC | Age: 53
Discharge: HOME | End: 2023-05-30
Attending: INTERNAL MEDICINE | Admitting: INTERNAL MEDICINE
Payer: COMMERCIAL

## 2023-05-30 DIAGNOSIS — E66.01 MORBID OBESITY (H): ICD-10-CM

## 2023-05-30 DIAGNOSIS — Z12.31 VISIT FOR SCREENING MAMMOGRAM: ICD-10-CM

## 2023-05-30 DIAGNOSIS — E11.9 TYPE 2 DIABETES MELLITUS WITHOUT COMPLICATION, WITHOUT LONG-TERM CURRENT USE OF INSULIN (H): Primary | ICD-10-CM

## 2023-05-30 LAB — HBA1C MFR BLD: 6.3 % (ref 0–5.6)

## 2023-05-30 PROCEDURE — 99213 OFFICE O/P EST LOW 20 MIN: CPT | Mod: VID | Performed by: INTERNAL MEDICINE

## 2023-05-30 PROCEDURE — 77067 SCR MAMMO BI INCL CAD: CPT

## 2023-05-30 PROCEDURE — 36415 COLL VENOUS BLD VENIPUNCTURE: CPT | Mod: VID | Performed by: INTERNAL MEDICINE

## 2023-05-30 PROCEDURE — 83036 HEMOGLOBIN GLYCOSYLATED A1C: CPT | Mod: VID | Performed by: INTERNAL MEDICINE

## 2023-05-30 PROCEDURE — 80053 COMPREHEN METABOLIC PANEL: CPT | Mod: VID | Performed by: INTERNAL MEDICINE

## 2023-05-30 NOTE — PROGRESS NOTES
Lima is a 52 year old who is being evaluated via a billable video visit.      How would you like to obtain your AVS? MyChart  If the video visit is dropped, the invitation should be resent by: Text to cell phone: [phone]  Will anyone else be joining your video visit? No    Subjective   Lima is a 52 year old, presenting for the following health issues:  Talk about breast reduction  HPI        Type 2 diabetes mellitus without complication, without long-term current use of insulin (H)   She has been managing her blood glucose with ozempic.  Dose was recently increased to 2 mg.  She is hoping to see weight loss results by August.  If not seeing much improvement with weight loss she will consider bariatric surgery  Morbid obesity (H)    Lima Bueno would like to consult with plastic surgery to discuss plastic surgery for breast reduction surgery.  She is also following with bariatric surgery as above.      Review of Systems   Constitutional, HEENT + epistaxis will be following in ENT.  Has been using , cardiovascular, pulmonary, GI, , musculoskeletal, neuro, skin, endocrine and psych systems are negative, except as otherwise noted.      Objective           Vitals:  No vitals were obtained today due to virtual visit.    Physical Exam   GENERAL: Healthy, alert and no distress  EYES: Eyes grossly normal to inspection.  No discharge or erythema, or obvious scleral/conjunctival abnormalities.   RESP: No audible wheeze, cough, or visible cyanosis.  No visible retractions or increased work of breathing.    SKIN: Visible skin clear. No significant rash, abnormal pigmentation or lesions.  NEURO: Cranial nerves grossly intact.  Mentation and speech appropriate for age.  PSYCH: Mentation appears normal, affect normal/bright, judgement and insight intact, normal speech and appearance well-groomed.      (E11.9) Type 2 diabetes mellitus without complication, without long-term current use of insulin (H)   (primary encounter diagnosis)  Comment: She will plan to get labs drawn today including A1c and metabolic panel.  Continue on Ozempic.  We will try to update her eye exam for this past year  Plan: Hemoglobin A1c, Comprehensive metabolic panel         (BMP + Alb, Alk Phos, ALT, AST, Total. Bili,         TP)            (E66.01) Morbid obesity (H)  Comment: Continue follow-up with bariatric surgery.  Also plan to consult with plastic surgery for breast reduction  Plan: Adult Plastic Surgery  Referral                   Video-Visit Details    Type of service:  Video Visit   Video Start Time: 12:23 PM  Video End Time:12:42 PM    Originating Location (pt. Location): Home  Distant Location (provider location):  On-site  Platform used for Video Visit: Rayna

## 2023-05-30 NOTE — PATIENT INSTRUCTIONS
(E11.9) Type 2 diabetes mellitus without complication, without long-term current use of insulin (H)  (primary encounter diagnosis)  Comment: we will recheck labs at her convenience  Plan:     (E66.01) Morbid obesity (H)  Comment: Discussed risks and benefits of breast reduction surgery and she will be referred to plastic surgery to discuss this  Plan:

## 2023-05-31 LAB
ALBUMIN SERPL BCG-MCNC: 4.4 G/DL (ref 3.5–5.2)
ALP SERPL-CCNC: 118 U/L (ref 35–104)
ALT SERPL W P-5'-P-CCNC: 32 U/L (ref 10–35)
ANION GAP SERPL CALCULATED.3IONS-SCNC: 14 MMOL/L (ref 7–15)
AST SERPL W P-5'-P-CCNC: 23 U/L (ref 10–35)
BILIRUB SERPL-MCNC: 0.4 MG/DL
BUN SERPL-MCNC: 12.6 MG/DL (ref 6–20)
CALCIUM SERPL-MCNC: 9.4 MG/DL (ref 8.6–10)
CHLORIDE SERPL-SCNC: 104 MMOL/L (ref 98–107)
CREAT SERPL-MCNC: 0.99 MG/DL (ref 0.51–0.95)
DEPRECATED HCO3 PLAS-SCNC: 23 MMOL/L (ref 22–29)
GFR SERPL CREATININE-BSD FRML MDRD: 68 ML/MIN/1.73M2
GLUCOSE SERPL-MCNC: 102 MG/DL (ref 70–99)
POTASSIUM SERPL-SCNC: 4 MMOL/L (ref 3.4–5.3)
PROT SERPL-MCNC: 6.5 G/DL (ref 6.4–8.3)
SODIUM SERPL-SCNC: 141 MMOL/L (ref 136–145)

## 2023-06-01 NOTE — RESULT ENCOUNTER NOTE
Jose Amato,    I have had the opportunity to review your recent results and an interpretation is as follows:  Your follow blood count shows stable renal liver function tests  Your A1c also shows stable average glucose    Congratulations on your excellent results    Sincerely,  Oscar Baltazar MD

## 2023-06-15 ENCOUNTER — MYC MEDICAL ADVICE (OUTPATIENT)
Dept: FAMILY MEDICINE | Facility: CLINIC | Age: 53
End: 2023-06-15
Payer: COMMERCIAL

## 2023-06-26 NOTE — TELEPHONE ENCOUNTER
warfarin (COUMADIN) 5 MG tablet      Last Written Prescription Date: 7/3/2017  Last Fill Qty: 180, # refills: 0  Last Office Visit with G, [facility] or [facility] prescribing provider: 7/3/2017       Date and Result of Last PT/INR:   Lab Results   Component Value Date    INR 3.20 10/04/2017    INR 2.80 08/22/2017             Undermining Type: Entire Wound

## 2023-06-29 ENCOUNTER — TRANSFERRED RECORDS (OUTPATIENT)
Dept: HEALTH INFORMATION MANAGEMENT | Facility: CLINIC | Age: 53
End: 2023-06-29
Payer: COMMERCIAL

## 2023-06-30 NOTE — TELEPHONE ENCOUNTER
Patient Quality Outreach    Patient is due for the following:   Diabetes -  Eye Exam    Next Steps:   Schedule a office visit for Eye Exam    Type of outreach:    Sent [employer] message.      Questions for provider review:    None           Philomena Stoll, CMA

## 2023-07-04 ENCOUNTER — TELEPHONE (OUTPATIENT)
Dept: FAMILY MEDICINE | Facility: CLINIC | Age: 53
End: 2023-07-04
Payer: COMMERCIAL

## 2023-07-04 NOTE — TELEPHONE ENCOUNTER
..  Reason for Call:  Appointment Request    Patient requesting this type of appt:  Diarrhea and abdominal pains for 2wks    Requested provider: Oscar Baltazar    Reason patient unable to be scheduled: Not within requested timeframe    When does patient want to be seen/preferred time: 1-2 days    Comments: n\a    Could we send this information to you in [facility] or would you prefer to receive a phone call?:   Patient would prefer a phone call   Okay to leave a detailed message?: Yes at Cell number on file:    Telephone Information:   Mobile [phone]       Call taken on 7/4/2023 at 10:53 AM by Dina Varner

## 2023-07-05 ENCOUNTER — NURSE TRIAGE (OUTPATIENT)
Dept: FAMILY MEDICINE | Facility: CLINIC | Age: 53
End: 2023-07-05
Payer: COMMERCIAL

## 2023-07-05 NOTE — TELEPHONE ENCOUNTER
"Nurse Triage SBAR    Is this a 2nd Level Triage? YES, LICENSED PRACTITIONER REVIEW IS REQUIRED    Situation:   pt has been having moderate intermittent abdominal pain, belching, bloated and mild diarrhea x 14 days.  Pt stating her mouth feels a little dry, but she has been tolerating fluids and voiding adequately. Denies dizziness.     Background:   Family hx; pt's dad passed away from GI complication and pt is worried. Abdominal pain has been improving, but she is still burping a lot and reporting it smells and tastes like sulfur. Pt was triaged and dispo'd to office today; there are no openings and pt refusing to UC. Routing to [employer]; okay to schedule tomorrow 430pm??    Assessment:   assess/tx     Protocol Recommended Disposition:   See in Office Today    Recommendation:   answer callback from clinic regarding appointment time     Routed to provider    Does the patient meet one of the following criteria for ADS visit consideration? 16+ years old, with an [facility] PCP     TIP  Providers, please consider if this condition is appropriate for management at one of our Acute and Diagnostic Services sites.     If patient is a good candidate, please use dotphrase <dot>triageresponse and select Refer to ADS to document.  Pt reporting new onset of abdominal discomfort & belching.   .  1. DIARRHEA SEVERITY: \"How bad is the diarrhea?\" \"How many more stools have you had in the past 24 hours than normal?\"     - NO DIARRHEA (SCALE 0)    - MILD (SCALE 1-3): Few loose or mushy BMs; increase of 1-3 stools over normal daily number of stools; mild increase in ostomy output.    -  MODERATE (SCALE 4-7): Increase of 4-6 stools daily over normal; moderate increase in ostomy output.  * SEVERE (SCALE 8-10; OR 'WORST POSSIBLE'): Increase of 7 or more stools daily over normal; moderate increase in ostomy output; incontinence.      Twice day  2. ONSET: \"When did the diarrhea begin?\"       14 days ago   3. BM CONSISTENCY: \"How loose or " "watery is the diarrhea?\"       watery  4. VOMITING: \"Are you also vomiting?\" If Yes, ask: \"How many times in the past 24 hours?\"       Denies   5. ABDOMINAL PAIN: \"Are you having any abdominal pain?\" If Yes, ask: \"What does it feel like?\" (e.g., crampy, dull, intermittent, constant)       Intermittent, crampy, improving   6. ABDOMINAL PAIN SEVERITY: If present, ask: \"How bad is the pain?\"  (e.g., Scale 1-10; mild, moderate, or severe)    - MILD (1-3): doesn't interfere with normal activities, abdomen soft and not tender to touch     - MODERATE (4-7): interferes with normal activities or awakens from sleep, abdomen tender to touch     - SEVERE (8-10): excruciating pain, doubled over, unable to do any normal activities        5/10; episodes vary in time from a few minutes to all day  7. ORAL INTAKE: If vomiting, \"Have you been able to drink liquids?\" \"How much liquids have you had in the past 24 hours?\"      Yes, 6 cups water yesterday, 1 cup today   8. HYDRATION: \"Any signs of dehydration?\" (e.g., dry mouth [not just dry lips], too weak to stand, dizziness, new weight loss) \"When did you last urinate?\"      Dry mouth and thirsty, urine is clear yellow, normal amount about 30 min.   9. EXPOSURE: \"Have you traveled to a foreign country recently?\" \"Have you been exposed to anyone with diarrhea?\" \"Could you have eaten any food that was spoiled?\"      Denies   10. ANTIBIOTIC USE: \"Are you taking antibiotics now or have you taken antibiotics in the past 2 months?\"        Denies   11. OTHER SYMPTOMS: \"Do you have any other symptoms?\" (e.g., fever, blood in stool)        odorous burps  12. PREGNANCY: \"Is there any chance you are pregnant?\" \"When was your last menstrual period?\"        Pt is in menopause     Reason for Disposition    Abdominal pain (Exception: Pain clears completely with each passage of diarrhea stool.)    Additional Information    Negative: Shock suspected (e.g., cold/pale/clammy skin, too weak to stand, low " BP, rapid pulse)    Negative: Difficult to awaken or acting confused (e.g., disoriented, slurred speech)    Negative: Sounds like a life-threatening emergency to the triager    Negative: Vomiting also present and worse than the diarrhea    Negative: Blood in stool and without diarrhea    Negative: SEVERE abdominal pain (e.g., excruciating) and present > 1 hour    Negative: SEVERE abdominal pain and age > 60 years    Negative: Bloody, black, or tarry bowel movements (Exception: chronic-unchanged black-grey bowel movements and is taking iron pills or Pepto-Bismol)    Negative: SEVERE diarrhea (e.g., 7 or more times / day more than normal) and age > 60 years    Negative: Constant abdominal pain lasting > 2 hours    Negative: Drinking very little and has signs of dehydration (e.g., no urine > 12 hours, very dry mouth, very lightheaded)    Negative: Patient sounds very sick or weak to the triager    Negative: MODERATE diarrhea (e.g., 4-6 times / day more than normal) and age > 70 years    Negative: MODERATE diarrhea (e.g., 4-6 times / day more than normal) and present > 48 hours (2 days)    Negative: SEVERE diarrhea (e.g., 7 or more times / day more than normal) and present > 24 hours (1 day)    Protocols used: DIARRHEA-A-OH

## 2023-07-05 NOTE — TELEPHONE ENCOUNTER
ADEI-    Called pt and scheduled with Stevie Trivedi tomorrow.    Appointments in Next Year    Jul 06, 2023  4:30 PM  (Arrive by 4:10 PM)  Provider Visit with Stevie Trivedi PA-C  [facility] ([facility] ) [phone]     SLAVA OMER RN on 7/5/2023 at 3:54 PM

## 2023-07-06 ENCOUNTER — OFFICE VISIT (OUTPATIENT)
Dept: FAMILY MEDICINE | Facility: CLINIC | Age: 53
End: 2023-07-06
Payer: COMMERCIAL

## 2023-07-06 VITALS
BODY MASS INDEX: 37.44 KG/M2 | DIASTOLIC BLOOD PRESSURE: 85 MMHG | RESPIRATION RATE: 10 BRPM | SYSTOLIC BLOOD PRESSURE: 137 MMHG | TEMPERATURE: 97.2 F | HEART RATE: 80 BPM | OXYGEN SATURATION: 98 % | HEIGHT: 65 IN | WEIGHT: 224.7 LBS

## 2023-07-06 DIAGNOSIS — R19.7 DIARRHEA, UNSPECIFIED TYPE: Primary | ICD-10-CM

## 2023-07-06 DIAGNOSIS — K21.9 GASTROESOPHAGEAL REFLUX DISEASE WITHOUT ESOPHAGITIS: ICD-10-CM

## 2023-07-06 LAB
BASOPHILS # BLD AUTO: 0 10E3/UL (ref 0–0.2)
BASOPHILS NFR BLD AUTO: 1 %
EOSINOPHIL # BLD AUTO: 0.1 10E3/UL (ref 0–0.7)
EOSINOPHIL NFR BLD AUTO: 1 %
ERYTHROCYTE [DISTWIDTH] IN BLOOD BY AUTOMATED COUNT: 14.2 % (ref 10–15)
HCT VFR BLD AUTO: 43.8 % (ref 35–47)
HGB BLD-MCNC: 14 G/DL (ref 11.7–15.7)
IMM GRANULOCYTES # BLD: 0 10E3/UL
IMM GRANULOCYTES NFR BLD: 0 %
LYMPHOCYTES # BLD AUTO: 3.4 10E3/UL (ref 0.8–5.3)
LYMPHOCYTES NFR BLD AUTO: 52 %
MCH RBC QN AUTO: 27.3 PG (ref 26.5–33)
MCHC RBC AUTO-ENTMCNC: 32 G/DL (ref 31.5–36.5)
MCV RBC AUTO: 86 FL (ref 78–100)
MONOCYTES # BLD AUTO: 0.3 10E3/UL (ref 0–1.3)
MONOCYTES NFR BLD AUTO: 5 %
NEUTROPHILS # BLD AUTO: 2.7 10E3/UL (ref 1.6–8.3)
NEUTROPHILS NFR BLD AUTO: 41 %
PLATELET # BLD AUTO: 233 10E3/UL (ref 150–450)
RBC # BLD AUTO: 5.12 10E6/UL (ref 3.8–5.2)
WBC # BLD AUTO: 6.5 10E3/UL (ref 4–11)

## 2023-07-06 PROCEDURE — 36415 COLL VENOUS BLD VENIPUNCTURE: CPT | Performed by: PHYSICIAN ASSISTANT

## 2023-07-06 PROCEDURE — 80053 COMPREHEN METABOLIC PANEL: CPT | Performed by: PHYSICIAN ASSISTANT

## 2023-07-06 PROCEDURE — 99214 OFFICE O/P EST MOD 30 MIN: CPT | Performed by: PHYSICIAN ASSISTANT

## 2023-07-06 PROCEDURE — 85025 COMPLETE CBC W/AUTO DIFF WBC: CPT | Performed by: PHYSICIAN ASSISTANT

## 2023-07-06 RX ORDER — OMEPRAZOLE 40 MG/1
40 CAPSULE, DELAYED RELEASE ORAL DAILY
Qty: 30 CAPSULE | Refills: 2 | Status: SHIPPED | OUTPATIENT
Start: 2023-07-06 | End: 2023-10-04

## 2023-07-06 RX ORDER — FAMOTIDINE 20 MG/1
20 TABLET, FILM COATED ORAL DAILY
Qty: 90 TABLET | Refills: 3 | Status: SHIPPED | OUTPATIENT
Start: 2023-07-06 | End: 2023-10-04

## 2023-07-06 ASSESSMENT — PAIN SCALES - GENERAL: PAINLEVEL: MILD PAIN (3)

## 2023-07-06 NOTE — PROGRESS NOTES
"Assessment & Plan     Diarrhea, unspecified type  Gastroesophageal reflux disease without esophagitis    Overall she appears well today.  In no acute distress.  Abdomen nontender to palpation.  Significant review of systems with no red flags requiring imaging or stool cultures at this time. She agrees.  We reviewed signs and symptoms at length that warrant urgent/emergent re-evaluation.  Suspect this is a viral gastroenteritis.  We decided to check CBC as well as CMP today.  Start omeprazole 40 mg daily.  Discontinue Pepto-Bismol as well as Tums.  Also taking Pepcid which is okay.  Discussed brat diet.  Push fluids including water/Pedialyte/Gatorade. May use small amount of Imodium to help slow this if needed.  Expect resolution in the next 2 to 3 weeks.  She is comfortable with this plan.    - CBC with platelets and differential  - Comprehensive metabolic panel (BMP + Alb, Alk Phos, ALT, AST, Total. Bili, TP)  - omeprazole (PRILOSEC) 40 MG DR capsule  Dispense: 30 capsule; Refill: 2      30 minutes spent by me on the date of the encounter doing chart review, review of test results, interpretation of tests, patient visit and documentation        BMI:   Estimated body mass index is 37.39 kg/m  as calculated from the following:    Height as of this encounter: 1.651 m (5' 5\").    Weight as of this encounter: 101.9 kg (224 lb 11.2 oz).   Weight management plan: Discussed healthy diet and exercise guidelines      Return in about 3 weeks (around 7/27/2023) for follow-up per plan.    The likelihood of other entities in the differential is insufficient to justify any further testing for them at this time. This was explained to the patient. The patient was advised that persistent or worsening symptoms would require further evaluation. Patient advised to call the office and if unable to reach to go to the emergency room if they develop any new or worsening symptoms. Expressed understanding and agreement with above stated " "plan.     ANDI Wade [facility] STANISLAW    Subjective   Lima Bueno is a 52 year old female presenting for the following health issues:  Patient presents with:  Diarrhea: And some acid reflux.  Health Maintenance: Eye Exam?     Here today for a 14-day history of diarrhea and belching.  Patient states that she woke up in the middle of the night 2 weeks ago with a stomachache and had to run to the bathroom.  Had an episode of diarrhea as well as belching.  This has persisted.  Using Tums as well as Pepto-Bismol for symptoms.  Initially diarrhea was daily approximately 7-8 loose bowel movements per day and has now decreased to approximately 4 times a day.  Appetite has lessened.  Denies fever, chills, night sweats, nausea, vomiting, blood in the stool, urinary symptoms, abdominal pain.  Last colonoscopy in 2020.  Does not see a gastroenterologist regularly.  Unsure of any inciting cause for this as far as new foods/medications.  No recent travel.  No recent antibiotic use.  Alcohol use is minimal. No sick contacts. Everyday smoker.      Review of Systems   Constitutional, HEENT, cardiovascular, pulmonary, GI, , musculoskeletal, neuro, skin, endocrine and psych systems are negative, except as otherwise noted.      Objective    /85 (BP Location: Left arm, Patient Position: Sitting, Cuff Size: Adult Large)   Pulse 80   Temp 97.2  F (36.2  C) (Tympanic)   Resp 10   Ht 1.651 m (5' 5\")   Wt 101.9 kg (224 lb 11.2 oz)   SpO2 98%   BMI 37.39 kg/m    5' 5\"  224 lbs 11.2 oz    Allergies   Allergen Reactions     Phosphoric Acid Hives     Terbutaline Hives     Trazodone      Other reaction(s): Other - Describe In Comment Field  Facial nerve pain  Other reaction(s): Other - Describe In Comment Field  Facial nerve pain     Wellbutrin [Bupropion]      Smoking cessation -- told never to take again ,     Current Outpatient Medications   Medication Sig Dispense Refill     albuterol " (PROAIR HFA) 108 (90 Base) MCG/ACT inhaler Inhale 2 puffs into the lungs every 4 hours as needed for shortness of breath / dyspnea 8 g 11     albuterol (PROVENTIL) (2.5 MG/3ML) 0.083% neb solution Take 1 vial (2.5 mg) by nebulization every 6 hours as needed for shortness of breath / dyspnea or wheezing 120 mL 0     atorvastatin (LIPITOR) 20 MG tablet Take 1 tablet (20 mg) by mouth daily 90 tablet 3     blood glucose (NO BRAND SPECIFIED) test strip 1 Box of Test Strips for patients meter - test daily 100 strip 3     blood glucose monitoring (NO BRAND SPECIFIED) meter device kit Use to test blood sugar as directed. 1 kit 0     famotidine (PEPCID) 20 MG tablet Take 1 tablet (20 mg) by mouth daily 90 tablet 3     fexofenadine (ALLEGRA) 180 MG tablet Take 1 tablet (180 mg) by mouth daily 30 tablet 3     fluticasone-salmeterol (ADVAIR HFA) 230-21 MCG/ACT inhaler Inhale 2 puffs into the lungs 2 times daily 12 g 1     guaiFENesin-dextromethorphan (ROBITUSSIN DM) 100-10 MG/5ML syrup Take 10 mLs by mouth every 4 hours as needed for cough 118 mL 0     ketotifen (ZADITOR) 0.025 % ophthalmic solution Place 1 drop into both eyes 2 times daily 1 drop in affected eye(s) 2 times a day 5 mL 1     Lancets 30G MISC 120 30 gauge lancets (substitute as needed) - test daily 120 each 3     lisinopril (ZESTRIL) 5 MG tablet Take 1 tablet (5 mg) by mouth daily 90 tablet 3     mupirocin (BACTROBAN) 2 % external ointment Apply topically 3 times daily 30 g 3     omeprazole (PRILOSEC) 40 MG DR capsule Take 1 capsule (40 mg) by mouth daily 30 capsule 2     rivaroxaban ANTICOAGULANT (XARELTO) 20 MG TABS tablet Take 1 tablet (20 mg) by mouth daily (with dinner) 90 tablet 3     Semaglutide, 2 MG/DOSE, (OZEMPIC, 2 MG/DOSE,) 8 MG/3ML pen Inject 2 mg Subcutaneous once a week 24 mL 1     traMADol (ULTRAM) 50 MG tablet TAKE 1 TABLET BY MOUTH EVERY 6 HOURS AS NEEDED FOR SEVERE PAIN (SCHEDULE  APPOINTMENT  TO  DISCUSS  FURTHER  REFILLS  [phone]) 75  tablet 0     traZODone (DESYREL) 150 MG tablet Take 2 tablets (300 mg) by mouth At Bedtime 180 tablet 3     benzonatate (TESSALON) 100 MG capsule Take 1-2 capsules (100-200 mg) by mouth 3 times daily as needed for cough (Patient not taking: Reported on 7/6/2023) 45 capsule 1     Past Medical History:   Diagnosis Date     ADD (attention deficit disorder with hyperactivity)      Asthma      Breast mass 11/1/2012    Patient yet to have biopsy as of 11/1/2012       DVT (deep venous thrombosis) (H) 10/12    right leg     LSIL (low grade squamous intraepithelial lesion) on Pap smear 02/19/14    Neg high risk HPV     Pulmonary embolism (H) 2012    Saw Dr Toledo, protein S deficiency     Past Surgical History:   Procedure Laterality Date     ANKLE SURGERY Right 01/2019     COLONOSCOPY N/A 12/10/2020    Procedure: COLONOSCOPY, WITH POLYPECTOMY AND BIOPSY;  Surgeon: Osorio Dunn MD;  Location:  GI       Physical Exam   GENERAL: healthy, alert and no distress  EYES: Eyes grossly normal to inspection, PERRL and conjunctivae and sclerae normal  NECK: no adenopathy, no asymmetry, masses, or scars and thyroid normal to palpation  RESP: lungs clear to auscultation - no rales, rhonchi or wheezes  CV: regular rate and rhythm, normal S1 S2, no S3 or S4, no murmur, click or rub, no peripheral edema and peripheral pulses strong  ABDOMEN: soft, nontender, no hepatosplenomegaly, no masses and bowel sounds normal  MS: no gross musculoskeletal defects noted, no edema  SKIN: no suspicious lesions or rashes  NEURO: Normal strength and tone, mentation intact and speech normal  PSYCH: mentation appears normal, affect normal/bright

## 2023-07-07 LAB
ALBUMIN SERPL BCG-MCNC: 4.3 G/DL (ref 3.5–5.2)
ALP SERPL-CCNC: 111 U/L (ref 35–104)
ALT SERPL W P-5'-P-CCNC: 27 U/L (ref 0–50)
ANION GAP SERPL CALCULATED.3IONS-SCNC: 11 MMOL/L (ref 7–15)
AST SERPL W P-5'-P-CCNC: 17 U/L (ref 0–45)
BILIRUB SERPL-MCNC: 0.4 MG/DL
BUN SERPL-MCNC: 10.8 MG/DL (ref 6–20)
CALCIUM SERPL-MCNC: 9.2 MG/DL (ref 8.6–10)
CHLORIDE SERPL-SCNC: 104 MMOL/L (ref 98–107)
CREAT SERPL-MCNC: 0.89 MG/DL (ref 0.51–0.95)
DEPRECATED HCO3 PLAS-SCNC: 26 MMOL/L (ref 22–29)
GFR SERPL CREATININE-BSD FRML MDRD: 77 ML/MIN/1.73M2
GLUCOSE SERPL-MCNC: 91 MG/DL (ref 70–99)
POTASSIUM SERPL-SCNC: 3.9 MMOL/L (ref 3.4–5.3)
PROT SERPL-MCNC: 7.2 G/DL (ref 6.4–8.3)
SODIUM SERPL-SCNC: 141 MMOL/L (ref 136–145)

## 2023-07-07 NOTE — RESULT ENCOUNTER NOTE
Jose Amato,     It was nice meeting you yesterday!  Good news is your electrolytes, kidney function, and liver function are all stable.  I would stick to the plan that we had discussed yesterday.  Stay well-hydrated.  Brat diet.  Try the Prilosec to help hopefully help with the belching.  Additionally, not unreasonable to try something like Imodium to help slow down the amount of episodes that you are having.  I am hoping this resolves quickly for you!     Let me know if you have any questions or concerns,     Stevie Trivedi PA-C  [facility]

## 2023-07-23 ENCOUNTER — TELEPHONE (OUTPATIENT)
Dept: FAMILY MEDICINE | Facility: CLINIC | Age: 53
End: 2023-07-23
Payer: COMMERCIAL

## 2023-07-23 DIAGNOSIS — K86.2 CYST OF PANCREAS: Primary | ICD-10-CM

## 2023-07-23 NOTE — TELEPHONE ENCOUNTER
Can we call Lima Bueno and let her know that     She is due for a follow up MRI of her pancreast to follow up on the cysts that were seen     [facility] phone #[phone] to schedule

## 2023-07-24 NOTE — TELEPHONE ENCOUNTER
"I phoned pt and relayed Dr's comments reminder due repeat MRI from Oct.   \"That long ago?\"   She said she had gotten a scheduling call but she was busy at work.   Glad for our call.      Nereyda ALVAREZ MA       "

## 2023-07-28 ENCOUNTER — TELEPHONE (OUTPATIENT)
Dept: SURGERY | Facility: CLINIC | Age: 53
End: 2023-07-28
Payer: COMMERCIAL

## 2023-07-28 NOTE — TELEPHONE ENCOUNTER
Returned pt's call after speaking to pharmacy.  Per Jerome at pharmacy- Ozempic will be coming in Monday.  Informed pt of this.  Notified her it is ok to pick-up next dose on Thursday which is her next dose date.  She is also wondering when she should be seen next- states she is not losing (but not gaining) any weight.  Informed pt that Krystal wanted to see her for a follow-up in 3 months.  Given phone number to call and schedule an appt.   Informed her that Krystal is booking out a ways, but she can be seen by the next available provider or get on the wait list for cancellations.     Anca Siddiqui RN on 7/28/2023 at 12:48 PM

## 2023-07-28 NOTE — TELEPHONE ENCOUNTER
Patient has been unable to get her Ozempic & was due for a shot yesterday, please advise. Patient requesting call back as soon as possible.      [phone] okay to leave a VM

## 2023-08-01 ENCOUNTER — VIRTUAL VISIT (OUTPATIENT)
Dept: SURGERY | Facility: CLINIC | Age: 53
End: 2023-08-01
Payer: COMMERCIAL

## 2023-08-01 VITALS — HEIGHT: 65 IN | WEIGHT: 224 LBS | BODY MASS INDEX: 37.32 KG/M2

## 2023-08-01 DIAGNOSIS — E66.01 MORBID OBESITY (H): Primary | ICD-10-CM

## 2023-08-01 PROCEDURE — 97803 MED NUTRITION INDIV SUBSEQ: CPT | Mod: VID

## 2023-08-01 NOTE — PATIENT INSTRUCTIONS
Jose Amato!      It was great chatting with you again today! Here's a summary of the goals we discussed:    1. Choose a leaner breakfast protein  - Even if fortified with protein, granola has a lot of fat and sugar  - Better option: eggs, cottage cheese, yogurt, lean deli meat or Taiwanese hare, turkey sausage, protein shake or bar    2. Be mindful of portions when having fast food  - Don't double up on meat with Keenan Fritz's sandwich, the standard serving is just fine  - Pick one entree when eating at [employer]    3. Reduce fat at dinner  - Bake your meat instead of davis  - Switch to low-fat gravy    4. Avoid beverages with calories  - Coffee: choose sugar-free syrups (or use less) and switch to low-fat milk instead of whole  - Switch to Gatorade Zero      Plan on following up in 2-3 months. This can be scheduled via our call center at . Reach out sooner with any questions or concerns. Have a great day!      Janina Boggs, GLADIS, LD?  Clinical Dietitian

## 2023-08-01 NOTE — PROGRESS NOTES
Lima is a 52 year old who is being evaluated via a billable video visit.      How would you like to obtain your AVS? MyChart  If the video visit is dropped, the invitation should be resent by: Text to cell phone: [phone]  Will anyone else be joining your video visit? No      Video-Visit Details    Type of service:  Video Visit   Video Start Time: 3:40pm  Video End Time:3:53pm    Originating Location (pt. Location): stationary vehicle    Distant Location (provider location):  Off-site  Platform used for Video Visit: [facility]    MEDICAL WEIGHT LOSS FOLLOW UP    Reason for visit: medical weight loss     DIAGNOSIS:  Class II Obesity    ANTHROPOMETRICS:  Initial Weight: 220.6 pounds  Previous Weight: 224 pounds  Current Weight:  224 lbs 0 oz  BMI: 37.28 kg/m2    Weight Loss Medications:   Ozempic    NUTRITION HISTORY:  Breakfast: fruit + protein granola (1 cup)   Lunch: [employer] - burrito supreme + burrito with rice + nachos  sandwiches - double meat (homemade or Keenan Fritz's - turkey, roast beef, LT, guerrero)  leftovers   Dinner: chicken (fried + gravy) + rice (1/2c) + green beans  Cajun chicken pasta   Snacks: very rare; candy   Beverage choices: water, coffee (caramel + vanilla syrup, whole milk), gatorade (diluted, 2-3 bottles/day)    Dining Out:  How often do you dine out: lunches 2x/week  What types of food do you order: Taco Keen[employer] Miles      Exercise:  None    Additional Information: Pt reports ongoing frustration with lack of weight loss. Reviewed high-fat/sugar items in her diet and discussed substitutions.     EVALUATION/PROGRESS TOWARDS GOALS:  Previous Goals:   Eat your first meal within 1h of waking, then eat every 4-6 hours - improving  Limit cashews to 1/4c per day - met  Increase water to 48oz per day - improving  Add a piece of fruit alongside morning protein shake - met  Add movement to your day - short walks, resistance bands, etc - not met    Previous Nutrition Diagnosis:  Obese class II  related to excess energy intake and self-monitoring deficit as evidenced by BMI of 37.28 kg/m2.  No change, modified below     Current Nutrition Diagnosis:   Obese class II related to excess energy intake and self-monitoring deficit as evidenced by BMI of 37.28 kg/m2.    INTERVENTION:    Nutrition Prescription:  Recommend modified nutrient intake by decreasing energy intake.    Implementation:    Nutrition Education (Content):    Discussed previous goals and determined new goals    Encourage physical activity    Supported patient in attempted weight loss and behavior changes      Patient verbalizes understanding of diet by stating she will reduce high-fat/sugar items in her diet discussed today.     Anticipate fair-good compliance    Goals:  Replace granola with lean protein  Limit portions of fast food  Bake (not davis) meat and use low-fat gravy  Reduce high-fat/sugar coffee additives  Switch to Gatorade Zero    Follow Up/Monitoring:  Other  -  patient to follow up in 8-12 weeks    Time Spent With Patient:  13 Minutes      Janina Boggs RD, LD  Clinical Dietitian

## 2023-09-05 ENCOUNTER — OFFICE VISIT (OUTPATIENT)
Dept: OBGYN | Facility: CLINIC | Age: 53
End: 2023-09-05
Payer: COMMERCIAL

## 2023-09-05 VITALS
DIASTOLIC BLOOD PRESSURE: 80 MMHG | WEIGHT: 222 LBS | HEIGHT: 65 IN | SYSTOLIC BLOOD PRESSURE: 138 MMHG | BODY MASS INDEX: 36.99 KG/M2

## 2023-09-05 DIAGNOSIS — R87.612 PAPANICOLAOU SMEAR OF CERVIX WITH LOW GRADE SQUAMOUS INTRAEPITHELIAL LESION (LGSIL): ICD-10-CM

## 2023-09-05 DIAGNOSIS — Z01.419 ENCOUNTER FOR GYNECOLOGICAL EXAMINATION WITHOUT ABNORMAL FINDING: Primary | ICD-10-CM

## 2023-09-05 DIAGNOSIS — E66.01 MORBID OBESITY (H): ICD-10-CM

## 2023-09-05 PROCEDURE — 87624 HPV HI-RISK TYP POOLED RSLT: CPT | Performed by: OBSTETRICS & GYNECOLOGY

## 2023-09-05 PROCEDURE — G0145 SCR C/V CYTO,THINLAYER,RESCR: HCPCS | Performed by: OBSTETRICS & GYNECOLOGY

## 2023-09-05 PROCEDURE — 99203 OFFICE O/P NEW LOW 30 MIN: CPT | Performed by: OBSTETRICS & GYNECOLOGY

## 2023-09-05 ASSESSMENT — PATIENT HEALTH QUESTIONNAIRE - PHQ9
5. POOR APPETITE OR OVEREATING: NOT AT ALL
SUM OF ALL RESPONSES TO PHQ QUESTIONS 1-9: 1

## 2023-09-05 ASSESSMENT — ANXIETY QUESTIONNAIRES
GAD7 TOTAL SCORE: 0
7. FEELING AFRAID AS IF SOMETHING AWFUL MIGHT HAPPEN: NOT AT ALL
5. BEING SO RESTLESS THAT IT IS HARD TO SIT STILL: NOT AT ALL
3. WORRYING TOO MUCH ABOUT DIFFERENT THINGS: NOT AT ALL
2. NOT BEING ABLE TO STOP OR CONTROL WORRYING: NOT AT ALL
1. FEELING NERVOUS, ANXIOUS, OR ON EDGE: NOT AT ALL
6. BECOMING EASILY ANNOYED OR IRRITABLE: NOT AT ALL
GAD7 TOTAL SCORE: 0

## 2023-09-05 NOTE — PATIENT INSTRUCTIONS
Follow up with your primary care provider for your other medical problems.  Continue self breast exam.  Increase physical activity and exercise.  Lab and pap smear results will be called to the patient.  Co-testing done today as Lima is overdue based on last pap smear (2019) being abnormal with LGSIL.  Will need colposcopy if abnormal today.  Usual safety and preventative measures counseling done.

## 2023-09-05 NOTE — PROGRESS NOTES
Lima is a 52 year old  female who presents for annual exam.     Besides routine health maintenance, she has no other health concerns today.    HPI:  Here today for yearly GYN exam --doing fairly well.  Has not been seen in our office since 2019.  Still perimenopausal.  Has had 1-2 periods each of the last 2 years --usually only 1-2d light bleeding.  No cramping.  No hot flushes or night sweats.   Last episode this spring.  Not SA.  Continues to work with GI on bowel issues.   Denies bladder issues.  No leaking or incontinence.  Occ uncontrollable flatus with cough, sneeze, etc.    ; working as  for [employer] with homeless population; 3 living children --lost her 32yo son this year after infected pacemeker; 9 grandchildren which bring her much dagoberto  -staying active with walking; walking at least 3-4d/wk during the non winter months  +mammo in May; +SBE --no issues  PCP -Dr. Oscar Baltazar MD --follows meds, bloodwork, etc; due to see in September for yearly exam  -up to date on colonoscopy; will repeat in  due to polyps  -overdue on pap smear; hx LGSIL with neg HPV testing at last visit      GYNECOLOGIC HISTORY:    No LMP recorded. Patient is premenopausal.    Regular menses? no  Menses every 6 months  Length of menses: 1 days    Her current contraception method is: not sexually active.  She  reports that she has been smoking cigarettes. She has a 30.00 pack-year smoking history. She has never used smokeless tobacco.    Patient is not sexually active.  STD testing offered?  Declined    Last PHQ-9 score on record =       2023     4:15 PM   PHQ-9 SCORE   PHQ-9 Total Score 1     Last GAD7 score on record =       2023     4:15 PM   MARYAM-7 SCORE   Total Score 0     Alcohol Score = 0    HEALTH MAINTENANCE:  Recent Labs   Lab Test 23  1413 21  1558 09/02/15  1008   CHOL 134 165 171   HDL 44* 42* 52   LDL 72 108* 99   TRIG 88 76 102   CHOLHDLRATIO  --   --  3.3     < > = values in this interval not displayed.     TSH   Date Value Ref Range Status   2022 0.74 0.40 - 4.00 mU/L Final   2015 0.94 0.40 - 4.00 mU/L Final     Last Mammo:  2023 , Result: Normal, Next Mammo:   Pap:   Lab Results   Component Value Date    PAP LSIL 2019    PAP OTHER-NIL, See Result 10/21/2016    PAP LSIL 2014      Colonoscopy:  12/10/20, Result: polyp, Next Colonoscopy:   Dexa:  N/A    Health maintenance updated:  yes    HISTORY:  OB History    Para Term  AB Living   5 5 5 0 0 4   SAB IAB Ectopic Multiple Live Births   0 0 0 0 5      # Outcome Date GA Lbr Cezar/2nd Weight Sex Delivery Anes PTL Lv   5 Term     M    LIVE BIRTH   4 Term     F    BENJAMIN   3 Term     F    BENJAMIN   2 Term     M    BENJAMIN   1 Term     M    BENJAMIN       Patient Active Problem List   Diagnosis    Deep vein thrombosis (DVT) of lower extremity (H)    Pulmonary embolism (H)    Asthma    ADD (attention deficit disorder with hyperactivity)    Breast mass    CARDIOVASCULAR SCREENING; LDL GOAL LESS THAN 160    Protein S deficiency (H)    Tobacco use disorder    Papanicolaou smear of cervix with low grade squamous intraepithelial lesion (LGSIL)    Spinal stenosis    Chronic, continuous use of opioids    Long-term (current) use of anticoagulants [Z79.01]    Type 2 diabetes mellitus without complication, without long-term current use of insulin (H)    Hyperlipidemia LDL goal <100    Morbid obesity (H)    Essential hypertension, benign    Cyst of pancreas     Past Surgical History:   Procedure Laterality Date    ANKLE SURGERY Right 2019    COLONOSCOPY N/A 12/10/2020    Procedure: COLONOSCOPY, WITH POLYPECTOMY AND BIOPSY;  Surgeon: Osorio Dunn MD;  Location:  GI      Social History     Tobacco Use    Smoking status: Every Day     Packs/day: 1.00     Years: 30.00     Pack years: 30.00     Types: Cigarettes    Smokeless tobacco: Never   Substance Use Topics    Alcohol use: No      Alcohol/week: 0.0 standard drinks of alcohol      Problem (# of Occurrences) Relation (Name,Age of Onset)    Schizophrenia (1) Father    Hypertension (1) Brother    Thyroid Disease (1) Mother: hyperactive    Ovarian Cancer (1) Paternal Grandmother           Negative family history of: Breast Cancer              Current Outpatient Medications   Medication Sig    albuterol (PROAIR HFA) 108 (90 Base) MCG/ACT inhaler Inhale 2 puffs into the lungs every 4 hours as needed for shortness of breath / dyspnea    albuterol (PROVENTIL) (2.5 MG/3ML) 0.083% neb solution Take 1 vial (2.5 mg) by nebulization every 6 hours as needed for shortness of breath / dyspnea or wheezing    atorvastatin (LIPITOR) 20 MG tablet Take 1 tablet (20 mg) by mouth daily    blood glucose (NO BRAND SPECIFIED) test strip 1 Box of Test Strips for patients meter - test daily    blood glucose monitoring (NO BRAND SPECIFIED) meter device kit Use to test blood sugar as directed.    famotidine (PEPCID) 20 MG tablet Take 1 tablet (20 mg) by mouth daily    fexofenadine (ALLEGRA) 180 MG tablet Take 1 tablet (180 mg) by mouth daily    fluticasone-salmeterol (ADVAIR HFA) 230-21 MCG/ACT inhaler Inhale 2 puffs into the lungs 2 times daily    guaiFENesin-dextromethorphan (ROBITUSSIN DM) 100-10 MG/5ML syrup Take 10 mLs by mouth every 4 hours as needed for cough    ketotifen (ZADITOR) 0.025 % ophthalmic solution Place 1 drop into both eyes 2 times daily 1 drop in affected eye(s) 2 times a day    Lancets 30G MISC 120 30 gauge lancets (substitute as needed) - test daily    lisinopril (ZESTRIL) 5 MG tablet Take 1 tablet (5 mg) by mouth daily    omeprazole (PRILOSEC) 40 MG DR capsule Take 1 capsule (40 mg) by mouth daily    rivaroxaban ANTICOAGULANT (XARELTO) 20 MG TABS tablet Take 1 tablet (20 mg) by mouth daily (with dinner)    Semaglutide, 2 MG/DOSE, (OZEMPIC, 2 MG/DOSE,) 8 MG/3ML pen Inject 2 mg Subcutaneous once a week    traMADol (ULTRAM) 50 MG tablet TAKE 1 TABLET  "BY MOUTH EVERY 6 HOURS AS NEEDED FOR SEVERE PAIN (SCHEDULE  APPOINTMENT  TO  DISCUSS  FURTHER  REFILLS  [phone])    traZODone (DESYREL) 150 MG tablet Take 2 tablets (300 mg) by mouth At Bedtime    benzonatate (TESSALON) 100 MG capsule Take 1-2 capsules (100-200 mg) by mouth 3 times daily as needed for cough (Patient not taking: Reported on 7/6/2023)    mupirocin (BACTROBAN) 2 % external ointment Apply topically 3 times daily (Patient not taking: Reported on 9/5/2023)     No current facility-administered medications for this visit.     Allergies   Allergen Reactions    Phosphoric Acid Hives    Terbutaline Hives    Trazodone      Other reaction(s): Other - Describe In Comment Field  Facial nerve pain  Other reaction(s): Other - Describe In Comment Field  Facial nerve pain    Wellbutrin [Bupropion]      Smoking cessation -- told never to take again ,       Past medical, surgical, social and family histories were reviewed and updated in EPIC.    ROS:   12 point review of systems negative other than symptoms noted below or in the HPI.  No urinary frequency or dysuria, bladder or kidney problems    EXAM:  /80   Ht 1.651 m (5' 5\")   Wt 100.7 kg (222 lb)   BMI 36.94 kg/m     BMI: Body mass index is 36.94 kg/m .    PHYSICAL EXAM:  Constitutional:   Appearance: Well nourished, well developed, alert, in no acute distress  Neck:  Lymph Nodes:  No lymphadenopathy present    Thyroid:  Gland size normal, nontender, no nodules or masses present  on palpation  Chest:  Respiratory Effort:  Breathing unlabored  Cardiovascular:    Heart: Auscultation:  Regular rate, normal rhythm, no murmurs present  Breasts: Palpation of Breasts and Axillae:  No masses present on palpation, no breast tenderness., Axillary Lymph Nodes:  No lymphadenopathy present., and No nodularity, asymmetry or nipple discharge bilaterally.  Gastrointestinal:   Abdominal Examination:  Abdomen nontender to palpation, tone normal without rigidity or " guarding, no masses present, umbilicus without lesions   Liver and Spleen:  No hepatomegaly present, liver nontender to palpation    Hernias:  No hernias present  Lymphatic: Lymph Nodes:  No other lymphadenopathy present  Skin:  General Inspection:  No rashes present, no lesions present, no areas of  discoloration  Neurologic:    Mental Status:  Oriented X3.  Normal strength and tone, sensory exam                grossly normal, mentation intact and speech normal.    Psychiatric:   Mentation appears normal and affect normal/bright.         Pelvic Exam:  External Genitalia:     Normal appearance for age, no discharge present, no tenderness present, no inflammatory lesions present, color normal  Vagina:     Normal vaginal vault without central or paravaginal defects, no discharge present, no inflammatory lesions present, no masses present  Bladder:     Nontender to palpation  Urethra:   Urethral Body:  Urethra palpation normal, urethra structural support normal   Urethral Meatus:  No erythema or lesions present  Cervix:     Appearance healthy, no lesions present, nontender to palpation, no bleeding present  Uterus:     Uterus: firm, normal sized and nontender, midplane in position.   Adnexa:     No adnexal tenderness present, no adnexal masses present  Perineum:     Perineum within normal limits, no evidence of trauma, no rashes or skin lesions present  Anus:     Anus within normal limits, no hemorrhoids present  Inguinal Lymph Nodes:     No lymphadenopathy present  Pubic Hair:     Normal pubic hair distribution for age  Genitalia and Groin:     No rashes present, no lesions present, no areas of discoloration, no masses present    COUNSELING:   Reviewed preventive health counseling, as reflected in patient instructions  Special attention given to:        Regular exercise       Healthy diet/nutrition       Colorectal Cancer Screening       (Ana)menopause management    BMI: Body mass index is 36.94 kg/m .  Weight  management plan: Discussed healthy diet and exercise guidelines Patient was referred to their PCP to discuss a diet and exercise plan.    ASSESSMENT:  52 year old female with satisfactory annual exam.    ICD-10-CM    1. Encounter for gynecological examination without abnormal finding  Z01.419       2. Papanicolaou smear of cervix with low grade squamous intraepithelial lesion (LGSIL)  R87.612 Pap screen with HPV - recommended age 30 - 65 years      3. Morbid obesity (H)  E66.01           PLAN:  Patient Instructions   Follow up with your primary care provider for your other medical problems.  Continue self breast exam.  Increase physical activity and exercise.  Lab and pap smear results will be called to the patient.  Co-testing done today as Lima is overdue based on last pap smear (2019) being abnormal with LGSIL.  Will need colposcopy if abnormal today.  Usual safety and preventative measures counseling done.       Sera Cabezas MD

## 2023-09-08 LAB
BKR LAB AP GYN ADEQUACY: NORMAL
BKR LAB AP GYN INTERPRETATION: NORMAL
BKR LAB AP HPV REFLEX: NORMAL
BKR LAB AP PREVIOUS ABNL DX: NORMAL
BKR LAB AP PREVIOUS ABNORMAL: NORMAL
PATH REPORT.COMMENTS IMP SPEC: NORMAL
PATH REPORT.COMMENTS IMP SPEC: NORMAL
PATH REPORT.RELEVANT HX SPEC: NORMAL

## 2023-09-11 LAB
HUMAN PAPILLOMA VIRUS 16 DNA: NEGATIVE
HUMAN PAPILLOMA VIRUS 18 DNA: NEGATIVE
HUMAN PAPILLOMA VIRUS FINAL DIAGNOSIS: NORMAL
HUMAN PAPILLOMA VIRUS OTHER HR: NEGATIVE

## 2023-09-13 ENCOUNTER — TELEPHONE (OUTPATIENT)
Dept: FAMILY MEDICINE | Facility: CLINIC | Age: 53
End: 2023-09-13
Payer: COMMERCIAL

## 2023-09-13 DIAGNOSIS — F40.240 CLAUSTROPHOBIA: ICD-10-CM

## 2023-09-13 NOTE — TELEPHONE ENCOUNTER
Pt called the clinic and reports she is having an MRI on 9/15/23. She is requesting a script for a sedative before this procedure, which she had been prescribed previously.     Historical medication pended for review.     Thank you,  Mellissa Ovalles RN

## 2023-09-14 RX ORDER — LORAZEPAM 0.5 MG/1
TABLET ORAL
Qty: 2 TABLET | Refills: 0 | Status: SHIPPED | OUTPATIENT
Start: 2023-09-14 | End: 2023-10-04

## 2023-09-14 NOTE — TELEPHONE ENCOUNTER
Called and left message for the patient informing patient script has been sent to the pharmacy for the MRI tomorrow. Advised patient to call the clinic back with any additional questions and/or concerns.     Gabrielle Jean-Baptiste RN

## 2023-09-15 ENCOUNTER — TELEPHONE (OUTPATIENT)
Dept: FAMILY MEDICINE | Facility: CLINIC | Age: 53
End: 2023-09-15
Payer: COMMERCIAL

## 2023-09-15 ENCOUNTER — ANCILLARY PROCEDURE (OUTPATIENT)
Dept: MRI IMAGING | Facility: CLINIC | Age: 53
End: 2023-09-15
Attending: INTERNAL MEDICINE
Payer: COMMERCIAL

## 2023-09-15 DIAGNOSIS — K86.2 CYST OF PANCREAS: ICD-10-CM

## 2023-09-15 PROCEDURE — 74183 MRI ABD W/O CNTR FLWD CNTR: CPT

## 2023-09-15 PROCEDURE — A9585 GADOBUTROL INJECTION: HCPCS | Mod: JZ | Performed by: INTERNAL MEDICINE

## 2023-09-15 PROCEDURE — 255N000002 HC RX 255 OP 636: Mod: JZ | Performed by: INTERNAL MEDICINE

## 2023-09-15 RX ORDER — GADOBUTROL 604.72 MG/ML
10 INJECTION INTRAVENOUS ONCE
Status: COMPLETED | OUTPATIENT
Start: 2023-09-15 | End: 2023-09-15

## 2023-09-15 RX ADMIN — GADOBUTROL 10 ML: 604.72 INJECTION INTRAVENOUS at 15:44

## 2023-09-15 NOTE — LETTER
September 15, 2023      Lima Bueno  [address] [address] [facility] [address]        To Whom It May Concern:    Lima Bueno  was seen on 9/15/2023 .  Please excuse her  until 9/15/2023 due to medication effect..        Sincerely,        Oscar Baltazar MD, MD

## 2023-09-15 NOTE — LETTER
September 15, 2023      Lima Bueno  [address] [address] [facility] [address]        To Whom It May Concern:    Lima Bueno  was seen on 9/15/2023 .  Please excuse her  until 9/16/2023 due to medication effect..        Sincerely,        Oscar Baltazar MD, MD

## 2023-09-18 NOTE — TELEPHONE ENCOUNTER
I can't tell what triggered this request but I mailed the 2nd letter done at 5:00 Pm that has excuse til 9-16-23 (instead of 9-15-23) on it.  Not certain if pt wanted faxed or picked up but I mailed to her at home.     Nereyda ALVAREZ MA

## 2023-09-19 DIAGNOSIS — K86.2 CYST OF PANCREAS: Primary | ICD-10-CM

## 2023-09-19 NOTE — RESULT ENCOUNTER NOTE
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

## 2023-09-24 ENCOUNTER — OFFICE VISIT (OUTPATIENT)
Dept: URGENT CARE | Facility: URGENT CARE | Age: 53
End: 2023-09-24
Payer: COMMERCIAL

## 2023-09-24 VITALS
RESPIRATION RATE: 18 BRPM | DIASTOLIC BLOOD PRESSURE: 84 MMHG | OXYGEN SATURATION: 97 % | BODY MASS INDEX: 36.94 KG/M2 | SYSTOLIC BLOOD PRESSURE: 130 MMHG | HEART RATE: 94 BPM | WEIGHT: 222 LBS | TEMPERATURE: 97.5 F

## 2023-09-24 DIAGNOSIS — J22 LOWER RESP. TRACT INFECTION: ICD-10-CM

## 2023-09-24 DIAGNOSIS — H65.92 OME (OTITIS MEDIA WITH EFFUSION), LEFT: Primary | ICD-10-CM

## 2023-09-24 DIAGNOSIS — R06.2 WHEEZING: ICD-10-CM

## 2023-09-24 PROCEDURE — 99214 OFFICE O/P EST MOD 30 MIN: CPT | Mod: 25 | Performed by: PHYSICIAN ASSISTANT

## 2023-09-24 PROCEDURE — 94640 AIRWAY INHALATION TREATMENT: CPT | Performed by: PHYSICIAN ASSISTANT

## 2023-09-24 RX ORDER — AZITHROMYCIN 250 MG/1
TABLET, FILM COATED ORAL
Qty: 6 TABLET | Refills: 0 | Status: SHIPPED | OUTPATIENT
Start: 2023-09-24 | End: 2023-09-29

## 2023-09-24 RX ORDER — ALBUTEROL SULFATE 1.25 MG/3ML
1.25 SOLUTION RESPIRATORY (INHALATION) EVERY 6 HOURS PRN
Qty: 90 ML | Refills: 0 | Status: SHIPPED | OUTPATIENT
Start: 2023-09-24 | End: 2023-10-04

## 2023-09-24 RX ORDER — ALBUTEROL SULFATE 90 UG/1
2 AEROSOL, METERED RESPIRATORY (INHALATION) EVERY 6 HOURS PRN
Qty: 18 G | Refills: 0 | Status: SHIPPED | OUTPATIENT
Start: 2023-09-24 | End: 2023-10-04

## 2023-09-24 RX ORDER — GUAIFENESIN 600 MG/1
1200 TABLET, EXTENDED RELEASE ORAL 2 TIMES DAILY
Qty: 30 TABLET | Refills: 0 | Status: SHIPPED | OUTPATIENT
Start: 2023-09-24 | End: 2023-10-04

## 2023-09-24 RX ORDER — BENZONATATE 200 MG/1
200 CAPSULE ORAL 3 TIMES DAILY PRN
Qty: 30 CAPSULE | Refills: 0 | Status: SHIPPED | OUTPATIENT
Start: 2023-09-24 | End: 2023-10-04

## 2023-09-24 RX ORDER — PREDNISONE 20 MG/1
40 TABLET ORAL DAILY
Qty: 10 TABLET | Refills: 0 | Status: SHIPPED | OUTPATIENT
Start: 2023-09-24 | End: 2023-09-29

## 2023-09-24 RX ORDER — IPRATROPIUM BROMIDE AND ALBUTEROL SULFATE 2.5; .5 MG/3ML; MG/3ML
3 SOLUTION RESPIRATORY (INHALATION) ONCE
Status: COMPLETED | OUTPATIENT
Start: 2023-09-24 | End: 2023-09-24

## 2023-09-24 RX ADMIN — IPRATROPIUM BROMIDE AND ALBUTEROL SULFATE 3 ML: 2.5; .5 SOLUTION RESPIRATORY (INHALATION) at 14:54

## 2023-09-24 ASSESSMENT — ENCOUNTER SYMPTOMS
DIARRHEA: 0
WHEEZING: 1
SORE THROAT: 1
COUGH: 1
FEVER: 0
DYSURIA: 0
APPETITE CHANGE: 1
HEADACHES: 0
MYALGIAS: 1
VOMITING: 0
RHINORRHEA: 1
NAUSEA: 0

## 2023-09-24 NOTE — PROGRESS NOTES
SUBJECTIVE:   Lima Bueno is a 52 year old female presenting with a chief complaint of   Chief Complaint   Patient presents with    URI     Cough, body aches, congestion and mucus-Pt has asthma        She is an established patient of [facility].  Patient presents with 5 days of symptoms.  Out of albuterol and nebulizer.  No fevers.  SOB.          Review of Systems   Constitutional:  Positive for appetite change. Negative for fever.   HENT:  Positive for congestion, ear pain, rhinorrhea and sore throat.    Respiratory:  Positive for cough and wheezing.    Gastrointestinal:  Negative for diarrhea, nausea and vomiting.   Genitourinary:  Negative for dysuria.   Musculoskeletal:  Positive for myalgias.   Skin:  Negative for rash.   Neurological:  Negative for headaches.   All other systems reviewed and are negative.      Past Medical History:   Diagnosis Date    ADD (attention deficit disorder with hyperactivity)     Asthma     Breast mass 11/1/2012    Patient yet to have biopsy as of 11/1/2012      DVT (deep venous thrombosis) (H) 10/12    right leg    LSIL (low grade squamous intraepithelial lesion) on Pap smear 02/19/14    Neg high risk HPV    Pulmonary embolism (H) 2012    Saw Dr [place], protein S deficiency     Family History   Problem Relation Age of Onset    Thyroid Disease Mother         hyperactive    Schizophrenia Father     Hypertension Brother     Ovarian Cancer Paternal Grandmother     Breast Cancer No family hx of      Current Outpatient Medications   Medication Sig Dispense Refill    albuterol (ACCUNEB) 1.25 MG/3ML neb solution Take 1 vial (1.25 mg) by nebulization every 6 hours as needed for shortness of breath, wheezing or cough 90 mL 0    albuterol (PROAIR HFA) 108 (90 Base) MCG/ACT inhaler Inhale 2 puffs into the lungs every 4 hours as needed for shortness of breath / dyspnea 8 g 11    albuterol (PROAIR HFA/PROVENTIL HFA/VENTOLIN HFA) 108 (90 Base) MCG/ACT inhaler Inhale 2 puffs into the lungs  every 6 hours as needed for shortness of breath, wheezing or cough 18 g 0    albuterol (PROVENTIL) (2.5 MG/3ML) 0.083% neb solution Take 1 vial (2.5 mg) by nebulization every 6 hours as needed for shortness of breath / dyspnea or wheezing 120 mL 0    atorvastatin (LIPITOR) 20 MG tablet Take 1 tablet (20 mg) by mouth daily 90 tablet 3    azithromycin (ZITHROMAX) 250 MG tablet Take 2 tablets (500 mg) by mouth daily for 1 day, THEN 1 tablet (250 mg) daily for 4 days. 6 tablet 0    benzonatate (TESSALON) 200 MG capsule Take 1 capsule (200 mg) by mouth 3 times daily as needed for cough 30 capsule 0    blood glucose (NO BRAND SPECIFIED) test strip 1 Box of Test Strips for patients meter - test daily 100 strip 3    blood glucose monitoring (NO BRAND SPECIFIED) meter device kit Use to test blood sugar as directed. 1 kit 0    famotidine (PEPCID) 20 MG tablet Take 1 tablet (20 mg) by mouth daily 90 tablet 3    fexofenadine (ALLEGRA) 180 MG tablet Take 1 tablet (180 mg) by mouth daily 30 tablet 3    fluticasone-salmeterol (ADVAIR HFA) 230-21 MCG/ACT inhaler Inhale 2 puffs into the lungs 2 times daily 12 g 1    guaiFENesin (MUCINEX) 600 MG 12 hr tablet Take 2 tablets (1,200 mg) by mouth 2 times daily 30 tablet 0    guaiFENesin-dextromethorphan (ROBITUSSIN DM) 100-10 MG/5ML syrup Take 10 mLs by mouth every 4 hours as needed for cough 118 mL 0    ketotifen (ZADITOR) 0.025 % ophthalmic solution Place 1 drop into both eyes 2 times daily 1 drop in affected eye(s) 2 times a day 5 mL 1    Lancets 30G MISC 120 30 gauge lancets (substitute as needed) - test daily 120 each 3    lisinopril (ZESTRIL) 5 MG tablet Take 1 tablet (5 mg) by mouth daily 90 tablet 3    LORazepam (ATIVAN) 0.5 MG tablet Take 0.5 mg 1 hour prior to MRI 2 tablet 0    omeprazole (PRILOSEC) 40 MG DR capsule Take 1 capsule (40 mg) by mouth daily 30 capsule 2    predniSONE (DELTASONE) 20 MG tablet Take 2 tablets (40 mg) by mouth daily for 5 days 10 tablet 0     rivaroxaban ANTICOAGULANT (XARELTO) 20 MG TABS tablet Take 1 tablet (20 mg) by mouth daily (with dinner) 90 tablet 3    Semaglutide, 2 MG/DOSE, (OZEMPIC, 2 MG/DOSE,) 8 MG/3ML pen Inject 2 mg Subcutaneous once a week 24 mL 1    traMADol (ULTRAM) 50 MG tablet TAKE 1 TABLET BY MOUTH EVERY 6 HOURS AS NEEDED FOR SEVERE PAIN (SCHEDULE  APPOINTMENT  TO  DISCUSS  FURTHER  REFILLS  [phone]) 30 tablet 0    traZODone (DESYREL) 150 MG tablet Take 2 tablets (300 mg) by mouth At Bedtime 180 tablet 3    benzonatate (TESSALON) 100 MG capsule Take 1-2 capsules (100-200 mg) by mouth 3 times daily as needed for cough (Patient not taking: Reported on 7/6/2023) 45 capsule 1    mupirocin (BACTROBAN) 2 % external ointment Apply topically 3 times daily (Patient not taking: Reported on 9/5/2023) 30 g 3     Social History     Tobacco Use    Smoking status: Every Day     Packs/day: 1.00     Years: 30.00     Pack years: 30.00     Types: Cigarettes    Smokeless tobacco: Never   Substance Use Topics    Alcohol use: No     Alcohol/week: 0.0 standard drinks of alcohol       OBJECTIVE  /84   Pulse 94   Temp 97.5  F (36.4  C) (Tympanic)   Resp 18   Wt 100.7 kg (222 lb)   SpO2 97%   BMI 36.94 kg/m      Physical Exam  Vitals and nursing note reviewed.   Constitutional:       General: She is not in acute distress.     Appearance: Normal appearance. She is obese. She is ill-appearing.   HENT:      Head: Normocephalic and atraumatic.      Right Ear: Tympanic membrane, ear canal and external ear normal.      Left Ear: Ear canal and external ear normal.      Ears:      Comments: Left TM erythematous and bulging.       Nose: Nose normal.      Mouth/Throat:      Mouth: Mucous membranes are moist.      Pharynx: Oropharynx is clear.   Eyes:      Extraocular Movements: Extraocular movements intact.      Conjunctiva/sclera: Conjunctivae normal.   Cardiovascular:      Rate and Rhythm: Normal rate and regular rhythm.      Pulses: Normal pulses.       Heart sounds: Normal heart sounds.   Pulmonary:      Effort: Pulmonary effort is normal.      Breath sounds: Wheezing and rhonchi present.   Musculoskeletal:      Cervical back: Normal range of motion.   Skin:     General: Skin is warm and dry.      Findings: No rash.   Neurological:      General: No focal deficit present.      Mental Status: She is alert.   Psychiatric:         Mood and Affect: Mood normal.         Behavior: Behavior normal.         Labs:  No results found for this or any previous visit (from the past 24 hour(s)).    X-Ray was not done.    ASSESSMENT:      ICD-10-CM    1. OME (otitis media with effusion), left  H65.92       2. Lower resp. tract infection  J22 albuterol (PROAIR HFA/PROVENTIL HFA/VENTOLIN HFA) 108 (90 Base) MCG/ACT inhaler     albuterol (ACCUNEB) 1.25 MG/3ML neb solution     benzonatate (TESSALON) 200 MG capsule     predniSONE (DELTASONE) 20 MG tablet     ipratropium - albuterol 0.5 mg/2.5 mg/3 mL (DUONEB) neb solution 3 mL     azithromycin (ZITHROMAX) 250 MG tablet     guaiFENesin (MUCINEX) 600 MG 12 hr tablet      3. Wheezing  R06.2 albuterol (PROAIR HFA/PROVENTIL HFA/VENTOLIN HFA) 108 (90 Base) MCG/ACT inhaler     albuterol (ACCUNEB) 1.25 MG/3ML neb solution     predniSONE (DELTASONE) 20 MG tablet     ipratropium - albuterol 0.5 mg/2.5 mg/3 mL (DUONEB) neb solution 3 mL           Medical Decision Making:    Differential Diagnosis:  URI Adult/Peds:  Acute left otitis media, Asthma exacerbation, Bronchitis-viral, Pneumonia, and Viral syndrome    Serious Comorbid Conditions:  Adult:  Asthma and Diabetes    PLAN:    RF on albuterol neb and inhaler.  Rx for prednisone, tessalon perles, mucinex and zpak.  Given a neb treatment here.  Discussed reasons to seek immediate medical attention.  Additionally if no improvement or worsening in one week, may follow up with PCP and/or UC.    Following neb treatment in office today, No wheezing, only rhonchi present.        Followup:    If not  improving or if condition worsens, follow up with your Primary Care Provider, If not improving or if conditions worsens over the next 12-24 hours, go to the Emergency Department    There are no Patient Instructions on file for this visit.

## 2023-09-24 NOTE — PROGRESS NOTES
Clinic Administered Medication Documentation    Patient was given Dupneb. Prior to medication administration, verified patient's identity using patient's name and date of birth.    Priti Miller CMA

## 2023-09-24 NOTE — LETTER
September 24, 2023      Lima Bueno  [address] [address] [address]        To Whom It May Concern:    Lima Bueno was seen in our clinic. She may return to work without restrictions on 9/25/23.      Sincerely,        BASILIO Pedraza

## 2023-09-27 ENCOUNTER — TELEPHONE (OUTPATIENT)
Dept: FAMILY MEDICINE | Facility: CLINIC | Age: 53
End: 2023-09-27
Payer: COMMERCIAL

## 2023-09-27 NOTE — LETTER
September 28, 2023      Lima Bueno  [address] [address] [facility] [address]        To Whom It May Concern:    Lima Bueno suffers from known asthma for which she intends to follow-up next week.  She reports a recent asthma flareup, and it would be recommended that she be allowed to avoid irritants that exist in her current work environment.  Please take this into consideration when determining patient's request to work from home until her upcoming appointment on 10/04/2023.      Sincerely,        Oscar Baltazar MD

## 2023-09-27 NOTE — TELEPHONE ENCOUNTER
Pt called the clinic requesting a note for work.     She states she saw a provider in  on 9/24, and was prescribed a few new medications.     She states she has been unable to use her nebulizer at work as it will not work in the building. She states her work environment is unsanitary and the air quality is poor, which is negatively affecting her breathing.     She is requesting a note for her employer as soon as possible stating she can work from home until her next follow-up appt with her PCP to discuss long-term solutions. She would like this by the end of the week if possible.     Routing to PCP/team to advise next steps.     Thank you,  Mellissa Ovalles RN

## 2023-09-29 NOTE — TELEPHONE ENCOUNTER
Called pt to confirm she is able to see letter through MyChart.      Trudy HERNANDEZ MA on 9/29/2023 at 9:13 AM

## 2023-10-04 ENCOUNTER — OFFICE VISIT (OUTPATIENT)
Dept: FAMILY MEDICINE | Facility: CLINIC | Age: 53
End: 2023-10-04
Payer: COMMERCIAL

## 2023-10-04 VITALS
DIASTOLIC BLOOD PRESSURE: 98 MMHG | HEART RATE: 101 BPM | RESPIRATION RATE: 19 BRPM | HEIGHT: 65 IN | SYSTOLIC BLOOD PRESSURE: 141 MMHG | TEMPERATURE: 97.6 F | WEIGHT: 222 LBS | BODY MASS INDEX: 36.99 KG/M2 | OXYGEN SATURATION: 96 %

## 2023-10-04 DIAGNOSIS — I26.99 OTHER PULMONARY EMBOLISM WITHOUT ACUTE COR PULMONALE, UNSPECIFIED CHRONICITY (H): ICD-10-CM

## 2023-10-04 DIAGNOSIS — I10 ESSENTIAL HYPERTENSION, BENIGN: ICD-10-CM

## 2023-10-04 DIAGNOSIS — K21.9 GASTROESOPHAGEAL REFLUX DISEASE WITHOUT ESOPHAGITIS: ICD-10-CM

## 2023-10-04 DIAGNOSIS — E11.9 TYPE 2 DIABETES MELLITUS WITHOUT COMPLICATION, WITHOUT LONG-TERM CURRENT USE OF INSULIN (H): ICD-10-CM

## 2023-10-04 DIAGNOSIS — J45.909 PERSISTENT ASTHMA WITHOUT COMPLICATION, UNSPECIFIED ASTHMA SEVERITY: ICD-10-CM

## 2023-10-04 DIAGNOSIS — J22 LOWER RESP. TRACT INFECTION: ICD-10-CM

## 2023-10-04 DIAGNOSIS — E66.01 MORBID OBESITY (H): ICD-10-CM

## 2023-10-04 DIAGNOSIS — Z87.891 PERSONAL HISTORY OF TOBACCO USE: ICD-10-CM

## 2023-10-04 DIAGNOSIS — F51.01 PRIMARY INSOMNIA: ICD-10-CM

## 2023-10-04 DIAGNOSIS — R06.2 WHEEZING: ICD-10-CM

## 2023-10-04 DIAGNOSIS — I82.4Y1 DEEP VEIN THROMBOSIS (DVT) OF PROXIMAL VEIN OF RIGHT LOWER EXTREMITY, UNSPECIFIED CHRONICITY (H): ICD-10-CM

## 2023-10-04 DIAGNOSIS — Z00.00 ROUTINE GENERAL MEDICAL EXAMINATION AT A HEALTH CARE FACILITY: Primary | ICD-10-CM

## 2023-10-04 DIAGNOSIS — E78.5 HYPERLIPIDEMIA LDL GOAL <100: ICD-10-CM

## 2023-10-04 LAB — HBA1C MFR BLD: 6.7 % (ref 0–5.6)

## 2023-10-04 PROCEDURE — 99396 PREV VISIT EST AGE 40-64: CPT | Mod: 25 | Performed by: INTERNAL MEDICINE

## 2023-10-04 PROCEDURE — 91320 SARSCV2 VAC 30MCG TRS-SUC IM: CPT | Performed by: INTERNAL MEDICINE

## 2023-10-04 PROCEDURE — 36415 COLL VENOUS BLD VENIPUNCTURE: CPT | Performed by: INTERNAL MEDICINE

## 2023-10-04 PROCEDURE — 99214 OFFICE O/P EST MOD 30 MIN: CPT | Mod: 25 | Performed by: INTERNAL MEDICINE

## 2023-10-04 PROCEDURE — 83036 HEMOGLOBIN GLYCOSYLATED A1C: CPT | Performed by: INTERNAL MEDICINE

## 2023-10-04 PROCEDURE — 90471 IMMUNIZATION ADMIN: CPT | Performed by: INTERNAL MEDICINE

## 2023-10-04 PROCEDURE — 90480 ADMN SARSCOV2 VAC 1/ONLY CMP: CPT | Performed by: INTERNAL MEDICINE

## 2023-10-04 PROCEDURE — 90677 PCV20 VACCINE IM: CPT | Performed by: INTERNAL MEDICINE

## 2023-10-04 RX ORDER — ATORVASTATIN CALCIUM 20 MG/1
20 TABLET, FILM COATED ORAL DAILY
Qty: 90 TABLET | Refills: 3 | Status: SHIPPED | OUTPATIENT
Start: 2023-10-04 | End: 2024-09-20

## 2023-10-04 RX ORDER — ALBUTEROL SULFATE 0.83 MG/ML
2.5 SOLUTION RESPIRATORY (INHALATION) EVERY 6 HOURS PRN
Qty: 120 ML | Refills: 3 | Status: SHIPPED | OUTPATIENT
Start: 2023-10-04

## 2023-10-04 RX ORDER — TRAZODONE HYDROCHLORIDE 150 MG/1
300 TABLET ORAL AT BEDTIME
Qty: 180 TABLET | Refills: 3 | Status: SHIPPED | OUTPATIENT
Start: 2023-10-04

## 2023-10-04 RX ORDER — SEMAGLUTIDE 2.68 MG/ML
2 INJECTION, SOLUTION SUBCUTANEOUS WEEKLY
Qty: 24 ML | Refills: 1 | Status: SHIPPED | OUTPATIENT
Start: 2023-10-04

## 2023-10-04 RX ORDER — BENZONATATE 200 MG/1
200 CAPSULE ORAL 3 TIMES DAILY PRN
Qty: 30 CAPSULE | Refills: 11 | Status: SHIPPED | OUTPATIENT
Start: 2023-10-04

## 2023-10-04 RX ORDER — LISINOPRIL 10 MG/1
10 TABLET ORAL DAILY
Qty: 90 TABLET | Refills: 3 | Status: SHIPPED | OUTPATIENT
Start: 2023-10-04

## 2023-10-04 RX ORDER — FAMOTIDINE 20 MG/1
20 TABLET, FILM COATED ORAL DAILY
Qty: 90 TABLET | Refills: 3 | Status: SHIPPED | OUTPATIENT
Start: 2023-10-04

## 2023-10-04 RX ORDER — ALBUTEROL SULFATE 90 UG/1
2 AEROSOL, METERED RESPIRATORY (INHALATION) EVERY 6 HOURS PRN
Qty: 18 G | Refills: 11 | Status: SHIPPED | OUTPATIENT
Start: 2023-10-04

## 2023-10-04 RX ORDER — FEXOFENADINE HCL 180 MG/1
180 TABLET ORAL DAILY
Qty: 30 TABLET | Refills: 3 | Status: SHIPPED | OUTPATIENT
Start: 2023-10-04 | End: 2024-02-14

## 2023-10-04 RX ORDER — FLUTICASONE PROPIONATE AND SALMETEROL XINAFOATE 230; 21 UG/1; UG/1
2 AEROSOL, METERED RESPIRATORY (INHALATION) 2 TIMES DAILY
Qty: 12 G | Refills: 11 | Status: SHIPPED | OUTPATIENT
Start: 2023-10-04

## 2023-10-04 RX ORDER — GUAIFENESIN 600 MG/1
1200 TABLET, EXTENDED RELEASE ORAL 2 TIMES DAILY
Qty: 120 TABLET | Refills: 3 | Status: SHIPPED | OUTPATIENT
Start: 2023-10-04

## 2023-10-04 ASSESSMENT — PAIN SCALES - GENERAL: PAINLEVEL: NO PAIN (0)

## 2023-10-04 NOTE — LETTER
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  It would be recommended that she be allowed to avoid irritants that exist in her current work environment.  Please consider this when determining her request to work from home and to limit her exposure to the dust and mold to less than 2 hours in a given work day.          Sincerely,        Oscar Baltazar MD, MD

## 2023-10-04 NOTE — NURSING NOTE
Prior to immunization administration, verified patients identity using patient s name and date of birth. Please see Immunization Activity for additional information.     Screening Questionnaire for Adult Immunization    Are you sick today?   No   Do you have allergies to medications, food, a vaccine component or latex?   Yes   Have you ever had a serious reaction after receiving a vaccination?   No   Do you have a long-term health problem with heart, lung, kidney, or metabolic disease (e.g., diabetes), asthma, a blood disorder, no spleen, complement component deficiency, a cochlear implant, or a spinal fluid leak?  Are you on long-term aspirin therapy?  Pt checked  No but asthma on prob list   Do you have cancer, leukemia, HIV/AIDS, or any other immune system problem?   No   Do you have a parent, brother, or sister with an immune system problem?   No   In the past 3 months, have you taken medications that affect  your immune system, such as prednisone, other steroids, or anticancer drugs; drugs for the treatment of rheumatoid arthritis, Crohn s disease, or psoriasis; or have you had radiation treatments?   Yes   Have you had a seizure, or a brain or other nervous system problem?   No   During the past year, have you received a transfusion of blood or blood    products, or been given immune (gamma) globulin or antiviral drug?   No   For women: Are you pregnant or is there a chance you could become       pregnant during the next month?   No   Have you received any vaccinations in the past 4 weeks?   No     Immunization questionnaire was positive for at least one answer.  Notified no one.      Patient instructed to remain in clinic for 15 minutes afterwards, and to report any adverse reactions.     Screening performed by Danielle Flores CMA on 10/4/2023 at 5:19 PM.

## 2023-10-04 NOTE — PATIENT INSTRUCTIONS
(Z00.00) Routine general medical examination at a health care facility  (primary encounter diagnosis)  Comment: For routine exam, we will draw labs as ordered, cholesterol, diabetes mellitus check, liver function, renal function.  We will also update vaccination history. And refer for lung cancer screening [facility] phone #[phone]   Plan:     (E11.9) Type 2 diabetes mellitus without complication, without long-term current use of insulin (H)  Comment: Check hemoglobin A1c today.  Continue on current dose of ozempic  Plan: HEMOGLOBIN A1C, Semaglutide, 2 MG/DOSE,         (OZEMPIC, 2 MG/DOSE,) 8 MG/3ML pen, blood         glucose (NO BRAND SPECIFIED) test strip,         Lancets 30G MISC            (J45.301) Persistent asthma without complication, unspecified asthma severity  Comment: letter written.  Continue Advair and albuterol nebulizers   Plan: fluticasone-salmeterol (ADVAIR HFA) 230-21         MCG/ACT inhaler, albuterol (PROVENTIL) (2.5         MG/3ML) 0.083% neb solution, fexofenadine         (ALLEGRA) 180 MG tablet            (J22) Lower resp. tract infection  Comment: as above   Plan: albuterol (PROVENTIL) (2.5 MG/3ML) 0.083% neb         solution, albuterol (PROAIR HFA/PROVENTIL         HFA/VENTOLIN HFA) 108 (90 Base) MCG/ACT         inhaler, benzonatate (TESSALON) 200 MG capsule,        guaiFENesin (MUCINEX) 600 MG 12 hr tablet            (R06.2) Wheezing  Comment: as above   Plan: albuterol (PROAIR HFA/PROVENTIL HFA/VENTOLIN         HFA) 108 (90 Base) MCG/ACT inhaler            (E78.5) Hyperlipidemia LDL goal <100  Comment: Continue atorvastatin   Plan: atorvastatin (LIPITOR) 20 MG tablet            (I10) Essential hypertension, benign  Comment: Blood pressure is a bit elevated and we will increase to 10 mg lisinopril   Plan: lisinopril (ZESTRIL) 10 MG tablet            (F51.01) Primary insomnia  Comment: Continue trazodone   Plan: traZODone (DESYREL) 150 MG tablet            (E66.01) Morbid  obesity (H)  Comment: Continue to work on weight loss with diet and exercise and follow up in bariatric surgery.    Plan: Semaglutide, 2 MG/DOSE, (OZEMPIC, 2 MG/DOSE,) 8        MG/3ML pen            (K21.9) Gastroesophageal reflux disease without esophagitis  Comment: Famotidine 20 mg at night  Plan: famotidine (PEPCID) 20 MG tablet            (I82.4Y1) Deep vein thrombosis (DVT) of proximal vein of right lower extremity, unspecified chronicity (H)  Comment: Continue rivaroxaban for deep vein thrombosis prevention   Plan: rivaroxaban ANTICOAGULANT (XARELTO) 20 MG TABS         tablet            (I26.99) Other pulmonary embolism without acute cor pulmonale, unspecified chronicity (H)  Comment: as above   Plan: rivaroxaban ANTICOAGULANT (XARELTO) 20 MG TABS         tablet             Lung Cancer Screening   Frequently Asked Questions  If you are at high-risk for lung cancer, getting screened with low-dose computed tomography (LDCT) every year can help save your life. This handout offers answers to some of the most common questions about lung cancer screening. If you have other questions, please call [phone] ([phone]).     What is it?  Lung cancer screening uses special X-ray technology to create an image of your lung tissue. The exam is quick and easy and takes less than 10 seconds. We don t give you any medicine or use any needles. You can eat before and after the exam. You don t need to change your clothes as long as the clothing on your chest doesn t contain metal. But, you do need to be able to hold your breath for at least 6 seconds during the exam.    What is the goal of lung cancer screening?  The goal of lung cancer screening is to save lives. Many times, lung cancer is not found until a person starts having physical symptoms. Lung cancer screening can help detect lung cancer in the earliest stages when it may be easier to treat.    Who should be screened for lung cancer?  We suggest lung  cancer screening for anyone who is at high-risk for lung cancer. You are in the high-risk group if you:     are between the ages of 55 and 79, and   have smoked at least 1 pack of cigarettes a day for 20 or more years, and   still smoke or have quit within the past 15 years.    However, if you have a new cough or shortness of breath, you should talk to your doctor before being screened.    Why does it matter if I have symptoms?  Certain symptoms can be a sign that you have a condition in your lungs that should be checked and treated by your doctor. These symptoms include fever, chest pain, a new or changing cough, shortness of breath that you have never felt before, coughing up blood or unexplained weight loss. Having any of these symptoms can greatly affect the results of lung cancer screening.       Should all smokers get an LDCT lung cancer screening exam?  It depends. Lung cancer screening is for a very specific group of men and women who have a history of heavy smoking over a long period of time (see  Who should be screened for lung cancer  above).  I am in the high-risk group, but have been diagnosed with cancer in the past. Is LDCT lung cancer screening right for me?  In some cases, you should not have LDCT lung screening, such as when your doctor is already following your cancer with CT scan studies. Your doctor will help you decide if LDCT lung screening is right for you.  Do I need to have a screening exam every year?  Yes. If you are in the high-risk group described earlier, you should get an LDCT lung cancer screening exam every year until you are 79, or are no longer willing or able to undergo screening and possible procedures to diagnose and treat lung cancer.  How effective is LDCT at preventing death from lung cancer?  Studies have shown that LDCT lung cancer screening can lower the risk of death from lung cancer by 20 percent in people who are at high-risk.  What are the risks?  There are some risks  and limitations of LDCT lung cancer screening. We want to make sure you understand the risks and benefits, so please let us know if you have any questions. Your doctor may want to talk with you more about these risks.   Radiation exposure: As with any exam that uses radiation, there is a very small increased risk of cancer. The amount of radiation in LDCT is small--about the same amount a person would get from a mammogram. Your doctor orders the exam when he or she feels the potential benefits outweigh the risks.   False negatives: No test is perfect, including LDCT. It is possible that you may have a medical condition, including lung cancer, that is not found during your exam. This is called a false negative result.   False positives and more testing: LDCT very often finds something in the lung that could be cancer, but in fact is not. This is called a false positive result. False positive tests often cause anxiety. To make sure these findings are not cancer, you may need to have more tests. These tests will be done only if you give us permission. Sometimes patients need a treatment that can have side effects, such as a biopsy. For more information on false positives, see  What can I expect from the results?    Findings not related to lung cancer: Your LDCT exam also takes pictures of areas of your body next to your lungs. In a very small number of cases, the CT scan will show an abnormal finding in one of these areas, such as your kidneys, adrenal glands, liver or thyroid. This finding may not be serious, but you may need more tests. Your doctor can help you decide what other tests you may need, if any.  What can I expect from the results?  About 1 out of 4 LDCT exams will find something that may need more tests. Most of the time, these findings are lung nodules. Lung nodules are very small collections of tissue in the lung. These nodules are very common, and the vast majority--more than 97 percent--are not cancer  (benign). Most are normal lymph nodes or small areas of scarring from past infections.  But, if a small lung nodule is found to be cancer, the cancer can be cured more than 90 percent of the time. To know if the nodule is cancer, we may need to get more images before your next yearly screening exam. If the nodule has suspicious features (for example, it is large, has an odd shape or grows over time), we will refer you to a specialist for further testing.  Will my doctor also get the results?  Yes. Your doctor will get a copy of your results.  Is it okay to keep smoking now that there s a cancer screening exam?  No. Tobacco is one of the strongest cancer-causing agents. It causes not only lung cancer, but other cancers and cardiovascular (heart) diseases as well. The damage caused by smoking builds over time. This means that the longer you smoke, the higher your risk of disease. While it is never too late to quit, the sooner you quit, the better.  Where can I find help to quit smoking?  The best way to prevent lung cancer is to stop smoking. If you have already quit smoking, congratulations and keep it up! For help on quitting smoking, please call [employer] at 7-115-QUITNOW ([phone]) or the American [employer] Society at 1-821.509.2122 to find local resources near you.  One-on-one health coaching:  If you d prefer to work individually with a health care provider on tobacco cessation, we offer:     Medication Therapy Management:  Our specially trained pharmacists work closely with you and your doctor to help you quit smoking.  Call [phone] or [phone] (toll free).

## 2023-10-04 NOTE — PROGRESS NOTES
SUBJECTIVE:   CC: Lima is an 52 year old who presents for preventive health visit.       Healthy Habits:     Getting at least 3 servings of Calcium per day:  Yes    Bi-annual eye exam:  Yes    Dental care twice a year:  Yes    Sleep apnea or symptoms of sleep apnea:  None    Diet:  Regular (no restrictions)    Frequency of exercise:  6-7 days/week    Duration of exercise:  Less than 15 minutes    Taking medications regularly:  Yes    Barriers to taking medications:  None    Medication side effects:  None    Additional concerns today:  Yes      Social History     Tobacco Use    Smoking status: Every Day     Packs/day: 1.00     Years: 30.00     Pack years: 30.00     Types: Cigarettes    Smokeless tobacco: Never   Substance Use Topics    Alcohol use: No     Alcohol/week: 0.0 standard drinks of alcohol             12/6/2021     2:44 PM   Alcohol Use   Prescreen: >3 drinks/day or >7 drinks/week? No     Reviewed orders with patient.  Reviewed health maintenance and updated orders accordingly - Yes  Lab work is in process  Labs reviewed in EPIC    Breast Cancer Screening:    FHS-7:       5/30/2023     2:25 PM   Breast CA Risk Assessment (FHS-7)   Did any of your first-degree relatives have breast or ovarian cancer? No   Did any of your relatives have bilateral breast cancer? No   Did any man in your family have breast cancer? No   Did any woman in your family have breast and ovarian cancer? No   Did any woman in your family have breast cancer before age 50 y? No   Do you have 2 or more relatives with breast and/or ovarian cancer? No   Do you have 2 or more relatives with breast and/or bowel cancer? No     click delete button to remove this line now    Pertinent mammograms are reviewed under the imaging tab.    History of abnormal Pap smear: NO - age 30-65 PAP every 5 years with negative HPV co-testing recommended      Latest Ref Rng & Units 9/5/2023     3:40 PM 4/5/2019    11:53 AM 4/5/2019    11:30 AM   PAP / HPV    PAP  Negative for Intraepithelial Lesion or Malignancy (NILM)      PAP (Historical)   LSIL     HPV 16 DNA Negative Negative   Negative    HPV 18 DNA Negative Negative   Negative    Other HR HPV Negative Negative   Negative      Reviewed and updated as needed this visit by clinical staff                  Reviewed and updated as needed this visit by Provider                     Persistent asthma without complication, unspecified asthma severity\   Lima Bueno continues to suffer from asthma exacerbations when she is working.  She is using nebulizers every 6 hours and albuterol inhales as well.  She is also using nebulizers in the evening.  She was seen initially in the clinic 1 week ago on 09/24.  She continues on Advair 2 puffs twice per day with additional use 1 puff every 4-6 hours.   She completed a course of prednisone burst and azithromycin which did help clear up ear infection and improved symptoms   Type 2 diabetes mellitus without complication, without long-term current use of insulin (H)   She continues on Ozempic for management of her blood glucose.  She is due for recheck of an A1c.  Also due for eye exam this year  Hyperlipidemia LDL goal <100   She continues on atorvastatin without issue.  Essential hypertension, benign   Blood pressure remained stable  Primary insomnia   She continues to take trazodone which is helpful for sleep.  No side effects  Morbid obesity (H)   Weight remains elevated.  She is going to be following up with bariatric surgery clinic in the next few weeks.  Personal history of tobacco use   She is aware of the risk factors are associated with smoking.  She wishes to quit smoking, but has had difficulty in the past with use of bupropion.  She may consider trial of smoking cessation without assistance        Review of Systems  CONSTITUTIONAL: NEGATIVE for fever, chills, change in weight  INTEGUMENTARY/SKIN: NEGATIVE for worrisome rashes, moles or lesions  EYES: NEGATIVE  "for vision changes or irritation  ENT: NEGATIVE for ear, mouth and throat problems  RESP: as above   BREAST: NEGATIVE for masses, tenderness or discharge  CV: NEGATIVE for chest pain, palpitations or peripheral edema  GI: NEGATIVE for nausea, abdominal pain, heartburn, or change in bowel habits  : NEGATIVE for unusual urinary or vaginal symptoms. No vaginal bleeding.  MUSCULOSKELETAL: NEGATIVE for significant arthralgias or myalgia  NEURO: NEGATIVE for weakness, dizziness or paresthesias  PSYCHIATRIC: NEGATIVE for changes in mood or affect      OBJECTIVE:   BP (!) 141/98 (BP Location: Left arm, Patient Position: Sitting, Cuff Size: Adult Regular)   Pulse 101   Temp 97.6  F (36.4  C) (Tympanic)   Resp 19   Ht 1.651 m (5' 5\")   Wt 100.7 kg (222 lb)   SpO2 96%   BMI 36.94 kg/m    Physical Exam  GENERAL: healthy, alert and no distress  EYES: Eyes grossly normal to inspection, PERRL and conjunctivae and sclerae normal  HENT: ear canals and TM's normal, nose and mouth without ulcers or lesions  NECK: no adenopathy, no asymmetry, masses, or scars and thyroid normal to palpation  RESP: lungs clear to auscultation - no rales, rhonchi or wheezes  CV: regular rate and rhythm, normal S1 S2, no S3 or S4, no murmur, click or rub, no peripheral edema and peripheral pulses strong  ABDOMEN: soft, nontender, no hepatosplenomegaly, no masses and bowel sounds normal  MS: no gross musculoskeletal defects noted, no edema  SKIN: no suspicious lesions or rashes  NEURO: Normal strength and tone, mentation intact and speech normal  PSYCH: mentation appears normal, affect normal/bright    Diagnostic Test Results:  Labs reviewed in Epic    ASSESSMENT/PLAN:     Patient Instructions   (Z00.00) Routine general medical examination at a health care facility  (primary encounter diagnosis)  Comment: For routine exam, we will draw labs as ordered, cholesterol, diabetes mellitus check, liver function, renal function, and refer for colonoscopy. "  We will also update vaccination history. And refer for lung cancer screening [facility] phone #[phone]   Plan:     (E11.9) Type 2 diabetes mellitus without complication, without long-term current use of insulin (H)  Comment: Check hemoglobin A1c today.  Continue on current dose of ozempic  Plan: HEMOGLOBIN A1C, Semaglutide, 2 MG/DOSE,         (OZEMPIC, 2 MG/DOSE,) 8 MG/3ML pen, blood         glucose (NO BRAND SPECIFIED) test strip,         Lancets 30G MISC            (J45.909) Persistent asthma without complication, unspecified asthma severity  Comment: letter written.  Continue Advair and albuterol nebulizers   Plan: fluticasone-salmeterol (ADVAIR HFA) 230-21         MCG/ACT inhaler, albuterol (PROVENTIL) (2.5         MG/3ML) 0.083% neb solution, fexofenadine         (ALLEGRA) 180 MG tablet            (J22) Lower resp. tract infection  Comment: as above   Plan: albuterol (PROVENTIL) (2.5 MG/3ML) 0.083% neb         solution, albuterol (PROAIR HFA/PROVENTIL         HFA/VENTOLIN HFA) 108 (90 Base) MCG/ACT         inhaler, benzonatate (TESSALON) 200 MG capsule,        guaiFENesin (MUCINEX) 600 MG 12 hr tablet            (R06.2) Wheezing  Comment: as above   Plan: albuterol (PROAIR HFA/PROVENTIL HFA/VENTOLIN         HFA) 108 (90 Base) MCG/ACT inhaler            (E78.5) Hyperlipidemia LDL goal <100  Comment: Continue atorvastatin   Plan: atorvastatin (LIPITOR) 20 MG tablet            (I10) Essential hypertension, benign  Comment: Blood pressure is a bit elevated and we will increase to 10 mg lisinopril   Plan: lisinopril (ZESTRIL) 10 MG tablet            (F51.01) Primary insomnia  Comment: Continue trazodone   Plan: traZODone (DESYREL) 150 MG tablet            (E66.01) Morbid obesity (H)  Comment: Continue to work on weight loss with diet and exercise and follow up in bariatric surgery.    Plan: Semaglutide, 2 MG/DOSE, (OZEMPIC, 2 MG/DOSE,) 8        MG/3ML pen            (K21.9) Gastroesophageal reflux  disease without esophagitis  Comment: Famotidine 20 mg at night  Plan: famotidine (PEPCID) 20 MG tablet            (I82.4Y1) Deep vein thrombosis (DVT) of proximal vein of right lower extremity, unspecified chronicity (H)  Comment: Continue rivaroxaban for deep vein thrombosis prevention   Plan: rivaroxaban ANTICOAGULANT (XARELTO) 20 MG TABS         tablet            (I26.99) Other pulmonary embolism without acute cor pulmonale, unspecified chronicity (H)  Comment: as above   Plan: rivaroxaban ANTICOAGULANT (XARELTO) 20 MG TABS         tablet           Smoking  Comment: CT lung cancer screening recommended.  Smoking cessation discussed at length and she is not interested in any medication options at this time.    Patient has been advised of split billing requirements and indicates understanding: Yes      COUNSELING:  Reviewed preventive health counseling, as reflected in patient instructions        She reports that she has been smoking cigarettes. She has a 30.00 pack-year smoking history. She has never used smokeless tobacco.  Nicotine/Tobacco Cessation Plan:   Information offered: Patient not interested at this time          Oscar Baltazar MD, MD  [facility] Cancer Screening Shared Decision Making Visit     Lima Bueno, a 52 year old female, is eligible for lung cancer screening    History   Smoking Status    Every Day    Packs/day: 1.00    Years: 30.00    Types: Cigarettes   Smokeless Tobacco    Never       I have discussed with patient the risks and benefits of screening for lung cancer with low-dose CT.     The risks include:    radiation exposure: one low dose chest CT has as much ionizing radiation as about 15 chest x-rays, or 6 months of background radiation living in [place]      false positives: most findings/nodules are NOT cancer, but some might still require additional diagnostic evaluation, including biopsy    over-diagnosis: some slow growing cancers that  might never have been clinically significant will be detected and treated unnecessarily     The benefit of early detection of lung cancer is contingent upon adherence to annual screening or more frequent follow up if indicated.     Furthermore, to benefit from screening, Lima must be willing and able to undergo diagnostic procedures, if indicated. Although no specific guide is available for determining severity of comorbidities, it is reasonable to withhold screening in patients who have greater mortality risk from other diseases.     We did discuss that the best way to prevent lung cancer is to not smoke.    Some patients may value a numeric estimation of lung cancer risk when evaluating if lung cancer screening is right for them, here is one calculator:    ShouldIScreen

## 2023-10-08 NOTE — RESULT ENCOUNTER NOTE
Jose Amato,    I have had the opportunity to review your recent results and an interpretation is as follows:  Your follow-up A1c shows stable average blood glucose.  Continue on Ozempic    Sincerely,  Oscar Baltazar MD

## 2023-10-09 ENCOUNTER — DOCUMENTATION ONLY (OUTPATIENT)
Dept: FAMILY MEDICINE | Facility: CLINIC | Age: 53
End: 2023-10-09

## 2023-10-09 NOTE — PROGRESS NOTES
Lima DAVID Chris Bueno has an upcoming lab appointment:    Future Appointments   Date Time Provider Department Center   10/23/2023  5:30 PM CS [facility]   10/24/2023  9:00 AM [facility]     Patient is scheduled for the following lab(s): pt is requesting labs. Please order if necessary, thank you    There is no order available. Please review and place either future orders or HMPO (Review of Health Maintenance Protocol Orders), as appropriate.    There are no preventive care reminders to display for this patient.  Kayla Morgan

## 2023-10-18 ENCOUNTER — TELEPHONE (OUTPATIENT)
Dept: SURGERY | Facility: CLINIC | Age: 53
End: 2023-10-18
Payer: COMMERCIAL

## 2023-10-18 NOTE — TELEPHONE ENCOUNTER
Called pt at request of MKZIYAD to inquire if upcoming virtual visit is to discuss medications or if pt would like to discuss surgery.  Per pt, she would like to keep her upcoming appointment as is for the time being. She is aware she is aware that any future visits to talk about starting the surgical route will need to be scheduled for an hour in-person.     Anca LARA RN

## 2023-10-18 NOTE — PROGRESS NOTES
"Lima is a 52 year old who is being evaluated via a billable video visit.      The patient has been notified of following:     \"This video visit will be conducted via a call between you and your physician/provider. We have found that certain health care needs can be provided without the need for an in-person physical exam.  This service lets us provide the care you need with a video conversation.  If a prescription is necessary we can send it directly to your pharmacy.  If lab work is needed we can place an order for that and you can then stop by our lab to have the test done at a later time.    Video visits are billed at different rates depending on your insurance coverage.  Please reach out to your insurance provider with any questions.    If during the course of the call the physician/provider feels a video visit is not appropriate, you will not be charged for this service.\"    Patient has given verbal consent for Video visit? Yes    How would you like to obtain your AVS? MyChart    If the video visit is dropped, the invitation should be resent by: Text to cell phone: [phone]    Will anyone else be joining your video visit? No    I    Video-Visit Details    Type of service:  Video Visit    Video Start Time: 9:04 AM    Video End Time:9:17 AM    Originating Location (pt. Location): Home    Distant Location (provider location): Remote-home     Platform used for Video Visit: MENABANQER- phone      10/24/2023      Return Medical Weight Management Note     Lima Bueno  MRN:  [id number]  :  1970    Dear Oscar Baltazar MD, MD,    I had the pleasure of seeing your patient Lima Bueno. She is a 52 year old female who I am continuing to see for treatment of obesity related to:         No data to display                Assessment & Plan   Problem List Items Addressed This Visit       Tobacco use disorder     Will need to be 3 months smoke free prior to bariatric surgery.       "    Type 2 diabetes mellitus without complication, without long-term current use of insulin (H)     Controlled with current medications.  Will continue Ozempic.  Should improve with bariatric surgery and wt loss.          Morbid obesity (H) - Primary     Pt has been in our bariatric program since 7/28/2022 with no measurable weight loss despite pharmacotherapy.  Today we discussed bariatric surgery and she would like to proceed.  She will schedule bariatric initial evaluation in the new year.  For now she will continue on Ozempic 2 mg weekly as this is helping her maintain her weight and keep her diabetes controlled.             PATIENT INSTRUCTIONS:  Continue Ozempic 2 mg inject once weekly  Will prepare for bariatric surgery in 2024  Please view bariatric online info session. Link below:     [email or web address]   Work to cut down on smoking.  You rey need to be smoke free for 3 months before surgery and for life following surgery to decrease your risk of stomach ulcers.     FOLLOW-UP:  Please call [phone] to schedule your a bariatric initial evaluation in 3 months in clinic with both the dietitian and myself.  The visits for an hour each.    25 minutes spent on the date of the encounter doing chart review, history and exam, result review, counseling, developing plan of care, documentation, and further activities as noted      INTERVAL HISTORY:  Lima returns for medical weight management follow up.  Last seen in 4/252023.  We increase Ozempic to 2 mg weekly.  Frustrated with lack of weight loss. Was coming on a 1 year since starting the program and her BMI is still 37.  Plan was if in 3 months, if no real progress, she will see myself and the dietician for a bariatric initial evaluation in clinic. She will review the info session prior to that visit. Has seen dietician several times since last visit.      She did not set up the initial evaluation. Will see  what her intention is today.        WEIGHT METRICS:  Body mass index is 36.94 kg/m .   Current Weight: 222 lb (100.7 kg)  Last Visits Weight: 224 lb (101.6 kg)  Initial Weight (lbs): 220.6 lbs  Cumulative weight loss (lbs): -1.4  Weight Loss Percentage: -0.63%    Wt Readings from Last 10 Encounters:   10/24/23 222 lb (100.7 kg)   10/04/23 222 lb (100.7 kg)   09/24/23 222 lb (100.7 kg)   09/05/23 222 lb (100.7 kg)   08/01/23 224 lb (101.6 kg)   07/06/23 224 lb 11.2 oz (101.9 kg)   04/28/23 224 lb (101.6 kg)   04/25/23 224 lb (101.6 kg)   04/10/23 224 lb (101.6 kg)   01/19/23 214 lb 6.4 oz (97.3 kg)             No data to display                     No data to display                  MEDICATIONS:   Current Outpatient Medications   Medication Sig Dispense Refill    albuterol (PROAIR HFA/PROVENTIL HFA/VENTOLIN HFA) 108 (90 Base) MCG/ACT inhaler Inhale 2 puffs into the lungs every 6 hours as needed for shortness of breath, wheezing or cough 18 g 11    albuterol (PROVENTIL) (2.5 MG/3ML) 0.083% neb solution Take 1 vial (2.5 mg) by nebulization every 6 hours as needed for shortness of breath or wheezing 120 mL 3    atorvastatin (LIPITOR) 20 MG tablet Take 1 tablet (20 mg) by mouth daily 90 tablet 3    benzonatate (TESSALON) 200 MG capsule Take 1 capsule (200 mg) by mouth 3 times daily as needed for cough 30 capsule 11    blood glucose (NO BRAND SPECIFIED) test strip 1 Box of Test Strips for patients meter - test daily 100 strip 3    blood glucose monitoring (NO BRAND SPECIFIED) meter device kit Use to test blood sugar as directed. 1 kit 0    famotidine (PEPCID) 20 MG tablet Take 1 tablet (20 mg) by mouth daily 90 tablet 3    fexofenadine (ALLEGRA) 180 MG tablet Take 1 tablet (180 mg) by mouth daily 30 tablet 3    fluticasone-salmeterol (ADVAIR HFA) 230-21 MCG/ACT inhaler Inhale 2 puffs into the lungs 2 times daily 12 g 11    guaiFENesin (MUCINEX) 600 MG 12 hr tablet Take 2 tablets (1,200 mg) by mouth 2 times daily 120  tablet 3    ketotifen (ZADITOR) 0.025 % ophthalmic solution Place 1 drop into both eyes 2 times daily 1 drop in affected eye(s) 2 times a day 5 mL 1    Lancets 30G MISC 120 30 gauge lancets (substitute as needed) - test daily 120 each 3    lisinopril (ZESTRIL) 10 MG tablet Take 1 tablet (10 mg) by mouth daily 90 tablet 3    mupirocin (BACTROBAN) 2 % external ointment Apply topically 3 times daily 30 g 3    rivaroxaban ANTICOAGULANT (XARELTO) 20 MG TABS tablet Take 1 tablet (20 mg) by mouth daily (with dinner) 90 tablet 3    Semaglutide, 2 MG/DOSE, (OZEMPIC, 2 MG/DOSE,) 8 MG/3ML pen Inject 2 mg Subcutaneous once a week 24 mL 1    traZODone (DESYREL) 150 MG tablet Take 2 tablets (300 mg) by mouth At Bedtime 180 tablet 3       LABS:  Hemoglobin A1C   Date Value Ref Range Status   10/04/2023 6.7 (H) 0.0 - 5.6 % Final     Comment:     Normal <5.7%   Prediabetes 5.7-6.4%    Diabetes 6.5% or higher     Note: Adopted from ADA consensus guidelines.     TSH   Date Value Ref Range Status   05/04/2022 0.74 0.40 - 4.00 mU/L Final   09/02/2015 0.94 0.40 - 4.00 mU/L Final     Sodium   Date Value Ref Range Status   07/06/2023 141 136 - 145 mmol/L Final   10/04/2017 140 133 - 144 mmol/L Final     Potassium   Date Value Ref Range Status   07/06/2023 3.9 3.4 - 5.3 mmol/L Final   08/30/2022 3.5 3.4 - 5.3 mmol/L Final   01/08/2019 3.7 3.4 - 5.3 mmol/L Final     Chloride   Date Value Ref Range Status   07/06/2023 104 98 - 107 mmol/L Final   08/30/2022 103 94 - 109 mmol/L Final   10/04/2017 107 94 - 109 mmol/L Final     Carbon Dioxide   Date Value Ref Range Status   10/04/2017 25 20 - 32 mmol/L Final     Carbon Dioxide (CO2)   Date Value Ref Range Status   07/06/2023 26 22 - 29 mmol/L Final   08/30/2022 28 20 - 32 mmol/L Final     Anion Gap   Date Value Ref Range Status   07/06/2023 11 7 - 15 mmol/L Final   08/30/2022 8 3 - 14 mmol/L Final   10/04/2017 8 3 - 14 mmol/L Final     Glucose   Date Value Ref Range Status   07/06/2023 91 70 - 99  mg/dL Final   08/30/2022 90 70 - 99 mg/dL Final   10/04/2017 101 (H) 70 - 99 mg/dL Final     Comment:     Non Fasting     Urea Nitrogen   Date Value Ref Range Status   07/06/2023 10.8 6.0 - 20.0 mg/dL Final   08/30/2022 13 7 - 30 mg/dL Final   10/04/2017 10 7 - 30 mg/dL Final     Creatinine   Date Value Ref Range Status   07/06/2023 0.89 0.51 - 0.95 mg/dL Final   01/08/2019 0.81 0.52 - 1.04 mg/dL Final     GFR Estimate   Date Value Ref Range Status   07/06/2023 77 >60 mL/min/1.73m2 Final   01/08/2019 85 >60 mL/min/[1.73_m2] Final     Comment:     Non  GFR Calc  Starting 12/18/2018, serum creatinine based estimated GFR (eGFR) will be   calculated using the Chronic Kidney Disease Epidemiology Collaboration   (CKD-EPI) equation.       Calcium   Date Value Ref Range Status   07/06/2023 9.2 8.6 - 10.0 mg/dL Final   10/04/2017 9.0 8.5 - 10.1 mg/dL Final     Bilirubin Total   Date Value Ref Range Status   07/06/2023 0.4 <=1.2 mg/dL Final   10/04/2017 0.3 0.2 - 1.3 mg/dL Final     Alkaline Phosphatase   Date Value Ref Range Status   07/06/2023 111 (H) 35 - 104 U/L Final   10/04/2017 96 40 - 150 U/L Final     ALT   Date Value Ref Range Status   07/06/2023 27 0 - 50 U/L Final     Comment:     Reference intervals for this test were updated on 6/12/2023 to more accurately reflect our healthy population. There may be differences in the flagging of prior results with similar values performed with this method. Interpretation of those prior results can be made in the context of the updated reference intervals.     10/04/2017 23 0 - 50 U/L Final     AST   Date Value Ref Range Status   07/06/2023 17 0 - 45 U/L Final     Comment:     Reference intervals for this test were updated on 6/12/2023 to more accurately reflect our healthy population. There may be differences in the flagging of prior results with similar values performed with this method. Interpretation of those prior results can be made in the context of the  "updated reference intervals.   10/04/2017 14 0 - 45 U/L Final     Cholesterol   Date Value Ref Range Status   01/17/2023 134 <200 mg/dL Final   07/27/2018 162 <200 mg/dL Final     HDL Cholesterol   Date Value Ref Range Status   07/27/2018 39 (L) >49 mg/dL Final     Direct Measure HDL   Date Value Ref Range Status   01/17/2023 44 (L) >=50 mg/dL Final     LDL Cholesterol Calculated   Date Value Ref Range Status   01/17/2023 72 <=100 mg/dL Final   07/27/2018 82 <100 mg/dL Final     Comment:     Desirable:       <100 mg/dl     Triglycerides   Date Value Ref Range Status   01/17/2023 88 <150 mg/dL Final   07/27/2018 206 (H) <150 mg/dL Final     Comment:     Borderline high:  150-199 mg/dl  High:             200-499 mg/dl  Very high:       >499 mg/dl  Non Fasting       WBC   Date Value Ref Range Status   02/19/2018 5.6 4.0 - 11.0 10e9/L Final     WBC Count   Date Value Ref Range Status   07/06/2023 6.5 4.0 - 11.0 10e3/uL Final     Hemoglobin   Date Value Ref Range Status   07/06/2023 14.0 11.7 - 15.7 g/dL Final   01/08/2019 14.6 11.7 - 15.7 g/dL Final     Hematocrit   Date Value Ref Range Status   07/06/2023 43.8 35.0 - 47.0 % Final   02/19/2018 42.4 35.0 - 47.0 % Final     MCV   Date Value Ref Range Status   07/06/2023 86 78 - 100 fL Final   02/19/2018 86 78 - 100 fl Final     Platelet Count   Date Value Ref Range Status   07/06/2023 233 150 - 450 10e3/uL Final   02/19/2018 224 150 - 450 10e9/L Final         BP Readings from Last 6 Encounters:   10/04/23 (!) 141/98   09/24/23 130/84   09/05/23 138/80   07/06/23 137/85   04/10/23 133/87   04/07/23 (!) 153/98       Pulse Readings from Last 6 Encounters:   10/04/23 101   09/24/23 94   07/06/23 80   04/10/23 98   04/07/23 91   08/30/22 91       PE:  Ht 5' 5\" (1.651 m)   Wt 222 lb (100.7 kg)   BMI 36.94 kg/m    GENERAL: Healthy, alert and no distress  EYES: Eyes grossly normal to inspection.  No discharge or erythema, or obvious scleral/conjunctival abnormalities.  RESP: No " audible wheeze, cough, or visible cyanosis.  No visible retractions or increased work of breathing.    SKIN: Visible skin clear. No significant rash, abnormal pigmentation or lesions.  NEURO: Cranial nerves grossly intact.  Mentation and speech appropriate for age.  PSYCH: Mentation appears normal, affect normal/bright, judgement and insight intact, normal speech and appearance well-groomed.      Sincerely,      Krystal Calix PA-C

## 2023-10-24 ENCOUNTER — VIRTUAL VISIT (OUTPATIENT)
Dept: SURGERY | Facility: CLINIC | Age: 53
End: 2023-10-24
Payer: COMMERCIAL

## 2023-10-24 VITALS — BODY MASS INDEX: 36.99 KG/M2 | HEIGHT: 65 IN | WEIGHT: 222 LBS

## 2023-10-24 DIAGNOSIS — F17.200 TOBACCO USE DISORDER: ICD-10-CM

## 2023-10-24 DIAGNOSIS — E66.01 MORBID OBESITY (H): Primary | ICD-10-CM

## 2023-10-24 DIAGNOSIS — E11.9 TYPE 2 DIABETES MELLITUS WITHOUT COMPLICATION, WITHOUT LONG-TERM CURRENT USE OF INSULIN (H): ICD-10-CM

## 2023-10-24 PROCEDURE — 99214 OFFICE O/P EST MOD 30 MIN: CPT | Mod: VID | Performed by: PHYSICIAN ASSISTANT

## 2023-10-24 NOTE — ASSESSMENT & PLAN NOTE
Controlled with current medications.  Will continue Ozempic.  Should improve with bariatric surgery and wt loss.

## 2023-10-24 NOTE — ASSESSMENT & PLAN NOTE
Pt has been in our bariatric program since 7/28/2022 with no measurable weight loss despite pharmacotherapy.  Today we discussed bariatric surgery and she would like to proceed.  She will schedule bariatric initial evaluation in the new year.  For now she will continue on Ozempic 2 mg weekly as this is helping her maintain her weight and keep her diabetes controlled.

## 2023-10-24 NOTE — PATIENT INSTRUCTIONS
"To ensure quality you may receive a patient satisfaction survey. The greatest compliment you can give is \"Likely to Recommend.\"    Nice to talk with you today.  Thank you for your trust. Below is the plan discussed.-  ANDI Rice      PATIENT INSTRUCTIONS:  Continue Ozempic 2 mg inject once weekly    Will prepare for bariatric surgery in 2024    Please view bariatric online info session. Link below:     [email or web address]     Work to cut down on smoking.  You rey need to be smoke free for 3 months before surgery and for life following surgery to decrease your risk of stomach ulcers.     FOLLOW-UP:  Please call [phone] to schedule your a bariatric initial evaluation in 3 months in clinic with both the dietitian and myself.  The visits for an hour each.      "

## 2023-10-24 NOTE — Clinical Note
Jose Moore,  I saw Lima in clinic today.  We discussed bariatric surgery and she is interested in moving forward.  We will do an initial evaluation early next year.  In the meantime she plans on continuing her Ozempic 2 mg.  She will need to stop smoking for 3 months prior to surgery and of course after well.  Let me know if you have questions.  Have a great day.  Krystal

## 2023-11-04 ENCOUNTER — TELEPHONE (OUTPATIENT)
Dept: FAMILY MEDICINE | Facility: CLINIC | Age: 53
End: 2023-11-04
Payer: COMMERCIAL

## 2023-11-27 ENCOUNTER — ANCILLARY PROCEDURE (OUTPATIENT)
Dept: CT IMAGING | Facility: CLINIC | Age: 53
End: 2023-11-27
Attending: INTERNAL MEDICINE
Payer: COMMERCIAL

## 2023-11-27 DIAGNOSIS — Z87.891 PERSONAL HISTORY OF TOBACCO USE: ICD-10-CM

## 2023-11-27 PROCEDURE — 71271 CT THORAX LUNG CANCER SCR C-: CPT

## 2023-11-30 NOTE — RESULT ENCOUNTER NOTE
Jose Amato,    I have had the opportunity to review your recent results and an interpretation is as follows:  Congratulations on your excellent results.  No concerning findings     Sincerely,  Oscar Baltazar MD

## 2023-12-19 ENCOUNTER — OFFICE VISIT (OUTPATIENT)
Dept: URGENT CARE | Facility: URGENT CARE | Age: 53
End: 2023-12-19
Payer: COMMERCIAL

## 2023-12-19 VITALS
WEIGHT: 223 LBS | TEMPERATURE: 97.8 F | DIASTOLIC BLOOD PRESSURE: 85 MMHG | OXYGEN SATURATION: 97 % | BODY MASS INDEX: 37.11 KG/M2 | HEART RATE: 86 BPM | SYSTOLIC BLOOD PRESSURE: 148 MMHG | RESPIRATION RATE: 18 BRPM

## 2023-12-19 DIAGNOSIS — Z86.718 HISTORY OF DEEP VENOUS THROMBOSIS: ICD-10-CM

## 2023-12-19 DIAGNOSIS — M79.89 RIGHT LEG SWELLING: Primary | ICD-10-CM

## 2023-12-19 LAB — D DIMER PPP FEU-MCNC: <0.27 UG/ML FEU (ref 0–0.5)

## 2023-12-19 PROCEDURE — 85379 FIBRIN DEGRADATION QUANT: CPT | Performed by: FAMILY MEDICINE

## 2023-12-19 PROCEDURE — 36415 COLL VENOUS BLD VENIPUNCTURE: CPT | Performed by: FAMILY MEDICINE

## 2023-12-19 PROCEDURE — 99214 OFFICE O/P EST MOD 30 MIN: CPT | Performed by: FAMILY MEDICINE

## 2023-12-19 NOTE — LETTER
December 19, 2023      Lima Bueno  [address] [address] [id number]        To Whom It May Concern:    Lima Bueno was seen in our clinic. She may return to work 12- without restrictions.      Sincerely,        Freddy Marie, DO

## 2023-12-20 ENCOUNTER — HOSPITAL ENCOUNTER (OUTPATIENT)
Dept: ULTRASOUND IMAGING | Facility: CLINIC | Age: 53
Discharge: HOME | End: 2023-12-20
Attending: FAMILY MEDICINE | Admitting: FAMILY MEDICINE
Payer: COMMERCIAL

## 2023-12-20 PROCEDURE — 93971 EXTREMITY STUDY: CPT | Mod: RT

## 2023-12-20 NOTE — PROGRESS NOTES
SUBJECTIVE: Lima Bueno is a 52 year old female presenting with a chief complaint of rt lower ext pain and swelling, no injury.  Onset of symptoms was 1 week(s) ago.  Hx of DVT    Past Medical History:   Diagnosis Date    ADD (attention deficit disorder with hyperactivity)     Asthma     Breast mass 11/1/2012    Patient yet to have biopsy as of 11/1/2012      DVT (deep venous thrombosis) (H) 10/12    right leg    LSIL (low grade squamous intraepithelial lesion) on Pap smear 02/19/14    Neg high risk HPV    Pulmonary embolism (H) 2012    Saw Dr [place], protein S deficiency     Allergies   Allergen Reactions    Phosphoric Acid Hives    Terbutaline Hives    Trazodone      Other reaction(s): Other - Describe In Comment Field  Facial nerve pain  Other reaction(s): Other - Describe In Comment Field  Facial nerve pain    Wellbutrin [Bupropion]      Smoking cessation -- told never to take again ,     Social History     Tobacco Use    Smoking status: Every Day     Packs/day: 1.00     Years: 30.00     Additional pack years: 0.00     Total pack years: 30.00     Types: Cigarettes    Smokeless tobacco: Never   Substance Use Topics    Alcohol use: No     Alcohol/week: 0.0 standard drinks of alcohol       ROS:  SKIN: no rash  GI: no vomiting    OBJECTIVE:  BP (!) 148/85   Pulse 86   Temp 97.8  F (36.6  C) (Tympanic)   Resp 18   Wt 101.2 kg (223 lb)   SpO2 97%   Breastfeeding No   BMI 37.11 kg/m  GENERAL APPEARANCE: healthy, alert and no distress  SKIN: no suspicious lesions or rashes  Swelling and pain rt lower ext      ICD-10-CM    1. Right leg swelling  M79.89 D dimer, quantitative     US Lower Extremity Venous Duplex Right      2. History of deep venous thrombosis  Z86.718 D dimer, quantitative     US Lower Extremity Venous Duplex Right        Unable to get US tonight and pt does not want to go through ED tonight.  Desires a US tomorrow   Fluids/Rest, f/u if worse/not any better

## 2023-12-21 ENCOUNTER — NURSE TRIAGE (OUTPATIENT)
Dept: FAMILY MEDICINE | Facility: CLINIC | Age: 53
End: 2023-12-21
Payer: COMMERCIAL

## 2023-12-21 NOTE — TELEPHONE ENCOUNTER
Patient Contact    Attempt # 1    Was call answered? No.    Left message for patient to call triage back.    Jasmin Wahl RN

## 2023-12-21 NOTE — TELEPHONE ENCOUNTER
Pt called the clinic back. Assisted with scheduling pt tomorrow with same day provider at  as no appts in [place] were open tomorrow for in person.

## 2023-12-21 NOTE — TELEPHONE ENCOUNTER
"Nurse Triage SBAR    Is this a 2nd Level Triage? YES, LICENSED PRACTITIONER REVIEW IS REQUIRED    Situation: Patient has had a painful swollen leg at least since last Tuesday. Patient went to  and got an US which was negative for blood clotts. However, patient says her leg still does not feel good and is very painful. The  doctor does not come back until Friday and she wants answers now. Should she be booked for a visit with a same day provider? There are no visits available for today, same day visits available tomorrow possibly? Please advise     Background: Patient had a DVT Ruled out via Ultrasound    Assessment: Patient needs to be assessed     Protocol Recommended Disposition:   Go To Office Now    Recommendation: Patient needs to be seen      Routed to provider    Does the patient meet one of the following criteria for ADS visit consideration? No    Reason for Disposition   Thigh or calf pain and only 1 side and present > 1 hour    Additional Information   Negative: Chest pain   Negative: Followed an insect bite and has localized swelling (e.g., small area of puffy or swollen skin)   Negative: Followed a knee injury   Negative: Ankle or foot injury   Negative: Pregnant with leg swelling or edema   Negative: Difficulty breathing at rest   Negative: Entire foot is cool or blue in comparison to other side   Negative: SEVERE swelling (e.g., swelling extends above knee, entire leg is swollen, weeping fluid)   Negative: Cast on leg or ankle and has increasing pain   Negative: Can't walk or can barely stand (new-onset)   Negative: Fever and red area (or area very tender to touch)   Negative: Patient sounds very sick or weak to the triager   Negative: Swelling of face, arm or hands  (Exception: Slight puffiness of fingers during hot weather.)   Negative: Pregnant 20 or more weeks and sudden weight gain (i.e., > 2 lbs, 1 kg in one week)    Answer Assessment - Initial Assessment Questions  1. ONSET: \"When did the " "swelling start?\" (e.g., minutes, hours, days)      Swelling noticed on Tuesday   2. LOCATION: \"What part of the leg is swollen?\"  \"Are both legs swollen or just one leg?\"      Right leg is swollen from knee cap bacj   3. SEVERITY: \"How bad is the swelling?\" (e.g., localized; mild, moderate, severe)    - Localized: Small area of swelling localized to one leg.    - MILD pedal edema: Swelling limited to foot and ankle, pitting edema < 1/4 inch (6 mm) deep, rest and elevation eliminate most or all swelling.    - MODERATE edema: Swelling of lower leg to knee, pitting edema > 1/4 inch (6 mm) deep, rest and elevation only partially reduce swelling.    - SEVERE edema: Swelling extends above knee, facial or hand swelling present.       Kneecap to the ankle, moderate   4. REDNESS: \"Does the swelling look red or infected?\"      No   5. PAIN: \"Is the swelling painful to touch?\" If Yes, ask: \"How painful is it?\"   (Scale 1-10; mild, moderate or severe)      Painful to touch, 7/10  6. FEVER: \"Do you have a fever?\" If Yes, ask: \"What is it, how was it measured, and when did it start?\"       no  7. CAUSE: \"What do you think is causing the leg swelling?\"      Viktor   8. MEDICAL HISTORY: \"Do you have a history of blood clots (e.g., DVT), cancer, heart failure, kidney disease, or liver failure?\"      Had imaging   9. RECURRENT SYMPTOM: \"Have you had leg swelling before?\" If Yes, ask: \"When was the last time?\" \"What happened that time?\"      Not where it is painful   10. OTHER SYMPTOMS: \"Do you have any other symptoms?\" (e.g., chest pain, difficulty breathing)        no  11. PREGNANCY: \"Is there any chance you are pregnant?\" \"When was your last menstrual period?\"        no    Protocols used: Leg Swelling and Edema-A-OH    "

## 2023-12-22 ENCOUNTER — OFFICE VISIT (OUTPATIENT)
Dept: INTERNAL MEDICINE | Facility: CLINIC | Age: 53
End: 2023-12-22
Payer: COMMERCIAL

## 2023-12-22 VITALS
WEIGHT: 224 LBS | RESPIRATION RATE: 16 BRPM | DIASTOLIC BLOOD PRESSURE: 82 MMHG | BODY MASS INDEX: 37.32 KG/M2 | HEART RATE: 87 BPM | SYSTOLIC BLOOD PRESSURE: 142 MMHG | HEIGHT: 65 IN | OXYGEN SATURATION: 97 %

## 2023-12-22 DIAGNOSIS — M48.00 SPINAL STENOSIS, UNSPECIFIED SPINAL REGION: ICD-10-CM

## 2023-12-22 DIAGNOSIS — M54.16 RIGHT LUMBAR RADICULOPATHY: Primary | ICD-10-CM

## 2023-12-22 PROCEDURE — 99213 OFFICE O/P EST LOW 20 MIN: CPT | Performed by: PHYSICIAN ASSISTANT

## 2023-12-22 RX ORDER — GABAPENTIN 100 MG/1
300 CAPSULE ORAL 3 TIMES DAILY PRN
Qty: 90 CAPSULE | Refills: 1 | Status: SHIPPED | OUTPATIENT
Start: 2023-12-22 | End: 2024-01-19

## 2023-12-22 ASSESSMENT — ASTHMA QUESTIONNAIRES: ACT_TOTALSCORE: 16

## 2023-12-22 NOTE — PROGRESS NOTES
"  Assessment & Plan     Right lumbar radiculopathy    - Spine  Referral; Future  - gabapentin (NEURONTIN) 100 MG capsule; Take 3 capsules (300 mg) by mouth 3 times daily as needed for neuropathic pain    Spinal stenosis, unspecified spinal region    - Spine  Referral; Future  - gabapentin (NEURONTIN) 100 MG capsule; Take 3 capsules (300 mg) by mouth 3 times daily as needed for neuropathic pain              Reviewed with patient  No acute injury, pain posterior thigh and anterior right lower leg  Trial of neurontin for pain   See spinal specialist     Zakiya Orourke PA-C  [facility] QUAN Amato is a 52 year old, presenting for the following health issues:  Musculoskeletal Problem      History of Present Illness       Reason for visit:  Knee  Symptom onset:  1-2 weeks ago  Symptoms include:  Sour to touch  Symptom intensity:  Moderate  Symptom progression:  Staying the same  Had these symptoms before:  No  What makes it worse:  Standing/Sitting  What makes it better:  Elevation    She eats 4 or more servings of fruits and vegetables daily.She consumes 4 sweetened beverage(s) daily.She exercises with enough effort to increase her heart rate 30 to 60 minutes per day.  She exercises with enough effort to increase her heart rate 7 days per week.   She is taking medications regularly.         Seen in UC 3 days ago.   No acute DVT on ultrasound.   Hx of chronic thrombus - see report  Denies any injury to the leg  No redness   Swelling noted and pain anterior lower to medial leg and posterior thigh  Hx of spinal stenosis lumbar  MRI in 2020             Review of Systems         Objective    BP (!) 142/82   Pulse 87   Resp 16   Ht 1.651 m (5' 5\")   Wt 101.6 kg (224 lb)   SpO2 97%   BMI 37.28 kg/m    Body mass index is 37.28 kg/m .  Physical Exam   GENERAL: healthy, alert and no distress  MS: pain with light touch to anterior lower leg  No " redness  Some swelling as compared to the left ( ? Chronic post DVT)   SKIN: no suspicious lesions or rashes

## 2023-12-22 NOTE — LETTER
December 22, 2023      Lima Bueno  [address] [facility] [address]        To Whom It May Concern:    Lima Bueno was seen in our clinic. She may return to work with the following: work from home restriction so that she can elevate leg and monitor the pain. Starting 12/27/23 -12/29/23      Sincerely,      Zakiya Orourke

## 2023-12-26 ENCOUNTER — TELEPHONE (OUTPATIENT)
Dept: FAMILY MEDICINE | Facility: CLINIC | Age: 53
End: 2023-12-26
Payer: COMMERCIAL

## 2023-12-26 NOTE — TELEPHONE ENCOUNTER
Nurse Triage SBAR    Is this a 2nd Level Triage? YES, LICENSED PRACTITIONER REVIEW IS REQUIRED    Situation: Can't get into the specialist until January 17 th. Can Dr. Baltazar extend note for work through the 20 th for remote work    Background: Right lumbar radiculopathy    Assessment: Can't get into the specialist until January 17th. Can Dr. Baltazar extend note for work through the 20 th for remote work. Pain at 7/10.    Protocol Recommended Disposition:   No disposition on file.    Recommendation: Can't get into the specialist until January 17th. Can Dr. Baltazar extend note for work through the 20 th for remote work. Pain at 7/10. Please send through [facility].    Routed to provider    Does the patient meet one of the following criteria for ADS visit consideration? 16+ years old, with an [facility] PCP     TIP  Providers, please consider if this condition is appropriate for management at one of our Acute and Diagnostic Services sites.     If patient is a good candidate, please use dotphrase <dot>triageresponse and select Refer to ADS to document.

## 2024-01-09 NOTE — CONFIDENTIAL NOTE
NEUROSURGERY- NEW PREVISIT PLANNING       Record Status/Location     Referring Provider Referral   Zakiya Orourke PA-C      Diagnosis Referral M54.16 (ICD-10-CM) - Right lumbar radiculopathy   M48.00 (ICD-10-CM) - Spinal stenosis, unspecified spinal region      MRI (HEAD, NECK, SPINE) Pacs Lumbar 8/13/20 [facility]   CT Na    X-ray Na    INJECTION Na    PHYSICAL THERAPY Na    SURGERY Na

## 2024-01-10 ENCOUNTER — VIRTUAL VISIT (OUTPATIENT)
Dept: FAMILY MEDICINE | Facility: CLINIC | Age: 54
End: 2024-01-10
Payer: COMMERCIAL

## 2024-01-10 DIAGNOSIS — M48.00 SPINAL STENOSIS, UNSPECIFIED SPINAL REGION: Primary | ICD-10-CM

## 2024-01-10 DIAGNOSIS — M54.41 ACUTE MIDLINE LOW BACK PAIN WITH RIGHT-SIDED SCIATICA: ICD-10-CM

## 2024-01-10 PROCEDURE — 99212 OFFICE O/P EST SF 10 MIN: CPT | Mod: 95 | Performed by: INTERNAL MEDICINE

## 2024-01-10 NOTE — PROGRESS NOTES
Lima is a 53 year old who is being evaluated via a billable video visit.      How would you like to obtain your AVS? MyChart  If the video visit is dropped, the invitation should be resent by: Text to cell phone: [phone]  Will anyone else be joining your video visit? No        Subjective   Lima is a 53 year old, presenting for the following health issues:  Forms and Health Maintenance (Eye Exam- Last one was in 11/8/2022and she will get one scheduled. )      HPI     Right Leg Swelling  Sciatica   Lima Bueno has been keeping her leg elevated and noticing that the swelling has improved.  She has been taking gabapentin during the evening.  She has resume driving.  She has been noticing loose stools and is hoping to see a neurologist to follow up on her back symptoms with sciatica.  She is hoping to return to work on 01/20, but is concerned that she is still having symptoms.  She does have appoint with the spine clinic on the 17th and was hoping to get some clarity from that office visit.  She notes that she has been able to drive, but wonders if activity that her job requires may be too much for her.  She is still hesitant to return to work at this time.      Review of Systems   Constitutional, HEENT, cardiovascular, pulmonary, gi and gu systems are negative, except as otherwise noted.      Objective           Vitals:  No vitals were obtained today due to virtual visit.    Physical Exam   GENERAL: Healthy, alert and no distress  EYES: Eyes grossly normal to inspection.  No discharge or erythema, or obvious scleral/conjunctival abnormalities.  RESP: No audible wheeze, cough, or visible cyanosis.  No visible retractions or increased work of breathing.    SKIN: Visible skin clear. No significant rash, abnormal pigmentation or lesions.  NEURO: Cranial nerves grossly intact.  Mentation and speech appropriate for age.  PSYCH: Mentation appears normal, affect normal/bright, judgement and insight  intact, normal speech and appearance well-groomed.    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  Will intend to follow-up next Friday in the office to fill it out together.  At this point anticipated return to work is still in 20 January  Plan:     (M54.41) Acute midline low back pain with right-sided sciatica  Comment: As above  Plan:               Video-Visit Details    Type of service:  Video Visit   Video Start Time: 3:50 PM  Video End Time:4:06 PM    Originating Location (pt. Location): Home    Distant Location (provider location):  On-site  Platform used for Video Visit: Viviana

## 2024-01-13 ENCOUNTER — HEALTH MAINTENANCE LETTER (OUTPATIENT)
Age: 54
End: 2024-01-13

## 2024-01-17 ENCOUNTER — PRE VISIT (OUTPATIENT)
Dept: NEUROSURGERY | Facility: CLINIC | Age: 54
End: 2024-01-17

## 2024-01-17 ENCOUNTER — OFFICE VISIT (OUTPATIENT)
Dept: NEUROSURGERY | Facility: CLINIC | Age: 54
End: 2024-01-17
Payer: COMMERCIAL

## 2024-01-17 VITALS
SYSTOLIC BLOOD PRESSURE: 132 MMHG | BODY MASS INDEX: 37.44 KG/M2 | DIASTOLIC BLOOD PRESSURE: 96 MMHG | HEIGHT: 65 IN | HEART RATE: 91 BPM | OXYGEN SATURATION: 96 % | WEIGHT: 224.7 LBS

## 2024-01-17 DIAGNOSIS — M54.16 LUMBAR RADICULOPATHY: Primary | ICD-10-CM

## 2024-01-17 DIAGNOSIS — M43.16 SPONDYLOLISTHESIS, LUMBAR REGION: ICD-10-CM

## 2024-01-17 PROCEDURE — 99204 OFFICE O/P NEW MOD 45 MIN: CPT | Performed by: NURSE PRACTITIONER

## 2024-01-17 ASSESSMENT — PAIN SCALES - GENERAL: PAINLEVEL: SEVERE PAIN (7)

## 2024-01-17 NOTE — PROGRESS NOTES
[facility] Neurosurgery Clinic Visit      CC: low back pain, right leg pain     Primary Care Provider: Oscar Baltazar    Reason For Visit:   I was asked by Zakiya Gunter PA-C to consult on the patient for: lumbar radiculopathy       HPI: Lima Bueno is a 53 year old female who presents for evaluation of low back and right leg pain. Patient reports history of chronic low back pain for a few years that worsened over the past 1 year. She developed sharp right leg pain about 1 month ago, denies any trauma or injuries at onset. She was evaluated by PCP and Urgent Care. Today, patient reports midline low back pain that radiates to right posterior thigh and wraps around to anterior shin, with pain and paresthesias in right foot. Pain is worsened with prolonged sitting, heavy lifting, and walking. She is unable to walk more than 2 miles without pain. She also reports feeling right leg weakness at times due to pain. Denies falls, foot drop, or saddle anesthesia. She reports loose stools but denies any bladder/bowel incontinence. Patient has tried gabapentin for pain management. Patient states her symptoms are impacting her daily life and make it difficult to complete house chores.     Patient is a current smoker. She takes xarelto for history of DVT/PE in 2012.     Current pain: 7/10     Past Medical History:   Diagnosis Date    ADD (attention deficit disorder with hyperactivity)     Asthma     Breast mass 11/1/2012    Patient yet to have biopsy as of 11/1/2012      DVT (deep venous thrombosis) (H) 10/12    right leg    LSIL (low grade squamous intraepithelial lesion) on Pap smear 02/19/14    Neg high risk HPV    Pulmonary embolism (H) 2012    Saw Dr Toledo, protein S deficiency       Past Medical History reviewed with patient during visit.    Past Surgical History:   Procedure Laterality Date    ANKLE SURGERY Right 01/2019    COLONOSCOPY N/A 12/10/2020    Procedure: COLONOSCOPY, WITH POLYPECTOMY  AND BIOPSY;  Surgeon: Osorio Dunn MD;  Location: [facility]     Past Surgical History reviewed with patient during visit.    Current Outpatient Medications   Medication    albuterol (PROAIR HFA/PROVENTIL HFA/VENTOLIN HFA) 108 (90 Base) MCG/ACT inhaler    albuterol (PROVENTIL) (2.5 MG/3ML) 0.083% neb solution    atorvastatin (LIPITOR) 20 MG tablet    benzonatate (TESSALON) 200 MG capsule    blood glucose (NO BRAND SPECIFIED) test strip    blood glucose monitoring (NO BRAND SPECIFIED) meter device kit    famotidine (PEPCID) 20 MG tablet    fexofenadine (ALLEGRA) 180 MG tablet    fluticasone-salmeterol (ADVAIR HFA) 230-21 MCG/ACT inhaler    gabapentin (NEURONTIN) 100 MG capsule    guaiFENesin (MUCINEX) 600 MG 12 hr tablet    ketotifen (ZADITOR) 0.025 % ophthalmic solution    Lancets 30G MISC    lisinopril (ZESTRIL) 10 MG tablet    mupirocin (BACTROBAN) 2 % external ointment    rivaroxaban ANTICOAGULANT (XARELTO) 20 MG TABS tablet    Semaglutide, 2 MG/DOSE, (OZEMPIC, 2 MG/DOSE,) 8 MG/3ML pen    traZODone (DESYREL) 150 MG tablet     No current facility-administered medications for this visit.       Allergies   Allergen Reactions    Phosphoric Acid Hives    Terbutaline Hives    Trazodone      Other reaction(s): Other - Describe In Comment Field  Facial nerve pain  Other reaction(s): Other - Describe In Comment Field  Facial nerve pain    Wellbutrin [Bupropion]      Smoking cessation -- told never to take again ,       Social History     Socioeconomic History    Marital status: Single    Number of children: 5   Occupational History     Employer: [employer]    Occupation:      Comment: [employer]; 2022   Tobacco Use    Smoking status: Every Day     Packs/day: 1.00     Years: 30.00     Additional pack years: 0.00     Total pack years: 30.00     Types: Cigarettes    Smokeless tobacco: Never   Vaping Use    Vaping Use: Never used   Substance and Sexual Activity    Alcohol use: No     Alcohol/week: 0.0  "standard drinks of alcohol    Drug use: No    Sexual activity: Yes     Partners: Male     Birth control/protection: Post-menopausal   Social History Narrative    Employed as [employer]     Social Determinants of Health     Financial Resource Strain: Low Risk  (12/22/2023)    Financial Resource Strain     Within the past 12 months, have you or your family members you live with been unable to get utilities (heat, electricity) when it was really needed?: No   Food Insecurity: Low Risk  (12/22/2023)    Food Insecurity     Within the past 12 months, did you worry that your food would run out before you got money to buy more?: No     Within the past 12 months, did the food you bought just not last and you didn t have money to get more?: No   Transportation Needs: Low Risk  (12/22/2023)    Transportation Needs     Within the past 12 months, has lack of transportation kept you from medical appointments, getting your medicines, non-medical meetings or appointments, work, or from getting things that you need?: No   Interpersonal Safety: Low Risk  (12/22/2023)    Interpersonal Safety     Do you feel physically and emotionally safe where you currently live?: Yes     Within the past 12 months, have you been hit, slapped, kicked or otherwise physically hurt by someone?: No     Within the past 12 months, have you been humiliated or emotionally abused in other ways by your partner or ex-partner?: No   Housing Stability: Low Risk  (12/22/2023)    Housing Stability     Do you have housing? : Yes     Are you worried about losing your housing?: No       Family History   Problem Relation Age of Onset    Thyroid Disease Mother         hyperactive    Schizophrenia Father     Hypertension Brother     Ovarian Cancer Paternal Grandmother     Breast Cancer No family hx of        ROS: 10 point ROS neg other than the symptoms noted above in the HPI.    Vital Signs: BP (!) 132/96   Pulse 91   Ht 5' 5\" (1.651 m)   " Wt 224 lb 11.2 oz (101.9 kg)   SpO2 96%   BMI 37.39 kg/m      Neurological Examination:  Awake  Alert  Oriented x 3  Speech clear    Motor exam: RLE exam limited due to pain   Iliopsoas  (hip flexion)               Right: 4+/5  Left:  5/5  Quadriceps  (knee extension)       Right:  4+/5  Left:  5/5  Hamstrings  (knee flexion)            Right:  4+/5  Left:  5/5  Gastroc Soleus  (PF)                          Right:  5/5  Left:  5/5  Tibialis Ant  (DF)                          Right:  5/5  Left:  5/5  EHL                          Right:  5/5  Left:  5/5         Sensation intact to light touch in BLE     Reflexes are 2+ in the patellar and Achilles. There is no clonus.     Musculoskeletal:  Gait: Able to stand from a seated position. Antalgic gait.   No tenderness of the spine or paraspinous muscles.  Tenderness to palpation of right SI joint.   Positive straight leg raise on the right.     Imaging:   MR LUMBAR SPINE WITHOUT CONTRAST 8/13/2020 9:09 PM                                                             IMPRESSION: Grade 1-2 spondylitic spondylolisthesis measuring  approximately 1.1 cm with severe bilateral neural foraminal stenosis.  This has minimally progressed since 2014.    Assessment/Plan:   53 year old female who presents for evaluation of low back pain that radiates to right leg with pain and paresthesias in right foot. Lumbar spine MRI from 2020 reveals L5-S1 spondylolisthesis with bilateral foraminal stenosis. We discussed symptoms, imaging, and next steps.      - Updated lumbar spine MRI ordered for further evaluation  - Referral to PT  - Patient is requesting an updated work letter stating okay to work from home due to her symptoms. Work letter given to patient today.   - Patient would like to follow-up in clinic to review MRI results. Care team will help schedule appts.     Advised patient to call our clinic with any questions or concerns.   Discussed red flag symptoms and advised to seek medical  attention if these develop.   Patient voiced understanding and agreement.      Frances Anton CNP  [facility]  [address]  [place] [address]  Tel [phone]  Pager [phone]

## 2024-01-17 NOTE — LETTER
January 17, 2024      Lima Bueno  [address] [facility] [address]        To Whom It May Concern:    Lima Bueno was seen in our clinic for low back and leg pain. Please extend her work from home restriction for 1 month so she can complete further work up and treatment of her symptoms. Please call our clinic with any questions.       Sincerely,      Frances Anton

## 2024-01-17 NOTE — NURSING NOTE
"Lima Bueno is a 53 year old female who presents for:  Chief Complaint   Patient presents with    Consult     Right lumbar radiculopathy / Spinal stenosis. Pain level about 6-7, low back into right leg        Initial Vitals:  BP (!) 132/96   Pulse 91   Ht 5' 5\" (1.651 m)   Wt 224 lb 11.2 oz (101.9 kg)   SpO2 96%   BMI 37.39 kg/m   Estimated body mass index is 37.39 kg/m  as calculated from the following:    Height as of this encounter: 5' 5\" (1.651 m).    Weight as of this encounter: 224 lb 11.2 oz (101.9 kg).. Body surface area is 2.16 meters squared. BP completed using cuff size: regular  Severe Pain (7)    Nursing Comments:     Jeronimo Orosco    "

## 2024-01-17 NOTE — PATIENT INSTRUCTIONS
Order for lumbar spine MRI. You can stop at the  to schedule, or call [phone].    Referral to physical therapy. They will call you to schedule.     Work letter updated today.     Please call our clinic if symptoms persist, change, or worsen at any time.    [facility]  [address] [address]  Tel [phone]

## 2024-01-17 NOTE — LETTER
1/17/2024         RE: Lima Bueno  [address]  [place] [facility] [address]        Dear Colleague,    Thank you for referring your patient, Lima Bueno, to the [facility]. Please see a copy of my visit note below.    [facility] Neurosurgery Clinic Visit      CC: low back pain, right leg pain     Primary Care Provider: Oscar Baltazar    Reason For Visit:   I was asked by Zakiya Gunter PA-C to consult on the patient for: lumbar radiculopathy       HPI: Lima Bueno is a 53 year old female who presents for evaluation of low back and right leg pain. Patient reports history of chronic low back pain for a few years that worsened over the past 1 year. She developed sharp right leg pain about 1 month ago, denies any trauma or injuries at onset. She was evaluated by PCP and Urgent Care. Today, patient reports midline low back pain that radiates to right posterior thigh and wraps around to anterior shin, with pain and paresthesias in right foot. Pain is worsened with prolonged sitting, heavy lifting, and walking. She is unable to walk more than 2 miles without pain. She also reports feeling right leg weakness at times due to pain. Denies falls, foot drop, or saddle anesthesia. She reports loose stools but denies any bladder/bowel incontinence. Patient has tried gabapentin for pain management. Patient states her symptoms are impacting her daily life and make it difficult to complete house chores.     Patient is a current smoker. She takes xarelto for history of DVT/PE in 2012.     Current pain: 7/10     Past Medical History:   Diagnosis Date     ADD (attention deficit disorder with hyperactivity)      Asthma      Breast mass 11/1/2012    Patient yet to have biopsy as of 11/1/2012       DVT (deep venous thrombosis) (H) 10/12    right leg     LSIL (low grade squamous intraepithelial lesion) on Pap smear 02/19/14    Neg high risk HPV      Pulmonary embolism (H) 2012    Saw Dr Toledo, protein S deficiency       Past Medical History reviewed with patient during visit.    Past Surgical History:   Procedure Laterality Date     ANKLE SURGERY Right 01/2019     COLONOSCOPY N/A 12/10/2020    Procedure: COLONOSCOPY, WITH POLYPECTOMY AND BIOPSY;  Surgeon: Osorio Dunn MD;  Location:  GI     Past Surgical History reviewed with patient during visit.    Current Outpatient Medications   Medication     albuterol (PROAIR HFA/PROVENTIL HFA/VENTOLIN HFA) 108 (90 Base) MCG/ACT inhaler     albuterol (PROVENTIL) (2.5 MG/3ML) 0.083% neb solution     atorvastatin (LIPITOR) 20 MG tablet     benzonatate (TESSALON) 200 MG capsule     blood glucose (NO BRAND SPECIFIED) test strip     blood glucose monitoring (NO BRAND SPECIFIED) meter device kit     famotidine (PEPCID) 20 MG tablet     fexofenadine (ALLEGRA) 180 MG tablet     fluticasone-salmeterol (ADVAIR HFA) 230-21 MCG/ACT inhaler     gabapentin (NEURONTIN) 100 MG capsule     guaiFENesin (MUCINEX) 600 MG 12 hr tablet     ketotifen (ZADITOR) 0.025 % ophthalmic solution     Lancets 30G MISC     lisinopril (ZESTRIL) 10 MG tablet     mupirocin (BACTROBAN) 2 % external ointment     rivaroxaban ANTICOAGULANT (XARELTO) 20 MG TABS tablet     Semaglutide, 2 MG/DOSE, (OZEMPIC, 2 MG/DOSE,) 8 MG/3ML pen     traZODone (DESYREL) 150 MG tablet     No current facility-administered medications for this visit.       Allergies   Allergen Reactions     Phosphoric Acid Hives     Terbutaline Hives     Trazodone      Other reaction(s): Other - Describe In Comment Field  Facial nerve pain  Other reaction(s): Other - Describe In Comment Field  Facial nerve pain     Wellbutrin [Bupropion]      Smoking cessation -- told never to take again ,       Social History     Socioeconomic History     Marital status: Single     Number of children: 5   Occupational History     Employer: [employer]     Occupation:      Comment: [employer]; 2022   Tobacco Use     Smoking status: Every Day     Packs/day: 1.00     Years: 30.00     Additional pack years: 0.00     Total pack years: 30.00     Types: Cigarettes     Smokeless tobacco: Never   Vaping Use     Vaping Use: Never used   Substance and Sexual Activity     Alcohol use: No     Alcohol/week: 0.0 standard drinks of alcohol     Drug use: No     Sexual activity: Yes     Partners: Male     Birth control/protection: Post-menopausal   Social History Narrative    Employed as [employer]     Social Determinants of Health     Financial Resource Strain: Low Risk  (12/22/2023)    Financial Resource Strain      Within the past 12 months, have you or your family members you live with been unable to get utilities (heat, electricity) when it was really needed?: No   Food Insecurity: Low Risk  (12/22/2023)    Food Insecurity      Within the past 12 months, did you worry that your food would run out before you got money to buy more?: No      Within the past 12 months, did the food you bought just not last and you didn t have money to get more?: No   Transportation Needs: Low Risk  (12/22/2023)    Transportation Needs      Within the past 12 months, has lack of transportation kept you from medical appointments, getting your medicines, non-medical meetings or appointments, work, or from getting things that you need?: No   Interpersonal Safety: Low Risk  (12/22/2023)    Interpersonal Safety      Do you feel physically and emotionally safe where you currently live?: Yes      Within the past 12 months, have you been hit, slapped, kicked or otherwise physically hurt by someone?: No      Within the past 12 months, have you been humiliated or emotionally abused in other ways by your partner or ex-partner?: No   Housing Stability: Low Risk  (12/22/2023)    Housing Stability      Do you have housing? : Yes      Are you worried about losing your housing?: No       Family History   Problem  "Relation Age of Onset     Thyroid Disease Mother         hyperactive     Schizophrenia Father      Hypertension Brother      Ovarian Cancer Paternal Grandmother      Breast Cancer No family hx of        ROS: 10 point ROS neg other than the symptoms noted above in the HPI.    Vital Signs: BP (!) 132/96   Pulse 91   Ht 5' 5\" (1.651 m)   Wt 224 lb 11.2 oz (101.9 kg)   SpO2 96%   BMI 37.39 kg/m      Neurological Examination:  Awake  Alert  Oriented x 3  Speech clear    Motor exam: RLE exam limited due to pain   Iliopsoas  (hip flexion)               Right: 4+/5  Left:  5/5  Quadriceps  (knee extension)       Right:  4+/5  Left:  5/5  Hamstrings  (knee flexion)            Right:  4+/5  Left:  5/5  Gastroc Soleus  (PF)                          Right:  5/5  Left:  5/5  Tibialis Ant  (DF)                          Right:  5/5  Left:  5/5  EHL                          Right:  5/5  Left:  5/5         Sensation intact to light touch in BLE     Reflexes are 2+ in the patellar and Achilles. There is no clonus.     Musculoskeletal:  Gait: Able to stand from a seated position. Antalgic gait.   No tenderness of the spine or paraspinous muscles.  Tenderness to palpation of right SI joint.   Positive straight leg raise on the right.     Imaging:   MR LUMBAR SPINE WITHOUT CONTRAST 8/13/2020 9:09 PM                                                             IMPRESSION: Grade 1-2 spondylitic spondylolisthesis measuring  approximately 1.1 cm with severe bilateral neural foraminal stenosis.  This has minimally progressed since 2014.    Assessment/Plan:   53 year old female who presents for evaluation of low back pain that radiates to right leg with pain and paresthesias in right foot. Lumbar spine MRI from 2020 reveals L5-S1 spondylolisthesis with bilateral foraminal stenosis. We discussed symptoms, imaging, and next steps.      - Updated lumbar spine MRI ordered for further evaluation  - Referral to PT  - Patient is requesting an " updated work letter stating okay to work from home due to her symptoms. Work letter given to patient today.   - Patient would like to follow-up in clinic to review MRI results. Care team will help schedule appts.     Advised patient to call our clinic with any questions or concerns.   Discussed red flag symptoms and advised to seek medical attention if these develop.   Patient voiced understanding and agreement.      Frances Anton CNP  [facility]  [address] [address]  Tel [phone]  Pager [phone]        Again, thank you for allowing me to participate in the care of your patient.        Sincerely,        Frances Anton, JADEN

## 2024-01-19 ENCOUNTER — OFFICE VISIT (OUTPATIENT)
Dept: FAMILY MEDICINE | Facility: CLINIC | Age: 54
End: 2024-01-19
Payer: COMMERCIAL

## 2024-01-19 ENCOUNTER — NURSE TRIAGE (OUTPATIENT)
Dept: NURSING | Facility: CLINIC | Age: 54
End: 2024-01-19

## 2024-01-19 VITALS
TEMPERATURE: 98 F | SYSTOLIC BLOOD PRESSURE: 131 MMHG | OXYGEN SATURATION: 100 % | HEIGHT: 65 IN | HEART RATE: 107 BPM | DIASTOLIC BLOOD PRESSURE: 87 MMHG | WEIGHT: 225 LBS | BODY MASS INDEX: 37.49 KG/M2 | RESPIRATION RATE: 18 BRPM

## 2024-01-19 DIAGNOSIS — M54.41 ACUTE MIDLINE LOW BACK PAIN WITH RIGHT-SIDED SCIATICA: ICD-10-CM

## 2024-01-19 DIAGNOSIS — M48.00 SPINAL STENOSIS, UNSPECIFIED SPINAL REGION: ICD-10-CM

## 2024-01-19 DIAGNOSIS — E11.9 TYPE 2 DIABETES MELLITUS WITHOUT COMPLICATION, WITHOUT LONG-TERM CURRENT USE OF INSULIN (H): Primary | ICD-10-CM

## 2024-01-19 DIAGNOSIS — M54.16 RIGHT LUMBAR RADICULOPATHY: ICD-10-CM

## 2024-01-19 DIAGNOSIS — M48.062 SPINAL STENOSIS, LUMBAR REGION, WITH NEUROGENIC CLAUDICATION: ICD-10-CM

## 2024-01-19 DIAGNOSIS — F40.240 CLAUSTROPHOBIA: ICD-10-CM

## 2024-01-19 LAB — HBA1C MFR BLD: 6.4 % (ref 0–5.6)

## 2024-01-19 PROCEDURE — 36415 COLL VENOUS BLD VENIPUNCTURE: CPT | Performed by: INTERNAL MEDICINE

## 2024-01-19 PROCEDURE — 82570 ASSAY OF URINE CREATININE: CPT | Performed by: INTERNAL MEDICINE

## 2024-01-19 PROCEDURE — 82043 UR ALBUMIN QUANTITATIVE: CPT | Performed by: INTERNAL MEDICINE

## 2024-01-19 PROCEDURE — 83036 HEMOGLOBIN GLYCOSYLATED A1C: CPT | Performed by: INTERNAL MEDICINE

## 2024-01-19 PROCEDURE — 99214 OFFICE O/P EST MOD 30 MIN: CPT | Performed by: INTERNAL MEDICINE

## 2024-01-19 RX ORDER — GABAPENTIN 100 MG/1
300 CAPSULE ORAL 3 TIMES DAILY PRN
Qty: 90 CAPSULE | Refills: 3 | Status: SHIPPED | OUTPATIENT
Start: 2024-01-19 | End: 2024-04-12

## 2024-01-19 ASSESSMENT — PAIN SCALES - GENERAL: PAINLEVEL: SEVERE PAIN (7)

## 2024-01-19 NOTE — PATIENT INSTRUCTIONS
(E11.9) Type 2 diabetes mellitus without complication, without long-term current use of insulin (H)  (primary encounter diagnosis)  Comment: We will check hemoglobin A1c today and continue semaglutide  Plan: HEMOGLOBIN A1C, Albumin Random Urine         Quantitative with Creat Ratio            (M54.41) Acute midline low back pain with right-sided sciatica  Comment: We will check with neurosurgery later this month.  Obtain new images - MRI and consider physical therapy recommendations and follow up needed  Plan:     (M48.062) Spinal stenosis, lumbar region, with neurogenic claudication  Comment: as above - follow up in 1 month   Plan:     (M54.16) Right lumbar radiculopathy  Comment: as above   Plan: gabapentin (NEURONTIN) 100 MG capsule            (M48.00) Spinal stenosis, unspecified spinal region  Comment: as above   Plan: gabapentin (NEURONTIN) 100 MG capsule

## 2024-01-19 NOTE — PROGRESS NOTES
"  Marisol Amato is a 53 year old, presenting for the following health issues:  Follow Up    History of Present Illness       Back Pain:  She presents for follow up of back pain. Patient's back pain is a chronic problem.  Location of back pain:  Other  Description of back pain: other  Back pain spreads: right buttocks, left buttocks, right thigh, right knee and right foot    Since patient first noticed back pain, pain is: gradually worsening  Does back pain interfere with her job:  Yes       She eats 4 or more servings of fruits and vegetables daily.She consumes 4 sweetened beverage(s) daily. She exercises with enough effort to increase her heart rate 3 or less days per week.   She is taking medications regularly.     Right lumbar radiculopathy  Spinal stenosis, unspecified spinal region    Lima Bueno returns to the clinic today for follow up of her recent office visit in neurosurgery.  She notes that she is having still persistent right sided radicular pain and some weakness in her right leg.  She is having difficulty standing.  She is having difficulty walking as well and has yet to return to work.  She notes some relief with gabapentin, and requests a refill of this prescription today.  She was recommended to have a follow up in spine clinic following up coming MRI of the lumbar spine.  Type 2 diabetes mellitus without complication, without long-term current use of insulin (H)   She is doing well with current use of semaglutide.  She notes no numbness of tingling.  She has not had eye exam yet this year.        Objective    /87 (BP Location: Right arm, Patient Position: Chair, Cuff Size: Adult Large)   Pulse 107   Temp 98  F (36.7  C)   Resp 18   Ht 1.651 m (5' 5\")   Wt 102.1 kg (225 lb)   SpO2 100%   Breastfeeding No   BMI 37.44 kg/m    Body mass index is 37.44 kg/m .  Physical Exam   GENERAL: alert and no distress  EYES: Eyes grossly normal to inspection,     RESP: lungs clear " to auscultation - no rales, rhonchi or wheezes  CV: regular rate and rhythm, normal S1 S2, no S3 or S4, no murmur  ABDOMEN: soft, nontender, no hepatosplenomegaly   MS: gait antlagic, full range of motion of bilateral lower extremity   SKIN: no suspicious lesions or rashes  NEURO: + weakness 4/6 right hip flexion, sensation in tact  PSYCH: mentation appears normal, affect normal/bright  LYMPH: no cervical, supraclavicular, axillary, or inguinal adenopathy    Patient Instructions   (E11.9) Type 2 diabetes mellitus without complication, without long-term current use of insulin (H)  (primary encounter diagnosis)  Comment: We will check hemoglobin A1c today and continue semaglutide  Plan: HEMOGLOBIN A1C, Albumin Random Urine         Quantitative with Creat Ratio            (M54.41) Acute midline low back pain with right-sided sciatica  Comment: We will check with neurosurgery later this month.  Obtain new images - MRI and consider physical therapy recommendations and follow up needed  Plan:     (M48.062) Spinal stenosis, lumbar region, with neurogenic claudication  Comment: as above - follow up in 1 month   Plan:     (M54.16) Right lumbar radiculopathy  Comment: as above   Plan: gabapentin (NEURONTIN) 100 MG capsule            (M48.00) Spinal stenosis, unspecified spinal region  Comment: as above   Plan: gabapentin (NEURONTIN) 100 MG capsule                  30 minutes spent with patient.  Over 50% of time counseling      Signed Electronically by: Oscar Baltazar MD, MD

## 2024-01-20 LAB
CREAT UR-MCNC: 253 MG/DL
MICROALBUMIN UR-MCNC: 24.5 MG/L
MICROALBUMIN/CREAT UR: 9.68 MG/G CR (ref 0–25)

## 2024-01-20 NOTE — TELEPHONE ENCOUNTER
"Nurse Triage SBAR    Is this a 2nd Level Triage? NO    Situation:     Patient states she is currently prescribing Xarelto and calls nurse line to verify if she could take either tylenol or ibuprofen and wanted to verify which one as she was certain one of those she couldn't take.    Patient states she takes 20mg of Xarelto daily,    I utilized Xetawave drug interactions resouce with Xarelto, Tylenol and Ibuprofen to check for interactions.    Discussed with patient that:    \"Using rivaroxaban together with ibuprofen may increase the risk of bleeding, including severe and sometimes fatal hemorrhage.\"    Discussed with patient that I did not find any drug interactions between tylenol and X    Discussed some fever clinical information with patient      For fevers 100-101  F (37.8-38.3  C), fever medicine is generally not needed.    For fevers above 101  F (38.3  C) you can take acetaminophen. The goal of fever therapy is to bring the fever down to a comfortable level. Remember that fever medicine usually lowers fever 2 degrees F (1 - 1 1/2 degrees C).    Patient did not want any triage.  Questions answered and further support offered if needed.    Isaac Fragoso RN on 1/19/2024 at 10:37 PM      Reason for Disposition   Caller has medicine question, adult has minor symptoms, caller declines triage, AND triager answers question    Additional Information   Negative: [1] Intentional drug overdose AND [2] suicidal thoughts or ideas   Negative: Drug overdose and triager unable to answer question   Negative: Caller requesting a renewal or refill of a medicine patient is currently taking   Negative: Caller requesting information unrelated to medicine   Negative: Caller requesting information about COVID-19 Vaccine   Negative: Caller requesting information about Emergency Contraception   Negative: Caller requesting information about Combined Birth Control Pills   Negative: Caller requesting information about Progestin Birth " Control Pills   Negative: Caller requesting information about Post-Op pain or medicines   Negative: Caller requesting a prescription antibiotic (such as Penicillin) for Strep throat and has a positive culture result   Negative: Caller requesting a prescription anti-viral med (such as Tamiflu) and has influenza (flu) symptoms   Negative: Immunization reaction suspected   Negative: Rash while taking a medicine or within 3 days of stopping it   Negative: [1] Asthma and [2] having symptoms of asthma (cough, wheezing, etc.)   Negative: [1] Symptom of illness (e.g., headache, abdominal pain, earache, vomiting) AND [2] more than mild   Negative: Breastfeeding questions about mother's medicines and diet   Negative: MORE THAN A DOUBLE DOSE of a prescription or over-the-counter (OTC) drug   Negative: [1] DOUBLE DOSE (an extra dose or lesser amount) of prescription drug AND [2] any symptoms (e.g., dizziness, nausea, pain, sleepiness)   Negative: [1] DOUBLE DOSE (an extra dose or lesser amount) of over-the-counter (OTC) drug AND [2] any symptoms (e.g., dizziness, nausea, pain, sleepiness)   Negative: Took another person's prescription drug   Negative: [1] DOUBLE DOSE (an extra dose or lesser amount) of prescription drug AND [2] NO symptoms  (Exception: A double dose of antibiotics.)   Negative: Diabetes drug error or overdose (e.g., took wrong type of insulin or took extra dose)   Negative: [1] Prescription not at pharmacy AND [2] was prescribed by PCP recently (Exception: Triager has access to EMR and prescription is recorded there. Go to Home Care and confirm for pharmacy.)   Negative: [1] Pharmacy calling with prescription question AND [2] triager unable to answer question   Negative: [1] Caller has URGENT medicine question about med that PCP or specialist prescribed AND [2] triager unable to answer question   Negative: Medicine patch causing local rash or itching   Negative: [1] Caller has medicine question about med NOT  prescribed by PCP AND [2] triager unable to answer question (e.g., compatibility with other med, storage)   Negative: Prescription request for new medicine (not a refill)   Negative: [1] Caller has NON-URGENT medicine question about med that PCP prescribed AND [2] triager unable to answer question   Negative: Caller wants to use a complementary or alternative medicine   Negative: [1] Prescription prescribed recently is not at pharmacy AND [2] triager has access to patient's EMR AND [3] prescription is recorded in the EMR   Negative: [1] DOUBLE DOSE (an extra dose or lesser amount) of over-the-counter (OTC) drug AND [2] NO symptoms   Negative: [1] DOUBLE DOSE (an extra dose or lesser amount) of antibiotic drug AND [2] NO symptoms   Negative: Caller has medicine question only, adult not sick, AND triager answers question    Protocols used: Medication Question Call-A-

## 2024-01-21 NOTE — RESULT ENCOUNTER NOTE
Jose Amato,    I have had the opportunity to review your recent results and an interpretation is as follows:  Your hemoglobin A1c shows stable average blood glucose  Your urine albumin returned stable as well     Sincerely,  Oscar Baltazar MD

## 2024-01-26 ENCOUNTER — THERAPY VISIT (OUTPATIENT)
Dept: PHYSICAL THERAPY | Facility: CLINIC | Age: 54
End: 2024-01-26
Payer: COMMERCIAL

## 2024-01-26 DIAGNOSIS — M54.16 LUMBAR RADICULOPATHY: Primary | ICD-10-CM

## 2024-01-26 PROCEDURE — 97110 THERAPEUTIC EXERCISES: CPT | Mod: GP | Performed by: PHYSICAL THERAPIST

## 2024-01-26 PROCEDURE — 97161 PT EVAL LOW COMPLEX 20 MIN: CPT | Mod: GP | Performed by: PHYSICAL THERAPIST

## 2024-01-26 NOTE — PROGRESS NOTES
PHYSICAL THERAPY EVALUATION  Type of Visit: Evaluation    See electronic medical record for Abuse and Falls Screening details.    Subjective       Presenting condition or subjective complaint: Pt presents with chronic complaints of low back/buttock pain and recent onset of R LE pain. Pt reported waking with severe pain in the R LE on 12-12-23. Pt reported UC visit on 12-19-23 secondary to severity of sx's. Reports back pain is constant and R LE pain is intermittent. Recent office visit w/neurosurgery on 1-1-24. Referral to PT generated at that time. MRI scheduled for 1-30-24. Follow-up visit with neurosurgery on 1-31-24. Pt reports R LE sx's aggravated with standing/walking; relieved with lying on her L side.  Date of onset: 12/12/23    Relevant medical history: Arthritis; Asthma; Diabetes; DVT (blood clot); History of fractures   Dates & types of surgery: R ankle surgery    Prior diagnostic imaging/testing results: MRI (2020) Grade 1-2 spondylitic spondylolisthesis measuring  approximately 1.1 cm with severe bilateral neural foraminal stenosis.  This has minimally progressed since 2014.   Prior therapy history for the same diagnosis, illness or injury: No      Prior Level of Function  Transfers: Independent  Ambulation: Independent  ADL: Independent    Living Environment  Social support: With family members   Type of home: House; 2-story   Stairs to enter the home: Yes 5 Is there a railing: Yes   Ramp: No   Stairs inside the home:   0 (2 flights; 7 steps and 9 steps) Is there a railing: Yes   Help at home:    Equipment owned:       Employment: Yes   Hobbies/Interests: Books, TV, family.    Patient goals for therapy: Sit, walkg and participate in daily activities w/out pain.       Objective   LUMBAR SPINE EVALUATION  PAIN: Pain Level at Rest: 6/10  Pain Level with Use: 8/10  Pain Location: midline sacrum, B buttocks, R LE  Pain Frequency: constant  Pain is Exacerbated By: standing, walking  POSTURE:   WFL's; deferred standing when able stating increased back/leg pain with prolonged standing.  GAIT:   Weightbearing Status: WBAT  Assistive Device(s): None  Gait Deviations: WNL   ROM:  LROM: flexion: fingertips to knees, provocation of back pain; extension: unable, provocation of back pain.  PROM B hips: R hip; pain provocation with all directions; WFL's on the L.  MYOTOMES:  Knee extension: 5/5; hamstring R: pain limited; L: 5/5; DF/PF: WNL's; great toe extension: 5/5.  DTR S: Patellar: 2/4; Achilles: unable to elicit B  NEURAL TENSION:  SLR R/L: negative. SLR w/DF: negative.  FUNCTIONAL TESTS:  Able to perform transitional movements independently, however, reported moderate pain level        Assessment & Plan   CLINICAL IMPRESSIONS  Medical Diagnosis: Right lumbar radiculopathy; Spinal stenosis, unspecified spinal region    Treatment Diagnosis: chronic low back pain; R radiculopathy   Impression/Assessment: Patient is a 53 year old female with low back/R LE complaints.  The following significant findings have been identified: Pain, Decreased ROM/flexibility, Decreased strength, Impaired muscle performance, and Decreased activity tolerance. These impairments interfere with their ability to perform self care tasks, work tasks, household chores, household mobility, and community mobility as compared to previous level of function.     Clinical Decision Making (Complexity):  Clinical Presentation: Stable/Uncomplicated  Clinical Presentation Rationale: based on medical and personal factors listed in PT evaluation  Clinical Decision Making (Complexity): Low complexity    PLAN OF CARE  Treatment Interventions:  Interventions: Neuromuscular Re-education, Therapeutic Activity, Therapeutic Exercise, Self-Care/Home Management    Long Term Goals     PT Goal 1  Goal Identifier: ADL's  Goal Description: Perform ADL's with pain level <3/10  Rationale: to maximize safety and independence with performance of ADLs and functional  tasks;to maximize safety and independence with self cares  Target Date: 03/22/24      Frequency of Treatment: 1x/week  Duration of Treatment: 8 weeks      Education Assessment:        Risks and benefits of evaluation/treatment have been explained.   Patient/Family/caregiver agrees with Plan of Care.     Evaluation Time:     PT Eval, Low Complexity Minutes (04564): 30       Signing Clinician: CHRISTOPHER Scanlon [facility]                                                                                   OUTPATIENT PHYSICAL THERAPY      PLAN OF TREATMENT FOR OUTPATIENT REHABILITATION   Patient's Last Name, First Name, M.IEmerald Perez Lima Bueno  DAVID YOB: 1970   Provider's Name   [facility]   Medical Record No.  [id number]     Onset Date: 12/12/23  Start of Care Date:       Medical Diagnosis:  Right lumbar radiculopathy; Spinal stenosis, unspecified spinal region      PT Treatment Diagnosis:  chronic low back pain; R radiculopathy Plan of Treatment  Frequency/Duration: 1x/week/ 8 weeks    Certification date from 01/26/24 to 03/22/24         See note for plan of treatment details and functional goals     Kassi Beckman, PT                         I CERTIFY THE NEED FOR THESE SERVICES FURNISHED UNDER        THIS PLAN OF TREATMENT AND WHILE UNDER MY CARE     (Physician attestation of this document indicates review and certification of the therapy plan).              Referring Provider:  Frances Anton    Initial Assessment  See Epic Evaluation-

## 2024-02-01 ENCOUNTER — TELEPHONE (OUTPATIENT)
Dept: FAMILY MEDICINE | Facility: CLINIC | Age: 54
End: 2024-02-01
Payer: COMMERCIAL

## 2024-02-01 NOTE — TELEPHONE ENCOUNTER
Patient calling. This is a follow up from [employer] message sent 1/26/24. Forms sent over to Dr. Baltazar. Unemployment does need the form. One form is from [employer] and one from [place] unemployment. [employer] form sent to you on Monday or Tuesday is the correct form. Please put the same information you put on the original form on these two forms.  Please provide the same info provided to [employer] to be sent to Unemployment [place].     Patient would like to pick a copy of the forms, in case they get lost during faxing.     JESSICA Gonzalez  [facility] Triage

## 2024-02-02 ENCOUNTER — THERAPY VISIT (OUTPATIENT)
Dept: PHYSICAL THERAPY | Facility: CLINIC | Age: 54
End: 2024-02-02
Attending: NURSE PRACTITIONER
Payer: COMMERCIAL

## 2024-02-02 DIAGNOSIS — M54.16 LUMBAR RADICULOPATHY: ICD-10-CM

## 2024-02-02 DIAGNOSIS — M43.16 SPONDYLOLISTHESIS, LUMBAR REGION: ICD-10-CM

## 2024-02-02 PROCEDURE — 97140 MANUAL THERAPY 1/> REGIONS: CPT | Mod: GP | Performed by: PHYSICAL THERAPIST

## 2024-02-02 PROCEDURE — 97110 THERAPEUTIC EXERCISES: CPT | Mod: GP | Performed by: PHYSICAL THERAPIST

## 2024-02-02 NOTE — TELEPHONE ENCOUNTER
Patient contacts clinic for update on [place] Unemployment forms.  Patient informed that PCP has not completed forms, but will reach out when completed.  Patient expressed understanding.

## 2024-02-05 DIAGNOSIS — F40.240 CLAUSTROPHOBIA: ICD-10-CM

## 2024-02-05 NOTE — TELEPHONE ENCOUNTER
Medication Question or Refill        What medication are you calling about (include dose and sig)?:   LORazepam (ATIVAN) 0.5 MG tablet     Preferred Pharmacy:     [address] [address]  Phone: [phone] Fax: [phone] Alternate Fax: [phone], [phone]    Controlled Substance Agreement on file:   CSA -- Patient Level:    CSA: None found at the patient level.       Who prescribed the medication?: PCP    Do you need a refill? Yes, Pt takes for MRI pepe'ts and has an MRI on 02.07.24    When did you use the medication last? 09.2023 during last MRI    Patient offered an appointment? No, Pt said she spoke with PCP about this at pepe[facility] on 01.19.24    Do you have any questions or concerns?  No    Could we send this information to you in [facility] or would you prefer to receive a phone call?:   Patient would prefer a phone call   Okay to leave a detailed message?: Yes at Cell number on file:    Telephone Information:   Mobile [phone]     *Unable to Pend as it states it is not a current Rx*

## 2024-02-05 NOTE — TELEPHONE ENCOUNTER
Dr. Baltazar,     See message below. Ativan pended to the requested pharmacy.     Thank you,  Gabrielle Jean-Baptiste RN

## 2024-02-06 RX ORDER — LORAZEPAM 0.5 MG/1
TABLET ORAL
Qty: 2 TABLET | Refills: 0 | Status: ON HOLD | OUTPATIENT
Start: 2024-02-06 | End: 2024-05-25

## 2024-02-06 NOTE — TELEPHONE ENCOUNTER
Patient called back requesting update on forms or a visit.      Writer advised a visit would be appropriate. Patient was not offered visit previously and now forms are due Monday. No appointments available.     Please advise double book or if you are able to have filled out by Monday.

## 2024-02-06 NOTE — TELEPHONE ENCOUNTER
CC: Patient calling back to clinic to follow up on refill request for lorazepam. Patient is requesting this rx prior to MRI scheduled for tomorrow 2/7/24 at 11:45.    Routing to PCP to please review and sign if appropriate - thank you!     Please route back to triage to follow up with patient     Patient would like a callback at [phone] - ok to leave detailed VM     Ellis Espinosa RN  [facility]

## 2024-02-06 NOTE — TELEPHONE ENCOUNTER
Patient calling back for update regarding forms.    States the deadline to submit form is Monday 2/12/24.    Routing to PCP to follow up on this - please advise if anything further needed from triage - thank you!     Ellis Espinosa RN  [facility]

## 2024-02-06 NOTE — TELEPHONE ENCOUNTER
Patient called pharmacy urgently checking on RX, is there another provider who could approve 1 dose for the MRI tomorrow? Pharmacy closes at 6pm and opens at 8am

## 2024-02-07 ENCOUNTER — TELEPHONE (OUTPATIENT)
Dept: FAMILY MEDICINE | Facility: CLINIC | Age: 54
End: 2024-02-07
Payer: COMMERCIAL

## 2024-02-07 ENCOUNTER — ANCILLARY PROCEDURE (OUTPATIENT)
Dept: MRI IMAGING | Facility: CLINIC | Age: 54
End: 2024-02-07
Attending: NURSE PRACTITIONER
Payer: COMMERCIAL

## 2024-02-07 DIAGNOSIS — M43.16 SPONDYLOLISTHESIS, LUMBAR REGION: ICD-10-CM

## 2024-02-07 DIAGNOSIS — M54.16 LUMBAR RADICULOPATHY: ICD-10-CM

## 2024-02-07 PROCEDURE — 72148 MRI LUMBAR SPINE W/O DYE: CPT

## 2024-02-07 NOTE — TELEPHONE ENCOUNTER
Patient asking about Ativan and when to take. Nurse able to advise not to drive while on the med AND to take before test but not to drive.     JESSICA Gonzalez  [facility]

## 2024-02-07 NOTE — TELEPHONE ENCOUNTER
Patient agreeable. Please place at the . Patient coming in to pick it up.     EJSSICA Gonzalez  [facility]

## 2024-02-08 ENCOUNTER — THERAPY VISIT (OUTPATIENT)
Dept: PHYSICAL THERAPY | Facility: CLINIC | Age: 54
End: 2024-02-08
Attending: NURSE PRACTITIONER
Payer: COMMERCIAL

## 2024-02-08 DIAGNOSIS — M54.16 LUMBAR RADICULOPATHY: Primary | ICD-10-CM

## 2024-02-08 PROCEDURE — 97110 THERAPEUTIC EXERCISES: CPT | Mod: GP | Performed by: PHYSICAL THERAPIST

## 2024-02-14 DIAGNOSIS — J45.909 PERSISTENT ASTHMA WITHOUT COMPLICATION, UNSPECIFIED ASTHMA SEVERITY: ICD-10-CM

## 2024-02-14 RX ORDER — FEXOFENADINE HCL 180 MG/1
180 TABLET ORAL DAILY
Qty: 30 TABLET | Refills: 7 | Status: SHIPPED | OUTPATIENT
Start: 2024-02-14

## 2024-02-14 NOTE — TELEPHONE ENCOUNTER
Prescription approved per [facility] Refill Protocol.  Gabrielle Jean-Baptiste, RN  [facility] Triage Nurse

## 2024-02-15 ENCOUNTER — TELEPHONE (OUTPATIENT)
Dept: FAMILY MEDICINE | Facility: CLINIC | Age: 54
End: 2024-02-15

## 2024-02-15 NOTE — TELEPHONE ENCOUNTER
Forms received and placed on Dr. Baltazar's desk for virtual appt on Monday, 2-.    Patient notified.    Pia Lopez MA

## 2024-02-15 NOTE — TELEPHONE ENCOUNTER
Forms/Letter Request    Type of form/letter: RETURN TO WORK        Do we have the form/letter: Yes     Who is the form from? Insurance comp    Where did/will the form come from? Patient or family brought in       When is form/letter needed by: ASAP     How would you like the form/letter returned: PLEASE CALL PATIENT     Patient Notified form requests are processed in 5-7 business days:Yes    Could we send this information to you in [employer] or would you prefer to receive a phone call?:   Patient would prefer a phone call   Okay to leave a detailed message?: Yes at Cell number on file:    Telephone Information:   Mobile [phone]

## 2024-02-19 ENCOUNTER — TELEPHONE (OUTPATIENT)
Dept: OTHER | Facility: CLINIC | Age: 54
End: 2024-02-19

## 2024-02-19 ENCOUNTER — OFFICE VISIT (OUTPATIENT)
Dept: NEUROSURGERY | Facility: CLINIC | Age: 54
End: 2024-02-19
Payer: COMMERCIAL

## 2024-02-19 ENCOUNTER — VIRTUAL VISIT (OUTPATIENT)
Dept: FAMILY MEDICINE | Facility: CLINIC | Age: 54
End: 2024-02-19
Payer: COMMERCIAL

## 2024-02-19 ENCOUNTER — DOCUMENTATION ONLY (OUTPATIENT)
Dept: NEUROSURGERY | Facility: CLINIC | Age: 54
End: 2024-02-19

## 2024-02-19 VITALS — DIASTOLIC BLOOD PRESSURE: 83 MMHG | OXYGEN SATURATION: 99 % | HEART RATE: 85 BPM | SYSTOLIC BLOOD PRESSURE: 126 MMHG

## 2024-02-19 DIAGNOSIS — E11.9 TYPE 2 DIABETES MELLITUS WITHOUT COMPLICATION, WITHOUT LONG-TERM CURRENT USE OF INSULIN (H): Primary | ICD-10-CM

## 2024-02-19 DIAGNOSIS — M43.16 SPONDYLOLISTHESIS OF LUMBAR REGION: Primary | ICD-10-CM

## 2024-02-19 DIAGNOSIS — Z01.818 PREOP TESTING: ICD-10-CM

## 2024-02-19 DIAGNOSIS — M48.062 SPINAL STENOSIS, LUMBAR REGION, WITH NEUROGENIC CLAUDICATION: Primary | ICD-10-CM

## 2024-02-19 DIAGNOSIS — M48.062 SPINAL STENOSIS, LUMBAR REGION, WITH NEUROGENIC CLAUDICATION: ICD-10-CM

## 2024-02-19 PROCEDURE — 99213 OFFICE O/P EST LOW 20 MIN: CPT | Mod: 95 | Performed by: INTERNAL MEDICINE

## 2024-02-19 PROCEDURE — 99214 OFFICE O/P EST MOD 30 MIN: CPT | Performed by: NEUROLOGICAL SURGERY

## 2024-02-19 ASSESSMENT — ASTHMA QUESTIONNAIRES
QUESTION_1 LAST FOUR WEEKS HOW MUCH OF THE TIME DID YOUR ASTHMA KEEP YOU FROM GETTING AS MUCH DONE AT WORK, SCHOOL OR AT HOME: NONE OF THE TIME
QUESTION_3 LAST FOUR WEEKS HOW OFTEN DID YOUR ASTHMA SYMPTOMS (WHEEZING, COUGHING, SHORTNESS OF BREATH, CHEST TIGHTNESS OR PAIN) WAKE YOU UP AT NIGHT OR EARLIER THAN USUAL IN THE MORNING: ONCE A WEEK
ACT_TOTALSCORE: 19
QUESTION_2 LAST FOUR WEEKS HOW OFTEN HAVE YOU HAD SHORTNESS OF BREATH: ONCE OR TWICE A WEEK
ACT_TOTALSCORE: 19
QUESTION_5 LAST FOUR WEEKS HOW WOULD YOU RATE YOUR ASTHMA CONTROL: SOMEWHAT CONTROLLED
QUESTION_4 LAST FOUR WEEKS HOW OFTEN HAVE YOU USED YOUR RESCUE INHALER OR NEBULIZER MEDICATION (SUCH AS ALBUTEROL): ONCE A WEEK OR LESS

## 2024-02-19 ASSESSMENT — PAIN SCALES - GENERAL: PAINLEVEL: MODERATE PAIN (5)

## 2024-02-19 NOTE — PATIENT INSTRUCTIONS
Patient Instructions    Surgery scheduled at [facility] for Lumbar 5-Sacral 1 ALIF (anterior lumbar interbody fusion) with Dr. Reynaga     Pre-Operative    Surgical risks: blood clots in the leg or lung, problems urinating, nerve damage, drainage from the incision, infection, stiffness    You will need a Pre-operative physical with primary care physician within 30 days prior to your surgical date.     You will spend 2-4 nights in the hospital.    Smoking Cessation  You are advised to quit smoking immediately through recovery to help with healing and reduce risk of complications. Printout given.   You will need a nicotine test 3 weeks prior to surgery. You will need to be nicotine free at 2 weeks prior to the nicotine blood test. Nicotine free means you may not use or come in contact with nicotine products. Please call your local [facility] to schedule the lab test.    Shower procedure  You will need to shower the night before and morning of surgery using the antimicrobial soap (chlorhexidine) given to you. Please refer to showering instruction sheet in your surgery education folder.    Eating/Drinking  Stop all solid foods 8 hours before surgery.  Keep drinking clear liquids until 4 hours before surgery  Clear liquids include water, clear juice, black coffee, or clear tea without milk, Gatorade, clear soda.     Medications  Discontinue Aspirin & NSAIDs (Advil/Ibuprofen, Indocin, Naproxen,Nuprin,Relafen/Nabumetone, Diclofenac,Meloxicam, Aleve, Celebrex) 7 days prior to surgical date. After surgery, do not begin taking these medications until given clearance as it may cause bleeding and interfere with healing.  Hold methotrexate, Xeljanz for 1-2 weeks prior to surgery and continue to hold 2 weeks post surgery.   It is ok to take Tylenol (Acetaminophen) for pain within the 7 days prior to surgery. Example: you could take 1000 mg 3 times per day. Do not exceed 3,000 mg per day.   If you  are on chronic pain medication (oxycodone, Percocet, hydrocodone, Vicodin, Norco, Dilaudid, morphine, MS Contin, naltrexone, Suboxone, etc) or have a pain contract we will reach out to your pain clinic to gather your most recent records and recommendations for pain management post-op.  Please ask your provider who manages your chronic pain if they require you to schedule an office visit prior to surgery. Continue obtaining your pain medications from your current provider until surgery. Our team will manage your acute post-op pain in the hospital and during the recovery period. Your pain team will continue to manage your chronic pain.   Any other medications prescribed, please discuss with your primary care provider at your pre-operative physical        Post-Operative    Incision Care  Look at your incision site every day. You  may need a mirror or family member to help you.   Watch for signs of infection  Redness, swelling, warmth, drainage (Green or yellow drainage (pus) from your incision or increased bloody drainage), and fever of 101 degrees or higher  Notif clinic [phone]  Remove dressing as instructed upon discharge  Do not apply lotions or ointments to incision  It is okay to shower, just pat the incision dry   No submerging incision in water such as pools, hot tubs, or baths for at least 8 weeks and until the incision is healed    Pain Management  Dealing with pain  As your body heals, you might feel a stabbing, burning, or aching pain. You may also have some numbness.  Everyone feels pain differently, we may ask you to rate your pain using a pain scale. This will let us know how much pain you feel.   Keep in mind that medicine won't take away all of your pain. It helps to try other ways to relax and ease pain.   Things to help with pain  After surgery, we will give you medicine for your pain. These medications work well, but they can make you drowsy, itchy, or sick to your stomach. If we give you  narcotics for pain, try to take the pills with food.   For mild to moderate pain, you can take medication such as Tylenol. These can be used with narcotics, but make sure that your narcotic does not contain Tylenol.   Do NOT drive while taking narcotic pain medication  Do NOT drink alcohol while using any pain medication  You can utilize ice as needed (no longer than 20 minutes at one time)  Refills of pain medication: please call the neurosurgery clinic to request 2-3 days before you run out  Aspirin & NSAIDS (ex. Ibuprofen, aleve, naproxen): Don't take NSAIDs until 6 weeks after surgery to reduce risk of bleeding and interference with bone healing     Bowel Care  Many people have constipation (hard stools) after surgery. The narcotic pain medication we often prescribed can contribute to constipation. To help prevent constipation: Drink plenty of fluid (8-10 glasses/day); Eat more fiber, such as whole grain bread, bran cereal, and fruits and vegetables; Stay active by walking; Over the counter stool softener may also help.      Activity Restrictions  For the first 6-8 weeks, no lifting > 10 pounds, no bending, twisting, or overhead reaching.  Take stairs in moderation   Walking is the best way to start exercise after surgery. Take short frequent walks. You may gradually increase the distance as tolerated. If you feel pain, decrease your activity, but DO NOT stop walking. Walking will help you gain strength and prevent muscle soreness and spasms.   Avoid bed rest and prolonged sitting for longer than 30 minutes (change positions frequently while awake)  No contact sports or high impact activities such as; running/jogging, snowmobile or 4 bass riding or any other recreational vehicles until after given clearance at one of your follow up visits  If a brace is required per Dr. Reynaga, Orthotics will fit you for the brace in the hospital. Brace type and length of time to wear it will be determined by Dr. Reynaga.      Contact clinic right away or go to the Emergency Department if you develop:   Infection (incisional redness, swelling, warmth, drainage, or fever (temp > 101 F))  New injury  Bladder or bowel changes or loss of control    Signs of blood clot:  Swelling and/or warmth in one or both legs  Pain or tenderness in your leg, ankle, foot, or arm   Red or discolored skin     Go to the Emergency Department   If sudden onset of severe headache, weakness, confusion, change in level of consciousness, pain, or loss of movement.  Chest pain  Trouble breathing     Post-Op Follow Up Appointments  2 week incision check & staple/suture removal with Neurosurgery Nurse  6 week post op with x-ray prior with our Physician Assistant or Nurse Practitioner  3 months post op with x-ray prior with our Physician Assistant or Nurse Practitioner  6 months post op with x-ray prior with our Physician Assistant or Nurse Practitioner  1 year post op with x-ray prior with our Physician Assistant or Nurse Practitioner  Please call to schedule follow up and xray appointments at [phone]    Resources  If you are currently employed, you will need to be off work for 4-6 weeks for recovery and healing.  Please fax any FMLA/short term disability paperwork to [phone] prior to surgery  You may call our clinic when you'd like to return to work and we can provide a work letter  To allow staff adequate time to complete paperwork, please send as soon as possible     [facility]  [facility] - [address] [address]  Telephone:  [phone]        Fax:  [phone]    Xenograft Text: The defect edges were debeveled with a #15 scalpel blade.  Given the location of the defect, shape of the defect and the proximity to free margins a xenograft was deemed most appropriate.  The graft was then trimmed to fit the size of the defect.  The graft was then placed in the primary defect and oriented appropriately.

## 2024-02-19 NOTE — NURSING NOTE
"Lima Bueno is a 53 year old female who presents for:  Chief Complaint   Patient presents with    RECHECK     Lumbar spondylolisthesis and radiculopathy, radiates to right leg        Initial Vitals:  /83   Pulse 85   SpO2 99%  Estimated body mass index is 37.44 kg/m  as calculated from the following:    Height as of 1/19/24: 5' 5\" (1.651 m).    Weight as of 1/19/24: 225 lb (102.1 kg).. There is no height or weight on file to calculate BSA. BP completed using cuff size: regular  Moderate Pain (5)        Jayleen Stephens    "

## 2024-02-19 NOTE — TELEPHONE ENCOUNTER
Request received from Kassi @ Dr. Reynaga's office for Dr. Ashton to assist with surgery.  Sent staff message to Kassi with available dates for Dr. Ashton.

## 2024-02-19 NOTE — PROGRESS NOTES
Lima is a 53 year old who is being evaluated via a billable video visit.      How would you like to obtain your AVS? MyChart  If the video visit is dropped, the invitation should be resent by: Text to cell phone: [phone]  Will anyone else be joining your video visit? No              Subjective   Lima is a 53 year old, presenting for the following health issues:  Forms (Patient having a virtual visit for forms.  )    History of Present Illness       Reason for visit:  Forms need to be filled out and Follow up    She eats 4 or more servings of fruits and vegetables daily.She consumes 5 sweetened beverage(s) daily.She exercises with enough effort to increase her heart rate 60 or more minutes per day.  She exercises with enough effort to increase her heart rate 7 days per week.   She is taking medications regularly.        Type 2 diabetes mellitus without complication, without long-term current use of insulin (H)  Spinal stenosis, lumbar region, with neurogenic claudication   I spoke to Lima Bueno today over video visit to discuss options with regards to her spinal stenosis and recommendations for upcoming surgery.  She had an appointment earlier today with neurosurgery and was recommended to consider a lumbar fusion with right sided decompression.  She has been instructed that she will need to quit smoking in order to have this upcoming surgery.  She in the interim has been unable to return to her place of employment due to pain.  She requests a letter allowing her to remain working from home until she has recovered from her surgery.      Review of Systems  Constitutional, HEENT, cardiovascular, pulmonary, gi and gu systems are negative, except as otherwise noted.      Objective             Physical Exam   GENERAL: alert and no distress  EYES: Eyes grossly normal to inspection.  No discharge or erythema, or obvious scleral/conjunctival abnormalities.  RESP: No audible wheeze, cough, or visible  cyanosis.    SKIN: Visible skin clear. No significant rash, abnormal pigmentation or lesions.  NEURO: Cranial nerves grossly intact.  Mentation and speech appropriate for age.  PSYCH: Appropriate affect, tone, and pace of words    (E11.9) Type 2 diabetes mellitus without complication, without long-term current use of insulin (H)  (primary encounter diagnosis)  Comment: No concerns.  Hemoglobin A1c was excellent  Plan:     (M48.062) Spinal stenosis, lumbar region, with neurogenic claudication  Comment: Plan for upcoming surgery.  Letter written allowing her to remain working from home until recovered from surgery.  The date of recevery is unknown, but will allow for 6 months from now.  Plan:          Video-Visit Details    Type of service:  Video Visit   Video Start Time:  5:30 PM  Video End Time:5:40 PM    Originating Location (pt. Location): Home    Distant Location (provider location):  On-site  Platform used for Video Visit: Viviana  Signed Electronically by: Oscar Baltazar MD, MD

## 2024-02-19 NOTE — LETTER
2/19/2024         RE: Lima Bueno  [address] [facility] [address]        Dear Colleague,    Thank you for referring your patient, Lima Bueno, to the [facility]. Please see a copy of my visit note below.    Lima Bueno is a 53 year old female who presents for evaluation of low back and right leg pain. Patient reports history of chronic low back pain for a few years that worsened over the past 1 year. She developed sharp right leg pain about 1 month ago, denies any trauma or injuries at onset. She was evaluated by PCP and Urgent Care. Today, patient reports midline low back pain that radiates to right posterior thigh and wraps around to anterior shin, with pain and paresthesias in right foot. Pain is worsened with prolonged sitting, heavy lifting, and walking. She is unable to walk more than 2 miles without pain. She also reports feeling right leg weakness at times due to pain. Denies falls, foot drop, or saddle anesthesia. She reports loose stools but denies any bladder/bowel incontinence. Patient has tried gabapentin for pain management. Patient states her symptoms are impacting her daily life and make it difficult to complete house chores.     2/9/24: Returns for follow up.  Continued severe pain as described above.  MR Lumbar, personally reviewed, with L5-S1 grade 2 spondylolisthesis and severe foraminal stenosis.    Past Medical History:   Diagnosis Date     ADD (attention deficit disorder with hyperactivity)      Asthma      Breast mass 11/1/2012    Patient yet to have biopsy as of 11/1/2012       DVT (deep venous thrombosis) (H) 10/12    right leg     LSIL (low grade squamous intraepithelial lesion) on Pap smear 02/19/14    Neg high risk HPV     Pulmonary embolism (H) 2012    Saw Dr Toledo, protein S deficiency       Past Medical History reviewed with patient during visit.    Past Surgical History:   Procedure Laterality Date      ANKLE SURGERY Right 01/2019     COLONOSCOPY N/A 12/10/2020    Procedure: COLONOSCOPY, WITH POLYPECTOMY AND BIOPSY;  Surgeon: Osorio Dunn MD;  Location: [facility]     Past Surgical History reviewed with patient during visit.    Current Outpatient Medications   Medication     albuterol (PROAIR HFA/PROVENTIL HFA/VENTOLIN HFA) 108 (90 Base) MCG/ACT inhaler     albuterol (PROVENTIL) (2.5 MG/3ML) 0.083% neb solution     atorvastatin (LIPITOR) 20 MG tablet     benzonatate (TESSALON) 200 MG capsule     blood glucose (NO BRAND SPECIFIED) test strip     blood glucose monitoring (NO BRAND SPECIFIED) meter device kit     famotidine (PEPCID) 20 MG tablet     fexofenadine (ALLEGRA) 180 MG tablet     fluticasone-salmeterol (ADVAIR HFA) 230-21 MCG/ACT inhaler     gabapentin (NEURONTIN) 100 MG capsule     guaiFENesin (MUCINEX) 600 MG 12 hr tablet     ketotifen (ZADITOR) 0.025 % ophthalmic solution     Lancets 30G MISC     lisinopril (ZESTRIL) 10 MG tablet     LORazepam (ATIVAN) 0.5 MG tablet     mupirocin (BACTROBAN) 2 % external ointment     rivaroxaban ANTICOAGULANT (XARELTO) 20 MG TABS tablet     Semaglutide, 2 MG/DOSE, (OZEMPIC, 2 MG/DOSE,) 8 MG/3ML pen     traZODone (DESYREL) 150 MG tablet     No current facility-administered medications for this visit.       Allergies   Allergen Reactions     Phosphoric Acid Hives     Terbutaline Hives     Trazodone      Other reaction(s): Other - Describe In Comment Field  Facial nerve pain  Other reaction(s): Other - Describe In Comment Field  Facial nerve pain     Wellbutrin [Bupropion]      Smoking cessation -- told never to take again ,       Social History     Socioeconomic History     Marital status: Single     Number of children: 5   Occupational History     Employer: [employer]     Occupation:      Comment: [employer]; 2022   Tobacco Use     Smoking status: Every Day     Packs/day: 1.00     Years: 30.00     Additional pack years: 0.00     Total pack years: 30.00      Types: Cigarettes     Smokeless tobacco: Never   Vaping Use     Vaping Use: Never used   Substance and Sexual Activity     Alcohol use: No     Alcohol/week: 0.0 standard drinks of alcohol     Drug use: No     Sexual activity: Yes     Partners: Male     Birth control/protection: Post-menopausal   Social History Narrative    Employed as [employer]     Social Determinants of Health     Financial Resource Strain: Low Risk  (12/22/2023)    Financial Resource Strain      Within the past 12 months, have you or your family members you live with been unable to get utilities (heat, electricity) when it was really needed?: No   Food Insecurity: Low Risk  (12/22/2023)    Food Insecurity      Within the past 12 months, did you worry that your food would run out before you got money to buy more?: No      Within the past 12 months, did the food you bought just not last and you didn t have money to get more?: No   Transportation Needs: Low Risk  (12/22/2023)    Transportation Needs      Within the past 12 months, has lack of transportation kept you from medical appointments, getting your medicines, non-medical meetings or appointments, work, or from getting things that you need?: No   Interpersonal Safety: Low Risk  (12/22/2023)    Interpersonal Safety      Do you feel physically and emotionally safe where you currently live?: Yes      Within the past 12 months, have you been hit, slapped, kicked or otherwise physically hurt by someone?: No      Within the past 12 months, have you been humiliated or emotionally abused in other ways by your partner or ex-partner?: No   Housing Stability: Low Risk  (12/22/2023)    Housing Stability      Do you have housing? : Yes      Are you worried about losing your housing?: No       Family History   Problem Relation Age of Onset     Thyroid Disease Mother         hyperactive     Schizophrenia Father      Hypertension Brother      Ovarian Cancer Paternal  Grandmother      Breast Cancer No family hx of        ROS: 10 point ROS neg other than the symptoms noted above in the HPI.    Vital Signs: /83   Pulse 85   SpO2 99%     Neurological Examination:  Awake  Alert  Oriented x 3  Speech clear    Motor exam: RLE exam limited due to pain   Iliopsoas  (hip flexion)               Right: 4+/5  Left:  5/5  Quadriceps  (knee extension)       Right:  4+/5  Left:  5/5  Hamstrings  (knee flexion)            Right:  4+/5  Left:  5/5  Gastroc Soleus  (PF)                          Right:  5/5  Left:  5/5  Tibialis Ant  (DF)                          Right:  5/5  Left:  5/5  EHL                          Right:  5/5  Left:  5/5         Sensation intact to light touch in BLE     Reflexes are 2+ in the patellar and Achilles. There is no clonus.     Assessment/Plan:   53 year old female who presents for evaluation of low back pain that radiates to right leg with pain and paresthesias in right foot. Lumbar spine MRI from 2020 reveals L5-S1 spondylolisthesis with bilateral foraminal stenosis. We discussed symptoms, imaging, and next steps.      She is now quitting smoking  Has done a few sessions of PT without improvement including home therapy; not a candidate for further therapy due to lack of improvement  STEVE, and medical management with NSAIDs without improvement  Will plan for L5-S1 ALIF/Robotic PSF with right decompression  Risks and benefits discussed including vessel injury, infection, hematoma, CSF leak, nerve root injury, pseudoarthrosis, and need for further surgery       Again, thank you for allowing me to participate in the care of your patient.        Sincerely,        Vinny Reynaga MD

## 2024-02-19 NOTE — NURSING NOTE
Reviewed pre- and post-operative instructions as outlined in the After Visit Summary/Patient Instructions with patient.   Surgery folder was given to patient    Patient Education Topic: Procedure with Dr. Reynaga     Learner(s): Patient    Knowledge Level: Basic    Readiness to Learn: Ready    Method:  Verbal explanation and Written material     Outcome: Able to verbalize instructions    Barriers to Learning: No barrier    Covid Testing:  NA    NDI/LIDIA: Confirmation of completion within last 6 months    Smoking Status: current smoker    Smoking Cessation handout given to patient: Yes.    if patient smokes and having fusion , RN to order Nicotine test to be done 3 weeks prior to surgery. Patient will need to be nicotine free at least 1 week prior to the nicotine blood test. 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   Time Off: 6 weeks     Patient had the opportunity for questions to be answered. Advised Patient to call clinic with any questions/concerns. Gave patient antibacterial soap for pre-surgery skin preparation.

## 2024-02-19 NOTE — LETTER
iLma Bueno  [address] [address] [facility] [address]           To Whom It May Concern:     Lima Bueno was seen in our clinic on 2/19/2024   Please extend her work from home restriction so that she can elevate leg and monitor the pain until expected recovery from surgery, 08/01/2024.        Sincerely,     Oscar Baltazar MD

## 2024-02-19 NOTE — PROGRESS NOTES
Lima Bueno is a 53 year old female who presents for evaluation of low back and right leg pain. Patient reports history of chronic low back pain for a few years that worsened over the past 1 year. She developed sharp right leg pain about 1 month ago, denies any trauma or injuries at onset. She was evaluated by PCP and Urgent Care. Today, patient reports midline low back pain that radiates to right posterior thigh and wraps around to anterior shin, with pain and paresthesias in right foot. Pain is worsened with prolonged sitting, heavy lifting, and walking. She is unable to walk more than 2 miles without pain. She also reports feeling right leg weakness at times due to pain. Denies falls, foot drop, or saddle anesthesia. She reports loose stools but denies any bladder/bowel incontinence. Patient has tried gabapentin for pain management. Patient states her symptoms are impacting her daily life and make it difficult to complete house chores.     2/9/24: Returns for follow up.  Continued severe pain as described above.  MR Lumbar, personally reviewed, with L5-S1 grade 2 spondylolisthesis and severe foraminal stenosis.    Past Medical History:   Diagnosis Date    ADD (attention deficit disorder with hyperactivity)     Asthma     Breast mass 11/1/2012    Patient yet to have biopsy as of 11/1/2012      DVT (deep venous thrombosis) (H) 10/12    right leg    LSIL (low grade squamous intraepithelial lesion) on Pap smear 02/19/14    Neg high risk HPV    Pulmonary embolism (H) 2012    Saw Dr Toledo, protein S deficiency       Past Medical History reviewed with patient during visit.    Past Surgical History:   Procedure Laterality Date    ANKLE SURGERY Right 01/2019    COLONOSCOPY N/A 12/10/2020    Procedure: COLONOSCOPY, WITH POLYPECTOMY AND BIOPSY;  Surgeon: Osorio Dunn MD;  Location:  GI     Past Surgical History reviewed with patient during visit.    Current Outpatient Medications   Medication     albuterol (PROAIR HFA/PROVENTIL HFA/VENTOLIN HFA) 108 (90 Base) MCG/ACT inhaler    albuterol (PROVENTIL) (2.5 MG/3ML) 0.083% neb solution    atorvastatin (LIPITOR) 20 MG tablet    benzonatate (TESSALON) 200 MG capsule    blood glucose (NO BRAND SPECIFIED) test strip    blood glucose monitoring (NO BRAND SPECIFIED) meter device kit    famotidine (PEPCID) 20 MG tablet    fexofenadine (ALLEGRA) 180 MG tablet    fluticasone-salmeterol (ADVAIR HFA) 230-21 MCG/ACT inhaler    gabapentin (NEURONTIN) 100 MG capsule    guaiFENesin (MUCINEX) 600 MG 12 hr tablet    ketotifen (ZADITOR) 0.025 % ophthalmic solution    Lancets 30G MISC    lisinopril (ZESTRIL) 10 MG tablet    LORazepam (ATIVAN) 0.5 MG tablet    mupirocin (BACTROBAN) 2 % external ointment    rivaroxaban ANTICOAGULANT (XARELTO) 20 MG TABS tablet    Semaglutide, 2 MG/DOSE, (OZEMPIC, 2 MG/DOSE,) 8 MG/3ML pen    traZODone (DESYREL) 150 MG tablet     No current facility-administered medications for this visit.       Allergies   Allergen Reactions    Phosphoric Acid Hives    Terbutaline Hives    Trazodone      Other reaction(s): Other - Describe In Comment Field  Facial nerve pain  Other reaction(s): Other - Describe In Comment Field  Facial nerve pain    Wellbutrin [Bupropion]      Smoking cessation -- told never to take again ,       Social History     Socioeconomic History    Marital status: Single    Number of children: 5   Occupational History     Employer: [employer]    Occupation:      Comment: [employer]; 2022   Tobacco Use    Smoking status: Every Day     Packs/day: 1.00     Years: 30.00     Additional pack years: 0.00     Total pack years: 30.00     Types: Cigarettes    Smokeless tobacco: Never   Vaping Use    Vaping Use: Never used   Substance and Sexual Activity    Alcohol use: No     Alcohol/week: 0.0 standard drinks of alcohol    Drug use: No    Sexual activity: Yes     Partners: Male     Birth control/protection: Post-menopausal   Social  History Narrative    Employed as [employer]     Social Determinants of Health     Financial Resource Strain: Low Risk  (12/22/2023)    Financial Resource Strain     Within the past 12 months, have you or your family members you live with been unable to get utilities (heat, electricity) when it was really needed?: No   Food Insecurity: Low Risk  (12/22/2023)    Food Insecurity     Within the past 12 months, did you worry that your food would run out before you got money to buy more?: No     Within the past 12 months, did the food you bought just not last and you didn t have money to get more?: No   Transportation Needs: Low Risk  (12/22/2023)    Transportation Needs     Within the past 12 months, has lack of transportation kept you from medical appointments, getting your medicines, non-medical meetings or appointments, work, or from getting things that you need?: No   Interpersonal Safety: Low Risk  (12/22/2023)    Interpersonal Safety     Do you feel physically and emotionally safe where you currently live?: Yes     Within the past 12 months, have you been hit, slapped, kicked or otherwise physically hurt by someone?: No     Within the past 12 months, have you been humiliated or emotionally abused in other ways by your partner or ex-partner?: No   Housing Stability: Low Risk  (12/22/2023)    Housing Stability     Do you have housing? : Yes     Are you worried about losing your housing?: No       Family History   Problem Relation Age of Onset    Thyroid Disease Mother         hyperactive    Schizophrenia Father     Hypertension Brother     Ovarian Cancer Paternal Grandmother     Breast Cancer No family hx of        ROS: 10 point ROS neg other than the symptoms noted above in the HPI.    Vital Signs: /83   Pulse 85   SpO2 99%     Neurological Examination:  Awake  Alert  Oriented x 3  Speech clear    Motor exam: RLE exam limited due to pain   Iliopsoas  (hip flexion)                Right: 4+/5  Left:  5/5  Quadriceps  (knee extension)       Right:  4+/5  Left:  5/5  Hamstrings  (knee flexion)            Right:  4+/5  Left:  5/5  Gastroc Soleus  (PF)                          Right:  5/5  Left:  5/5  Tibialis Ant  (DF)                          Right:  5/5  Left:  5/5  EHL                          Right:  5/5  Left:  5/5         Sensation intact to light touch in BLE     Reflexes are 2+ in the patellar and Achilles. There is no clonus.     Assessment/Plan:   53 year old female who presents for evaluation of low back pain that radiates to right leg with pain and paresthesias in right foot. Lumbar spine MRI from 2020 reveals L5-S1 spondylolisthesis with bilateral foraminal stenosis. We discussed symptoms, imaging, and next steps.      She is now quitting smoking  Has done a few sessions of PT without improvement including home therapy; not a candidate for further therapy due to lack of improvement  STEVE, and medical management with NSAIDs without improvement  Will plan for L5-S1 ALIF/Robotic PSF with right decompression  Risks and benefits discussed including vessel injury, infection, hematoma, CSF leak, nerve root injury, pseudoarthrosis, and need for further surgery

## 2024-02-20 NOTE — NURSING NOTE
Dr. Reynaga [facility] pt hold xarelto 5 days preop. Post op TBD. Pt sent myc stating this.

## 2024-02-20 NOTE — PROGRESS NOTES
STARTED [employer] report form. Awaiting surgery date.     Placed document in in progress bin at .

## 2024-02-21 ENCOUNTER — TELEPHONE (OUTPATIENT)
Dept: FAMILY MEDICINE | Facility: CLINIC | Age: 54
End: 2024-02-21
Payer: COMMERCIAL

## 2024-02-21 ENCOUNTER — TELEPHONE (OUTPATIENT)
Dept: NEUROSURGERY | Facility: CLINIC | Age: 54
End: 2024-02-21
Payer: COMMERCIAL

## 2024-02-21 DIAGNOSIS — Z72.0 TOBACCO USE: Primary | ICD-10-CM

## 2024-02-21 NOTE — TELEPHONE ENCOUNTER
Prescription for nicotine inhaler sent    Can we also call Lima Bueno and ask if they are interested in quitting smoking, and if so would they like to schedule with Rosario Rabago in medical therapeutic management to discuss options?     Oscar Baltazar MD

## 2024-02-21 NOTE — TELEPHONE ENCOUNTER
Called patient to discuss FMLA questions. Patient reports her neurologist is currently filling out her FMLA PW and they are requesting clarifying information.    Reviewed the FMLA PW given to us will start the date of her surgery. She verbalized understanding.

## 2024-02-21 NOTE — TELEPHONE ENCOUNTER
Nurse Triage SBAR    Is this a 2nd Level Triage? YES, LICENSED PRACTITIONER REVIEW IS REQUIRED    Situation: I have been trying to stop smoking before surgery. They have pushed it out and now I want some nicotine inhalers because the surgery is not until May    Background: hx of smoking    Assessment: I have been trying to stop smoking before surgery. They have pushed it out and now I want some nicotine inhalers because the surgery is not until May    Protocol Recommended Disposition:   No disposition on file.    Recommendation:   Unable to find a nicotene inhalers. Pharmacy pended for your  Review.      Routed to provider    Does the patient meet one of the following criteria for ADS visit consideration? 16+ years old, with an [facility] PCP     TIP  Providers, please consider if this condition is appropriate for management at one of our Acute and Diagnostic Services sites.     If patient is a good candidate, please use dotphrase <dot>triageresponse and select Refer to ADS to document.

## 2024-02-21 NOTE — TELEPHONE ENCOUNTER
Pt would like a call back to discuss FMLA.  She is very frustrated that her surgery could not be scheduled until 5/7, due to Dr. Ashton.  She is concerned about her FMLA.

## 2024-02-21 NOTE — TELEPHONE ENCOUNTER
Called and spoke to the patient. Relayed message from the provider below. Patient states she would be interested in getting an appointment scheduled with Rosario to discuss options in regards to quitting smoking.     Will route back to PCP for review.     Gabrielle Jean-Baptiste RN

## 2024-02-22 RX ORDER — VARENICLINE TARTRATE 0.5 (11)-1
KIT ORAL
Qty: 53 TABLET | Refills: 0 | Status: CANCELLED | OUTPATIENT
Start: 2024-02-22

## 2024-02-22 RX ORDER — BUPROPION HYDROCHLORIDE 150 MG/1
150 TABLET, FILM COATED, EXTENDED RELEASE ORAL 2 TIMES DAILY
Status: CANCELLED | OUTPATIENT
Start: 2024-02-22

## 2024-02-22 NOTE — TELEPHONE ENCOUNTER
Medication Request (Medication Nicotrol inhaler is no longer commercially made and is no longer available.  Please send alternate product. Nicotine replacement therapy, Zyban, or Chantix are options.

## 2024-02-22 NOTE — TELEPHONE ENCOUNTER
Called and spoke with pt, relayed message below. Pt verbalized understanding and in agreement with plan.  SLAVA OMER RN on 2/22/2024 at 3:10 PM

## 2024-02-22 NOTE — TELEPHONE ENCOUNTER
Will route to medical therapeutic management to discuss medication options with Lima Bueno     Referral was signed previously

## 2024-02-28 NOTE — TELEPHONE ENCOUNTER
FMLA faxed to [facility] [employer] at [phone].    RightFax confirmed at 09:43am on 02/28/24.

## 2024-03-06 NOTE — TELEPHONE ENCOUNTER
Dr. Ashton is scheduled to assist Dr. Reynaga on 5/7/24 LUMBAR 5 TO SACRAL 1 ANTERIOR LUMBAR INTERBODY FUSION

## 2024-03-14 NOTE — TELEPHONE ENCOUNTER
Request approved by Lola to move patient's surgery from 5/7/24 to 4/30/24.  Staff messages sent to Dr. Ronnell Solitario's surgery scheduler, to confirm.  Holding 4/30/24 @ 7:30pm until surgery moved.

## 2024-03-15 NOTE — TELEPHONE ENCOUNTER
FMLA and Physician's Report sent to Insurance and to HIM for our records.    Right Fax confirmed both at 10:22am on 03/15/24.

## 2024-03-20 NOTE — TELEPHONE ENCOUNTER
Kassi @ Dr. Reynaga's office moved surgery to 4/30/24.  It is now on Dr. Ashton's schedule for 4/30/24.

## 2024-03-21 ENCOUNTER — VIRTUAL VISIT (OUTPATIENT)
Dept: PHARMACY | Facility: CLINIC | Age: 54
End: 2024-03-21
Payer: COMMERCIAL

## 2024-03-21 DIAGNOSIS — F17.200 TOBACCO USE DISORDER: ICD-10-CM

## 2024-03-21 DIAGNOSIS — I10 ESSENTIAL HYPERTENSION, BENIGN: ICD-10-CM

## 2024-03-21 DIAGNOSIS — I82.4Y1 DEEP VEIN THROMBOSIS (DVT) OF PROXIMAL VEIN OF RIGHT LOWER EXTREMITY, UNSPECIFIED CHRONICITY (H): ICD-10-CM

## 2024-03-21 DIAGNOSIS — J45.909 PERSISTENT ASTHMA WITHOUT COMPLICATION, UNSPECIFIED ASTHMA SEVERITY: ICD-10-CM

## 2024-03-21 DIAGNOSIS — E11.9 TYPE 2 DIABETES MELLITUS WITHOUT COMPLICATION, WITHOUT LONG-TERM CURRENT USE OF INSULIN (H): Primary | ICD-10-CM

## 2024-03-21 DIAGNOSIS — M48.062 SPINAL STENOSIS, LUMBAR REGION, WITH NEUROGENIC CLAUDICATION: ICD-10-CM

## 2024-03-21 PROCEDURE — 99607 MTMS BY PHARM ADDL 15 MIN: CPT | Performed by: PHARMACIST

## 2024-03-21 PROCEDURE — 99605 MTMS BY PHARM NP 15 MIN: CPT | Performed by: PHARMACIST

## 2024-03-21 RX ORDER — VARENICLINE TARTRATE 0.5 (11)-1
KIT ORAL
Qty: 53 TABLET | Refills: 0 | Status: SHIPPED | OUTPATIENT
Start: 2024-03-21 | End: 2024-04-18

## 2024-03-21 NOTE — Clinical Note
ROGERIO - started Chantix. Have follow-up next month to see if she successfully quit smoking. Thanks for referral! Chandrakant

## 2024-03-21 NOTE — PROGRESS NOTES
Medication Therapy Management (MTM) Encounter    ASSESSMENT:                            Medication Adherence/Access: No issues identified    Tobacco Use Disorder: Patient continues to use tobacco but is ready to quit. She is currently smoking less than 1 cigarette per day. Has cut back use from 10 cigarettes daily without the help of any medications. Based on patient preferences and history, patient would benefit from Chantix, one week prior to quit date.     COPD/asthma/allergies: stable. ACT not at goal of 20 or greater but is improving as she reduces cigarette use. Will reevaluate next month after she has quit.    DVT: controlled on appropriate dose of Xarelto. Patient likely will continue extended phase treatment indefinitely due to history of both DVT and PE.    Neuropathy: controlled on current regimen    Type 2 Diabetes: Patient is meeting A1c goal of < 7%. Pt would benefit from continuing current regimen.     Microalbumin: up to date. Last microalbumin was at goal < 30 mg/g. Pt is on ACEI/ARB.  Aspirin: is not indicated due to current anticoag therapy   Statin: Patient is on moderate intensity statin which is indicated per 2019 ACC/AHA guidelines for lipid management due to the presence of diabetes. The 10-year ASCVD risk score (Francois DK, et al., 2019) is: 16.9%  Immunizations: Due for none - vaccines are up to date   Annual Eye Exam: due.     Hypertension: Stable. Patient is meeting blood pressure goal of < 140/90mmHg.    PLAN:                            1. Pick quit date - start Chantix one week prior.    2. Make sure to take Chantix with food and water to limit nausea/stomach upset    3. Due for diabetes eye and foot exam    Follow-up: Return in 4 weeks (on 4/18/2024) for Follow up, with me, using a video visit.    SUBJECTIVE/OBJECTIVE:                          Lima Bueno is a 53 year old female contacted via secure video for an initial visit. She was referred to me from Dr. Oscar Baltazar,  MD.      Reason for visit: smoking cessation.    Allergies/ADRs: Reviewed in chart  Past Medical History: Reviewed in chart  Tobacco: She reports that she has been smoking cigarettes. She has a 30 pack-year smoking history. She has never used smokeless tobacco.  Nicotine/Tobacco Cessation Plan:  working on this now  Alcohol: not currently using    Medication Adherence/Access: no issues reported    Tobacco Use Disorder:  No medications    Was previously smoking 10 cigarettes per day.   Now only smoking half of a cigarette per day  Has been cutting back on her own  Triggers for tobacco use include: stress  Tried quitting in the past: Yes.   What worked/didn't work in the past:  -Bupropion caused suicidal ideations  -nicotine replacement has helped but doesn't get her completely to quit  -Has never tried Chantix  Motivators for quitting: health, upcoming surgery and needs to have zero nicotine on blood test  First use within 30 minutes of waking up? No    COPD/asthma/allergies:   Advair /21 mcg - 2 puff(s) twice daily  Albuterol (ProAir/Ventolin/Proventil) inhaler as needed  Albuterol Nebs as needed  Fexofenadine 180 mg daily  Benzonatate 200 mg three times daily if needed   Guafenesin 1200 mg twice daily if needed    Patient rinses their mouth after using steroid inhaler.   Shortness of breath continues to improve with reduced cigarette use  Triggers include: Patient is unaware of triggers.  Patient reports the following symptoms: none.        4/10/2023     2:35 PM 12/22/2023     2:46 PM 2/19/2024    12:37 PM   ACT Total Scores   ACT TOTAL SCORE (Goal Greater than or Equal to 20) 16 16 19   In the past 12 months, how many times did you visit the emergency room for your asthma without being admitted to the hospital? 0 0 0   In the past 12 months, how many times were you hospitalized overnight because of your asthma? 0 0 0     DVT:  Xarelto 20 mg daily  Pt has history of DVT and PE in 2012   Has been on Xarelto  since then  Denies abnormal bleeding/bruising    Neuropathy:  Gabapentin 300 mg three times daily as needed  Helping with neuropathy  No concerns at this time  Diabetes   Type 2 Diabetes  Ozempic 2 mg weekly     Med history:  Metformin - stopped after Ozempic was started, per Bariatric team    Tolerating Ozempic well  Blood sugar monitoring: occasionally checks with glucometer, no readings to report today  Current diabetes symptoms: numbness/tingling       Eye exam in the last 12 months? No  Foot exam: due    Hypertension:   Lisinopril 10 mg daily   Patient denies dry cough or dizziness  Patient does not self-monitor blood pressure.      BP Readings from Last 3 Encounters:   02/19/24 126/83   01/19/24 131/87   01/17/24 (!) 132/96     Pulse Readings from Last 3 Encounters:   02/19/24 85   01/19/24 107   01/17/24 91       Lab Results   Component Value Date    A1C 6.4 (H) 01/19/2024    GLC 91 07/06/2023     07/06/2023    POTASSIUM 3.9 07/06/2023    DANIELLE 9.2 07/06/2023    CHLORIDE 104 07/06/2023    CO2 26 07/06/2023    BUN 10.8 07/06/2023    CR 0.89 07/06/2023    GFRESTIMATED 77 07/06/2023    CHOL 134 01/17/2023    LDL 72 01/17/2023    HDL 44 (L) 01/17/2023    TRIG 88 01/17/2023    B12 886 04/04/2014    UMALCR 9.68 01/19/2024    TSH 0.74 05/04/2022     ----------------  I spent 35 minutes with this patient today. All changes were made via collaborative practice agreement with Oscar Baltazar MD, MD. A copy of the visit note was provided to the patient's provider(s).    A summary of these recommendations was declined by the patient.    Chandrakant Toledo, PharmD, [facility]  Medication Therapy Management Provider  [phone]    Telemedicine Visit Details  Type of service:  Video Conference via Tutor  Start Time:  3:00 PM  End Time:  3:35 PM     Medication Therapy Recommendations  Tobacco use disorder    Rationale: Untreated condition - Needs additional medication therapy - Indication   Recommendation: Start  Medication - CHANTIX STARTING MONTH RYNE PO   Status: Accepted per CPA

## 2024-03-26 NOTE — PATIENT INSTRUCTIONS
Jose Amato, it was great talking with you today!     Recommendations from today's MTM visit:                                                      1. Pick a quit date and start Chantix one week prior. Follow the instructions on the starter pack to slowly increase the dose over the first week.    2. Make sure to take Chantix with food and water to limit nausea/stomach upset    3. Due for diabetes eye and foot exam    I value your experience and would be very grateful for your time with providing feedback on our clinic survey. You may receive a survey via email or text message in the next few days.     Next MTM visit: 4/18/24    To schedule another MTM appointment, please call the clinic directly or you may call the MTM scheduling line at [phone] or toll-free at [phone].     My Clinical Pharmacist's contact information:                                                      Please feel free to contact me with any questions or concerns you have.      Chandrakant Toledo, PharmD, BCACP  [facility]  Phone: [phone]

## 2024-04-01 ENCOUNTER — DOCUMENTATION ONLY (OUTPATIENT)
Dept: NEUROSURGERY | Facility: CLINIC | Age: 54
End: 2024-04-01
Payer: COMMERCIAL

## 2024-04-01 NOTE — TELEPHONE ENCOUNTER
Notification received from Kassi @ Dr. Reynaga's office that surgery date changed to 5/22/24.  Dr. Ashton's schedule updated with change.

## 2024-04-06 ENCOUNTER — LAB (OUTPATIENT)
Dept: LAB | Facility: CLINIC | Age: 54
End: 2024-04-06
Payer: COMMERCIAL

## 2024-04-06 DIAGNOSIS — Z01.818 PREOP TESTING: ICD-10-CM

## 2024-04-06 PROCEDURE — 36415 COLL VENOUS BLD VENIPUNCTURE: CPT

## 2024-04-06 PROCEDURE — G0480 DRUG TEST DEF 1-7 CLASSES: HCPCS | Mod: 90

## 2024-04-09 LAB
COTININE SERPL-MCNC: <5 NG/ML
NICOTINE SERPL-MCNC: <5 NG/ML

## 2024-04-11 PROBLEM — M54.16 LUMBAR RADICULOPATHY: Status: RESOLVED | Noted: 2024-01-26 | Resolved: 2024-04-11

## 2024-04-11 NOTE — PROGRESS NOTES
DISCHARGE  Reason for Discharge: Pt completed 3 sessions of PT. Pt reported follow-up visit with neurosurgeon following pt's last PT scheduled visit. Subsequent review of MR's reveals future lumbar surgery scheduled for May of 2024. No further PT planned.      Discharge Plan: Discharge.   02/08/24 0500   Appointment Info   Signing clinician's name / credentials Kassi Colesariel PT   Total/Authorized Visits 8   Visits Used 3   Medical Diagnosis Right lumbar radiculopathy; Spinal stenosis, unspecified spinal region   PT Tx Diagnosis chronic low back pain; R radiculopathy   Quick Adds Certification   Progress Note/Certification   Onset of illness/injury or Date of Surgery 12/12/23   Therapy Frequency 1x/week   Predicted Duration 8 weeks   Certification date from 01/26/24   Certification date to 03/22/24   PT Goal 1   Goal Identifier ADL's   Goal Description Perform ADL's with pain level <3/10   Rationale to maximize safety and independence with performance of ADLs and functional tasks;to maximize safety and independence with self cares   Goal Progress ongoing   Target Date 03/22/24   Subjective Report   Subjective Report Reported she underwent MRI on 2-7-24. Received phone call from neurosurgery following the MRI and advised follow-up with [facility] was being cancelled and re-scheduling with neurosurgeon.   Objective Measures   Objective Measures Objective Measure 1   Objective Measure 1   Objective Measure LROM   Treatment Interventions (PT)   Interventions Therapeutic Procedure/Exercise;Manual Therapy   Therapeutic Procedure/Exercise   Therapeutic Procedures: strength, endurance, ROM, flexibility minutes (83670) 30   Therapeutic Procedures Ther Proc 2   Ther Proc 1 DKC   Ther Proc 1 - Details Verbal review. Provided instruction in FISitting, all fours stretch or modified standing flexion-encouraged flexion based movements multiple times/day if sx relief obtained.   Ther Proc 2 KRISTAL/PHY of buttock/LE sx's   Ther Proc 2  - Details Continued review of (prior) MRI findings from 2000. Provided insight into possible causes of R LE sx's   Skilled Intervention Instructed in directional preference movement to minimize back/LE complaints.   Manual Therapy   Manual Therapy 1 Axial distraction   Manual Therapy 1 - Details Axial distraction in L sidelying, x3-4 minutes; relief of R LE sx's after ~1+ minutes. Performed x2. Trial of flexion/rotation (supine); relief obtained with R and L rotation but reported max relief with DKC.   Total Session Time   Timed Code Treatment Minutes 30   Total Treatment Time (sum of timed and untimed services) 30         Referring Provider:  Frances Anton

## 2024-04-12 DIAGNOSIS — M54.16 RIGHT LUMBAR RADICULOPATHY: ICD-10-CM

## 2024-04-12 DIAGNOSIS — M48.00 SPINAL STENOSIS, UNSPECIFIED SPINAL REGION: ICD-10-CM

## 2024-04-12 RX ORDER — GABAPENTIN 100 MG/1
300 CAPSULE ORAL 3 TIMES DAILY PRN
Qty: 90 CAPSULE | Refills: 3 | Status: SHIPPED | OUTPATIENT
Start: 2024-04-12 | End: 2024-08-06

## 2024-04-17 NOTE — PROGRESS NOTES
"Medication Therapy Management (MTM) Encounter    ASSESSMENT:                            Medication Adherence/Access: No issues identified    Tobacco Use Disorder: pt is tolerating Chantix with minimal side effects and is having benefit for tobacco cessation. Recommend continuing Chantix. Pt had a few occasions where she had a craving for cigarettes and didn't know what to use. Recommend adding nicotine gum to help in these instances.    PLAN:                            1. Continue Chantix 1 mg twice daily - ordered refills today    2. Ordered nicotine gum to use for cravings. Educated on proper use of nicotine gum.    Follow-up: Return in 3 weeks (on 5/9/2024) for Follow up, with me, using a video visit.    SUBJECTIVE/OBJECTIVE:                          Lima Ross is a 53 year old female contacted via secure video for a follow-up visit.     Reason for visit: tobacco cessation.    Allergies/ADRs: Reviewed in chart  Past Medical History: Reviewed in chart  Tobacco: She reports that she has not been smoking cigarettes. She has a 30 pack-year smoking history. She has never used smokeless tobacco.  Nicotine/Tobacco Cessation Plan: Chantix  Alcohol: not currently using     Medication Adherence/Access: no issues reported    Tobacco Use Disorder:  Chantix 1 mg twice daily     Med history:  -Bupropion caused suicidal ideations  -nicotine replacement has helped but doesn't get her completely to quit  -Has never tried Chantix     Is having weird dreams from Chantix but tolerating so far  Says it is helping a lot with cravings   She is not currently smoking cigarettes  Hasn't bought a pack of cigarettes in over a month  \"I have had a few puffs on occasion but I have not finished a cigarette since starting the Chantix\"  Triggers for tobacco use include: stress, anniversary of her son passing away  Tried quitting in the past: Yes. Has not been successful   Motivators for quitting: health, upcoming surgery and needs to " have zero nicotine on blood test for PA to be approved  First use within 30 minutes of waking up? No  ----------------  I spent 14 minutes with this patient today. All changes were made via collaborative practice agreement with Oscar Baltazar MD, MD. A copy of the visit note was provided to the patient's provider(s).    A summary of these recommendations was declined by the patient.    Chandrakant Toledo, PharmD, [facility]  Medication Therapy Management Provider  [phone]    Telemedicine Visit Details  Type of service:  Telephone visit  Start Time: 2:03 PM  End Time: 2:17 PM     Medication Therapy Recommendations  Tobacco use disorder    Current Medication: varenicline (CHANTIX CONTINUING MONTH RYNE) 1 MG tablet   Rationale: Synergistic therapy - Needs additional medication therapy - Indication   Recommendation: Start Medication - nicotine 2 MG gum   Status: Accepted per CPA

## 2024-04-18 ENCOUNTER — VIRTUAL VISIT (OUTPATIENT)
Dept: PHARMACY | Facility: CLINIC | Age: 54
End: 2024-04-18
Payer: COMMERCIAL

## 2024-04-18 DIAGNOSIS — F17.200 TOBACCO USE DISORDER: Primary | ICD-10-CM

## 2024-04-18 PROCEDURE — 99607 MTMS BY PHARM ADDL 15 MIN: CPT | Performed by: PHARMACIST

## 2024-04-18 PROCEDURE — 99606 MTMS BY PHARM EST 15 MIN: CPT | Performed by: PHARMACIST

## 2024-04-18 RX ORDER — VARENICLINE TARTRATE 1 MG/1
1 TABLET, FILM COATED ORAL 2 TIMES DAILY
Qty: 60 TABLET | Refills: 11 | Status: SHIPPED | OUTPATIENT
Start: 2024-04-18 | End: 2024-05-07

## 2024-04-18 NOTE — Clinical Note
ROGERIO Conti pt has been able to quit smoking with chantix except for a few breakthrough incidents. Ordered nicotine gum today for her to use in these scenarios. Have follow-up in 3 weeks to check in. Thanks! Chandrakant

## 2024-04-18 NOTE — PATIENT INSTRUCTIONS
"Jose Amato, it was great talking with you today!     Recommendations from today's MTM visit:                                                      1. Keep up the good work!!! Continue Chantix 1 mg twice daily - ordered refills today    2. Ordered nicotine gum to use for cravings as needed. Make sure to \"chew and park\" like we discussed when using this product. If you don't like the nicotine gum, you can also use plain hard candy or gum    I value your experience and would be very grateful for your time with providing feedback on our clinic survey. You may receive a survey via email or text message in the next few days.     Next MTM visit: 5/9/24    To schedule another MTM appointment, please call the clinic directly or you may call the MTM scheduling line at [phone] or toll-free at [phone].     My Clinical Pharmacist's contact information:                                                      Please feel free to contact me with any questions or concerns you have.      Chandrakant Toledo, [place], BCACP  [facility]  Phone: [phone]   "

## 2024-05-01 ENCOUNTER — NURSE TRIAGE (OUTPATIENT)
Dept: NURSING | Facility: CLINIC | Age: 54
End: 2024-05-01
Payer: COMMERCIAL

## 2024-05-01 DIAGNOSIS — R19.7 DIARRHEA, UNSPECIFIED TYPE: Primary | ICD-10-CM

## 2024-05-01 NOTE — TELEPHONE ENCOUNTER
Per huddle with doctor Barboza,ok to schedule pt at ADS tomorrow at 9:00 AM. Pt informed and is agreeable with plan.

## 2024-05-01 NOTE — TELEPHONE ENCOUNTER
Can we schedule her for ADS tomorrow in [place]?  We may be able to get stool studies done at that time and if she needs IV hydration we can provide this as well.    She can also discontinue Chantix at this time    Oscar Baltazar MD

## 2024-05-01 NOTE — TELEPHONE ENCOUNTER
"Nurse Triage SBAR    Is this a 2nd Level Triage? YES, LICENSED PRACTITIONER REVIEW IS REQUIRED    Situation: Prolonged Diarrhea     Background: Pt states she has had prolonged diarrhea for approximately one month after starting new medication (Chantix for smoking cessation) however symptoms worsened on Saturday to the point she is now afraid to go outside and is wearing a depends    Assessment: Denies abdominal pain or bloody/tarry stools. Complains of new incontinence and \"yellow water\" stools around 5 episodes a day. Denies signs of dehydration and reports drinking increased amounts of fluid and tolerating that well    Protocol Recommended Disposition:   See in Office Today    Recommendation: Routed to PCP care team to review, seeking recommendation of need to be seen versus home care, also questioning if she can trial discontinuing Chantix     Routed to provider    Does the patient meet one of the following criteria for ADS visit consideration? 16+ years old, with an [facility] PCP     TIP  Providers, please consider if this condition is appropriate for management at one of our Acute and Diagnostic Services sites.     If patient is a good candidate, please use dotphrase <dot>triageresponse and select Refer to ADS to document.      Reason for Disposition   MODERATE diarrhea (e.g., 4-6 times / day more than normal) and present > 48 hours (2 days)    Additional Information   Negative: Shock suspected (e.g., cold/pale/clammy skin, too weak to stand, low BP, rapid pulse)   Negative: Difficult to awaken or acting confused (e.g., disoriented, slurred speech)   Negative: Sounds like a life-threatening emergency to the triager   Negative: Vomiting also present and worse than the diarrhea   Negative: Blood in stool and without diarrhea   Negative: SEVERE abdominal pain (e.g., excruciating) and present > 1 hour   Negative: SEVERE abdominal pain and age > 60 years   Negative: Bloody, black, or tarry bowel movements  (Exception: " Chronic-unchanged black-grey bowel movements and is taking iron pills or Pepto-Bismol.)   Negative: SEVERE diarrhea (e.g., 7 or more times / day more than normal) and age > 60 years   Negative: Constant abdominal pain lasting > 2 hours   Negative: Drinking very little and dehydration suspected (e.g., no urine > 12 hours, very dry mouth, very lightheaded)   Negative: Patient sounds very sick or weak to the triager   Negative: SEVERE diarrhea (e.g., 7 or more times / day more than normal) and present > 24 hours (1 day)    Protocols used: Diarrhea-A-OH

## 2024-05-02 ENCOUNTER — OFFICE VISIT (OUTPATIENT)
Dept: PEDIATRICS | Facility: CLINIC | Age: 54
End: 2024-05-02
Payer: COMMERCIAL

## 2024-05-02 VITALS
DIASTOLIC BLOOD PRESSURE: 70 MMHG | TEMPERATURE: 96.7 F | WEIGHT: 223.5 LBS | HEIGHT: 65 IN | RESPIRATION RATE: 16 BRPM | HEART RATE: 96 BPM | BODY MASS INDEX: 37.24 KG/M2 | OXYGEN SATURATION: 97 % | SYSTOLIC BLOOD PRESSURE: 124 MMHG

## 2024-05-02 DIAGNOSIS — R19.7 DIARRHEA, UNSPECIFIED TYPE: Primary | ICD-10-CM

## 2024-05-02 LAB
ALBUMIN SERPL BCG-MCNC: 4.2 G/DL (ref 3.5–5.2)
ALP SERPL-CCNC: 122 U/L (ref 40–150)
ALT SERPL W P-5'-P-CCNC: 28 U/L (ref 0–50)
ANION GAP SERPL CALCULATED.3IONS-SCNC: 16 MMOL/L (ref 7–15)
AST SERPL W P-5'-P-CCNC: 20 U/L (ref 0–45)
BILIRUB SERPL-MCNC: 0.7 MG/DL
BUN SERPL-MCNC: 14.4 MG/DL (ref 6–20)
CALCIUM SERPL-MCNC: 9.3 MG/DL (ref 8.6–10)
CHLORIDE SERPL-SCNC: 104 MMOL/L (ref 98–107)
CREAT SERPL-MCNC: 1.22 MG/DL (ref 0.51–0.95)
DEPRECATED HCO3 PLAS-SCNC: 20 MMOL/L (ref 22–29)
EGFRCR SERPLBLD CKD-EPI 2021: 52 ML/MIN/1.73M2
ERYTHROCYTE [DISTWIDTH] IN BLOOD BY AUTOMATED COUNT: 14.9 % (ref 10–15)
GLUCOSE SERPL-MCNC: 108 MG/DL (ref 70–99)
HCT VFR BLD AUTO: 46.3 % (ref 35–47)
HGB BLD-MCNC: 14.7 G/DL (ref 11.7–15.7)
MCH RBC QN AUTO: 27.2 PG (ref 26.5–33)
MCHC RBC AUTO-ENTMCNC: 31.7 G/DL (ref 31.5–36.5)
MCV RBC AUTO: 86 FL (ref 78–100)
PLATELET # BLD AUTO: 241 10E3/UL (ref 150–450)
POTASSIUM SERPL-SCNC: 3.8 MMOL/L (ref 3.4–5.3)
PROT SERPL-MCNC: 7.3 G/DL (ref 6.4–8.3)
RBC # BLD AUTO: 5.41 10E6/UL (ref 3.8–5.2)
SODIUM SERPL-SCNC: 140 MMOL/L (ref 135–145)
WBC # BLD AUTO: 6 10E3/UL (ref 4–11)

## 2024-05-02 PROCEDURE — 80053 COMPREHEN METABOLIC PANEL: CPT | Performed by: INTERNAL MEDICINE

## 2024-05-02 PROCEDURE — 36415 COLL VENOUS BLD VENIPUNCTURE: CPT | Performed by: INTERNAL MEDICINE

## 2024-05-02 PROCEDURE — 99213 OFFICE O/P EST LOW 20 MIN: CPT | Performed by: INTERNAL MEDICINE

## 2024-05-02 PROCEDURE — 85027 COMPLETE CBC AUTOMATED: CPT | Performed by: INTERNAL MEDICINE

## 2024-05-02 NOTE — PROGRESS NOTES
"Acute and Diagnostic Services Clinic Visit    Assessment & Plan     Symptoms started with Chantix.  Accelerated with increased dose of Chantix.  Would make no sense that this is related to the Chantix.  Labs are reviewed.  Her GFR is slightly depressed.  Could be from some volume depletion.    Can be followed up in a routine basis.    She is instructed to stop Chantix completely.  To observe a brat diet and no dairy until diarrhea resolves.    Turn precautions discussed    Diarrhea, unspecified type    - CBC with platelets  - Comprehensive metabolic panel  - CBC with platelets  - Comprehensive metabolic panel        BMI  Estimated body mass index is 37.19 kg/m  as calculated from the following:    Height as of this encounter: 1.651 m (5' 5\").    Weight as of this encounter: 101.4 kg (223 lb 8 oz).         No follow-ups on file.    Marisol Amato is a 53 year old, presenting for the following health issues:  Diarrhea    HPI     Pt with several days watery diarrhea.  Some nausea also.    She recalls having loose stools ever since starting chantix.      Has had 4 loose stools already this morning.  No blood in stool.    No emesis.      Loose stools worsened with increased chantix dose.    No sick contacts.      Diarrhea  Onset/Duration: 5 days  Description:       Consistency of stool: watery and yellow       Blood in stool: No       Number of loose stools past 24 hours: \"too many to count\"  Progression of Symptoms: worsening  Accompanying signs and symptoms:       Fever: No       Nausea/Vomiting: YES- nausea present, but no vomiting       Abdominal pain: No       Weight loss: No       Episodes of constipation: No  History   Ill contacts: No  Recent use of antibiotics: No  Recent travels: No  Recent medication-new or changes(Rx or OTC): YES- started taking Chantix about 1 month ago  Precipitating or alleviating factors: None  Therapies tried and outcome: Imodium AD          Objective    /70 (BP Location: " "Left arm, Patient Position: Sitting, Cuff Size: Adult Large)   Pulse 96   Temp (!) 96.7  F (35.9  C)   Resp 16   Ht 1.651 m (5' 5\")   Wt 101.4 kg (223 lb 8 oz)   SpO2 97%   BMI 37.19 kg/m    Body mass index is 37.19 kg/m .  Physical Exam   GEN NAD  ENT MMM  CV RRR  ABD obese +BS no rebound or guarding.              Signed Electronically by: Rob Barboza MD    "

## 2024-05-07 ENCOUNTER — OFFICE VISIT (OUTPATIENT)
Dept: FAMILY MEDICINE | Facility: CLINIC | Age: 54
End: 2024-05-07
Payer: COMMERCIAL

## 2024-05-07 VITALS
RESPIRATION RATE: 15 BRPM | BODY MASS INDEX: 36.8 KG/M2 | SYSTOLIC BLOOD PRESSURE: 133 MMHG | HEART RATE: 81 BPM | DIASTOLIC BLOOD PRESSURE: 83 MMHG | TEMPERATURE: 96.9 F | WEIGHT: 220.9 LBS | HEIGHT: 65 IN | OXYGEN SATURATION: 98 %

## 2024-05-07 DIAGNOSIS — I82.4Y1 DEEP VEIN THROMBOSIS (DVT) OF PROXIMAL VEIN OF RIGHT LOWER EXTREMITY, UNSPECIFIED CHRONICITY (H): ICD-10-CM

## 2024-05-07 DIAGNOSIS — F17.200 TOBACCO USE DISORDER: ICD-10-CM

## 2024-05-07 DIAGNOSIS — E78.5 HYPERLIPIDEMIA LDL GOAL <100: ICD-10-CM

## 2024-05-07 DIAGNOSIS — E11.9 TYPE 2 DIABETES MELLITUS WITHOUT COMPLICATION, WITHOUT LONG-TERM CURRENT USE OF INSULIN (H): ICD-10-CM

## 2024-05-07 DIAGNOSIS — J45.909 PERSISTENT ASTHMA WITHOUT COMPLICATION, UNSPECIFIED ASTHMA SEVERITY: ICD-10-CM

## 2024-05-07 DIAGNOSIS — I10 ESSENTIAL HYPERTENSION, BENIGN: ICD-10-CM

## 2024-05-07 DIAGNOSIS — M48.062 SPINAL STENOSIS, LUMBAR REGION, WITH NEUROGENIC CLAUDICATION: ICD-10-CM

## 2024-05-07 DIAGNOSIS — Z01.818 PREOP GENERAL PHYSICAL EXAM: Primary | ICD-10-CM

## 2024-05-07 LAB
ANION GAP SERPL CALCULATED.3IONS-SCNC: 10 MMOL/L (ref 7–15)
BUN SERPL-MCNC: 10.8 MG/DL (ref 6–20)
CALCIUM SERPL-MCNC: 9.3 MG/DL (ref 8.6–10)
CHLORIDE SERPL-SCNC: 103 MMOL/L (ref 98–107)
CREAT SERPL-MCNC: 0.94 MG/DL (ref 0.51–0.95)
DEPRECATED HCO3 PLAS-SCNC: 28 MMOL/L (ref 22–29)
EGFRCR SERPLBLD CKD-EPI 2021: 72 ML/MIN/1.73M2
ERYTHROCYTE [DISTWIDTH] IN BLOOD BY AUTOMATED COUNT: 14.6 % (ref 10–15)
GLUCOSE SERPL-MCNC: 98 MG/DL (ref 70–99)
HBA1C MFR BLD: 6.1 % (ref 0–5.6)
HCT VFR BLD AUTO: 42.6 % (ref 35–47)
HGB BLD-MCNC: 13.8 G/DL (ref 11.7–15.7)
MCH RBC QN AUTO: 27.3 PG (ref 26.5–33)
MCHC RBC AUTO-ENTMCNC: 32.4 G/DL (ref 31.5–36.5)
MCV RBC AUTO: 84 FL (ref 78–100)
PLATELET # BLD AUTO: 248 10E3/UL (ref 150–450)
POTASSIUM SERPL-SCNC: 3.4 MMOL/L (ref 3.4–5.3)
RBC # BLD AUTO: 5.05 10E6/UL (ref 3.8–5.2)
SODIUM SERPL-SCNC: 141 MMOL/L (ref 135–145)
WBC # BLD AUTO: 6 10E3/UL (ref 4–11)

## 2024-05-07 PROCEDURE — 80048 BASIC METABOLIC PNL TOTAL CA: CPT | Performed by: INTERNAL MEDICINE

## 2024-05-07 PROCEDURE — 83036 HEMOGLOBIN GLYCOSYLATED A1C: CPT | Performed by: INTERNAL MEDICINE

## 2024-05-07 PROCEDURE — 99214 OFFICE O/P EST MOD 30 MIN: CPT | Performed by: INTERNAL MEDICINE

## 2024-05-07 PROCEDURE — 36415 COLL VENOUS BLD VENIPUNCTURE: CPT | Performed by: INTERNAL MEDICINE

## 2024-05-07 PROCEDURE — 85027 COMPLETE CBC AUTOMATED: CPT | Performed by: INTERNAL MEDICINE

## 2024-05-07 PROCEDURE — 93000 ELECTROCARDIOGRAM COMPLETE: CPT | Performed by: INTERNAL MEDICINE

## 2024-05-07 ASSESSMENT — PAIN SCALES - GENERAL: PAINLEVEL: NO PAIN (0)

## 2024-05-07 NOTE — PROGRESS NOTES
Preoperative Evaluation  [facility]  [address], [address]  [place], [place] [address]  Phone: [phone]  Primary Provider: Dillan Baltazar  Pre-op Performing Provider: DILLAN BALTAZAR  May 7, 2024     Lima is a 53 year old, presenting for the following:  Pre-Op Exam      Surgical Information  Surgery/Procedure: Lumbar 5 to Sacral 1 Anterior Lumbar Interbody Fusion   Surgery Location: [facility]   Surgeon: Dr Reynaga   Surgery Date: 5/22/2024  Time of Surgery: 10:10 am   Where patient plans to recover: At home with family  Fax number for surgical facility: Note does not need to be faxed, will be available electronically in Epic.      Subjective       HPI related to upcoming procedure: Spinal stenosis of lumbar spine        5/7/2024     8:55 AM   Preop Questions   1. Have you ever had a heart attack or stroke? No   2. Have you ever had surgery on your heart or blood vessels, such as a stent placement, a coronary artery bypass, or surgery on an artery in your head, neck, heart, or legs? No   3. Do you have chest pain with activity? No   4. Do you have a history of  heart failure? No   5. Do you currently have a cold, bronchitis or symptoms of other infection? No   6. Do you have a cough, shortness of breath, or wheezing? No   7. Do you or anyone in your family have previous history of blood clots? YES - history of deep vein thrombosis    8. Do you or does anyone in your family have a serious bleeding problem such as prolonged bleeding following surgeries or cuts? UNKNOWN - When taking Xarelto   9. Have you ever had problems with anemia or been told to take iron pills? No   10. Have you had any abnormal blood loss such as black, tarry or bloody stools, or abnormal vaginal bleeding? No   11. Have you ever had a blood transfusion? No   12. Are you willing to have a blood transfusion if it is medically needed before, during, or after your surgery? Yes   13. Have you or  any of your relatives ever had problems with anesthesia? No   14. Do you have sleep apnea, excessive snoring or daytime drowsiness? No   15. Do you have any artifical heart valves or other implanted medical devices like a pacemaker, defibrillator, or continuous glucose monitor? No   16. Do you have artificial joints? No   17. Are you allergic to latex? No   18. Is there any chance that you may be pregnant? No       Health Care Directive  Patient does not have a Health Care Directive or Living Will: Discussed advance care planning with patient; however, patient declined at this time.    Preoperative Review of    reviewed - no record of controlled substances prescribed.      Status of Chronic Conditions:  See problem list for active medical problems.  Problems all longstanding and stable, except as noted/documented.  See ROS for pertinent symptoms related to these conditions.    Patient Active Problem List    Diagnosis Date Noted    Acute midline low back pain with right-sided sciatica 01/10/2024     Priority: Medium    Cyst of pancreas 08/30/2022     Priority: Medium    Essential hypertension, benign 07/28/2022     Priority: Medium    Morbid obesity (H) 05/04/2022     Priority: Medium     MWL Initial MKD 220lb BMI 36 Ozempic Start, Bariatric surgery Candidate      Type 2 diabetes mellitus without complication, without long-term current use of insulin (H) 12/17/2021     Priority: Medium    Hyperlipidemia LDL goal <100 12/17/2021     Priority: Medium    Long-term (current) use of anticoagulants [Z79.01] 08/22/2016     Priority: Medium    Chronic, continuous use of opioids 07/27/2016     Priority: Medium     Patient is followed by Oscar Baltazar MD, MD for ongoing prescription of pain medication.  All refills should only be approved by this provider, or covering partner.    Medication(s): Tramadol   Maximum quantity per month: PCP to please specify   Clinic visit frequency required: Q 3 months       Controlled substance agreement:  Encounter-Level CSA - 07/27/2018:    Controlled Substance Agreement - Scan on 8/1/2018  8:25 AM: CONTROLLED SUBSTANCE AGREEMENT (below)         Encounter-Level CSA - 09/27/2016:    Controlled Substance Agreement - Scan on 10/5/2016  1:07 PM: CONTROLLED SUBSTANCE AGREEMENT (below)         Encounter-Level CSA - 09/02/2015:    Controlled Substance Agreement - Scan on 9/2/2015  3:58 PM: Controlled Substrance Agreement 9/2/15 (below)         Patient-Level CSA:    There are no patient-level csa.       Pain Clinic evaluation in the past: No    DIRE Total Score(s):  No flowsheet data found.    [facility] website verification:  done on 8/27/19 [place]    [email or web address]        Spinal stenosis, lumbar region, with neurogenic claudication 03/04/2015     Priority: Medium    Papanicolaou smear of cervix with low grade squamous intraepithelial lesion (LGSIL) 02/19/2014     Priority: Medium     2002 NIL pap  2004 NIL pap  02/19/14 Pap= LSIL, Neg high risk HPV. Plan for colp per PCP.- COLP not completed  [gap in pap follow up/records]   10/21/16 Pap= NIL, Neg HPV, Endometrial cells present that do correlate with LMP. Referred to see ObGyn due to no follow up on prior abnormal.no c  12/29/16 Consult with Gyn, no colp or EMB, repeat pap in 1 yr NOT at time of menses. Due 10/2017  [gap in pap follow up/records]   4/5/19 LSIL pap, Neg HPV.  Plan: cotest in 1 year.   3/3/21  Lost to follow-up for pap tracking   9/5/23 NIL pap, neg HR HPV. Plan 3 year cotest      Tobacco use disorder 02/04/2014     Priority: Medium    Protein S deficiency (H24) 03/12/2013     Priority: Medium     See note of  dated 2/19/2013      CARDIOVASCULAR SCREENING; LDL GOAL LESS THAN 160 02/27/2013     Priority: Medium    Breast mass 11/01/2012     Priority: Medium     Patient yet to have biopsy as of 11/1/2012       Deep vein thrombosis (DVT) of lower extremity (H)      Priority: Medium     right leg       Pulmonary embolism (H)      Priority: Medium    Asthma      Priority: Medium    ADD (attention deficit disorder with hyperactivity)      Priority: Medium     Problem list name updated by automated process. Provider to review and confirm        Past Medical History:   Diagnosis Date    ADD (attention deficit disorder with hyperactivity)     Asthma     Breast mass 11/1/2012    Patient yet to have biopsy as of 11/1/2012      DVT (deep venous thrombosis) (H) 10/12    right leg    LSIL (low grade squamous intraepithelial lesion) on Pap smear 02/19/14    Neg high risk HPV    Pulmonary embolism (H) 2012    Saw Dr Toledo, protein S deficiency     Past Surgical History:   Procedure Laterality Date    ANKLE SURGERY Right 01/2019    COLONOSCOPY N/A 12/10/2020    Procedure: COLONOSCOPY, WITH POLYPECTOMY AND BIOPSY;  Surgeon: Osorio uDnn MD;  Location: [facility]     Current Outpatient Medications   Medication Sig Dispense Refill    albuterol (PROAIR HFA/PROVENTIL HFA/VENTOLIN HFA) 108 (90 Base) MCG/ACT inhaler Inhale 2 puffs into the lungs every 6 hours as needed for shortness of breath, wheezing or cough 18 g 11    albuterol (PROVENTIL) (2.5 MG/3ML) 0.083% neb solution Take 1 vial (2.5 mg) by nebulization every 6 hours as needed for shortness of breath or wheezing 120 mL 3    atorvastatin (LIPITOR) 20 MG tablet Take 1 tablet (20 mg) by mouth daily 90 tablet 3    benzonatate (TESSALON) 200 MG capsule Take 1 capsule (200 mg) by mouth 3 times daily as needed for cough 30 capsule 11    blood glucose (NO BRAND SPECIFIED) test strip 1 Box of Test Strips for patients meter - test daily 100 strip 3    famotidine (PEPCID) 20 MG tablet Take 1 tablet (20 mg) by mouth daily 90 tablet 3    fexofenadine (ALLEGRA) 180 MG tablet TAKE ONE TABLET BY MOUTH ONCE DAILY 30 tablet 7    fluticasone-salmeterol (ADVAIR HFA) 230-21 MCG/ACT inhaler Inhale 2 puffs into the lungs 2 times daily 12 g 11    gabapentin (NEURONTIN) 100 MG capsule TAKE 3  CAPSULES (300 MG) BY MOUTH 3 TIMES DAILY AS NEEDED FOR NEUROPATHIC PAIN 90 capsule 3    guaiFENesin (MUCINEX) 600 MG 12 hr tablet Take 2 tablets (1,200 mg) by mouth 2 times daily 120 tablet 3    ketotifen (ZADITOR) 0.025 % ophthalmic solution Place 1 drop into both eyes 2 times daily 1 drop in affected eye(s) 2 times a day 5 mL 1    Lancets 30G MISC 120 30 gauge lancets (substitute as needed) - test daily 120 each 3    lisinopril (ZESTRIL) 10 MG tablet Take 1 tablet (10 mg) by mouth daily 90 tablet 3    LORazepam (ATIVAN) 0.5 MG tablet Take 0.5 mg 1 hour prior to MRI 2 tablet 0    mupirocin (BACTROBAN) 2 % external ointment Apply topically 3 times daily 30 g 3    nicotine (NICORETTE) 2 MG gum Chew 1 piece every 2-4 hours as needed. Spearmint flavor if you have it. 200 each 4    rivaroxaban ANTICOAGULANT (XARELTO) 20 MG TABS tablet Take 1 tablet (20 mg) by mouth daily (with dinner) 90 tablet 3    Semaglutide, 2 MG/DOSE, (OZEMPIC, 2 MG/DOSE,) 8 MG/3ML pen Inject 2 mg Subcutaneous once a week 24 mL 1    traZODone (DESYREL) 150 MG tablet Take 2 tablets (300 mg) by mouth At Bedtime 180 tablet 3    varenicline (CHANTIX CONTINUING MONTH RYNE) 1 MG tablet Take 1 tablet (1 mg) by mouth 2 times daily 60 tablet 11       Allergies   Allergen Reactions    Phosphoric Acid Hives    Terbutaline Hives    Trazodone      Other reaction(s): Other - Describe In Comment Field  Facial nerve pain  Other reaction(s): Other - Describe In Comment Field  Facial nerve pain    Wellbutrin [Bupropion]      Smoking cessation -- told never to take again ,        Social History     Tobacco Use    Smoking status: Former     Current packs/day: 0.00     Average packs/day: 1 pack/day for 30.0 years (30.0 ttl pk-yrs)     Types: Cigarettes     Quit date: 3/25/2024     Years since quittin.1    Smokeless tobacco: Never   Substance Use Topics    Alcohol use: No     Alcohol/week: 0.0 standard drinks of alcohol     Family History   Problem Relation Age of  "Onset    Thyroid Disease Mother         hyperactive    Schizophrenia Father     Hypertension Brother     Ovarian Cancer Paternal Grandmother     Breast Cancer No family hx of      History   Drug Use No         Persistent asthma without complication, unspecified asthma severity   She is doint wellw current use of Advair and albuterol as needed   Type 2 diabetes mellitus without complication, without long-term current use of insulin (H)   Managing with semaglutide and has had success with both weight loss and blood glucose control  Hyperlipidemia LDL goal <100   Taking atorvastatin   Essential hypertension, benign   Blood pressure has been controlled with lower dose of lisinoprli  Deep vein thrombosis (DVT) of proximal vein of right lower extremity, unspecified chronicity (H)   Has continue on Rivaroxaban with success.        Review of Systems    Review of Systems  Constitutional, HEENT, cardiovascular, pulmonary, GI, , musculoskeletal, neuro, skin, endocrine and psych systems are negative, except as otherwise noted.    Objective    /83 (BP Location: Left arm, Patient Position: Sitting, Cuff Size: Adult Large)   Pulse 81   Temp 96.9  F (36.1  C) (Tympanic)   Resp 15   Ht 1.651 m (5' 5\")   Wt 100.2 kg (220 lb 14.4 oz)   SpO2 98%   BMI 36.76 kg/m     Estimated body mass index is 37.19 kg/m  as calculated from the following:    Height as of 5/2/24: 1.651 m (5' 5\").    Weight as of 5/2/24: 101.4 kg (223 lb 8 oz).  Physical Exam  GENERAL: alert and no distress  EYES: Eyes grossly normal to inspection,   HENT: ear canals and TM's normal,   NECK: no adenopathy, no asymmetry, masses, or scars  RESP: lungs clear to auscultation - no rales, rhonchi or wheezes  CV: regular rate and rhythm, normal S1 S2, no S3 or S4, no murmur,   ABDOMEN: obese, soft, nontender, no hepatosplenomegaly   MS: no gross musculoskeletal defects noted, no edema  SKIN: no suspicious lesions or rashes  NEURO: Normal strength and tone, "   PSYCH: mentation appears normal, affect normal/bright    Recent Labs   Lab Test 05/02/24  1103 01/19/24  1524 10/04/23  1720 07/06/23  1656   HGB 14.7  --   --  14.0     --   --  233     --   --  141   POTASSIUM 3.8  --   --  3.9   CR 1.22*  --   --  0.89   A1C  --  6.4* 6.7*  --         Diagnostics  Results for orders placed or performed in visit on 05/07/24   Hemoglobin A1c     Status: Abnormal   Result Value Ref Range    Hemoglobin A1C 6.1 (H) 0.0 - 5.6 %   Basic metabolic panel  (Ca, Cl, CO2, Creat, Gluc, K, Na, BUN)     Status: Normal   Result Value Ref Range    Sodium 141 135 - 145 mmol/L    Potassium 3.4 3.4 - 5.3 mmol/L    Chloride 103 98 - 107 mmol/L    Carbon Dioxide (CO2) 28 22 - 29 mmol/L    Anion Gap 10 7 - 15 mmol/L    Urea Nitrogen 10.8 6.0 - 20.0 mg/dL    Creatinine 0.94 0.51 - 0.95 mg/dL    GFR Estimate 72 >60 mL/min/1.73m2    Calcium 9.3 8.6 - 10.0 mg/dL    Glucose 98 70 - 99 mg/dL   CBC with platelets     Status: Normal   Result Value Ref Range    WBC Count 6.0 4.0 - 11.0 10e3/uL    RBC Count 5.05 3.80 - 5.20 10e6/uL    Hemoglobin 13.8 11.7 - 15.7 g/dL    Hematocrit 42.6 35.0 - 47.0 %    MCV 84 78 - 100 fL    MCH 27.3 26.5 - 33.0 pg    MCHC 32.4 31.5 - 36.5 g/dL    RDW 14.6 10.0 - 15.0 %    Platelet Count 248 150 - 450 10e3/uL        EKG: appears normal, NSR, normal axis, normal intervals, no acute ST/T changes c/w ischemia, no LVH by voltage criteria, unchanged from previous tracings    Revised Cardiac Risk Index (RCRI)  The patient has the following serious cardiovascular risks for perioperative complications:   - No serious cardiac risks = 0 points     RCRI Interpretation: 0 points: Class I (very low risk - 0.4% complication rate)    Patient Instructions   (Z01.818) Preop general physical exam  (primary encounter diagnosis)  Comment: You are medically optimized for your upcoming surgery.  EKG and labs pending today.  Continue on all of your inhalers.  Hold lisinopril on the night  prior to surgery.  Hold ozempic the week prior to the surgery.  Hold Xarelto as per High Bleeding Risk guidelines.  Resume after surgery dependent on surgical recommendations.   Timing for interruption of a direct oral anticoagulant (DOAC) before and after elective surgery      Plan: CBC with platelets, Basic metabolic panel  (Ca,        Cl, CO2, Creat, Gluc, K, Na, BUN), EKG 12-lead         complete w/read - Clinics            (M48.062) Spinal stenosis, lumbar region, with neurogenic claudication  Comment: Plan for surgery  Plan:     (J45.909) Persistent asthma without complication, unspecified asthma severity  Comment: Continue all current inhalers  Plan:     (E11.9) Type 2 diabetes mellitus without complication, without long-term current use of insulin (H)  Comment: Check labs today and continue Ozempic  Plan: CBC with platelets, Basic metabolic panel  (Ca,        Cl, CO2, Creat, Gluc, K, Na, BUN), Hemoglobin         A1c            (E78.5) Hyperlipidemia LDL goal <100  Comment: continue statin   Plan:     (I10) Essential hypertension, benign  Comment: blood pressure is stable.  Hold lisinopril as above   Plan:     (I82.4Y1) Deep vein thrombosis (DVT) of proximal vein of right lower extremity, unspecified chronicity (H)  Comment: as above - follow high bleeding risk guidelines  Plan:     (F17.200) Tobacco use disorder  Comment: Doing great.  Chantix discontinued  Plan:            Signed Electronically by: Oscar Baltazar MD, MD  Copy of this evaluation report is provided to requesting physician.

## 2024-05-07 NOTE — PATIENT INSTRUCTIONS
(Z01.818) Preop general physical exam  (primary encounter diagnosis)  Comment: You are medically optimized for your upcoming surgery.  EKG and labs pending today.  Continue on all of your inhalers.  Hold lisinopril on the night prior to surgery.  Hold ozempic the week prior to the surgery.  Hold Xarelto as per High Bleeding Risk guidelines.  Resume after surgery dependent on surgical recommendations.   Timing for interruption of a direct oral anticoagulant (DOAC) before and after elective surgery      Plan: CBC with platelets, Basic metabolic panel  (Ca,        Cl, CO2, Creat, Gluc, K, Na, BUN), EKG 12-lead         complete w/read - Clinics            (M48.062) Spinal stenosis, lumbar region, with neurogenic claudication  Comment: Plan for surgery  Plan:     (J45.909) Persistent asthma without complication, unspecified asthma severity  Comment: Continue all current inhalers  Plan:     (E11.9) Type 2 diabetes mellitus without complication, without long-term current use of insulin (H)  Comment: Check labs today and continue Ozempic  Plan: CBC with platelets, Basic metabolic panel  (Ca,        Cl, CO2, Creat, Gluc, K, Na, BUN), Hemoglobin         A1c            (E78.5) Hyperlipidemia LDL goal <100  Comment: continue statin   Plan:     (I10) Essential hypertension, benign  Comment: blood pressure is stable.  Hold lisinopril as above   Plan:     (I82.4Y1) Deep vein thrombosis (DVT) of proximal vein of right lower extremity, unspecified chronicity (H)  Comment: as above - follow high bleeding risk guidelines  Plan:     (F17.200) Tobacco use disorder  Comment: Doing great.  Chantix discontinued  Plan:

## 2024-05-09 ENCOUNTER — TELEPHONE (OUTPATIENT)
Dept: NEUROSURGERY | Facility: CLINIC | Age: 54
End: 2024-05-09
Payer: COMMERCIAL

## 2024-05-09 NOTE — RESULT ENCOUNTER NOTE
Jose Amato,    I have had the opportunity to review your recent results and an interpretation is as follows:  Your hemoglobin A1c shows much improved average blood glucose  Your complete blood counts shows stable hemoglobin and white blood cell count  Your basic metabolic panel also shows improved renal function and electrolytes      Sincerely,  Oscar Baltazar MD

## 2024-05-13 ENCOUNTER — TELEPHONE (OUTPATIENT)
Dept: NEUROSURGERY | Facility: CLINIC | Age: 54
End: 2024-05-13
Payer: COMMERCIAL

## 2024-05-13 NOTE — TELEPHONE ENCOUNTER
Emailed pre op education and packet contents to patient.   Confirmed medication holds as recommended by Provider.

## 2024-05-13 NOTE — TELEPHONE ENCOUNTER
Please try contacting patient again regarding pre-op information. Leave call back # if cannot reach. DOS: 5/21.

## 2024-05-15 ENCOUNTER — ANCILLARY PROCEDURE (OUTPATIENT)
Dept: CT IMAGING | Facility: CLINIC | Age: 54
End: 2024-05-15
Attending: NEUROLOGICAL SURGERY
Payer: COMMERCIAL

## 2024-05-15 DIAGNOSIS — Z01.818 PREOP TESTING: ICD-10-CM

## 2024-05-15 DIAGNOSIS — M48.062 SPINAL STENOSIS, LUMBAR REGION, WITH NEUROGENIC CLAUDICATION: ICD-10-CM

## 2024-05-15 PROCEDURE — 72131 CT LUMBAR SPINE W/O DYE: CPT

## 2024-05-21 ENCOUNTER — ANESTHESIA EVENT (OUTPATIENT)
Dept: SURGERY | Facility: CLINIC | Age: 54
DRG: 454 | End: 2024-05-21
Payer: COMMERCIAL

## 2024-05-22 ENCOUNTER — HOSPITAL ENCOUNTER (INPATIENT)
Facility: CLINIC | Age: 54
LOS: 3 days | Discharge: HOME HEALTH | DRG: 454 | End: 2024-05-25
Attending: NEUROLOGICAL SURGERY | Admitting: NEUROLOGICAL SURGERY
Payer: COMMERCIAL

## 2024-05-22 ENCOUNTER — ANESTHESIA (OUTPATIENT)
Dept: SURGERY | Facility: CLINIC | Age: 54
DRG: 454 | End: 2024-05-22
Payer: COMMERCIAL

## 2024-05-22 ENCOUNTER — APPOINTMENT (OUTPATIENT)
Dept: GENERAL RADIOLOGY | Facility: CLINIC | Age: 54
DRG: 454 | End: 2024-05-22
Attending: NEUROLOGICAL SURGERY
Payer: COMMERCIAL

## 2024-05-22 ENCOUNTER — APPOINTMENT (OUTPATIENT)
Dept: SURGERY | Facility: PHYSICIAN GROUP | Age: 54
End: 2024-05-22
Payer: COMMERCIAL

## 2024-05-22 DIAGNOSIS — Z98.1 S/P LUMBAR SPINAL FUSION: Primary | ICD-10-CM

## 2024-05-22 DIAGNOSIS — I82.4Y1 DEEP VEIN THROMBOSIS (DVT) OF PROXIMAL VEIN OF RIGHT LOWER EXTREMITY, UNSPECIFIED CHRONICITY (H): ICD-10-CM

## 2024-05-22 DIAGNOSIS — I26.99 OTHER PULMONARY EMBOLISM WITHOUT ACUTE COR PULMONALE, UNSPECIFIED CHRONICITY (H): ICD-10-CM

## 2024-05-22 DIAGNOSIS — M54.41 ACUTE MIDLINE LOW BACK PAIN WITH RIGHT-SIDED SCIATICA: ICD-10-CM

## 2024-05-22 LAB
ABO/RH(D): NORMAL
ANTIBODY SCREEN: NEGATIVE
GLUCOSE BLDC GLUCOMTR-MCNC: 193 MG/DL (ref 70–99)
GLUCOSE SERPL-MCNC: 172 MG/DL (ref 70–99)
POTASSIUM SERPL-SCNC: 3.7 MMOL/L (ref 3.4–5.3)
SPECIMEN EXPIRATION DATE: NORMAL

## 2024-05-22 PROCEDURE — 63047 LAM FACETEC & FORAMOT LUMBAR: CPT | Mod: 51 | Performed by: NEUROLOGICAL SURGERY

## 2024-05-22 PROCEDURE — 250N000011 HC RX IP 250 OP 636: Performed by: NURSE PRACTITIONER

## 2024-05-22 PROCEDURE — 250N000011 HC RX IP 250 OP 636: Performed by: NEUROLOGICAL SURGERY

## 2024-05-22 PROCEDURE — 0SG3071 FUSION OF LUMBOSACRAL JOINT WITH AUTOLOGOUS TISSUE SUBSTITUTE, POSTERIOR APPROACH, POSTERIOR COLUMN, OPEN APPROACH: ICD-10-PCS | Performed by: NEUROLOGICAL SURGERY

## 2024-05-22 PROCEDURE — 999N000179 XR SURGERY CARM FLUORO LESS THAN 5 MIN W STILLS

## 2024-05-22 PROCEDURE — 272N000002 HC OR SUPPLY OTHER OPNP: Performed by: NEUROLOGICAL SURGERY

## 2024-05-22 PROCEDURE — 22558 ARTHRD ANT NTRBD MIN DSC LUM: CPT | Mod: 62 | Performed by: NEUROLOGICAL SURGERY

## 2024-05-22 PROCEDURE — 360N000087 HC SURGERY LEVEL 7 W/ FLUORO, PER MIN: Performed by: NEUROLOGICAL SURGERY

## 2024-05-22 PROCEDURE — 22840 INSERT SPINE FIXATION DEVICE: CPT | Performed by: NEUROLOGICAL SURGERY

## 2024-05-22 PROCEDURE — 250N000009 HC RX 250: Performed by: NEUROLOGICAL SURGERY

## 2024-05-22 PROCEDURE — 250N000009 HC RX 250: Performed by: NURSE ANESTHETIST, CERTIFIED REGISTERED

## 2024-05-22 PROCEDURE — 250N000011 HC RX IP 250 OP 636: Mod: JZ | Performed by: ANESTHESIOLOGY

## 2024-05-22 PROCEDURE — 999N000141 HC STATISTIC PRE-PROCEDURE NURSING ASSESSMENT: Performed by: NEUROLOGICAL SURGERY

## 2024-05-22 PROCEDURE — 710N000009 HC RECOVERY PHASE 1, LEVEL 1, PER MIN: Performed by: NEUROLOGICAL SURGERY

## 2024-05-22 PROCEDURE — 8E0W0CZ ROBOTIC ASSISTED PROCEDURE OF TRUNK REGION, OPEN APPROACH: ICD-10-PCS | Performed by: NEUROLOGICAL SURGERY

## 2024-05-22 PROCEDURE — 250N000011 HC RX IP 250 OP 636

## 2024-05-22 PROCEDURE — C1713 ANCHOR/SCREW BN/BN,TIS/BN: HCPCS | Performed by: NEUROLOGICAL SURGERY

## 2024-05-22 PROCEDURE — 01NR0ZZ RELEASE SACRAL NERVE, OPEN APPROACH: ICD-10-PCS | Performed by: NEUROLOGICAL SURGERY

## 2024-05-22 PROCEDURE — 0275T AS PERC LAMINO-/LAMINECTOMY INDIR IMAG GUIDE LUMBAR: CPT | Performed by: ANESTHESIOLOGY

## 2024-05-22 PROCEDURE — S2900 ROBOTIC SURGICAL SYSTEM: HCPCS | Performed by: NEUROLOGICAL SURGERY

## 2024-05-22 PROCEDURE — 20930 SP BONE ALGRFT MORSEL ADD-ON: CPT | Performed by: NEUROLOGICAL SURGERY

## 2024-05-22 PROCEDURE — 258N000003 HC RX IP 258 OP 636: Performed by: ANESTHESIOLOGY

## 2024-05-22 PROCEDURE — 22612 ARTHRD PST TQ 1NTRSPC LUMBAR: CPT | Mod: AS | Performed by: NURSE PRACTITIONER

## 2024-05-22 PROCEDURE — 250N000009 HC RX 250

## 2024-05-22 PROCEDURE — 370N000017 HC ANESTHESIA TECHNICAL FEE, PER MIN: Performed by: NEUROLOGICAL SURGERY

## 2024-05-22 PROCEDURE — 22558 ARTHRD ANT NTRBD MIN DSC LUM: CPT | Mod: 62 | Performed by: SURGERY

## 2024-05-22 PROCEDURE — L8699 PROSTHETIC IMPLANT NOS: HCPCS | Performed by: NEUROLOGICAL SURGERY

## 2024-05-22 PROCEDURE — 63047 LAM FACETEC & FORAMOT LUMBAR: CPT | Mod: AS | Performed by: NURSE PRACTITIONER

## 2024-05-22 PROCEDURE — 258N000003 HC RX IP 258 OP 636: Performed by: NURSE PRACTITIONER

## 2024-05-22 PROCEDURE — 250N000025 HC SEVOFLURANE, PER MIN: Performed by: NEUROLOGICAL SURGERY

## 2024-05-22 PROCEDURE — 272N000001 HC OR GENERAL SUPPLY STERILE: Performed by: NEUROLOGICAL SURGERY

## 2024-05-22 PROCEDURE — 0SG30A0 FUSION OF LUMBOSACRAL JOINT WITH INTERBODY FUSION DEVICE, ANTERIOR APPROACH, ANTERIOR COLUMN, OPEN APPROACH: ICD-10-PCS | Performed by: NEUROLOGICAL SURGERY

## 2024-05-22 PROCEDURE — 82947 ASSAY GLUCOSE BLOOD QUANT: CPT | Performed by: ANESTHESIOLOGY

## 2024-05-22 PROCEDURE — 250N000013 HC RX MED GY IP 250 OP 250 PS 637: Performed by: NURSE PRACTITIONER

## 2024-05-22 PROCEDURE — 20936 SP BONE AGRFT LOCAL ADD-ON: CPT | Performed by: NEUROLOGICAL SURGERY

## 2024-05-22 PROCEDURE — P9045 ALBUMIN (HUMAN), 5%, 250 ML: HCPCS | Mod: JZ | Performed by: ANESTHESIOLOGY

## 2024-05-22 PROCEDURE — 250N000011 HC RX IP 250 OP 636: Performed by: ANESTHESIOLOGY

## 2024-05-22 PROCEDURE — P9045 ALBUMIN (HUMAN), 5%, 250 ML: HCPCS | Mod: JZ

## 2024-05-22 PROCEDURE — 84132 ASSAY OF SERUM POTASSIUM: CPT | Performed by: ANESTHESIOLOGY

## 2024-05-22 PROCEDURE — 22853 INSJ BIOMECHANICAL DEVICE: CPT | Performed by: NEUROLOGICAL SURGERY

## 2024-05-22 PROCEDURE — 0275T AS PERC LAMINO-/LAMINECTOMY INDIR IMAG GUIDE LUMBAR: CPT

## 2024-05-22 PROCEDURE — 22853 INSJ BIOMECHANICAL DEVICE: CPT | Mod: AS | Performed by: NURSE PRACTITIONER

## 2024-05-22 PROCEDURE — 22840 INSERT SPINE FIXATION DEVICE: CPT | Mod: AS | Performed by: NURSE PRACTITIONER

## 2024-05-22 PROCEDURE — 01NB0ZZ RELEASE LUMBAR NERVE, OPEN APPROACH: ICD-10-PCS | Performed by: NEUROLOGICAL SURGERY

## 2024-05-22 PROCEDURE — 36415 COLL VENOUS BLD VENIPUNCTURE: CPT | Performed by: ANESTHESIOLOGY

## 2024-05-22 PROCEDURE — 120N000001 HC R&B MED SURG/OB

## 2024-05-22 PROCEDURE — 22612 ARTHRD PST TQ 1NTRSPC LUMBAR: CPT | Performed by: NEUROLOGICAL SURGERY

## 2024-05-22 PROCEDURE — 250N000011 HC RX IP 250 OP 636: Performed by: NURSE ANESTHETIST, CERTIFIED REGISTERED

## 2024-05-22 PROCEDURE — 0SB40ZZ EXCISION OF LUMBOSACRAL DISC, OPEN APPROACH: ICD-10-PCS | Performed by: NEUROLOGICAL SURGERY

## 2024-05-22 PROCEDURE — 86900 BLOOD TYPING SEROLOGIC ABO: CPT | Performed by: ANESTHESIOLOGY

## 2024-05-22 DEVICE — GRAFT BONE INFUSE BMP SM 7510200: Type: IMPLANTABLE DEVICE | Site: SPINE LUMBAR | Status: FUNCTIONAL

## 2024-05-22 DEVICE — IMP ROD MEDT SOLERA CVD 5.5X35MM TI 1553201035: Type: IMPLANTABLE DEVICE | Site: SPINE LUMBAR | Status: FUNCTIONAL

## 2024-05-22 DEVICE — MAGNETOS EASPACK PUTTY 5CC 1-2MM USA
Type: IMPLANTABLE DEVICE | Site: SPINE LUMBAR | Status: FUNCTIONAL
Brand: MAGNETOS

## 2024-05-22 DEVICE — IMPLANTABLE DEVICE: Type: IMPLANTABLE DEVICE | Site: SPINE LUMBAR | Status: FUNCTIONAL

## 2024-05-22 RX ORDER — METHOCARBAMOL 100 MG/ML
750 INJECTION, SOLUTION INTRAMUSCULAR; INTRAVENOUS ONCE
Status: COMPLETED | OUTPATIENT
Start: 2024-05-22 | End: 2024-05-22

## 2024-05-22 RX ORDER — ALBUTEROL SULFATE 90 UG/1
2 AEROSOL, METERED RESPIRATORY (INHALATION) EVERY 6 HOURS PRN
Status: DISCONTINUED | OUTPATIENT
Start: 2024-05-22 | End: 2024-05-25 | Stop reason: HOSPADM

## 2024-05-22 RX ORDER — GABAPENTIN 300 MG/1
300 CAPSULE ORAL
Status: COMPLETED | OUTPATIENT
Start: 2024-05-22 | End: 2024-05-22

## 2024-05-22 RX ORDER — HYDROMORPHONE HYDROCHLORIDE 1 MG/ML
INJECTION, SOLUTION INTRAMUSCULAR; INTRAVENOUS; SUBCUTANEOUS PRN
Status: DISCONTINUED | OUTPATIENT
Start: 2024-05-22 | End: 2024-05-22

## 2024-05-22 RX ORDER — ALBUTEROL SULFATE 0.83 MG/ML
2.5 SOLUTION RESPIRATORY (INHALATION) EVERY 6 HOURS PRN
Status: DISCONTINUED | OUTPATIENT
Start: 2024-05-22 | End: 2024-05-25 | Stop reason: HOSPADM

## 2024-05-22 RX ORDER — KETAMINE HYDROCHLORIDE 10 MG/ML
INJECTION INTRAMUSCULAR; INTRAVENOUS PRN
Status: DISCONTINUED | OUTPATIENT
Start: 2024-05-22 | End: 2024-05-22

## 2024-05-22 RX ORDER — HYDRALAZINE HYDROCHLORIDE 20 MG/ML
2.5-5 INJECTION INTRAMUSCULAR; INTRAVENOUS EVERY 10 MIN PRN
Status: DISCONTINUED | OUTPATIENT
Start: 2024-05-22 | End: 2024-05-22 | Stop reason: HOSPADM

## 2024-05-22 RX ORDER — TRAZODONE HYDROCHLORIDE 100 MG/1
300 TABLET ORAL AT BEDTIME
Status: DISCONTINUED | OUTPATIENT
Start: 2024-05-22 | End: 2024-05-25 | Stop reason: HOSPADM

## 2024-05-22 RX ORDER — POLYETHYLENE GLYCOL 3350 17 G/17G
17 POWDER, FOR SOLUTION ORAL DAILY
Status: DISCONTINUED | OUTPATIENT
Start: 2024-05-23 | End: 2024-05-25 | Stop reason: HOSPADM

## 2024-05-22 RX ORDER — PHENYLEPHRINE HCL IN 0.9% NACL 50MG/250ML
PLASTIC BAG, INJECTION (ML) INTRAVENOUS CONTINUOUS PRN
Status: DISCONTINUED | OUTPATIENT
Start: 2024-05-22 | End: 2024-05-22

## 2024-05-22 RX ORDER — LISINOPRIL 10 MG/1
10 TABLET ORAL DAILY
Status: DISCONTINUED | OUTPATIENT
Start: 2024-05-22 | End: 2024-05-25 | Stop reason: HOSPADM

## 2024-05-22 RX ORDER — NALOXONE HYDROCHLORIDE 0.4 MG/ML
0.4 INJECTION, SOLUTION INTRAMUSCULAR; INTRAVENOUS; SUBCUTANEOUS
Status: DISCONTINUED | OUTPATIENT
Start: 2024-05-22 | End: 2024-05-25 | Stop reason: HOSPADM

## 2024-05-22 RX ORDER — SODIUM CHLORIDE, SODIUM LACTATE, POTASSIUM CHLORIDE, CALCIUM CHLORIDE 600; 310; 30; 20 MG/100ML; MG/100ML; MG/100ML; MG/100ML
INJECTION, SOLUTION INTRAVENOUS CONTINUOUS
Status: DISCONTINUED | OUTPATIENT
Start: 2024-05-22 | End: 2024-05-22 | Stop reason: HOSPADM

## 2024-05-22 RX ORDER — ONDANSETRON 2 MG/ML
INJECTION INTRAMUSCULAR; INTRAVENOUS PRN
Status: DISCONTINUED | OUTPATIENT
Start: 2024-05-22 | End: 2024-05-22

## 2024-05-22 RX ORDER — CALCIUM CARBONATE 500 MG/1
500 TABLET, CHEWABLE ORAL 4 TIMES DAILY PRN
Status: DISCONTINUED | OUTPATIENT
Start: 2024-05-22 | End: 2024-05-25 | Stop reason: HOSPADM

## 2024-05-22 RX ORDER — FENTANYL CITRATE 0.05 MG/ML
25 INJECTION, SOLUTION INTRAMUSCULAR; INTRAVENOUS EVERY 5 MIN PRN
Status: DISCONTINUED | OUTPATIENT
Start: 2024-05-22 | End: 2024-05-22 | Stop reason: HOSPADM

## 2024-05-22 RX ORDER — FENTANYL CITRATE 0.05 MG/ML
50 INJECTION, SOLUTION INTRAMUSCULAR; INTRAVENOUS EVERY 5 MIN PRN
Status: DISCONTINUED | OUTPATIENT
Start: 2024-05-22 | End: 2024-05-22 | Stop reason: HOSPADM

## 2024-05-22 RX ORDER — ONDANSETRON 2 MG/ML
4 INJECTION INTRAMUSCULAR; INTRAVENOUS EVERY 6 HOURS PRN
Status: DISCONTINUED | OUTPATIENT
Start: 2024-05-22 | End: 2024-05-25 | Stop reason: HOSPADM

## 2024-05-22 RX ORDER — NALOXONE HYDROCHLORIDE 0.4 MG/ML
0.1 INJECTION, SOLUTION INTRAMUSCULAR; INTRAVENOUS; SUBCUTANEOUS
Status: DISCONTINUED | OUTPATIENT
Start: 2024-05-22 | End: 2024-05-22 | Stop reason: HOSPADM

## 2024-05-22 RX ORDER — LIDOCAINE HYDROCHLORIDE 20 MG/ML
INJECTION, SOLUTION INFILTRATION; PERINEURAL PRN
Status: DISCONTINUED | OUTPATIENT
Start: 2024-05-22 | End: 2024-05-22

## 2024-05-22 RX ORDER — BISACODYL 10 MG
10 SUPPOSITORY, RECTAL RECTAL DAILY PRN
Status: DISCONTINUED | OUTPATIENT
Start: 2024-05-25 | End: 2024-05-25 | Stop reason: HOSPADM

## 2024-05-22 RX ORDER — CEFAZOLIN SODIUM 2 G/100ML
2 INJECTION, SOLUTION INTRAVENOUS EVERY 8 HOURS
Status: DISCONTINUED | OUTPATIENT
Start: 2024-05-22 | End: 2024-05-24

## 2024-05-22 RX ORDER — SODIUM CHLORIDE 9 MG/ML
INJECTION, SOLUTION INTRAVENOUS CONTINUOUS
Status: DISCONTINUED | OUTPATIENT
Start: 2024-05-22 | End: 2024-05-25 | Stop reason: HOSPADM

## 2024-05-22 RX ORDER — HYDROXYZINE HYDROCHLORIDE 25 MG/1
25 TABLET, FILM COATED ORAL EVERY 6 HOURS PRN
Status: DISCONTINUED | OUTPATIENT
Start: 2024-05-22 | End: 2024-05-25 | Stop reason: HOSPADM

## 2024-05-22 RX ORDER — SODIUM CHLORIDE, SODIUM LACTATE, POTASSIUM CHLORIDE, CALCIUM CHLORIDE 600; 310; 30; 20 MG/100ML; MG/100ML; MG/100ML; MG/100ML
INJECTION, SOLUTION INTRAVENOUS CONTINUOUS
Status: DISCONTINUED | OUTPATIENT
Start: 2024-05-22 | End: 2024-05-25 | Stop reason: HOSPADM

## 2024-05-22 RX ORDER — FAMOTIDINE 20 MG/1
20 TABLET, FILM COATED ORAL DAILY
Status: DISCONTINUED | OUTPATIENT
Start: 2024-05-22 | End: 2024-05-25 | Stop reason: HOSPADM

## 2024-05-22 RX ORDER — PROPOFOL 10 MG/ML
INJECTION, EMULSION INTRAVENOUS CONTINUOUS PRN
Status: DISCONTINUED | OUTPATIENT
Start: 2024-05-22 | End: 2024-05-22

## 2024-05-22 RX ORDER — OXYCODONE HYDROCHLORIDE 5 MG/1
5 TABLET ORAL EVERY 4 HOURS PRN
Status: DISCONTINUED | OUTPATIENT
Start: 2024-05-22 | End: 2024-05-25 | Stop reason: HOSPADM

## 2024-05-22 RX ORDER — FLUTICASONE PROPIONATE AND SALMETEROL XINAFOATE 230; 21 UG/1; UG/1
2 AEROSOL, METERED RESPIRATORY (INHALATION) 2 TIMES DAILY
Status: DISCONTINUED | OUTPATIENT
Start: 2024-05-23 | End: 2024-05-25 | Stop reason: HOSPADM

## 2024-05-22 RX ORDER — HYDROMORPHONE HCL IN WATER/PF 6 MG/30 ML
0.2 PATIENT CONTROLLED ANALGESIA SYRINGE INTRAVENOUS
Status: DISCONTINUED | OUTPATIENT
Start: 2024-05-22 | End: 2024-05-25 | Stop reason: HOSPADM

## 2024-05-22 RX ORDER — FENTANYL CITRATE 50 UG/ML
INJECTION, SOLUTION INTRAMUSCULAR; INTRAVENOUS PRN
Status: DISCONTINUED | OUTPATIENT
Start: 2024-05-22 | End: 2024-05-22

## 2024-05-22 RX ORDER — VECURONIUM BROMIDE 1 MG/ML
INJECTION, POWDER, LYOPHILIZED, FOR SOLUTION INTRAVENOUS PRN
Status: DISCONTINUED | OUTPATIENT
Start: 2024-05-22 | End: 2024-05-22

## 2024-05-22 RX ORDER — HYDROMORPHONE HCL IN WATER/PF 6 MG/30 ML
0.4 PATIENT CONTROLLED ANALGESIA SYRINGE INTRAVENOUS EVERY 5 MIN PRN
Status: DISCONTINUED | OUTPATIENT
Start: 2024-05-22 | End: 2024-05-22 | Stop reason: HOSPADM

## 2024-05-22 RX ORDER — ONDANSETRON 2 MG/ML
4 INJECTION INTRAMUSCULAR; INTRAVENOUS EVERY 30 MIN PRN
Status: DISCONTINUED | OUTPATIENT
Start: 2024-05-22 | End: 2024-05-22 | Stop reason: HOSPADM

## 2024-05-22 RX ORDER — ONDANSETRON 4 MG/1
4 TABLET, ORALLY DISINTEGRATING ORAL EVERY 6 HOURS PRN
Status: DISCONTINUED | OUTPATIENT
Start: 2024-05-22 | End: 2024-05-25 | Stop reason: HOSPADM

## 2024-05-22 RX ORDER — HYDROMORPHONE HCL IN WATER/PF 6 MG/30 ML
0.4 PATIENT CONTROLLED ANALGESIA SYRINGE INTRAVENOUS
Status: DISCONTINUED | OUTPATIENT
Start: 2024-05-22 | End: 2024-05-25 | Stop reason: HOSPADM

## 2024-05-22 RX ORDER — OXYCODONE HYDROCHLORIDE 5 MG/1
10 TABLET ORAL EVERY 4 HOURS PRN
Status: DISCONTINUED | OUTPATIENT
Start: 2024-05-22 | End: 2024-05-25 | Stop reason: HOSPADM

## 2024-05-22 RX ORDER — METHOCARBAMOL 750 MG/1
750 TABLET, FILM COATED ORAL EVERY 6 HOURS PRN
Status: DISCONTINUED | OUTPATIENT
Start: 2024-05-22 | End: 2024-05-25 | Stop reason: HOSPADM

## 2024-05-22 RX ORDER — CEFAZOLIN SODIUM/WATER 2 G/20 ML
2 SYRINGE (ML) INTRAVENOUS SEE ADMIN INSTRUCTIONS
Status: DISCONTINUED | OUTPATIENT
Start: 2024-05-22 | End: 2024-05-22

## 2024-05-22 RX ORDER — NALOXONE HYDROCHLORIDE 0.4 MG/ML
0.2 INJECTION, SOLUTION INTRAMUSCULAR; INTRAVENOUS; SUBCUTANEOUS
Status: DISCONTINUED | OUTPATIENT
Start: 2024-05-22 | End: 2024-05-25 | Stop reason: HOSPADM

## 2024-05-22 RX ORDER — GABAPENTIN 300 MG/1
300 CAPSULE ORAL AT BEDTIME
Status: DISCONTINUED | OUTPATIENT
Start: 2024-05-22 | End: 2024-05-25 | Stop reason: HOSPADM

## 2024-05-22 RX ORDER — AMOXICILLIN 250 MG
1 CAPSULE ORAL 2 TIMES DAILY
Status: DISCONTINUED | OUTPATIENT
Start: 2024-05-22 | End: 2024-05-25 | Stop reason: HOSPADM

## 2024-05-22 RX ORDER — DEXAMETHASONE SODIUM PHOSPHATE 4 MG/ML
INJECTION, SOLUTION INTRA-ARTICULAR; INTRALESIONAL; INTRAMUSCULAR; INTRAVENOUS; SOFT TISSUE PRN
Status: DISCONTINUED | OUTPATIENT
Start: 2024-05-22 | End: 2024-05-22

## 2024-05-22 RX ORDER — DEXAMETHASONE SODIUM PHOSPHATE 4 MG/ML
4 INJECTION, SOLUTION INTRA-ARTICULAR; INTRALESIONAL; INTRAMUSCULAR; INTRAVENOUS; SOFT TISSUE
Status: DISCONTINUED | OUTPATIENT
Start: 2024-05-22 | End: 2024-05-22 | Stop reason: HOSPADM

## 2024-05-22 RX ORDER — CEFAZOLIN SODIUM/WATER 2 G/20 ML
2 SYRINGE (ML) INTRAVENOUS
Status: DISCONTINUED | OUTPATIENT
Start: 2024-05-22 | End: 2024-05-22

## 2024-05-22 RX ORDER — FENTANYL CITRATE-0.9 % NACL/PF 10 MCG/ML
PLASTIC BAG, INJECTION (ML) INTRAVENOUS PRN
Status: DISCONTINUED | OUTPATIENT
Start: 2024-05-22 | End: 2024-05-22

## 2024-05-22 RX ORDER — HYDROMORPHONE HCL IN WATER/PF 6 MG/30 ML
0.2 PATIENT CONTROLLED ANALGESIA SYRINGE INTRAVENOUS EVERY 5 MIN PRN
Status: DISCONTINUED | OUTPATIENT
Start: 2024-05-22 | End: 2024-05-22 | Stop reason: HOSPADM

## 2024-05-22 RX ORDER — ONDANSETRON 4 MG/1
4 TABLET, ORALLY DISINTEGRATING ORAL EVERY 30 MIN PRN
Status: DISCONTINUED | OUTPATIENT
Start: 2024-05-22 | End: 2024-05-22 | Stop reason: HOSPADM

## 2024-05-22 RX ORDER — LIDOCAINE 40 MG/G
CREAM TOPICAL
Status: DISCONTINUED | OUTPATIENT
Start: 2024-05-22 | End: 2024-05-25 | Stop reason: HOSPADM

## 2024-05-22 RX ORDER — ACETAMINOPHEN 325 MG/1
650 TABLET ORAL EVERY 4 HOURS PRN
Status: DISCONTINUED | OUTPATIENT
Start: 2024-05-25 | End: 2024-05-25 | Stop reason: HOSPADM

## 2024-05-22 RX ORDER — ACETAMINOPHEN 325 MG/1
975 TABLET ORAL EVERY 8 HOURS
Status: COMPLETED | OUTPATIENT
Start: 2024-05-22 | End: 2024-05-25

## 2024-05-22 RX ORDER — PROPOFOL 10 MG/ML
INJECTION, EMULSION INTRAVENOUS PRN
Status: DISCONTINUED | OUTPATIENT
Start: 2024-05-22 | End: 2024-05-22

## 2024-05-22 RX ORDER — EPHEDRINE SULFATE 50 MG/ML
INJECTION, SOLUTION INTRAMUSCULAR; INTRAVENOUS; SUBCUTANEOUS PRN
Status: DISCONTINUED | OUTPATIENT
Start: 2024-05-22 | End: 2024-05-22

## 2024-05-22 RX ORDER — PROCHLORPERAZINE MALEATE 10 MG
10 TABLET ORAL EVERY 6 HOURS PRN
Status: DISCONTINUED | OUTPATIENT
Start: 2024-05-22 | End: 2024-05-25 | Stop reason: HOSPADM

## 2024-05-22 RX ORDER — ATORVASTATIN CALCIUM 20 MG/1
20 TABLET, FILM COATED ORAL EVERY EVENING
Status: DISCONTINUED | OUTPATIENT
Start: 2024-05-22 | End: 2024-05-25 | Stop reason: HOSPADM

## 2024-05-22 RX ADMIN — FENTANYL CITRATE 50 MCG: 50 INJECTION, SOLUTION INTRAMUSCULAR; INTRAVENOUS at 18:18

## 2024-05-22 RX ADMIN — SODIUM CHLORIDE, POTASSIUM CHLORIDE, SODIUM LACTATE AND CALCIUM CHLORIDE: 600; 310; 30; 20 INJECTION, SOLUTION INTRAVENOUS at 16:51

## 2024-05-22 RX ADMIN — Medication 2 G: at 14:57

## 2024-05-22 RX ADMIN — ALBUMIN HUMAN 250 ML: 0.05 INJECTION, SOLUTION INTRAVENOUS at 18:02

## 2024-05-22 RX ADMIN — Medication 5 MG: at 16:26

## 2024-05-22 RX ADMIN — Medication 0.4 MCG/KG/MIN: at 12:05

## 2024-05-22 RX ADMIN — METHOCARBAMOL 750 MG: 100 INJECTION, SOLUTION INTRAMUSCULAR; INTRAVENOUS at 19:15

## 2024-05-22 RX ADMIN — Medication 50 MCG: at 16:16

## 2024-05-22 RX ADMIN — Medication 5 MG: at 16:08

## 2024-05-22 RX ADMIN — SODIUM CHLORIDE: 9 INJECTION, SOLUTION INTRAVENOUS at 21:02

## 2024-05-22 RX ADMIN — Medication 100 MCG: at 14:29

## 2024-05-22 RX ADMIN — VECURONIUM BROMIDE 10 MG: 1 INJECTION, POWDER, LYOPHILIZED, FOR SOLUTION INTRAVENOUS at 11:05

## 2024-05-22 RX ADMIN — HYDROMORPHONE HYDROCHLORIDE 0.4 MG: 0.2 INJECTION, SOLUTION INTRAMUSCULAR; INTRAVENOUS; SUBCUTANEOUS at 18:47

## 2024-05-22 RX ADMIN — Medication 100 MCG: at 17:34

## 2024-05-22 RX ADMIN — LISINOPRIL 10 MG: 10 TABLET ORAL at 21:06

## 2024-05-22 RX ADMIN — VECURONIUM BROMIDE 5 MG: 1 INJECTION, POWDER, LYOPHILIZED, FOR SOLUTION INTRAVENOUS at 12:41

## 2024-05-22 RX ADMIN — FAMOTIDINE 20 MG: 20 TABLET, FILM COATED ORAL at 21:06

## 2024-05-22 RX ADMIN — Medication 10 MG: at 11:46

## 2024-05-22 RX ADMIN — Medication 100 MCG: at 11:44

## 2024-05-22 RX ADMIN — ALBUMIN (HUMAN): 12.5 SOLUTION INTRAVENOUS at 13:22

## 2024-05-22 RX ADMIN — HYDROMORPHONE HYDROCHLORIDE 0.4 MG: 0.2 INJECTION, SOLUTION INTRAMUSCULAR; INTRAVENOUS; SUBCUTANEOUS at 19:29

## 2024-05-22 RX ADMIN — PROPOFOL 30 MCG/KG/MIN: 10 INJECTION, EMULSION INTRAVENOUS at 11:05

## 2024-05-22 RX ADMIN — VECURONIUM BROMIDE 2 MG: 1 INJECTION, POWDER, LYOPHILIZED, FOR SOLUTION INTRAVENOUS at 16:31

## 2024-05-22 RX ADMIN — GABAPENTIN 300 MG: 300 CAPSULE ORAL at 09:14

## 2024-05-22 RX ADMIN — SUGAMMADEX 400 MG: 100 INJECTION, SOLUTION INTRAVENOUS at 17:28

## 2024-05-22 RX ADMIN — VECURONIUM BROMIDE 5 MG: 1 INJECTION, POWDER, LYOPHILIZED, FOR SOLUTION INTRAVENOUS at 13:34

## 2024-05-22 RX ADMIN — Medication 10 MG: at 12:00

## 2024-05-22 RX ADMIN — SODIUM CHLORIDE, POTASSIUM CHLORIDE, SODIUM LACTATE AND CALCIUM CHLORIDE: 600; 310; 30; 20 INJECTION, SOLUTION INTRAVENOUS at 12:05

## 2024-05-22 RX ADMIN — VECURONIUM BROMIDE 5 MG: 1 INJECTION, POWDER, LYOPHILIZED, FOR SOLUTION INTRAVENOUS at 12:01

## 2024-05-22 RX ADMIN — HYDROMORPHONE HYDROCHLORIDE 0.4 MG: 0.2 INJECTION, SOLUTION INTRAMUSCULAR; INTRAVENOUS; SUBCUTANEOUS at 18:58

## 2024-05-22 RX ADMIN — Medication 30 MG: at 11:28

## 2024-05-22 RX ADMIN — VECURONIUM BROMIDE 1 MG: 1 INJECTION, POWDER, LYOPHILIZED, FOR SOLUTION INTRAVENOUS at 14:49

## 2024-05-22 RX ADMIN — ONDANSETRON 4 MG: 2 INJECTION INTRAMUSCULAR; INTRAVENOUS at 17:18

## 2024-05-22 RX ADMIN — CEFAZOLIN SODIUM 2 G: 2 INJECTION, SOLUTION INTRAVENOUS at 23:14

## 2024-05-22 RX ADMIN — VECURONIUM BROMIDE 2 MG: 1 INJECTION, POWDER, LYOPHILIZED, FOR SOLUTION INTRAVENOUS at 15:27

## 2024-05-22 RX ADMIN — PROPOFOL 200 MG: 10 INJECTION, EMULSION INTRAVENOUS at 11:04

## 2024-05-22 RX ADMIN — HYDROMORPHONE HYDROCHLORIDE 0.4 MG: 0.2 INJECTION, SOLUTION INTRAMUSCULAR; INTRAVENOUS; SUBCUTANEOUS at 18:36

## 2024-05-22 RX ADMIN — HYDROMORPHONE HYDROCHLORIDE 0.5 MG: 1 INJECTION, SOLUTION INTRAMUSCULAR; INTRAVENOUS; SUBCUTANEOUS at 11:33

## 2024-05-22 RX ADMIN — Medication 100 MCG: at 13:57

## 2024-05-22 RX ADMIN — Medication 50 MCG: at 11:40

## 2024-05-22 RX ADMIN — Medication 2 G: at 10:57

## 2024-05-22 RX ADMIN — LIDOCAINE HYDROCHLORIDE 100 MG: 20 INJECTION, SOLUTION INFILTRATION; PERINEURAL at 11:04

## 2024-05-22 RX ADMIN — SODIUM CHLORIDE, POTASSIUM CHLORIDE, SODIUM LACTATE AND CALCIUM CHLORIDE: 600; 310; 30; 20 INJECTION, SOLUTION INTRAVENOUS at 18:59

## 2024-05-22 RX ADMIN — Medication 50 MCG: at 11:42

## 2024-05-22 RX ADMIN — Medication 5 MG: at 11:48

## 2024-05-22 RX ADMIN — SODIUM CHLORIDE, POTASSIUM CHLORIDE, SODIUM LACTATE AND CALCIUM CHLORIDE: 600; 310; 30; 20 INJECTION, SOLUTION INTRAVENOUS at 09:14

## 2024-05-22 RX ADMIN — SENNOSIDES AND DOCUSATE SODIUM 1 TABLET: 50; 8.6 TABLET ORAL at 21:06

## 2024-05-22 RX ADMIN — MIDAZOLAM 2 MG: 1 INJECTION INTRAMUSCULAR; INTRAVENOUS at 10:57

## 2024-05-22 RX ADMIN — ATORVASTATIN CALCIUM 20 MG: 20 TABLET, FILM COATED ORAL at 21:06

## 2024-05-22 RX ADMIN — TRAZODONE HYDROCHLORIDE 300 MG: 100 TABLET ORAL at 21:58

## 2024-05-22 RX ADMIN — FENTANYL CITRATE 50 MCG: 50 INJECTION, SOLUTION INTRAMUSCULAR; INTRAVENOUS at 17:56

## 2024-05-22 RX ADMIN — FENTANYL CITRATE 100 MCG: 50 INJECTION INTRAMUSCULAR; INTRAVENOUS at 11:04

## 2024-05-22 RX ADMIN — GABAPENTIN 300 MG: 300 CAPSULE ORAL at 21:58

## 2024-05-22 RX ADMIN — DEXAMETHASONE SODIUM PHOSPHATE 8 MG: 4 INJECTION, SOLUTION INTRA-ARTICULAR; INTRALESIONAL; INTRAMUSCULAR; INTRAVENOUS; SOFT TISSUE at 11:10

## 2024-05-22 RX ADMIN — Medication 100 MCG: at 11:48

## 2024-05-22 ASSESSMENT — ACTIVITIES OF DAILY LIVING (ADL)
ADLS_ACUITY_SCORE: 23
ADLS_ACUITY_SCORE: 26
ADLS_ACUITY_SCORE: 23
ADLS_ACUITY_SCORE: 33
ADLS_ACUITY_SCORE: 23

## 2024-05-22 ASSESSMENT — LIFESTYLE VARIABLES: TOBACCO_USE: 1

## 2024-05-22 NOTE — ANESTHESIA PREPROCEDURE EVALUATION
Anesthesia Pre-Procedure Evaluation    Patient: Lima Bueno   MRN: [id number] : 1970        Procedure : Procedure(s):  Lumbar 5 to Sacral 1 Anterior Lumbar Interbody Fusion  Lumbar 5 to Sacral 1 Robotic Posterior Spinal Fusion with right decompression          Past Medical History:   Diagnosis Date    ADD (attention deficit disorder with hyperactivity)     Asthma     Breast mass 2012    Patient yet to have biopsy as of 2012      DVT (deep venous thrombosis) (H) 10/12    right leg    LSIL (low grade squamous intraepithelial lesion) on Pap smear 14    Neg high risk HPV    Pulmonary embolism (H)     Saw Dr Toledo, protein S deficiency      Past Surgical History:   Procedure Laterality Date    ANKLE SURGERY Right 2019    COLONOSCOPY N/A 12/10/2020    Procedure: COLONOSCOPY, WITH POLYPECTOMY AND BIOPSY;  Surgeon: Osorio Dunn MD;  Location:  GI      Allergies   Allergen Reactions    Phosphoric Acid Hives    Terbutaline Hives    Trazodone      Other reaction(s): Other - Describe In Comment Field  Facial nerve pain  Other reaction(s): Other - Describe In Comment Field  Facial nerve pain    Wellbutrin [Bupropion]      Smoking cessation -- told never to take again ,      Social History     Tobacco Use    Smoking status: Former     Current packs/day: 0.00     Average packs/day: 1 pack/day for 30.0 years (30.0 ttl pk-yrs)     Types: Cigarettes     Quit date: 3/25/2024     Years since quittin.1    Smokeless tobacco: Never   Substance Use Topics    Alcohol use: No     Alcohol/week: 0.0 standard drinks of alcohol      Wt Readings from Last 1 Encounters:   24 100.2 kg (220 lb 14.4 oz)        Anesthesia Evaluation   Pt has had prior anesthetic.     No history of anesthetic complications       ROS/MED HX  ENT/Pulmonary:     (+)                tobacco use, Current use,     asthma  Treatment: Inhaler daily,              (-) sleep apnea   Neurologic:  - neg neurologic ROS  "    Cardiovascular:     (+) Dyslipidemia hypertension- -   -  - -                                      METS/Exercise Tolerance:     Hematologic: Comments: PE  Protein s def    (+) History of blood clots,               Musculoskeletal:       GI/Hepatic:     (+) GERD, Asymptomatic on medication,                  Renal/Genitourinary:     (+) renal disease, type: CRI,            Endo:     (+)  type II DM,             Obesity,       Psychiatric/Substance Use:     (+) psychiatric history other (comment)       Infectious Disease:       Malignancy:       Other:      (+)  , H/O Chronic Pain,         Physical Exam    Airway        Mallampati: II   TM distance: > 3 FB   Neck ROM: full   Mouth opening: > 3 cm    Respiratory Devices and Support         Dental       (+) Minor Abnormalities - some fillings, tiny chips      Cardiovascular   cardiovascular exam normal          Pulmonary   pulmonary exam normal                OUTSIDE LABS:  CBC:   Lab Results   Component Value Date    WBC 6.0 05/07/2024    WBC 6.0 05/02/2024    HGB 13.8 05/07/2024    HGB 14.7 05/02/2024    HCT 42.6 05/07/2024    HCT 46.3 05/02/2024     05/07/2024     05/02/2024     BMP:   Lab Results   Component Value Date     05/07/2024     05/02/2024    POTASSIUM 3.4 05/07/2024    POTASSIUM 3.8 05/02/2024    CHLORIDE 103 05/07/2024    CHLORIDE 104 05/02/2024    CO2 28 05/07/2024    CO2 20 (L) 05/02/2024    BUN 10.8 05/07/2024    BUN 14.4 05/02/2024    CR 0.94 05/07/2024    CR 1.22 (H) 05/02/2024    GLC 98 05/07/2024     (H) 05/02/2024     COAGS:   Lab Results   Component Value Date    INR 3.40 (H) 02/06/2018     POC: No results found for: \"BGM\", \"HCG\", \"HCGS\"  HEPATIC:   Lab Results   Component Value Date    ALBUMIN 4.2 05/02/2024    PROTTOTAL 7.3 05/02/2024    ALT 28 05/02/2024    AST 20 05/02/2024    ALKPHOS 122 05/02/2024    BILITOTAL 0.7 05/02/2024     OTHER:   Lab Results   Component Value Date    A1C 6.1 (H) 05/07/2024    DANIELLE " "9.3 05/07/2024    MAG 1.8 08/16/2015    TSH 0.74 05/04/2022    CRP 6.1 11/30/2012    SED 10 11/30/2012       Anesthesia Plan    ASA Status:  3    NPO Status:  NPO Appropriate    Anesthesia Type: General.     - Airway: ETT   Induction: Intravenous, Propofol.   Maintenance: Balanced.        Consents    Anesthesia Plan(s) and associated risks, benefits, and realistic alternatives discussed. Questions answered and patient/representative(s) expressed understanding.     - Discussed:     - Discussed with:  Patient            Postoperative Care    Pain management: IV analgesics.   PONV prophylaxis: Ondansetron (or other 5HT-3), Background Propofol Infusion     Comments:               Rasta Lugo MD    I have reviewed the pertinent notes and labs in the chart from the past 30 days and (re)examined the patient.  Any updates or changes from those notes are reflected in this note.              # Obesity: Estimated body mass index is 36.76 kg/m  as calculated from the following:    Height as of 5/7/24: 1.651 m (5' 5\").    Weight as of 5/7/24: 100.2 kg (220 lb 14.4 oz).      "

## 2024-05-22 NOTE — OP NOTE
Preoperative diagnosis: Lumbar radiculopathy.    Postoperative diagnosis: Same.    Procedure:   1.  Left retroperitoneal exposure of L5-S1 and intervertebral disc space for anterior lumbar interbody fusion.  2.  Application of Kelly sponge    Surgeon: Royce Ashton M.D.   Co-surgeon: Vinny Reynaga M.D.   Assistant: Sisi Sigala M.D.     Anesthesia: General.  Estimated blood loss: About 400 mL.  Complications: None.  Specimens: None.    Indications for procedure: This is a 53-year-old female with lumbar radiculopathy.  Vascular surgery services are requested to provide L5-S1 exposure for anterior lumbar interbody fusion.    Procedure: Patient was identified and then taken to the operating room and placed in supine position.  General anesthesia was induced.  Preoperative intravenous antibiotics were administered.  Anterior abdominal wall was prepped sterile surgical field was created.    Preprocedure timeout was conducted.  A 13 cm low Pfannenstiel incision was created with a #10 blade and deepened with electrocautery.  Superior and inferior flaps were created.  The left anterior rectus sheath was opened vertically.  The left rectus abdominis muscle was retracted laterally.  The retroperitoneal plane was created.  Left retroperitoneal viscera including the left ureter were reflected superomedially.  We identified the aortic bifurcation, both common iliac arteries and the left common iliac vein.  Under these the L5-S1 intervertebral disc space was identified.  Hemostat was placed on it and fluoroscopy was performed to confirm position.  Then further dissection was carried out to free up the left common iliac vein.    Dr. Reynaga proceeded with discectomy and cage implantation.  Following the successful completion of that procedure adequate hemostasis was noted.  Retroperitoneal viscera were allowed to fall back into the natural configuration.  Fascia was closed with oh looped PDS.  Lulú's layer was closed and tacked down  to the rectus fascia with 0 Vicryl suture.  Subdermal layer was closed with 3-0 Vicryl.  Skin was closed with vertical mattress 3-0 chromic sutures.  Kelly sponge was applied.    Counts of instruments, sponges and needle was noted to be correct.

## 2024-05-22 NOTE — PROGRESS NOTES
PTA medications updated by Medication Scribe prior to surgery via phone call with patient (last doses completed by Nurse)     Medication history sources: Patient, Surescripts, and H&P  In the past week, patient estimated taking medication this percent of the time: Greater than 90%      Significant changes made to the medication list:  Patient taking meds differently than prescribed; See PTA entries for: Gabapentin      Additional medication history information:   Patient was advised to bring: Albuterol Inh & Advair Inh    Medication reconciliation completed by provider prior to medication history? No    Time spent in this activity: 30 minutes    The information provided in this note is only as accurate as the sources available at the time of update(s)      Prior to Admission medications    Medication Sig Last Dose Taking? Auth Provider Long Term End Date   albuterol (PROAIR HFA/PROVENTIL HFA/VENTOLIN HFA) 108 (90 Base) MCG/ACT inhaler Inhale 2 puffs into the lungs every 6 hours as needed for shortness of breath, wheezing or cough  at PRN Yes Oscar Baltazar MD Yes    albuterol (PROVENTIL) (2.5 MG/3ML) 0.083% neb solution Take 1 vial (2.5 mg) by nebulization every 6 hours as needed for shortness of breath or wheezing  Yes Oscar Baltazar MD Yes    atorvastatin (LIPITOR) 20 MG tablet Take 1 tablet (20 mg) by mouth daily  at PM Yes Oscar Baltazar MD Yes    benzonatate (TESSALON) 200 MG capsule Take 1 capsule (200 mg) by mouth 3 times daily as needed for cough  at PRN Yes Oscar Baltazar MD     famotidine (PEPCID) 20 MG tablet Take 1 tablet (20 mg) by mouth daily  at PM Yes Oscar Baltazar MD     fexofenadine (ALLEGRA) 180 MG tablet TAKE ONE TABLET BY MOUTH ONCE DAILY  at PRN Yes Oscar Baltazar MD     fluticasone-salmeterol (ADVAIR HFA) 230-21 MCG/ACT inhaler Inhale 2 puffs into the lungs 2 times daily  at AM Yes Oscar Baltazar MD Yes    gabapentin  (NEURONTIN) 100 MG capsule TAKE 3 CAPSULES (300 MG) BY MOUTH 3 TIMES DAILY AS NEEDED FOR NEUROPATHIC PAIN  Patient taking differently: Take 3 capsules by mouth at bedtime  at PM Yes Oscar Baltazar MD Yes    guaiFENesin (MUCINEX) 600 MG 12 hr tablet Take 2 tablets (1,200 mg) by mouth 2 times daily  at PRN Yes Oscar Baltazar MD     ketotifen (ZADITOR) 0.025 % ophthalmic solution Place 1 drop into both eyes 2 times daily 1 drop in affected eye(s) 2 times a day  at PRN Yes Liz England, MISTY CNP Yes    lisinopril (ZESTRIL) 10 MG tablet Take 1 tablet (10 mg) by mouth daily  at 2400 Yes Oscar Baltazar MD Yes    LORazepam (ATIVAN) 0.5 MG tablet Take 0.5 mg 1 hour prior to MRI  at PRN Yes Oscar Baltazar MD No    rivaroxaban ANTICOAGULANT (XARELTO) 20 MG TABS tablet Take 1 tablet (20 mg) by mouth daily (with dinner)  at PM Yes Oscar Baltazar MD Yes    Semaglutide, 2 MG/DOSE, (OZEMPIC, 2 MG/DOSE,) 8 MG/3ML pen Inject 2 mg Subcutaneous once a week  Yes Oscar Baltazar MD     traZODone (DESYREL) 150 MG tablet Take 2 tablets (300 mg) by mouth At Bedtime  at PM Yes Oscar Baltazar MD Yes    blood glucose (NO BRAND SPECIFIED) test strip 1 Box of Test Strips for patients meter - test daily   Oscar Baltazar MD     Lancets 30G MISC 120 30 gauge lancets (substitute as needed) - test daily   Oscar Baltazar MD         Medication history completed by: Ivis Wheleer

## 2024-05-22 NOTE — BRIEF OP NOTE
[facility] Brief Operative Note    Pre-operative diagnosis: Spondylolisthesis of lumbar region [M43.16]   Post-operative diagnosis As above   Procedure: Procedure(s):  Lumbar 5 to Sacral 1 Anterior Lumbar Interbody Fusion  Lumbar 5 to Sacral 1 Robotic Posterior Spinal Fusion with right decompression   Surgeon(s): Surgeons and Role:  Panel 1:     * Vinny Reynaga MD - Primary     * Royce Ashton MD - Assisting     * Sisi Sigala MD - Assisting  Panel 2:     * Vinny Reynaga MD - Primary   Estimated blood loss: 400 mL    Specimens: * No specimens in log *   Findings: See op note

## 2024-05-22 NOTE — ANESTHESIA PROCEDURE NOTES
Airway       Patient location during procedure: OR       Procedure Start/Stop Times: 5/22/2024 11:07 AM  Staff -        Anesthesiologist:  Rasta Lugo MD       CRNA: Isaac Elliott APRN CRNA       Performed By: CRNA  Consent for Airway        Urgency: elective  Indications and Patient Condition       Indications for airway management: cyril-procedural       Induction type:intravenous       Mask difficulty assessment: 2 - vent by mask + OA or adjuvant +/- NMBA    Final Airway Details       Final airway type: endotracheal airway       Successful airway: ETT - single and Oral  Endotracheal Airway Details        ETT size (mm): 7.0       Cuffed: yes       Cuff volume (mL): 10       Successful intubation technique: direct laryngoscopy and video laryngoscopy       VL Blade Size: Reyes 3       Grade View of Cords: 1       Adjucts: stylet       Position: Right       Measured from: lips       Secured at (cm): 21       Bite block used: None    Post intubation assessment        Placement verified by: capnometry, equal breath sounds and chest rise        Number of attempts at approach: 1       Number of other approaches attempted: 0       Secured with: tape       Ease of procedure: easy       Dentition: Intact and Unchanged    Medication(s) Administered   Medication Administration Time: 5/22/2024 11:07 AM    Additional Comments       Patient positions self to comfort on pillow. Head and neck remained neutral, midline, and unchanged in position throughout induction, mask ventilation, and intubation.

## 2024-05-22 NOTE — OP NOTE
DATE OF SURGERY: May 22, 2024     SURGEON:  Vinny Reynaga MD   COSURGEON:  Royce Ashton MD   ASSISTANT:  Rufina Hernández NP     Note: Rufina Hernández was present for and assisted during the entire surgery and his/her role as an assistant was crucial for aid in exposure, suctioning, retraction and closure.  Dr. Ashton helped as a co-surgeon to provide the surgical approach and closure for the anterior lumbar interbody fusion.       PREOPERATIVE DIAGNOSIS:  L5-S1 spondylolisthesis      POSTOPERATIVE DIAGNOSIS:  L5-S1 spondylolisthesis      PROCEDURES:   1.  Vascular Surgery approach for L5-S1 anterior lumbar interbody fusion.   2.  L5-S1 discectomy and anterior interbody arthrodesis for interbody fusion.   3.  Insertion of Medtronic intervertebral cage at L5-S1.  4.  Anterior instrumentation of L5-S1 with insertion of screws.  5.  Posterior approach and insertion of bilateral pedicle screws at L5 and S1 with connecting rods (Medtronic Module X).   6.  L5-S1 bilateral laminectomy and bilateral facetectomy for decompression of stenosis.   7.  L5-S1 bilateral transverse process and facet posterolateral arthrodesis and fusion with autograft and allograft.   8.  Use of [employer] robot.   9.  Use of intraoperative fluoroscopy and microscope.       ESTIMATED BLOOD LOSS:  300 mL.       INDICATIONS FOR PROCEDURE: 53 year old female with L5-S1 isthmic spondylolisthesis and foraminal stenosis.  Severe back and leg pain.  Did extensive nonoperative management without improvement.  Risks, benefits, indications and alternatives were discussed with the patient and family in detail.  All their questions were answered, and they wished to proceed with surgery.       DESCRIPTION OF PROCEDURE:  The patient was initially brought to the operating room and positioned supine.  Sterile prepping and draping procedures were performed.  Antibiotics were administered and a timeout was performed.  Please refer to Dr. Ashton's  separately dictated note regarding the Vascular Surgery approach for exposure of the anterior lumbar spine.  Once the L5-S1 level had been verified with fluoroscopy, we performed a 15 blade to perform an annulotomy.  Anterior osteophytes were removed with Leksell rongeurs and the drill.  Subsequently, curets were used to perform a diskectomy, which was continued with the pituitary rongeurs. Interbody arthrodesis was performed with tim and curets.  An intervertebral body graft was filled with Medtronic Enfuse and DBM.  This was fixed into place with screws into the S1 vertebral body to allow posterior reduction.  X-ray demonstrated good placement of the graft and hardware.  Please refer to Dr. Ashton's separately dictated note regarding closure of the anterior wound.  After the anterior approach had been closed we flipped to a prone position on a Rylan table.  Sterile prepping and draping procedures were performed.  Another timeout was performed.  The PSIS pin was inserted and the [employer] Robot was registered.  A midline incision was performed.  The monopolar was used to expose the posterior elements at L5 and S1.  Utilizing robotic guidance, bilateral pedicle screws were inserted at L5 and S1.  Subsequently a combination of the high speed drill and Kerrison rongeurs were used to perform bilateral laminectomies of L5 and S1 with bilateral facetectomy.  Bilaterally the Kerrison rongeurs were used to perform foraminotomies and decompress the nerve roots.  After the laminectomy was performed and hemostasis was achieved,  we performed antibiotic irrigation.  Arthrodesis was then performed of the bilateral transverse processes, posterolateral graft was placed with autograft and allograft.  Rods were applied and set screws were placed with reduction of the spondylolisthesis.  The fascial layer was closed.  The dermal layer was closed with 2-0 vicryl sutures, and the skin was closed.  There were no intraprocedural  complications.

## 2024-05-23 ENCOUNTER — APPOINTMENT (OUTPATIENT)
Dept: PHYSICAL THERAPY | Facility: CLINIC | Age: 54
DRG: 454 | End: 2024-05-23
Attending: NEUROLOGICAL SURGERY
Payer: COMMERCIAL

## 2024-05-23 LAB
ANION GAP SERPL CALCULATED.3IONS-SCNC: 9 MMOL/L (ref 7–15)
BASOPHILS # BLD AUTO: 0 10E3/UL (ref 0–0.2)
BASOPHILS NFR BLD AUTO: 0 %
BUN SERPL-MCNC: 12.4 MG/DL (ref 6–20)
CALCIUM SERPL-MCNC: 8.1 MG/DL (ref 8.6–10)
CHLORIDE SERPL-SCNC: 102 MMOL/L (ref 98–107)
CREAT SERPL-MCNC: 0.9 MG/DL (ref 0.51–0.95)
DEPRECATED HCO3 PLAS-SCNC: 24 MMOL/L (ref 22–29)
EGFRCR SERPLBLD CKD-EPI 2021: 76 ML/MIN/1.73M2
EOSINOPHIL # BLD AUTO: 0 10E3/UL (ref 0–0.7)
EOSINOPHIL NFR BLD AUTO: 0 %
ERYTHROCYTE [DISTWIDTH] IN BLOOD BY AUTOMATED COUNT: 15.1 % (ref 10–15)
GLUCOSE BLDC GLUCOMTR-MCNC: 132 MG/DL (ref 70–99)
GLUCOSE BLDC GLUCOMTR-MCNC: 177 MG/DL (ref 70–99)
GLUCOSE BLDC GLUCOMTR-MCNC: 331 MG/DL (ref 70–99)
GLUCOSE SERPL-MCNC: 207 MG/DL (ref 70–99)
HCT VFR BLD AUTO: 31.6 % (ref 35–47)
HGB BLD-MCNC: 10.1 G/DL (ref 11.7–15.7)
IMM GRANULOCYTES # BLD: 0 10E3/UL
IMM GRANULOCYTES NFR BLD: 0 %
LYMPHOCYTES # BLD AUTO: 2 10E3/UL (ref 0.8–5.3)
LYMPHOCYTES NFR BLD AUTO: 23 %
MCH RBC QN AUTO: 27.7 PG (ref 26.5–33)
MCHC RBC AUTO-ENTMCNC: 32 G/DL (ref 31.5–36.5)
MCV RBC AUTO: 87 FL (ref 78–100)
MONOCYTES # BLD AUTO: 0.5 10E3/UL (ref 0–1.3)
MONOCYTES NFR BLD AUTO: 5 %
NEUTROPHILS # BLD AUTO: 6.4 10E3/UL (ref 1.6–8.3)
NEUTROPHILS NFR BLD AUTO: 72 %
NRBC # BLD AUTO: 0 10E3/UL
NRBC BLD AUTO-RTO: 0 /100
PLATELET # BLD AUTO: 171 10E3/UL (ref 150–450)
POTASSIUM SERPL-SCNC: 4.1 MMOL/L (ref 3.4–5.3)
RBC # BLD AUTO: 3.64 10E6/UL (ref 3.8–5.2)
SODIUM SERPL-SCNC: 135 MMOL/L (ref 135–145)
WBC # BLD AUTO: 8.9 10E3/UL (ref 4–11)

## 2024-05-23 PROCEDURE — 120N000001 HC R&B MED SURG/OB

## 2024-05-23 PROCEDURE — 85025 COMPLETE CBC W/AUTO DIFF WBC: CPT | Performed by: NURSE PRACTITIONER

## 2024-05-23 PROCEDURE — 99207 PR NO BILLABLE SERVICE THIS VISIT: CPT | Performed by: HOSPITALIST

## 2024-05-23 PROCEDURE — 80048 BASIC METABOLIC PNL TOTAL CA: CPT | Performed by: NURSE PRACTITIONER

## 2024-05-23 PROCEDURE — 250N000013 HC RX MED GY IP 250 OP 250 PS 637: Performed by: NURSE PRACTITIONER

## 2024-05-23 PROCEDURE — 250N000011 HC RX IP 250 OP 636: Performed by: NURSE PRACTITIONER

## 2024-05-23 PROCEDURE — 250N000012 HC RX MED GY IP 250 OP 636 PS 637: Performed by: NURSE PRACTITIONER

## 2024-05-23 PROCEDURE — 99222 1ST HOSP IP/OBS MODERATE 55: CPT | Performed by: NURSE PRACTITIONER

## 2024-05-23 PROCEDURE — 97110 THERAPEUTIC EXERCISES: CPT | Mod: GP | Performed by: PHYSICAL THERAPIST

## 2024-05-23 PROCEDURE — 97112 NEUROMUSCULAR REEDUCATION: CPT | Mod: GP | Performed by: PHYSICAL THERAPIST

## 2024-05-23 PROCEDURE — 97116 GAIT TRAINING THERAPY: CPT | Mod: GP | Performed by: PHYSICAL THERAPIST

## 2024-05-23 PROCEDURE — 36415 COLL VENOUS BLD VENIPUNCTURE: CPT | Performed by: NURSE PRACTITIONER

## 2024-05-23 PROCEDURE — 97162 PT EVAL MOD COMPLEX 30 MIN: CPT | Mod: GP | Performed by: PHYSICAL THERAPIST

## 2024-05-23 PROCEDURE — 97530 THERAPEUTIC ACTIVITIES: CPT | Mod: GP | Performed by: PHYSICAL THERAPIST

## 2024-05-23 RX ORDER — NICOTINE POLACRILEX 4 MG
15-30 LOZENGE BUCCAL
Status: DISCONTINUED | OUTPATIENT
Start: 2024-05-23 | End: 2024-05-25 | Stop reason: HOSPADM

## 2024-05-23 RX ORDER — DEXTROSE MONOHYDRATE 25 G/50ML
25-50 INJECTION, SOLUTION INTRAVENOUS
Status: DISCONTINUED | OUTPATIENT
Start: 2024-05-23 | End: 2024-05-25 | Stop reason: HOSPADM

## 2024-05-23 RX ADMIN — OXYCODONE HYDROCHLORIDE 10 MG: 5 TABLET ORAL at 03:46

## 2024-05-23 RX ADMIN — CEFAZOLIN SODIUM 2 G: 2 INJECTION, SOLUTION INTRAVENOUS at 22:35

## 2024-05-23 RX ADMIN — CEFAZOLIN SODIUM 2 G: 2 INJECTION, SOLUTION INTRAVENOUS at 14:58

## 2024-05-23 RX ADMIN — SENNOSIDES AND DOCUSATE SODIUM 1 TABLET: 50; 8.6 TABLET ORAL at 20:33

## 2024-05-23 RX ADMIN — ATORVASTATIN CALCIUM 20 MG: 20 TABLET, FILM COATED ORAL at 20:33

## 2024-05-23 RX ADMIN — CEFAZOLIN SODIUM 2 G: 2 INJECTION, SOLUTION INTRAVENOUS at 06:44

## 2024-05-23 RX ADMIN — INSULIN ASPART 1 UNITS: 100 INJECTION, SOLUTION INTRAVENOUS; SUBCUTANEOUS at 11:49

## 2024-05-23 RX ADMIN — SENNOSIDES AND DOCUSATE SODIUM 1 TABLET: 50; 8.6 TABLET ORAL at 08:49

## 2024-05-23 RX ADMIN — TRAZODONE HYDROCHLORIDE 300 MG: 100 TABLET ORAL at 22:15

## 2024-05-23 RX ADMIN — FAMOTIDINE 20 MG: 20 TABLET, FILM COATED ORAL at 08:45

## 2024-05-23 RX ADMIN — LISINOPRIL 10 MG: 10 TABLET ORAL at 08:45

## 2024-05-23 RX ADMIN — ACETAMINOPHEN 975 MG: 325 TABLET, FILM COATED ORAL at 15:46

## 2024-05-23 RX ADMIN — OXYCODONE HYDROCHLORIDE 10 MG: 5 TABLET ORAL at 10:08

## 2024-05-23 RX ADMIN — OXYCODONE HYDROCHLORIDE 10 MG: 5 TABLET ORAL at 20:33

## 2024-05-23 RX ADMIN — GABAPENTIN 300 MG: 300 CAPSULE ORAL at 22:15

## 2024-05-23 RX ADMIN — ACETAMINOPHEN 975 MG: 325 TABLET, FILM COATED ORAL at 08:45

## 2024-05-23 RX ADMIN — INSULIN ASPART 2 UNITS: 100 INJECTION, SOLUTION INTRAVENOUS; SUBCUTANEOUS at 17:38

## 2024-05-23 RX ADMIN — POLYETHYLENE GLYCOL 3350 17 G: 17 POWDER, FOR SOLUTION ORAL at 08:44

## 2024-05-23 ASSESSMENT — ACTIVITIES OF DAILY LIVING (ADL)
ADLS_ACUITY_SCORE: 28
ADLS_ACUITY_SCORE: 26
ADLS_ACUITY_SCORE: 28
ADLS_ACUITY_SCORE: 26
ADLS_ACUITY_SCORE: 28
ADLS_ACUITY_SCORE: 26
ADLS_ACUITY_SCORE: 28
ADLS_ACUITY_SCORE: 26
ADLS_ACUITY_SCORE: 28
ADLS_ACUITY_SCORE: 26
ADLS_ACUITY_SCORE: 28
ADLS_ACUITY_SCORE: 26
ADLS_ACUITY_SCORE: 26
ADLS_ACUITY_SCORE: 28

## 2024-05-23 NOTE — PROGRESS NOTES
Patient has spondylolisthesis of lumbar region and underwent L5-S1 anterior lumbar interbody fusion  By neurosurgery and vascular surgery  She has a past medical history of hyperlipidemia, history of DVT/PE in 2012 on Xarelto, insomnia, asthma.    Resume Xarelto as per neurosurgery.  Her other home meds have been reordered by neurosurgery      Reviewed patient's vitals    Full medicine consult tomorrow

## 2024-05-23 NOTE — PROGRESS NOTES
Neurosurgery progress note    Patient states her right leg weakness has nearly resolved and she is very pleased about that.  She is having postoperative back pain and abdominal pain as expected.    Drain with 150 mL out overnight    Exam    Alert and oriented no acute distress  Bilateral lower extremities with 5 5 strength including dorsiflexion plantarflexion bilaterally   bandages clean dry and intact posteriorly  Anterior abdominal incision covered with Prevena suction sponge    Assessment    Postop day 1 L5-S1 anterior and posterior lumbar fusion with vascular surgery approach.  Dr. Reynaga, Dr. Ashton    Plan    Continue drain and antibiotics  Prevena sponge on abdomen for 7 days postop  Hold Xarelto 7 days postop  PT OT  Pain medication as needed  Appreciate hospitalist consult  SCDs for DVT prophylaxis at this time

## 2024-05-23 NOTE — PROGRESS NOTES
Vascular surgery postop check    Patient seen in PACU.  Reports moderate pain in lower abdominal incision, manageable however.     Kelly intact along incision holding seal.   Palpable DP pulses bilaterally.     Continue current plan of care.     Jamil Causey MD  Vascular surgery PGY 3

## 2024-05-23 NOTE — PLAN OF CARE
Goal Outcome Evaluation:         [id number]       Summary:POD # 1 Lumbar 5 to Sacral 1 Anterior Lumbar Interbody Fusion  Lumbar 5 to Sacral 1 Robotic Posterior Spinal Fusion with right decompression    Orientation:A&OX4    Vitals/Tele: VSS ,pain managed with PRN oxycodone    IV Access/drains:PIV infusing NS @100ml/hr,SUKUMAR drain in mid back incision and Wound vac to lower abd    Diet:Regular    Mobility:AX1-2 GBW/ambulated to BR this AM    GI/:Continent of B&B,    Wound/Skin: Mid back and lower abd incision/CDI    Consults:OT/PT/Hospitalist    Discharge Plan:Pending/TBD

## 2024-05-23 NOTE — CONSULTS
"[facility]  Consult Note - Hospitalist Service  Date of Admission:  5/22/2024  Consult Requested by: Dr. Reynaga  Reason for Consult: post op medical management    Assessment & Plan   Lima Bueno is a 53 year old female with significant past medical history for type 2 diabetes mellitus, hypertension, hyperlipidemia, obesity, asthma and DVT on chronic anticoagulation with Xarelto who is now status post L5-S1 discectomy and fusion completed by Dr. Reynaga and Daisha on 5/22/2024.  Hospitalist are consulted for medical postoperative management.    L5-S1 Spondylolisthesis  -Vascular Surgery approach for L5-S1 anterior lumbar interbody fusion. L5-S1 discectomy and anterior interbody arthrodesis for interbody fusion completed by Dr. Reynaga on 5/22/2024.  -Review of operative report with estimated blood loss of 300 mL leaving operating room in stable condition.  -Pain management, therapy management, and initiation of postoperative anticoagulation per the attending surgical team.    Type 2 diabetes mellitus  -Well-controlled with last hemoglobin A1c 6.1  -Typically takes Ozempic will be on hold while admitted  -While admitted ACHS glucometers and sliding scale with goal to keep blood sugars less than 180    Hypertension  -Continue lisinopril with hold parameters    Hyperlipidemia  -Continue statin    Insomnia  -Continue home trazodone    Bowel regimen  -Senokot       The patient's care was discussed with the Patient, Patient's Family, and Dr. Britton .    Clinically Significant Risk Factors Present on Admission               # Drug Induced Coagulation Defect: home medication list includes an anticoagulant medication    # Hypertension: Noted on problem list      # Obesity: Estimated body mass index is 37.61 kg/m  as calculated from the following:    Height as of this encounter: 1.651 m (5' 5\").    Weight as of this encounter: 102.5 kg (226 lb).       # Asthma: noted on problem list        Maggie " CHRISTIANO Levi [facility]  Hospitalist Service  Securely message with [employer] (more info)  Text page via [facility] Paging/Directory   ______________________________________________________________________    Chief Complaint   Back pain, leg pain     History is obtained from the patient    History of Present Illness   Lima Bueno is a 53 year old female with significant past medical history for type 2 diabetes mellitus, hypertension, hyperlipidemia, obesity, asthma and DVT on chronic anticoagulation with Xarelto who is now status post L5-S1 discectomy and fusion completed by Dr. Reynaga and Daisha on 5/22/2024.  Patient did have preoperative evaluation completed on 5/7/2024 by primary care provider who found that patient was medically optimized for said procedure.  Review of operative record reveals no complications with estimated blood loss 300 mL.  Hospitalist are consulted for medical postoperative management.    Patient is seen this morning in her room with her granddaughter at bedside.  Patient is alert, oriented, very pleasant.  She does not appear to be in distress.  She is having pain however states that it feels more as time goes on as a surgical type pain rather than the severe pain that was shooting down her leg with the numbness and tingling.  She denies any fever or chills.  She denies shortness of breath or chest pain.  She denies any nausea, vomiting, or abdominal pain.      Past Medical History    Past Medical History:   Diagnosis Date    ADD (attention deficit disorder with hyperactivity)     Asthma     Breast mass 11/01/2012    Patient yet to have biopsy as of 11/1/2012      DVT (deep venous thrombosis) (H) 10/2012    right leg    Hypertension     LSIL (low grade squamous intraepithelial lesion) on Pap smear 02/19/2014    Neg high risk HPV    Obese     Pulmonary embolism (H) 2012    Saw Dr [place], protein S deficiency       Past Surgical History   Past Surgical History:   Procedure Laterality Date     ANKLE SURGERY Right 2019    COLONOSCOPY N/A 12/10/2020    Procedure: COLONOSCOPY, WITH POLYPECTOMY AND BIOPSY;  Surgeon: Osorio Dunn MD;  Location:  GI       Medications   I have reviewed this patient's current medications       Social History   I have reviewed this patient's social history and updated it with pertinent information if needed.  Social History     Tobacco Use    Smoking status: Some Days     Current packs/day: 0.00     Average packs/day: 1 pack/day for 30.0 years (30.0 ttl pk-yrs)     Types: Cigarettes     Last attempt to quit: 3/25/2024     Years since quittin.1    Smokeless tobacco: Never   Vaping Use    Vaping status: Never Used   Substance Use Topics    Alcohol use: No     Alcohol/week: 0.0 standard drinks of alcohol    Drug use: No        Physical Exam   Vital Signs: Temp: 97  F (36.1  C) Temp src: Oral BP: (!) 134/90 Pulse: 98   Resp: 18 SpO2: 94 % O2 Device: Nasal cannula Oxygen Delivery: 2 LPM  Weight: 226 lbs 0 oz    Constitutional: awake, alert, cooperative, no apparent distress, and appears stated age  Eyes: Lids and lashes normal, pupils equal, round and reactive to light, extra ocular muscles intact, sclera clear, conjunctiva normal  ENT: Normocephalic, without obvious abnormality, atraumatic, sinuses nontender on palpation, external ears without lesions, oral pharynx with moist mucous membranes, tonsils without erythema or exudates, gums normal and good dentition.  Respiratory: No increased work of breathing, good air exchange, clear to auscultation bilaterally, no crackles or wheezing  Cardiovascular: Normal apical impulse, regular rate and rhythm, normal S1 and S2, no S3 or S4, and no murmur noted  GI: No scars, normal bowel sounds, soft, non-distended, non-tender, no masses palpated, no hepatosplenomegally  Skin: no bruising or bleeding  Neurologic: Awake, alert, oriented to name, place and time.  Cranial nerves II-XII are grossly intact.  Motor is 5 out of 5  bilaterally.  Cerebellar finger to nose, heel to shin intact.  Sensory is intact.  Babinski down going, Romberg negative, and gait is normal.    Medical Decision Making   And medical standpoint per hospitalist she will be ready for discharge.    59 MINUTES SPENT BY ME on the date of service doing chart review, history, exam, documentation & further activities per the note.      Data     I have personally reviewed the following data over the past 24 hrs:    8.9  \   10.1 (L)   / 171     135 102 12.4 /  177 (H)   4.1 24 0.90 \       Imaging results reviewed over the past 24 hrs:   Recent Results (from the past 24 hour(s))   XR Surgery VIJI L/T 5 Min Fluoro w Stills    Narrative    EXAM: XR SURGERY VIJI FLUORO LESS THAN 5 MIN W STILLS  LOCATION: [facility]  DATE: 5/22/2024    INDICATION: L 5   S1 anterior  posterior  COMPARISON: None.    TECHNIQUE: Intraoperative fluoroscopy performed during the patient's procedure.    FLUOROSCOPIC TIME: 1.5  NUMBER OF IMAGES: 4    FINDINGS: Intraoperative fluoroscopic images for localization. Please see procedural note for further details.

## 2024-05-23 NOTE — ANESTHESIA POSTPROCEDURE EVALUATION
Patient: Lima Bueno    Procedure: Procedure(s):  Lumbar 5 to Sacral 1 Anterior Lumbar Interbody Fusion  Lumbar 5 to Sacral 1 Robotic Posterior Spinal Fusion with right decompression       Anesthesia Type:  General    Note:  Disposition: Admission   Postop Pain Control: Uneventful            Sign Out: Well controlled pain   PONV: No   Neuro/Psych: Uneventful            Sign Out: Acceptable/Baseline neuro status   Airway/Respiratory: Uneventful            Sign Out: Acceptable/Baseline resp. status   CV/Hemodynamics: Uneventful            Sign Out: Acceptable CV status; No obvious hypovolemia; No obvious fluid overload   Other NRE: NONE   DID A NON-ROUTINE EVENT OCCUR? No           Last vitals:  Vitals Value Taken Time   /79 05/22/24 1930   Temp 36.7  C (98.1  F) 05/22/24 1930   Pulse 101 05/22/24 1935   Resp 12 05/22/24 1935   SpO2 98 % 05/22/24 1934   Vitals shown include unfiled device data.    Electronically Signed By: Ariel May MD  May 22, 2024  7:53 PM

## 2024-05-23 NOTE — PROGRESS NOTES
05/23/24 1000   Appointment Info   Signing Clinician's Name / Credentials (PT) Mary Krueger, PT, DPT   Rehab Comments (PT) Spinal prec, SUKUMAR drain, wound vac; Reports she has baseline juvenile arthritis (not in chart)   Living Environment   People in Home   (5 people in home)   Current Living Arrangements house   Home Accessibility stairs to enter home;stairs within home   Number of Stairs, Main Entrance 1   Stair Railings, Main Entrance none   Number of Stairs, Within Home, Primary greater than 10 stairs   Stair Railings, Within Home, Primary railings safe and in good condition;railings on both sides of stairs   Transportation Anticipated family or friend will provide   Self-Care   Usual Activity Tolerance good   Current Activity Tolerance moderate   Regular Exercise No   Equipment Currently Used at Home none   Fall history within last six months no   General Information   Onset of Illness/Injury or Date of Surgery 05/22/24   Referring Physician Rufina Hernández, NP   Patient/Family Therapy Goals Statement (PT) Feel better, go home   Pertinent History of Current Problem (include personal factors and/or comorbidities that impact the POC) 53 year old female with L5-S1 isthmic spondylolisthesis and foraminal stenosis.  Severe back and leg pain.  Did extensive nonoperative management without improvement.  Lumbar 5 to Sacral 1 Anterior Lumbar Interbody Fusion  Lumbar 5 to Sacral 1 Robotic Posterior Spinal Fusion with right decompression. PMH signifcant for: juvenile arthritis, obesity, hx of DVT/PE, tobacco use disorder, ADD, asthma, among others. See chart for further details.   Existing Precautions/Restrictions fall;spinal   Cognition   Affect/Mental Status (Cognition) WNL   Orientation Status (Cognition) oriented x 4   Follows Commands (Cognition) WNL   Safety Deficit (Cognition) safety precautions follow-through/compliance;minimal deficit   Cognitive Status Comments Mild attentional issues noted, baseline  w/ADD   Pain Assessment   Patient Currently in Pain Yes, see Vital Sign flowsheet  (7.5/10)   Integumentary/Edema   Integumentary/Edema Comments Incisional area w/bandage, intact. No breakthrough bleeding   Posture    Posture Forward head position;Protracted shoulders;Lordosis   Range of Motion (ROM)   Range of Motion ROM deficits secondary to pain;ROM deficits secondary to weakness;ROM deficits secondary to surgical procedure;ROM is WFL   Strength (Manual Muscle Testing)   Strength (Manual Muscle Testing) Able to perform R SLR;Able to perform L SLR;Deficits observed during functional mobility   Bed Mobility   Comment, (Bed Mobility) SBA   Transfers   Comment, (Transfers) SBA   Gait/Stairs (Locomotion)   Comment, (Gait/Stairs) CGA   Balance   Balance Comments Needs B UE support   Sensory Examination   Sensory Perception patient reports no sensory changes   Coordination   Coordination no deficits were identified   Muscle Tone   Muscle Tone no deficits were identified   Clinical Impression   Criteria for Skilled Therapeutic Intervention Yes, treatment indicated   PT Diagnosis (PT) Safety and indep w/fn mob   Influenced by the following impairments Strength, balance, cardiovasc endurance, pain, ROM   Functional limitations due to impairments Transfers, amb, stairs   Clinical Presentation (PT Evaluation Complexity) evolving   Clinical Presentation Rationale Multiple affecting comorbidities, assessment incl strength, ROM, balance, fn mob. Evolving presentation.   Clinical Decision Making (Complexity) moderate complexity   Planned Therapy Interventions (PT) balance training;bed mobility training;gait training;neuromuscular re-education;postural re-education;patient/family education;home exercise program;strengthening;stair training;transfer training;progressive activity/exercise;risk factor education;home program guidelines   Risk & Benefits of therapy have been explained evaluation/treatment results reviewed;care  plan/treatment goals reviewed;risks/benefits reviewed;current/potential barriers reviewed;participants voiced agreement with care plan;participants included;patient;caregiver  (grandchild)   PT Total Evaluation Time   PT Eval, Moderate Complexity Minutes (24766) 10   Physical Therapy Goals   PT Frequency 2x/day   PT Predicted Duration/Target Date for Goal Attainment 06/02/24   PT Goals Bed Mobility;Transfers;Gait;Stairs;Aerobic Activity;PT Goal 1;PT Goal 2   PT: Bed Mobility Modified independent   PT: Transfers Modified independent   PT: Gait Modified independent;Supervision/stand-by assist   PT: Stairs Supervision/stand-by assist   PT: Perform aerobic activity with stable cardiovascular response 5 minutes;10 minutes;intermittent activity;continuous activity   PT: Goal 1 Pt will be indep w/HEP consisting of walking program and LE strengthening exercises.   PT: Goal 2 Pt will be able to state and follow during fn mobility all post-surgical precautions.   Interventions   Interventions Quick Adds Gait Training;Therapeutic Activity;Therapeutic Procedure   Therapeutic Procedure/Exercise   Ther. Procedure: strength, endurance, ROM, flexibillity Minutes (05090) 8   Symptoms Noted During/After Treatment fatigue   Treatment Detail/Skilled Intervention In standing pt completed heel lifts and mini march 2x 2 min, Pt completed APs in supine, LAQs in sitting-educated pt on benefit of AROM for circulation and reduced stiffness, strengthening principles. Tolerated well, very motivated.   Therapeutic Activity   Therapeutic Activities: dynamic activities to improve functional performance Minutes (01637) 10   Symptoms Noted During/After Treatment Fatigue;Increased pain   Treatment Detail/Skilled Intervention Skilled instruction on sup>sit with UE support. Pt able to push off using shoulders, SBA for coming to EOB. Mild dizziness reported, improved with seated rest and time. VSS. Sit<>stand from bed, patient pulling up on walker, cued  for hand placement. Education with posture and body mechanics for standing and stand pivot transfer to recliner. Sequencing with surgical leg and use of UEs on chair armrests for safety. Patient followed cues well, good recall. Encouraged to ambulate outside of therapy, use of call light. Chair alarm on, call light in reach. RN updated.   Gait Training   Gait Training Minutes (83501) 15   Symptoms Noted During/After Treatment (Gait Training) fatigue;increased pain   Treatment Detail/Skilled Intervention Gait training focusing on mechanics and indep with 2WW. Pt amb 200 with FWW, CGA. Chair follow provided by [facility]. Heavy B UE support. Verbal cues for posture, technique with turning with walker. No losses of balance, no knee buckling. Patient ambulated up/down 4 steps with B railings using step to pattern and cues for sequencing. Tolerated well, CGA, slow but steady, no knee buckling. Cues for pursed lip breathing.   Distance in Feet 200   PT Discharge Planning   PT Plan Progress indep and safety w/transfers, amb, progress stairs (did 4 so far) , give handout for walking program and progress HEP w/LE strengthening exercises   PT Discharge Recommendation (DC Rec) home with assist;home with home care physical therapy   PT Rationale for DC Rec Pt should do well with post-operative cares. She completed bed mob, transfers w/SBA, amb 200ft w/2WW and CGA, and stair negotiation w/CGA. No vaughn LOB and no bucklng, has supportive family willing and able to assist at d/c. She persists w/high pain ~7/10 in low back area and baseline juvenile arthritis, stiff and painful with B LEs and trunk. Would benefit from supervision from family, issuance of 2WW (order placed), and HHPT to maximize fn outcomes.   PT Brief overview of current status SB-CG w/2WW   PT Equipment Needed at Discharge walker, rolling  (Order placed)   Total Session Time   Timed Code Treatment Minutes 33   Total Session Time (sum of timed and untimed services)  43

## 2024-05-24 ENCOUNTER — TELEPHONE (OUTPATIENT)
Dept: FAMILY MEDICINE | Facility: CLINIC | Age: 54
End: 2024-05-24

## 2024-05-24 ENCOUNTER — APPOINTMENT (OUTPATIENT)
Dept: OCCUPATIONAL THERAPY | Facility: CLINIC | Age: 54
DRG: 454 | End: 2024-05-24
Attending: NURSE PRACTITIONER
Payer: COMMERCIAL

## 2024-05-24 LAB
GLUCOSE BLDC GLUCOMTR-MCNC: 115 MG/DL (ref 70–99)
GLUCOSE BLDC GLUCOMTR-MCNC: 141 MG/DL (ref 70–99)
GLUCOSE BLDC GLUCOMTR-MCNC: 184 MG/DL (ref 70–99)
GLUCOSE BLDC GLUCOMTR-MCNC: 194 MG/DL (ref 70–99)

## 2024-05-24 PROCEDURE — 250N000013 HC RX MED GY IP 250 OP 250 PS 637: Performed by: NURSE PRACTITIONER

## 2024-05-24 PROCEDURE — 99233 SBSQ HOSP IP/OBS HIGH 50: CPT

## 2024-05-24 PROCEDURE — 120N000001 HC R&B MED SURG/OB

## 2024-05-24 PROCEDURE — 250N000011 HC RX IP 250 OP 636: Performed by: NURSE PRACTITIONER

## 2024-05-24 PROCEDURE — 97110 THERAPEUTIC EXERCISES: CPT | Mod: GP | Performed by: PHYSICAL THERAPIST

## 2024-05-24 PROCEDURE — 97165 OT EVAL LOW COMPLEX 30 MIN: CPT | Mod: GO

## 2024-05-24 PROCEDURE — 97116 GAIT TRAINING THERAPY: CPT | Mod: GP | Performed by: PHYSICAL THERAPIST

## 2024-05-24 PROCEDURE — 99207 PR NO BILLABLE SERVICE THIS VISIT: CPT | Performed by: HOSPITALIST

## 2024-05-24 PROCEDURE — 97535 SELF CARE MNGMENT TRAINING: CPT | Mod: GO

## 2024-05-24 PROCEDURE — 250N000013 HC RX MED GY IP 250 OP 250 PS 637

## 2024-05-24 PROCEDURE — 97530 THERAPEUTIC ACTIVITIES: CPT | Mod: GP | Performed by: PHYSICAL THERAPIST

## 2024-05-24 RX ORDER — GUAIFENESIN 600 MG/1
1200 TABLET, EXTENDED RELEASE ORAL 2 TIMES DAILY
Status: DISCONTINUED | OUTPATIENT
Start: 2024-05-24 | End: 2024-05-25 | Stop reason: HOSPADM

## 2024-05-24 RX ADMIN — OXYCODONE HYDROCHLORIDE 10 MG: 5 TABLET ORAL at 16:34

## 2024-05-24 RX ADMIN — ATORVASTATIN CALCIUM 20 MG: 20 TABLET, FILM COATED ORAL at 21:05

## 2024-05-24 RX ADMIN — ACETAMINOPHEN 975 MG: 325 TABLET, FILM COATED ORAL at 16:33

## 2024-05-24 RX ADMIN — ACETAMINOPHEN 975 MG: 325 TABLET, FILM COATED ORAL at 08:38

## 2024-05-24 RX ADMIN — OXYCODONE HYDROCHLORIDE 10 MG: 5 TABLET ORAL at 12:20

## 2024-05-24 RX ADMIN — OXYCODONE HYDROCHLORIDE 10 MG: 5 TABLET ORAL at 08:39

## 2024-05-24 RX ADMIN — INSULIN ASPART 1 UNITS: 100 INJECTION, SOLUTION INTRAVENOUS; SUBCUTANEOUS at 08:45

## 2024-05-24 RX ADMIN — SENNOSIDES AND DOCUSATE SODIUM 1 TABLET: 50; 8.6 TABLET ORAL at 08:39

## 2024-05-24 RX ADMIN — FAMOTIDINE 20 MG: 20 TABLET, FILM COATED ORAL at 08:39

## 2024-05-24 RX ADMIN — FLUTICASONE PROPIONATE AND SALMETEROL XINAFOATE 2 PUFF: 230; 21 AEROSOL, METERED RESPIRATORY (INHALATION) at 08:44

## 2024-05-24 RX ADMIN — GUAIFENESIN 1200 MG: 600 TABLET, EXTENDED RELEASE ORAL at 12:20

## 2024-05-24 RX ADMIN — SENNOSIDES AND DOCUSATE SODIUM 1 TABLET: 50; 8.6 TABLET ORAL at 21:04

## 2024-05-24 RX ADMIN — CEFAZOLIN SODIUM 2 G: 2 INJECTION, SOLUTION INTRAVENOUS at 07:13

## 2024-05-24 RX ADMIN — ACETAMINOPHEN 975 MG: 325 TABLET, FILM COATED ORAL at 00:06

## 2024-05-24 RX ADMIN — TRAZODONE HYDROCHLORIDE 300 MG: 100 TABLET ORAL at 21:04

## 2024-05-24 RX ADMIN — POLYETHYLENE GLYCOL 3350 17 G: 17 POWDER, FOR SOLUTION ORAL at 08:38

## 2024-05-24 RX ADMIN — GABAPENTIN 300 MG: 300 CAPSULE ORAL at 21:04

## 2024-05-24 RX ADMIN — HYDROXYZINE HYDROCHLORIDE 25 MG: 25 TABLET, FILM COATED ORAL at 08:39

## 2024-05-24 RX ADMIN — GUAIFENESIN 1200 MG: 600 TABLET, EXTENDED RELEASE ORAL at 21:04

## 2024-05-24 RX ADMIN — LISINOPRIL 10 MG: 10 TABLET ORAL at 08:39

## 2024-05-24 RX ADMIN — INSULIN ASPART 1 UNITS: 100 INJECTION, SOLUTION INTRAVENOUS; SUBCUTANEOUS at 12:31

## 2024-05-24 RX ADMIN — FLUTICASONE PROPIONATE AND SALMETEROL XINAFOATE 2 PUFF: 230; 21 AEROSOL, METERED RESPIRATORY (INHALATION) at 21:06

## 2024-05-24 ASSESSMENT — ACTIVITIES OF DAILY LIVING (ADL)
ADLS_ACUITY_SCORE: 32
ADLS_ACUITY_SCORE: 28
ADLS_ACUITY_SCORE: 32
ADLS_ACUITY_SCORE: 28
PREVIOUS_RESPONSIBILITIES: MEAL PREP;HOUSEKEEPING;LAUNDRY;SHOPPING;YARDWORK;MEDICATION MANAGEMENT;FINANCES;DRIVING;SCHOOL;WORK
ADLS_ACUITY_SCORE: 32

## 2024-05-24 NOTE — PROGRESS NOTES
Neurosurgery Progress Note:     POD# 2 s/p L5-S1 anterior and posterior lumbar fusion with vascular surgery approach with Dr. Reynaga.    24 hours events: No events overnight.  Patient complaining of bilateral hip pain likely secondary to prolonged positioning in OR.  Preoperative right leg weakness has resolved.     On exam:  Alert and oriented no acute distress  Bilateral lower extremities with 5/5 strength including dorsiflexion plantarflexion bilaterally   bandages clean dry and intact posteriorly  Anterior abdominal incision covered with Prevena suction sponge  Incision: Posterior dressing removed incision signs of infection.  Closed with staples.     PLAN:  -Discontinue posterior surgical drain  -PT/OT-recs appreciated  -Updated [facility] paperwork  -Hold Xarelto 7 days postoperatively  -Prevena sponge on abdomen for 7 days postop     Glen Kim DNP  [facility]  [address] [address] [phone]    Plan discussed with Dr. Ronnell Ocampo Disclosure   This was created at least in part with a voice recognition software. Mistakes/typos may be present.

## 2024-05-24 NOTE — PLAN OF CARE
Goal Outcome Evaluation:  Patient vital signs are at baseline: yes  Patient able to ambulate as they were prior to admission or with assist devices provided by therapies during their stay:  yes  Patient MUST void prior to discharge:  yes  Patient able to tolerate oral intake:  yes  Pain has adequate pain control using Oral analgesics:  yes  Does patient have an identified :  yes  Has goal D/C date and time been discussed with patient:  TBD  Alert and oriented,  assist of one with walker and gait belt. SUKUMAR placed on back  incision and wound vac on abdomen,  Pain managed with prn oxycodone. Wound dressing CDI, CMS intact. Discharge pending.

## 2024-05-24 NOTE — PROGRESS NOTES
05/24/24 1102   Appointment Info   Signing Clinician's Name / Credentials (OT) JACQUE Palacios   Student Supervision Direct Patient Contact Provided   Rehab Comments (OT) 1 eval, 1 self care   Living Environment   People in Home child(collins), adult;grandchild(collins)  (5 total in house)   Current Living Arrangements house   Home Accessibility stairs to enter home;stairs within home   Number of Stairs, Main Entrance 1   Stair Railings, Main Entrance none   Number of Stairs, Within Home, Primary greater than 10 stairs   Stair Railings, Within Home, Primary railings safe and in good condition;railings on both sides of stairs   Transportation Anticipated family or friend will provide   Living Environment Comments Pt lives in multi story home with multiple children and grandchildren. Pt does not have AE at home or in bathroom.   Self-Care   Usual Activity Tolerance good   Current Activity Tolerance moderate   Regular Exercise No   Equipment Currently Used at Home none   Fall history within last six months no   Activity/Exercise/Self-Care Comment Pt ind in ADLs at baseline. Pt drives, but will not drive after surgery. Pt's grandchildren or children will provide support in ADLs when home.   Instrumental Activities of Daily Living (IADL)   Previous Responsibilities meal prep;housekeeping;laundry;shopping;yardwork;medication management;finances;driving;school;work   IADL Comments    General Information   Onset of Illness/Injury or Date of Surgery 05/22/24   Referring Physician Rufina Hernández NP   Patient/Family Therapy Goal Statement (OT) Increase independence in ADLs, go home   Additional Occupational Profile Info/Pertinent History of Current Problem Lima Bueno is a 53 year old female with significant past medical history for type 2 diabetes mellitus, hypertension, hyperlipidemia, obesity, asthma and DVT on chronic anticoagulation with Xarelto who is now status post L5-S1 discectomy and  fusion completed by Dr. Reynaga and Daisha on 5/22/2024. POD1   Existing Precautions/Restrictions spinal;fall;lifting   Cognitive Status Examination   Orientation Status orientation to person, place and time   Behavioral Issues verbal outbursts   Affect/Mental Status (Cognitive) agitated   Visual Perception   Visual Impairment/Limitations WFL   Sensory   Sensory Comments Previous numbness in BLE, reports that this has resolved since surgery   Pain Assessment   Patient Currently in Pain Yes, see Vital Sign flowsheet  (5/10)   Range of Motion Comprehensive   General Range of Motion bilateral upper extremity ROM WFL   Strength Comprehensive (MMT)   General Manual Muscle Testing (MMT) Assessment lower extremity strength deficits identified   Comment, General Manual Muscle Testing (MMT) Assessment secondary to surgical procedure, pain, precautions   Coordination   Upper Extremity Coordination No deficits were identified   Bed Mobility   Bed Mobility sit-supine   Sit-Supine [place] (Bed Mobility) modified independence   Assistive Device (Bed Mobility) bed rails   Transfers   Transfers sit-stand transfer;toilet transfer;shower transfer   Sit-Stand Transfer   Sit-Stand [place] (Transfers) contact guard   Assistive Device (Sit-Stand Transfers) walker, front-wheeled   Shower Transfer   Type (Shower Transfer) lateral   [place] Level (Shower Transfer) minimum assist (75% patient effort)   Assistive Device (Shower Transfer) tub transfer bench   Shower Transfer Comments Per clinical judgement   Toilet Transfer   Type (Toilet Transfer) stand-sit;sit-stand   [place] Level (Toilet Transfer) contact guard   Assistive Device (Toilet Transfer) walker, front-wheeled;grab bars/safety frame   Activities of Daily Living   BADL Assessment/Intervention lower body dressing;toileting;grooming;bathing   Lower Body Dressing Assessment/Training   Position (Lower Body Dressing) edge of bed sitting   Assistive Devices (Lower Body  Dressing) sock-aid;reacher   [place] Level (Lower Body Dressing) set up;verbal cues;socks   Grooming Assessment/Training   Position (Grooming) sink side   Assistive Devices (Grooming)   (FWW)   [place] Level (Grooming) contact guard assist   Comment, (Grooming) Per clinical judgement   Toileting   Position (Toileting) supported standing   Assistive Devices (Toileting) grab bar, toilet   Comment, (Toileting) dependent assist d/t spinal precautions   [place] Level (Toileting) perform perineal hygiene;dependent (less than 25% patient effort)   Clinical Impression   Criteria for Skilled Therapeutic Interventions Met (OT) Yes, treatment indicated   OT Diagnosis Decreased OT independence   OT Problem List-Impairments impacting ADL activity tolerance impaired;post-surgical precautions;pain   Assessment of Occupational Performance 1-3 Performance Deficits   Identified Performance Deficits TB dressing, bathing, toileting   Planned Therapy Interventions (OT) ADL retraining;IADL retraining;transfer training;other (see comments)  (AE education)   Clinical Decision Making Complexity (OT) problem focused assessment/low complexity   Risk & Benefits of therapy have been explained patient  (grand daughter)   OT Total Evaluation Time   OT Eval, Low Complexity Minutes (91599) 10   OT Goals   Therapy Frequency (OT) Daily   OT Predicted Duration/Target Date for Goal Attainment 05/31/24   OT Goals Lower Body Dressing;Toilet Transfer/Toileting;Hygiene/Grooming;Transfers   OT: Hygiene/Grooming within precautions;using adaptive equipment  (CGA)   OT: Lower Body Dressing using adaptive equipment;within precautions;including set-up/clothing retrieval   OT: Transfer Minimal assist;with assistive device;within precautions  (tub transfer,)   OT: Toilet Transfer/Toileting using adaptive equipment;within precautions  (CGA)   Interventions   Interventions Quick Adds Self-Care/Home Management   Self-Care/Home Management  "  Self-Care/Home Mgmt/ADL, Compensatory, Meal Prep Minutes (18613) 10   Symptoms Noted During/After Treatment (Meal Preparation/Planning Training) increased pain   Treatment Detail/Skilled Intervention Pt greeted sitting SOB, granddaughter attempting to put gait belt on to get out of bed, initially not agreeable to OT, pt receptive to participating in dressing and toileting. Bed alarm was not on, pt attempting to get out of bed. Pt was feeling \"agitated about wait times\" and expressing frustration. Pt reported needing to use the bathroom, therapist continued putting on gait belt, pt performed sit to stand w FWW and CGA. Pt ambulated to bathroom, performed stand to sit w FWW, side rails, and CGA, with BSC overlay. Pt completed sit to stand w FWW, CGA, and required dependent assistance for pericares d/t surgical precautions. Pt will have assistance w this at home. Pt ambulated back to bed w FWW and CGA. Pt performed stand to sit in bed w FWW, CGA, VCs for spinal precautions. Pt edu on AE to use while dressing. Pt doffed socks w set up assist and reacher, VCs given for techniques. Pt donned socks w set up assist, sock aid, and VCs for techniques. Pt became agitated and frustrasted with AE recommendation discussion d/t potential costs, OT provided with additional recs for family assist, pt reports ulimately her goal is to be ind as possible in self - pt edu on where to obtain AE, provided with AE handout. Pt performed sit to supine w Mod I, using bed rails as support. pt left supine in bed, all needs met, bed alarm on, call light in reach. RN updated.   OT Discharge Planning   OT Plan TB dressing w AE, tub transfers, standing tolerance w g/h tasks sink side   OT Discharge Recommendation (DC Rec) home with assist   OT Rationale for DC Rec Pt functioning below baseline in ADLs d/t decreased activity tolerance and pain s/p lumbar surgery. Pt currently requiring CGA assist while ambulating and STS transfers. Per clinical " judgement, anticipate needed min assist w tub bench for shower transfers. Pt has multiple supports at home who will assist with ADLs as needed. Recommend pt obtain hip kit, rasied toilet seat, tub bench for safe completion of ADLs within precautions. pt reports plans to obtain as able.   OT Brief overview of current status CGA and FWW for STS transfers, set up and AE for dressing, anticipate min assist w tub bench for tub transfer   OT Equipment Needed at Discharge dressing equipment;hip kit;raised toilet seat;tub bench   Total Session Time   Timed Code Treatment Minutes 10   Total Session Time (sum of timed and untimed services) 20

## 2024-05-24 NOTE — PROGRESS NOTES
"Provider Julio paged.  \"Pt has an order for her drain to be pulled but no orders around output to do that? She had 45ml out last shift and its usually under 30ml. Do you want me to go forward with pulling the SUKUMAR?\"  "

## 2024-05-24 NOTE — PLAN OF CARE
Goal Outcome Evaluation:  Patient vital signs are at baseline:Yes HR is tachycardia   Patient able to ambulate as they were prior to admission or with assist devices provided by therapies during their stay: Yes 1 assist gbw   Patient MUST void prior to discharge:yes bathroom   Patient able to tolerate oral intake:Yes mod carb diet   Pain has adequate pain control using Oral analgesics: Oxycodone 10 mg  x 2  Does patient have an identified :Yes   Has goal D/C date and time been discussed with patient:Pending   Pt is alert and oriented x4, VSS on RA, no observation of chest pain , shortness  of breath, PIV SL, skin intact , two drains, wound vac in placed below abd , J.P. in place at incision site at the back. She is blood sugar check.  Family is at bedside.

## 2024-05-24 NOTE — CONSULTS
Care Management Initial Consult    General Information  Assessment completed with: Patient, Family, Pt, Granddtr  Type of CM/SW Visit: Initial Assessment    Primary Care Provider verified and updated as needed: Yes   Readmission within the last 30 days:        Reason for Consult: discharge planning  Advance Care Planning:            Communication Assessment  Patient's communication style: spoken language (English or Bilingual)    Hearing Difficulty or Deaf: no   Wear Glasses or Blind: yes    Cognitive  Cognitive/Neuro/Behavioral: WDL  Level of Consciousness: alert  Arousal Level: opens eyes spontaneously  Orientation: oriented x 4        Speech: clear, spontaneous    Living Environment:   People in home: child(collins), adult, grandchild(collins)     Current living Arrangements: house      Able to return to prior arrangements:         Family/Social Support:  Care provided by: self, child(collins)  Provides care for: no one  Marital Status: Single             Description of Support System:           Current Resources:   Patient receiving home care services: No     Community Resources:    Equipment currently used at home: none  Supplies currently used at home:      Employment/Financial:  Employment Status:          Financial Concerns:             Does the patient's insurance plan have a 3 day qualifying hospital stay waiver?  No    Lifestyle & Psychosocial Needs:  Social Determinants of Health     Food Insecurity: Low Risk  (12/22/2023)    Food Insecurity     Within the past 12 months, did you worry that your food would run out before you got money to buy more?: No     Within the past 12 months, did the food you bought just not last and you didn t have money to get more?: No   Depression: Not at risk (1/10/2024)    PHQ-2     PHQ-2 Score: 2   Housing Stability: Low Risk  (12/22/2023)    Housing Stability     Do you have housing? : Yes     Are you worried about losing your housing?: No   Tobacco Use: High Risk (5/22/2024)     Patient History     Smoking Tobacco Use: Some Days     Smokeless Tobacco Use: Never     Passive Exposure: Not on file   Financial Resource Strain: Low Risk  (12/22/2023)    Financial Resource Strain     Within the past 12 months, have you or your family members you live with been unable to get utilities (heat, electricity) when it was really needed?: No   Alcohol Use: Not on file   Transportation Needs: Low Risk  (12/22/2023)    Transportation Needs     Within the past 12 months, has lack of transportation kept you from medical appointments, getting your medicines, non-medical meetings or appointments, work, or from getting things that you need?: No   Physical Activity: Not on file   Interpersonal Safety: Low Risk  (12/22/2023)    Interpersonal Safety     Do you feel physically and emotionally safe where you currently live?: Yes     Within the past 12 months, have you been hit, slapped, kicked or otherwise physically hurt by someone?: No     Within the past 12 months, have you been humiliated or emotionally abused in other ways by your partner or ex-partner?: No   Stress: Not on file   Social Connections: Not on file   Health Literacy: Not on file       Functional Status:  Prior to admission patient needed assistance:   Dependent ADLs:: Independent          Mental Health Status:          Chemical Dependency Status:                Values/Beliefs:  Spiritual, Cultural Beliefs, Advent Practices, Values that affect care:                 Additional Information:  Met with patient and granddaughter to discuss role in discharge planning. Pt lives with children/grandchildren in home. No services in the home. Family assist pt as needed. Currently no equipment in the home. Pt was a  by [employer], has [facility] and Medical assistance. PCP is Dr. Gaytan.     Reviewed recommendation for C.  Pt would like RN/PT/OT.  Explained given insurance we may have difficulty finding HHC.    Referral for RN/PT/OT sent to  [facility]  MD to order at discharge with F2F.     Pt needs walker, raised toilet seat/tub transfer bench.  Family would like them under [place] benefit.   Scripts given to Granddaughter who will go to  [facility] show room.  CC did attempt to call but no answer.   Family aware they may have to be delivered to home at later time.    Pt currently has wound vac (listed as prevena).  It is not known if Pt will discharge with prevena (portable unit thrown away) or Wound vac (home unit coordinated with [facility]).      CC will continue to follow for acceptance of HHC and discharge planning.   Family can transport at discharge.     Dede Doherty RN

## 2024-05-24 NOTE — PROGRESS NOTES
[facility]    Medicine Progress Note - Hospitalist Service    Date of Admission:  5/22/2024    Assessment & Plan   Lima Bueno is a 53 year old female with significant past medical history for type 2 diabetes mellitus, hypertension, hyperlipidemia, obesity, asthma and DVT on chronic anticoagulation with Xarelto who is now status post L5-S1 discectomy and fusion completed by Dr. Reynaga and Daisha on 5/22/2024.  Hospitalist are consulted for medical postoperative management.    L5-S1 Spondylolisthesis  -Vascular Surgery approach for L5-S1 anterior lumbar interbody fusion. L5-S1 discectomy and anterior interbody arthrodesis for interbody fusion completed by Dr. Reynaga on 5/22/2024. PO#2  -Review of operative report with estimated blood loss of 300 mL leaving operating room in stable condition.  -Pain management, therapy management, and initiation of postoperative anticoagulation per the attending surgical team.  -5/24: Pain well controled with current regimen.    -5/24 Continue PT    Type 2 diabetes mellitus  -Well-controlled with last hemoglobin A1c 6.1  -Typically takes Ozempic will be on hold while admitted  -While admitted ACHS glucometers and sliding scale with goal to keep blood sugars less than 180  -On 5/23, had an isolated BG of 331. Subsequently 141 after receiving insulin.     Hypertension  -Continue lisinopril with hold parameters  -Blood pressure stable. -140    Hyperlipidemia  -Continue statin    Insomnia  -Continue home trazodone    Bowel regimen  -Senokot    Diet: Advance Diet as Tolerated: Regular Diet Adult    DVT Prophylaxis: Pneumatic Compression Devices  Quiroz Catheter: Not present  Lines: None     Cardiac Monitoring: None  Code Status: Full Code      Clinically Significant Risk Factors                  # Hypertension: Noted on problem list        # Obesity: Estimated body mass index is 37.61 kg/m  as calculated from the following:    Height as of this  "encounter: 1.651 m (5' 5\").    Weight as of this encounter: 102.5 kg (226 lb)., PRESENT ON ADMISSION     # Asthma: noted on problem list      Disposition Plan     Medically Ready for Discharge: Anticipated in 2-4 Days  The patient's care was discussed with the Attending Physician, Dr. Britton .    Ariel Cesar NP  Hospitalist Service  [facility]  Securely message with [employer] (more info)  Text page via [employer] Paging/Directory   ______________________________________________________________________    Interval History   No acute events overnight. I personally saw and assessed the patient. She remains stable from a medical standpoint. I resumed guaifenesin, per the patient's request. Labs reviewed and appropriate follow up labs have been ordered.   At current, she denies the presence of headache, chest pain, shortness of breath, nausea, vomiting or abdominal pain. Plan to continue to assist with post operative medical management    Physical Exam   Vital Signs: Temp: 99.4  F (37.4  C) Temp src: Oral BP: 117/75 Pulse: 110   Resp: 16 SpO2: 90 % O2 Device: None (Room air)    Weight: 226 lbs 0 oz    Physical Exam  Vitals reviewed.   Cardiovascular:      Rate and Rhythm: Normal rate and regular rhythm.      Pulses: Normal pulses.      Heart sounds: Normal heart sounds.   Pulmonary:      Effort: Pulmonary effort is normal.      Breath sounds: Normal breath sounds and air entry.   Abdominal:      General: Abdomen is protuberant. Bowel sounds are decreased.      Palpations: Abdomen is soft.   Musculoskeletal:      Cervical back: Tenderness present.   Skin:     General: Skin is warm and dry.   Neurological:      General: No focal deficit present.      Mental Status: She is alert.   Psychiatric:         Attention and Perception: Attention normal.         Mood and Affect: Mood normal.         Speech: Speech normal.         Behavior: Behavior is cooperative.         Thought Content: Thought " content normal.         Cognition and Memory: Cognition normal.         Judgment: Judgment normal.     Medical Decision Making   50 MINUTES SPENT BY ME on the date of service doing chart review, history, exam, documentation & further activities per the note.    Data         Imaging results reviewed over the past 24 hrs:   No results found for this or any previous visit (from the past 24 hour(s)).

## 2024-05-24 NOTE — TELEPHONE ENCOUNTER
1.Patient calling wanting paperwork for FMLA today. She states she needs to send to her employer. She will need it scanned into chart so that she can get it through the hospital where she currently is.     2.She also wants provider to order sitting walker, raised toilet seat. She states hospital will not order because they say that it isn't covered by insurance but patient states it is if it is ordered by a provider.

## 2024-05-25 ENCOUNTER — APPOINTMENT (OUTPATIENT)
Dept: PHYSICAL THERAPY | Facility: CLINIC | Age: 54
DRG: 454 | End: 2024-05-25
Attending: NEUROLOGICAL SURGERY
Payer: COMMERCIAL

## 2024-05-25 ENCOUNTER — NURSE TRIAGE (OUTPATIENT)
Dept: NURSING | Facility: CLINIC | Age: 54
End: 2024-05-25
Payer: COMMERCIAL

## 2024-05-25 VITALS
TEMPERATURE: 98.7 F | WEIGHT: 226 LBS | HEIGHT: 65 IN | OXYGEN SATURATION: 91 % | BODY MASS INDEX: 37.65 KG/M2 | DIASTOLIC BLOOD PRESSURE: 83 MMHG | SYSTOLIC BLOOD PRESSURE: 126 MMHG | RESPIRATION RATE: 18 BRPM | HEART RATE: 110 BPM

## 2024-05-25 LAB
ANION GAP SERPL CALCULATED.3IONS-SCNC: 11 MMOL/L (ref 7–15)
BUN SERPL-MCNC: 8 MG/DL (ref 6–20)
CALCIUM SERPL-MCNC: 8.1 MG/DL (ref 8.6–10)
CHLORIDE SERPL-SCNC: 101 MMOL/L (ref 98–107)
CREAT SERPL-MCNC: 0.83 MG/DL (ref 0.51–0.95)
DEPRECATED HCO3 PLAS-SCNC: 24 MMOL/L (ref 22–29)
EGFRCR SERPLBLD CKD-EPI 2021: 83 ML/MIN/1.73M2
ERYTHROCYTE [DISTWIDTH] IN BLOOD BY AUTOMATED COUNT: 14.8 % (ref 10–15)
GLUCOSE BLDC GLUCOMTR-MCNC: 119 MG/DL (ref 70–99)
GLUCOSE BLDC GLUCOMTR-MCNC: 124 MG/DL (ref 70–99)
GLUCOSE SERPL-MCNC: 229 MG/DL (ref 70–99)
HCT VFR BLD AUTO: 30.2 % (ref 35–47)
HGB BLD-MCNC: 9.8 G/DL (ref 11.7–15.7)
MCH RBC QN AUTO: 27.8 PG (ref 26.5–33)
MCHC RBC AUTO-ENTMCNC: 32.5 G/DL (ref 31.5–36.5)
MCV RBC AUTO: 86 FL (ref 78–100)
PLATELET # BLD AUTO: 170 10E3/UL (ref 150–450)
POTASSIUM SERPL-SCNC: 4.1 MMOL/L (ref 3.4–5.3)
RBC # BLD AUTO: 3.52 10E6/UL (ref 3.8–5.2)
SODIUM SERPL-SCNC: 136 MMOL/L (ref 135–145)
WBC # BLD AUTO: 8.9 10E3/UL (ref 4–11)

## 2024-05-25 PROCEDURE — 99232 SBSQ HOSP IP/OBS MODERATE 35: CPT | Performed by: HOSPITALIST

## 2024-05-25 PROCEDURE — 85027 COMPLETE CBC AUTOMATED: CPT

## 2024-05-25 PROCEDURE — 97116 GAIT TRAINING THERAPY: CPT | Mod: GP | Performed by: PHYSICAL THERAPIST

## 2024-05-25 PROCEDURE — 36415 COLL VENOUS BLD VENIPUNCTURE: CPT

## 2024-05-25 PROCEDURE — 250N000013 HC RX MED GY IP 250 OP 250 PS 637: Performed by: NURSE PRACTITIONER

## 2024-05-25 PROCEDURE — 250N000013 HC RX MED GY IP 250 OP 250 PS 637

## 2024-05-25 PROCEDURE — 80048 BASIC METABOLIC PNL TOTAL CA: CPT

## 2024-05-25 RX ORDER — METHOCARBAMOL 750 MG/1
750 TABLET, FILM COATED ORAL EVERY 6 HOURS PRN
Qty: 40 TABLET | Refills: 0 | Status: SHIPPED | OUTPATIENT
Start: 2024-05-25 | End: 2024-06-05

## 2024-05-25 RX ORDER — OXYCODONE HYDROCHLORIDE 5 MG/1
5 TABLET ORAL EVERY 4 HOURS PRN
Qty: 40 TABLET | Refills: 0 | Status: SHIPPED | OUTPATIENT
Start: 2024-05-25 | End: 2024-06-05

## 2024-05-25 RX ORDER — AMOXICILLIN 250 MG
1 CAPSULE ORAL 2 TIMES DAILY
Qty: 30 TABLET | Refills: 0 | Status: SHIPPED | OUTPATIENT
Start: 2024-05-25 | End: 2024-06-09

## 2024-05-25 RX ADMIN — FLUTICASONE PROPIONATE AND SALMETEROL XINAFOATE 2 PUFF: 230; 21 AEROSOL, METERED RESPIRATORY (INHALATION) at 08:35

## 2024-05-25 RX ADMIN — FAMOTIDINE 20 MG: 20 TABLET, FILM COATED ORAL at 08:29

## 2024-05-25 RX ADMIN — POLYETHYLENE GLYCOL 3350 17 G: 17 POWDER, FOR SOLUTION ORAL at 08:34

## 2024-05-25 RX ADMIN — ACETAMINOPHEN 975 MG: 325 TABLET, FILM COATED ORAL at 08:29

## 2024-05-25 RX ADMIN — LISINOPRIL 10 MG: 10 TABLET ORAL at 08:29

## 2024-05-25 RX ADMIN — OXYCODONE HYDROCHLORIDE 10 MG: 5 TABLET ORAL at 10:19

## 2024-05-25 RX ADMIN — SENNOSIDES AND DOCUSATE SODIUM 1 TABLET: 50; 8.6 TABLET ORAL at 08:29

## 2024-05-25 RX ADMIN — METHOCARBAMOL 750 MG: 750 TABLET ORAL at 08:46

## 2024-05-25 RX ADMIN — OXYCODONE HYDROCHLORIDE 10 MG: 5 TABLET ORAL at 01:05

## 2024-05-25 RX ADMIN — GUAIFENESIN 1200 MG: 600 TABLET, EXTENDED RELEASE ORAL at 08:29

## 2024-05-25 RX ADMIN — OXYCODONE HYDROCHLORIDE 10 MG: 5 TABLET ORAL at 06:31

## 2024-05-25 ASSESSMENT — ACTIVITIES OF DAILY LIVING (ADL)
ADLS_ACUITY_SCORE: 32
ADLS_ACUITY_SCORE: 32
ADLS_ACUITY_SCORE: 31
ADLS_ACUITY_SCORE: 32
ADLS_ACUITY_SCORE: 31
ADLS_ACUITY_SCORE: 32
ADLS_ACUITY_SCORE: 31
ADLS_ACUITY_SCORE: 32

## 2024-05-25 NOTE — PLAN OF CARE
Occupational Therapy Discharge Summary    Reason for therapy discharge:    Discharged to home. Met with pt this date and pt had no further questions/needs.     Progress towards therapy goal(s). See goals on Care Plan in [facility] electronic health record for goal details.  Goals partially met.  Barriers to achieving goals:   discharge from facility.    Therapy recommendation(s):    No further therapy is recommended.Pt functioning below baseline in ADLs d/t decreased activity tolerance and pain s/p lumbar surgery. Pt currently requiring CGA assist while ambulating and STS transfers. Per clinical judgement, anticipate needed min assist w tub bench for shower transfers. Pt has multiple supports at home who will assist with ADLs as needed. Recommend pt obtain hip kit, rasied toilet seat, tub bench for safe completion of ADLs within precautions. pt reports plans to obtain as able.

## 2024-05-25 NOTE — PLAN OF CARE
Goal Outcome Evaluation:    Pt A&O x4. She was up assist of 1, GB, and walker to the bathroom. Pain controled with PRN oxy. Drain pulled per MD order. Pt eating normal diet and BS WNL so no insulin given. Dressing reinforced. VSS. Wound vac in place for abdomen.        Patient ID: Laura is a 52 year old female      REASON FOR CONSULTATION:   Chief Complaint   Patient presents with   • New Patient     Polyarthritis, Uveitis,  RO Behcet's Disease/ PCP: Lorne Pierre       HISTORY OF PRESENT ILLNESS  This 52 year old female presents for recurrent oral and genital ulcers.    Dear Dr. Pierre,    I had the pleasure of seeing your above patient in rheumatology consultation regarding her recurrent oral and genital ulcers. As you know, the patient is a 52-year-old female with a history of crohn’s disease, type 2 diabetes, recurrent uveitis, spinal stenosis, fatty liver, and depression.    She was diagnosed with crohn’s disease at the age of 24. She had severe diarrhea with associated hematochezia. She also had weight loss. She had seen gastroenterologist,, who performed upper endoscopy and colonoscopy. She was found to have small ulcers in the duodenum and ileal lesions/ ulcerations. The biopsy was consistent with crohn’s disease. She was initially treated with Pentasa and Pepto-Bismol. She was also treated with PO Prednisone. Her symptoms improved with the medications; however, she had five major flare-ups since the age of 24.     She used to see gastroenterologist,  at [place]; however, she could not follow up due to insurance issues. She was started on Entocort and Prednisone. During flare-ups, she was given Prednisone. She had frequent  flare-ups,and she was therefore,    switched to Imuran in 2008. She discontinued Imuran temporarily due to insurance issues and was sick for a while. She restarted Imuran and takes it once daily which has helped in her symptoms. Currently she does not take Prednisone. She does not have diarrhea and blood in the stools now. She started seeing Dr. Andrew recently.    She was also diagnosed with uveitis in the left eye at the age of 24, sooner after the diagnosis of crohn’s. She was given Prednisone oral and eye drops. She was on Atropine five times daily  and was weaned off later. She is on Azathioprine for this as well She does not have any side effects with Azathioprine. She had numerous episodes of uveitis in both eyes, mainly in the left eye, from the age of 24. She sees a uveitis specialist. She was given oral steroids when she had uveitis flare-up. Last episode of uveitis (right eye) was in October 2018, and she was given PO steroids. She has episodes of blurred vision when she works on computer. She has not been on anti-TNF therapy or any other biological treatment. She does not have a history of vasculitis.    She started having oral ulcers when she was in high school. She usually gets the ulcers over the cheeks. She gets oral ulcers at least twice a month and uses Biotene mouth wash. She started having genital ulcers from the age of 29. Currently she has genital ulcers. The ulcers are painful. She gets genital ulcers more frequently. She has never obtained a biopsy.     She had a yeast infection in the vaginal area and was given medication for it by her gynecologist.    She has chronic back pain and was diagnosed with spinal stenosis by an MRI, two years ago. She is currently seeing a pain specialist. She has received three epidural injections which has helped in her pain. She still has some pain and cannot stand for long hours. Her back pain worsens with activity. She also has pain in her hands; however, she does not have swelling. She has sharp pains in her feet and toes. She has pain in both knees. She has received steroid injection in the right knee which helped her symptoms. She was informed that she has osteoarthritis of the knees. She has swelling in her knees, left ankle, and left foot. She also has a history of possible dactylitis.    She has occasional skin rash over her chest and hands.     She has gained almost 60 lbs in the last two years. She has joined weight watchers program.  She had intermittent fevers (highest fever was 101.8 deg F) in the  last two months.         There are no active problems to display for this patient.    Past Medical History:   Diagnosis Date   • Arthritis    • Crohn's colitis (CMS/HCC)    • Depressive disorder    • Diabetes (CMS/HCC)    • Fatty liver    • Hyperlipidemia    • Noninfectious ileitis    • Obesity    • Spinal stenosis    • Uveitis      Past Surgical History:   Procedure Laterality Date   • Adenoidectomy     • Cholecystectomy     • Colonoscopy     • Rotator cuff repair Right    • Tonsillectomy       Family History   Problem Relation Age of Onset   • Cancer Mother    • Diabetes Father      Social History     Socioeconomic History   • Marital status: /Civil Union     Spouse name: Not on file   • Number of children: 0   • Years of education: Not on file   • Highest education level: Not on file   Social Needs   • Financial resource strain: Not on file   • Food insecurity - worry: Not on file   • Food insecurity - inability: Not on file   • Transportation needs - medical: Not on file   • Transportation needs - non-medical: Not on file   Occupational History   • Occupation: ex cardiac tech   Tobacco Use   • Smoking status: Never Smoker   • Smokeless tobacco: Never Used   Substance and Sexual Activity   • Alcohol use: No     Frequency: Never   • Drug use: No   • Sexual activity: Not on file   Other Topics Concern   • Not on file   Social History Narrative   • Not on file     ALLERGIES:   Allergen Reactions   • Codeine PRURITUS     N&V   • Meperidine PRURITUS     N&V       Prior to Admission medications    Medication Sig Start Date End Date Taking? Authorizing Provider   GABAPENTIN PO Take 600 mg by mouth at bedtime.   Yes Outside Provider   azaTHIOprine (IMURAN) 50 MG tablet Take 50 mg by mouth daily.   Yes Outside Provider   pioglitazone (ACTOS) 15 MG tablet Take 15 mg by mouth daily.   Yes Outside Provider   sitaGLIPtin-metFORMIN (JANUMET XR) 100-1000 MG extended-release tablet    Yes Outside Provider   Ertugliflozin  L-PyroglutamicAc (STEGLATRO) 15 MG Tab    Yes Outside Provider   gabapentin (NEURONTIN) 300 MG capsule Take 600 mg by mouth daily.   Yes Outside Provider   tizanidine (ZANAFLEX) 4 MG capsule Take 4 mg by mouth at bedtime.   Yes Outside Provider   nortriptyline (PAMELOR) 50 MG capsule Take 50 mg by mouth nightly.   Yes Outside Provider   TEMAZepam (RESTORIL) 15 MG capsule Take 15 mg by mouth nightly as needed for Sleep.   Yes Outside Provider   dicyclomine (BENTYL) 20 MG tablet Take 20 mg by mouth as needed.   Yes Outside Provider   metoCLOPramide (REGLAN) 5 MG tablet Take 5 mg by mouth as needed.   Yes Outside Provider   diphenoxylate-atropine (LOMOTIL) 2.5-0.025 MG tablet Take 1 tablet by mouth 4 times daily as needed for Diarrhea.   Yes Outside Provider   ondansetron (ZOFRAN ODT) 4 MG disintegrating tablet Place 4 mg onto the tongue every 8 hours as needed for Nausea.   Yes Outside Provider   Ergocalciferol (VITAMIN D2 PO)    Yes Outside Provider   Cyanocobalamin 1000 MCG/ML Kit    Yes Outside Provider   traMADol (ULTRAM) 50 MG tablet Take 50 mg by mouth every 6 hours as needed for Pain.   Yes Outside Provider   sertraline (ZOLOFT) 50 MG tablet Take 1 tablet by mouth daily. 3/19/19   Jag Gonzales MD   azaTHIOprine (IMURAN) 50 MG tablet Take 1 tablet by mouth daily. 2/7/19   Guanakito Andrew MD       REVIEW OF SYSTEMS:    Constitutional: She had fevers, no chills, no weight loss, fatigue. She has gained almost 60 lbs in the last two years.   HEENT: She has blurred vision in the left eye. She has a history of uveitis in both eyes. Dry eyes, dry mouth, no dysphagia. She has no chronic sinus disease. She has nasal ulcers. She had episodes of oral ulcers; however, she does not have sores now. She is a mouth breather.  Cardiovascular: No chest pain, orthopnea, no PND, occasional palpitations, no history of pericarditis. dyspnea on exertion. No heart disease.  Respiratory: Occasional cough, no shortness of breath,  no  history of pleurisy, no pleuritic chest pain. No coughing up of blood. No wheezing.  GI: no nausea, no vomiting, no diarrhea, no constipation. Hematochezia and abdominal pain. She has crohn’s disease; however, her symptoms are controlled now. Heartburn.  : no dysuria, no urgency, no hematuria. Has a history of kidney stones. She also has a hx of genital ulcers  Musculoskeletal: She has a history of Raynaud’s since the age of teenager, no neck or back pain. Morning stiffness lasts for 20 minutes. No history of fluid in her knees  Heme: no history of deep venous thrombosis or pulmonary embolism, no anemia, no leukopenia. No history of vasculitis.  Neuro: She has tingling and numbness in both hands and feet, no weakness of muscles, no myopathy. No history of strokes or seizures.  Psychiatric: no anxiety. She has a history of depression    Integumentary: Hair loss, no photosensitivity, occasional skin rashes, no malar rash,       PHYSICAL EXAMINATION:  Vitals:    03/19/19 1021   BP: 168/87   Pulse: 97   Resp: 18   Temp: 98 °F (36.7 °C)   SpO2: 100%   Weight: 131.3 kg (289 lb 5.7 oz)   Height: 5' 1\" (1.549 m)   PainSc:  0               Obese female  Constitutional: Alert, in no acute distress, vital signs reviewed. Normal gait.  Head and face: Atraumatic, no deformities, normocephalic, no temporal artery tenderness, normal facies.She has mild frontal hair thinning.  Eyes: Wears glasses. Pink conjunctiva, anicteric sclera, no dry eyes, extraocular muscles intact, no ptosis, pupils round reactive to light, no iritis. No redness or congestion noted in the eyes.  ENT: Normal-appearing outer ear, normal-appearing nose. Normal external auditory canal. No nasal discharge. No nasal ulcers. Normal-appearing turbinates. Normal lips.  No oral ulcers, dry mouth, no dental caries, no gingivitis, no oral lesions, no exudates.  Neck: No JVD, normal-appearing neck, supple, no masses, no cervical lymphadenopathy, no paracervical  tenderness, good range of motion of C-spine.  Lymphatics: No adenopathy.  Chest: Normal appearing chest wall.  Lungs: Good breath sounds bilaterally, no respiratory distress, normal respiratory effort, no use of accessory muscles, no rales or wheezing.  Cardiovascular exam: Normal rate, normal S1-S2, no murmurs, no loud P2.  Abdomen: Soft, no hepatomegaly, no splenomegaly, mild diffused abdominal tenderness, no ascites, no masses  Back: No para lumbar tenderness, bilateral SI joint tenderness,  Musculoskeletal exam:  Bilateral shoulders: Good range of motion bilateral shoulder joints with no tenderness or synovitis.  Bilateral elbows: No synovitis or tenderness.  Bilateral wrist joint: Tenderness.  Bilateral hands: Puffy. Few nail ridging; however, no nail pits.  Bilateral MCP joints: Tenderness.  Right PIP joint: Tenderness.  Bilateral hip joints: Fairly good range of motion with no tenderness.  Bilateral knees: Mild warmth and puffiness noted. Crepitus noted.  Bilateral ankles: Puffy.  She has puffiness on her toes. She has mild right MTP joint tenderness. She has mild swelling in the left toe and has left MTP tenderness.  She has multiple fibromyalgia tender spots.  Neurological exam:  normal motor strength in upper and lower extremity, no muscle atrophy, normal muscle tone, normal gait, normal coordination, good bilateral hand .  negative Tinel sign. No tremors.  Psychiatric: Alert oriented ×3, appropriate, interactive, mood and affect are appropriate, judgment not impaired, normal attention span.  Skin: No discoid or malar rash, no purpura or petechiae, no vasculitic lesions. She has mild frontal hair thinning.      Labs:    Labs done in February 2019, showed that she has mildly elevated cholesterol of 120, elevated triglycerides at 160, HDL at 47, and blood glucose of 136. Her creatinine was normal. She has mildly elevated liver enzymes (AST was 50 and ALT was 37). HbA1c was 6.6, TSH was normal at 3.5. Her  hemoglobin, white cell count, platelet count, vitamin B12, and folate were normal. Vitamin D was mildly elevated at 27, uric acid was 5.5.    No visits with results within 1 Month(s) from this visit.   Latest known visit with results is:   Orders Only on 01/24/2019   Component Date Value Ref Range Status   • Creatinine POC 01/24/2019 0.60  0.51 - 0.95 mg/dL Final   • Estimated GFR  (PO* 01/24/2019 >90   Final   • Estimated GFR  (PO* 01/24/2019 eGFR results = or >90 mL/min/1.73m2 = Normal kidney function.   Final   • Estimated GFR Non-* 01/24/2019 >90   Final   • Estimated GFR Non-* 01/24/2019 eGFR results = or >90 mL/min/1.73m2 = Normal kidney function.   Final             ASSESSMENT and PLAN:    Orders Placed This Encounter   • Sedimentation Rate Westergren   • C Reactive Protein   • Hepatitis Panel Chronic with Reflex   • Quantiferon TB Plus   • HLA B27 Antigen   • Rheumatoid Factor   • Cyclic Citrullinated Peptide AB IgG & IgA   • Angiotensin Converting Enzyme   • MADHU Screen with Reflex Titer and Pattern (Lupus Panel 1)   • GABAPENTIN PO   • sertraline (ZOLOFT) 50 MG tablet         IMPRESSION    1. Crohn’s disease  2. History of recurrent oral and genital ulcers - I agree that we need to rule out Behcet's  3. Recurrent uveitis  4. Spinal stenosis with sacroiliitis    She has a history of recurrent oral and genital ulcers and behcet's disease needs to be considered. Biopsy of the ulcers from crohn's disease can also look similar to behcet's disease.   Recurrent uveitis could be secondary to inflammatory bowel disease and/or behcet's disease or both.   Ordered labs for CBC, CMP, ESR, CRP, MADHU, HLA-B27, hepatitis panel, rheumatoid factor, angiotensin converting enzyme, quantiferon TB Gold, anti-CCP, anti-SCR.    I  did a pathergy test on her with 20 g needle.   Ordered X-ray AP pelvis to look at the SI joint.   After I get the lab results, I will talk  to her ophthalmologist, Radha to see whether we should start her on biological therapy (Humira or Remicade).   We will also discuss with her gastroenterologist. Her inflammatory bowel disease appears to be controlled based on the recent CT scan. Her recent chest X-ray showed no evidence of sarcoidosis.   Recommended seeing her gynecologist for her ulcers.   Follow up  her ophthalmologist for uveitis.   nformed that crohn’s disease can cause uveitis. Encouraged weight reduction.    Return to clinic in one week.    Thank you very much Dr. Pierre, for consulting me on this patient. Please feel free to call me back if you have any further questions or concerns on her.    Patient expresses understanding of the plan.  Proper usage and side effects of medications reviewed & discussed.  Refer to orders.  Return to clinic as clinically indicated as discussed with patient who verbalized understanding of & agreement with the plan.    Medical compliance with plan discussed and risks of non-compliance reviewed.  Patient education completed on disease process, etiology & prognosis.  Patient expresses understanding of the plan.  Proper usage and side effects of medications reviewed & discussed.  Refer to orders.  Return to clinic as clinically indicated as discussed with patient who verbalized understanding of & agreement with the plan.    Entered by Dave Garcia acting as a scribe for Dr. Jag Gonzales.

## 2024-05-25 NOTE — PROGRESS NOTES
"Care Management Discharge Note    Discharge Date: 05/25/2024       Discharge Disposition: Home, Home Care, DME    Discharge Services:      Discharge DME:      Discharge Transportation: family or friend will provide    Private pay costs discussed: Not applicable    Does the patient's insurance plan have a 3 day qualifying hospital stay waiver?  No    PAS Confirmation Code:    Patient/family educated on Medicare website which has current facility and service quality ratings:      Education Provided on the Discharge Plan:    Persons Notified of Discharge Plans: Pt/family/Bedside RN  Patient/Family in Agreement with the Plan: yes    Handoff Referral Completed: Yes    Additional Information:  Pt discharge to home with family.  Pt has orders for HHC RN/PT/OT.    Referral sent via standard HHC process. Have been unable to secure HHC. 7 agencies are pending, 13 have declined (due to capacity or insurance).  This was explained to Pt and family that as of now we do not have secured HHC.  Pt/family are frustrated as seem to say that I should be able to secure this.  Pt/family at times reference PCA services and I attempt to say this is different as PCA is coordinated by the [facility].  Explained that [facility] will continue to look for HHC and she will receive a call if agency is found.  If not PCP can continue to look for HHC if indicated. Pt voices understanding.     Pt wants a 4 WWW at discharge.This is not dispensed from the hospital as it is the weekend [facility] [employer] [facility] [employer] showroom is closed.  Talked with Pia of [facility] and she notes if Pt discharge with 2 ww it can be exchanged next week for 4WW.  This was explained to Pt/family but she notes she has already \"ordered\" a walker for  today.      Voices no other needs for discharge.     Bedside RN to convert to Prevena wound vac and review discharge instructions.   Family to transport.     Dede Doherty RN     "

## 2024-05-25 NOTE — PLAN OF CARE
Problem: Adult Inpatient Plan of Care  Goal: Optimal Comfort and Wellbeing  Outcome: Progressing  Intervention: Monitor Pain and Promote Comfort  Recent Flowsheet Documentation  Taken 5/25/2024 0006 by Rachna Garcia RN  Pain Management Interventions: medication (see MAR)     Problem: Pain Acute  Goal: Optimal Pain Control and Function  Outcome: Progressing  Intervention: Develop Pain Management Plan  Recent Flowsheet Documentation  Taken 5/25/2024 0006 by Rachna Garcia RN  Pain Management Interventions: medication (see MAR)  Intervention: Prevent or Manage Pain  Recent Flowsheet Documentation  Taken 5/25/2024 0007 by Rachna Garcia, RN  Medication Review/Management:   high-risk medications identified   medications reviewed   Goal Outcome Evaluation:         Patient vital signs are at baseline: Yes on RA  Patient able to ambulate as they were prior to admission or with assist devices provided by therapies during their stay:  Yes  Patient MUST void prior to discharge:  Yes. Continent both B&B  Patient able to tolerate oral intake:  Yes  Pain has adequate pain control using Oral analgesics:  Yes  Does patient have an identified :  Yes  Has goal D/C date and time been discussed with patient:  No,  TBD     Pt is A&Ox4. Pain controlled by PRN oxycodone. Wound vac in place in abdomen. On BG checks.

## 2024-05-25 NOTE — DISCHARGE SUMMARY
"NEUROSURGERY DISCHARGE SUMMARY    Patient Name:  Lima Bueno  MRN:  [id number]      :  1970      Date of Admission:  2024  Date of Discharge:  2024  Admitting Physician:  Vinny Reynaga MD  Discharge Physician:    Primary Care Provider:   Oscar Baltazar  Discharge Disposition:   Discharged to home    Admission Diagnoses:  Spondylolisthesis of lumbar region    Discharge Diagnoses:    Same    Brief History of Illness:   53 year old female with L5-S1 isthmic spondylolisthesis and foraminal stenosis.  Severe back and leg pain.  Did extensive nonoperative management without improvement.  Risks, benefits, indications and alternatives were discussed with the patient and family in detail.  All their questions were answered, and they wished to proceed with surgery.     Hospital Course:  Lumbar 5 to Sacral 1 Anterior Lumbar Interbody Fusion  Lumbar 5 to Sacral 1 Robotic Posterior Spinal Fusion with right decompression    Pertinent Investigations:  None    Consultations:    Vascular surgery for anterior approach    Discharge physical examination:   /83 (BP Location: Right arm)   Pulse 110   Temp 98.7  F (37.1  C) (Oral)   Resp 18   Ht 1.651 m (5' 5\")   Wt 102.5 kg (226 lb)   LMP 2019 (Exact Date)   SpO2 91%   BMI 37.61 kg/m    Alert and oriented no acute distress  Bilateral lower extremities with 5/5 strength including dorsiflexion plantarflexion bilaterally   bandages clean dry and intact posteriorly  Anterior abdominal incision covered with Prevena suction sponge  Incision: Posterior dressing removed incision signs of infection.  Closed with staples.    Discharge Medications:  Current Discharge Medication List        START taking these medications    Details   methocarbamol (ROBAXIN) 750 MG tablet Take 1 tablet (750 mg) by mouth every 6 hours as needed for muscle spasms  Qty: 40 tablet, Refills: 0    Associated Diagnoses: S/P lumbar spinal fusion      oxyCODONE " (ROXICODONE) 5 MG tablet Take 1 tablet (5 mg) by mouth every 4 hours as needed for moderate pain  Qty: 40 tablet, Refills: 0    Associated Diagnoses: S/P lumbar spinal fusion      senna-docusate (SENOKOT-S/PERICOLACE) 8.6-50 MG tablet Take 1 tablet by mouth 2 times daily for 30 doses  Qty: 30 tablet, Refills: 0    Associated Diagnoses: S/P lumbar spinal fusion           CONTINUE these medications which have CHANGED    Details   rivaroxaban ANTICOAGULANT (XARELTO) 20 MG TABS tablet Take 1 tablet (20 mg) by mouth daily (with dinner)  Qty: 90 tablet, Refills: 3    Associated Diagnoses: Deep vein thrombosis (DVT) of proximal vein of right lower extremity, unspecified chronicity (H); Other pulmonary embolism without acute cor pulmonale, unspecified chronicity (H)           CONTINUE these medications which have NOT CHANGED    Details   albuterol (PROAIR HFA/PROVENTIL HFA/VENTOLIN HFA) 108 (90 Base) MCG/ACT inhaler Inhale 2 puffs into the lungs every 6 hours as needed for shortness of breath, wheezing or cough  Qty: 18 g, Refills: 11    Comments: Pharmacy may dispense brand covered by insurance (Proair, or proventil or ventolin or generic albuterol inhaler)  Associated Diagnoses: Lower resp. tract infection; Wheezing      albuterol (PROVENTIL) (2.5 MG/3ML) 0.083% neb solution Take 1 vial (2.5 mg) by nebulization every 6 hours as needed for shortness of breath or wheezing  Qty: 120 mL, Refills: 3    Associated Diagnoses: Persistent asthma without complication, unspecified asthma severity; Lower resp. tract infection      atorvastatin (LIPITOR) 20 MG tablet Take 1 tablet (20 mg) by mouth daily  Qty: 90 tablet, Refills: 3    Associated Diagnoses: Hyperlipidemia LDL goal <100      benzonatate (TESSALON) 200 MG capsule Take 1 capsule (200 mg) by mouth 3 times daily as needed for cough  Qty: 30 capsule, Refills: 11    Associated Diagnoses: Lower resp. tract infection      famotidine (PEPCID) 20 MG tablet Take 1 tablet (20 mg) by  mouth daily  Qty: 90 tablet, Refills: 3    Associated Diagnoses: Gastroesophageal reflux disease without esophagitis      fexofenadine (ALLEGRA) 180 MG tablet TAKE ONE TABLET BY MOUTH ONCE DAILY  Qty: 30 tablet, Refills: 7    Associated Diagnoses: Persistent asthma without complication, unspecified asthma severity      fluticasone-salmeterol (ADVAIR HFA) 230-21 MCG/ACT inhaler Inhale 2 puffs into the lungs 2 times daily  Qty: 12 g, Refills: 11    Associated Diagnoses: Persistent asthma without complication, unspecified asthma severity      gabapentin (NEURONTIN) 100 MG capsule TAKE 3 CAPSULES (300 MG) BY MOUTH 3 TIMES DAILY AS NEEDED FOR NEUROPATHIC PAIN  Qty: 90 capsule, Refills: 3    Associated Diagnoses: Right lumbar radiculopathy; Spinal stenosis, unspecified spinal region      guaiFENesin (MUCINEX) 600 MG 12 hr tablet Take 2 tablets (1,200 mg) by mouth 2 times daily  Qty: 120 tablet, Refills: 3    Associated Diagnoses: Lower resp. tract infection      ketotifen (ZADITOR) 0.025 % ophthalmic solution Place 1 drop into both eyes 2 times daily 1 drop in affected eye(s) 2 times a day  Qty: 5 mL, Refills: 1    Associated Diagnoses: Acute conjunctivitis of both eyes, unspecified acute conjunctivitis type      lisinopril (ZESTRIL) 10 MG tablet Take 1 tablet (10 mg) by mouth daily  Qty: 90 tablet, Refills: 3    Comments: Dose increase  Associated Diagnoses: Essential hypertension, benign      Semaglutide, 2 MG/DOSE, (OZEMPIC, 2 MG/DOSE,) 8 MG/3ML pen Inject 2 mg Subcutaneous once a week  Qty: 24 mL, Refills: 1    Associated Diagnoses: Type 2 diabetes mellitus without complication, without long-term current use of insulin (H); Morbid obesity (H)      traZODone (DESYREL) 150 MG tablet Take 2 tablets (300 mg) by mouth At Bedtime  Qty: 180 tablet, Refills: 3    Associated Diagnoses: Primary insomnia      blood glucose (NO BRAND SPECIFIED) test strip 1 Box of Test Strips for patients meter - test daily  Qty: 100 strip,  Refills: 3    Associated Diagnoses: Type 2 diabetes mellitus without complication, without long-term current use of insulin (H)      Lancets 30G MISC 120 30 gauge lancets (substitute as needed) - test daily  Qty: 120 each, Refills: 3    Associated Diagnoses: Type 2 diabetes mellitus without complication, without long-term current use of insulin (H)           STOP taking these medications       LORazepam (ATIVAN) 0.5 MG tablet Comments:   Reason for Stopping:               Discharge follow up and instructions:     Home Care Referral      Primary Care - Care Coordination Referral      Home Care Referral      Reason for your hospital stay    Spinal surgery     Follow-up and recommended labs and tests     Follow up with primary care provider, Oscar Baltazar MD, within 7 days for hospital follow- up.  The following labs/tests are recommended: cbc, bmp in 1 week .     Reason for your hospital stay    Elective L5-S1 posterior fusion with Dr. Reynaga.     Follow-up and recommended labs and tests     Your post-operative follow up appointments have been/will be made for two weeks with a nurse for a well being and wound check visit as well as in six weeks with a provider.     Activity    Please limit your lifting to no more that ten pounds and avoid excessive bending, twisting and turning at the lumbar spine. You should also avoid excessive jostling and jarring activities.     Ok to shampoo hair, shower / bathe, but do not scrub or submerge incision under water until evaluated post operatively in clinic.    Ok to walk as tolerated, avoid bedrest.    No high impact activities such as; running/jogging, snowmobile or 4 bass riding or any other recreational vehicles.    Call my office at [phone] for increasing redness, swelling or pus draining from the incision, increased pain or any other questions and concerns.    Go to ER with any seizure activity, mental status change (increasing confusion), difficulty with  speech or increasing or acute weakness     Wound care and dressings    Your wound VAC will be on for 7 days total.  When it is completed the device will shut off and you can remove it.  We will evaluate your incision at your 2-week scheduled appointment.  You have any questions about your wound VAC please call clinic for further clarifications.     Walker Order for DME - ONLY FOR DME     Raised Toilet Seat Order    DME Documentation:   Describe the reason for need to support medical necessity: Post-spinal surgery precautions.     I, the undersigned, certify that the above prescribed supplies are medically necessary for this patient and is both reasonable and necessary in reference to accepted standards of medical and necessary in reference to accepted standards of medical practice in the treatment of this patient's condition and is not prescribed as a convenience.     Tub Transfer Bench Order    DME Documentation:   Describe the reason for need to support medical necessity: Impaired safety s/p spinal fusion surgery.     I, the undersigned, certify that the above prescribed supplies are medically necessary for this patient and is both reasonable and necessary in reference to accepted standards of medical and necessary in reference to accepted standards of medical practice in the treatment of this patient's condition and is not prescribed as a convenience.     Diet    Follow this diet upon discharge: Orders Placed This Encounter      Advance Diet as Tolerated: carb controlled diet     Diet    Follow this diet upon discharge: Orders Placed This Encounter      Advance Diet as Tolerated: Regular Diet Adult      Diet       Glen Kim DNP  Neurosurgery  [phone]

## 2024-05-25 NOTE — PROGRESS NOTES
[facility]    Hospitalist Progress Note    Interval History   Patient is awake and alert.  No acute events overnight.  Complaining of bilateral hip pain which is attributed to her surgical position.  Lower extremity weakness improved.    -Data reviewed today: I reviewed all new labs and imaging results over the last 24 hours. I personally reviewed no images or EKG's today.    Physical Exam   Temp: 98.7  F (37.1  C) Temp src: Oral BP: 126/83 Pulse: 110   Resp: 18 SpO2: 91 % O2 Device: None (Room air)    Vitals:    05/22/24 0841   Weight: 102.5 kg (226 lb)     Vital Signs with Ranges  Temp:  [98.4  F (36.9  C)-98.7  F (37.1  C)] 98.7  F (37.1  C)  Pulse:  [101-110] 110  Resp:  [16-18] 18  BP: (110-126)/(65-83) 126/83  SpO2:  [91 %-98 %] 91 %  I/O last 3 completed shifts:  In: 1000 [P.O.:1000]  Out: 86 [Urine:1; Drains:85]    Physical Exam  Constitutional:       Appearance: Normal appearance.   Cardiovascular:      Rate and Rhythm: Normal rate and regular rhythm.      Pulses: Normal pulses.      Heart sounds: Normal heart sounds.   Pulmonary:      Effort: Pulmonary effort is normal. No respiratory distress.      Breath sounds: Normal breath sounds.   Abdominal:      General: Abdomen is flat. Bowel sounds are normal. There is no distension.      Tenderness: There is no abdominal tenderness. There is no guarding.      Comments: Anterior wound VAC in place.   Skin:     General: Skin is warm and dry.   Neurological:      General: No focal deficit present.           Medications   Current Facility-Administered Medications   Medication Dose Route Frequency Provider Last Rate Last Admin    lactated ringers infusion   Intravenous Continuous RomielbransRufina vyas NP 10 mL/hr at 05/22/24 0914 New Bag at 05/22/24 1651    sodium chloride 0.9 % infusion   Intravenous Continuous RonbraRufina calderón NP   Stopped at 05/23/24 0833     Current Facility-Administered Medications   Medication Dose Route  Frequency Provider Last Rate Last Admin    atorvastatin (LIPITOR) tablet 20 mg  20 mg Oral QPM Rufina Hernández NP   20 mg at 05/24/24 2105    famotidine (PEPCID) tablet 20 mg  20 mg Oral Daily Rufina Hernández, NP   20 mg at 05/25/24 0829    fluticasone-salmeterol (ADVAIR-HFA) 230-21 MCG/ACT inhaler 2 puff  2 puff Inhalation BID Rufina Hernández NP   2 puff at 05/25/24 0835    gabapentin (NEURONTIN) capsule 300 mg  300 mg Oral At Bedtime Rufina Hernández NP   300 mg at 05/24/24 2104    guaiFENesin (MUCINEX) 12 hr tablet 1,200 mg  1,200 mg Oral BID Ariel Cesar NP   1,200 mg at 05/25/24 0829    insulin aspart (NovoLOG) injection (RAPID ACTING)  1-7 Units Subcutaneous TID AC Maggie Lam APRN CNP   1 Units at 05/24/24 1231    insulin aspart (NovoLOG) injection (RAPID ACTING)  1-5 Units Subcutaneous At Bedtime Maggie Lam APRN CNP   3 Units at 05/23/24 2217    lisinopril (ZESTRIL) tablet 10 mg  10 mg Oral Daily Rufina Hernández, NP   10 mg at 05/25/24 0829    polyethylene glycol (MIRALAX) Packet 17 g  17 g Oral Daily Rufina Hernández NP   17 g at 05/25/24 0834    senna-docusate (SENOKOT-S/PERICOLACE) 8.6-50 MG per tablet 1 tablet  1 tablet Oral BID Rufina Hernández NP   1 tablet at 05/25/24 0829    sodium chloride (PF) 0.9% PF flush 3 mL  3 mL Intracatheter Q8H Rufina Hernández, NP   3 mL at 05/24/24 2107    traZODone (DESYREL) tablet 300 mg  300 mg Oral At Bedtime Rufina Hernández NP   300 mg at 05/24/24 2104       Data   Recent Labs   Lab 05/25/24  0759 05/25/24  0631 05/25/24  0151 05/23/24  1138 05/23/24  0816 05/22/24  1746 05/22/24  0841   WBC 8.9  --   --   --  8.9  --   --    HGB 9.8*  --   --   --  10.1*  --   --    MCV 86  --   --   --  87  --   --      --   --   --  171  --   --      --   --   --  135  --   --    POTASSIUM 4.1  --   --   --  4.1  --  3.7   CHLORIDE 101  --   --   --  102  --   --     CO2 24  --   --   --  24  --   --    BUN 8.0  --   --   --  12.4  --   --    CR 0.83  --   --   --  0.90  --   --    ANIONGAP 11  --   --   --  9  --   --    DANIELLE 8.1*  --   --   --  8.1*  --   --    * 119* 124*   < > 207*   < > 172*    < > = values in this interval not displayed.       No results found for this or any previous visit (from the past 24 hour(s)).      Assessment & Plan   Lima Bueno is a 53 year old female with significant past medical history for type 2 diabetes mellitus, hypertension, hyperlipidemia, obesity, asthma and DVT on chronic anticoagulation with Xarelto who is now status post L5-S1 discectomy and fusion completed by Dr. Reynaga and Daisha on 5/22/2024.  Hospitalist are consulted for medical postoperative management.    L5-S1 Spondylolisthesis  -Vascular Surgery approach for L5-S1 anterior lumbar interbody fusion. L5-S1 discectomy and anterior interbody arthrodesis for interbody fusion completed by Dr. Reynaga on 5/22/2024. PO#2  -Review of operative report with estimated blood loss of 300 mL leaving operating room in stable condition.  -Pain management, therapy management, and initiation of postoperative anticoagulation per the attending surgical team.  -5/24: Pain well controled with current regimen.    -5/24 Continue PT  -5/25-discharging home with home care orders and wound VAC orders per neurosurgery.    Type 2 diabetes mellitus  -Well-controlled with last hemoglobin A1c 6.1  -Typically takes Ozempic will be on hold while admitted  -While admitted ACHS glucometers and sliding scale with goal to keep blood sugars less than 180  -On 5/23, had an isolated BG of 331. Subsequently 141 after receiving insulin.   -5/25/2024-patient had an isolated hyperglycemia of 229 but otherwise stable.  Restart home Ozempic at the time of discharge.    Hypertension  -Continue lisinopril with hold parameters  -Blood pressure stable. -140.     Hyperlipidemia  -Continue  "statin    Insomnia  -Continue home trazodone    Bowel regimen  -Senokot    Diet: Advance Diet as Tolerated: Regular Diet Adult    DVT Prophylaxis: Pneumatic Compression Devices  Quiroz Catheter: Not present  Lines: None     Cardiac Monitoring: None  Code Status: Full Code          Clinically Significant Risk Factors                  # Hypertension: Noted on problem list        # Obesity: Estimated body mass index is 37.61 kg/m  as calculated from the following:    Height as of this encounter: 1.651 m (5' 5\").    Weight as of this encounter: 102.5 kg (226 lb)., PRESENT ON ADMISSION     # Asthma: noted on problem list         DVT Prophylaxis: Pneumatic Compression Devices  Code Status: Full Code  Medically Ready for Discharge: Ready Now        Diana Britton MD, MD  [phone](p)   "

## 2024-05-25 NOTE — PROGRESS NOTES
Patient vital signs are at baseline: Yes  Patient able to ambulate as they were prior to admission or with assist devices provided by therapies during their stay:  Yes  Patient MUST void prior to discharge:  Yes  Patient able to tolerate oral intake:  Yes  Pain has adequate pain control using Oral analgesics:  Yes  Does patient have an identified :  Yes  Has goal D/C date and time been discussed with patient:  Yes     Pt A&O x4, up x1 with walker and GB. Pain managed with Oxy and Robaxin. Back incision sie dressing changed. CMS intact. Abd wound vac site intact. AVS and med reviewed with pt. All questions answered. Pt discharged home with family transport.

## 2024-05-25 NOTE — DISCHARGE INSTRUCTIONS
We are still looking for home health care for you.  Multiple referrals are still pending.  If you have not heard from an accepting company by next Tuesday.  Call [facility] [phone] to check on status.    Dr. Baltazar's office can continue to help you look for Home health care if still needed.

## 2024-05-26 NOTE — TELEPHONE ENCOUNTER
Reason for Disposition    Caller has medicine question, adult has minor symptoms, caller declines triage, AND triager answers question    Protocols used: Medication Question Call-A-AH

## 2024-05-26 NOTE — TELEPHONE ENCOUNTER
Lima has questions re: Medications after being discharged from the Hospital today. She couldn't locate her AVS.    Notified of:  START taking:  methocarbamol (ROBAXIN)  oxyCODONE (ROXICODONE)  senna-docusate (SENOKOT-S/PERICOLACE)  CHANGE how you take:  gabapentin (NEURONTIN)  rivaroxaban ANTICOAGULANT (XARELTO)  STOP taking:  LORazepam 0.5 MG tablet (ATIVAN)  Review your updated medication list below.    Re Gapabentin  gabapentin 100 MG capsule  Also known as: NEURONTIN  INSTRUCTIONS: TAKE 3 CAPSULES (300 MG) BY  MOUTH 3 TIMES DAILY AS NEEDED FOR  NEUROPATHIC PAIN    Re: Rivaroxaban  rivaroxaban ANTICOAGULANT 20 MG  Tabs tablet  Also known as: XARELTO  Start taking on: May 29, 2024  INSTRUCTIONS: Take 1 tablet (20 mg) by mouth  daily (with dinner)  Signed by: Glen Kim  What changed: These instructions start on May  29, 2024. If you are unsure what to do until  then, ask your doctor or other care provider.    Re: Juan Amato states that this is something she took prior to MRI and does not have this medication at home.    Jazmín Caro RN  [facility]    Reason for Disposition   Caller has medicine question, adult has minor symptoms, caller declines triage, AND triager answers question    Protocols used: Medication Question Call-A-

## 2024-05-27 ENCOUNTER — NURSE TRIAGE (OUTPATIENT)
Dept: NURSING | Facility: CLINIC | Age: 54
End: 2024-05-27
Payer: COMMERCIAL

## 2024-05-27 NOTE — TELEPHONE ENCOUNTER
Nurse Triage SBAR    Situation:     Background:   -Patient calling  -It is okay to call back and leave a detailed message at this number: [phone]   -L5-S1 anterior lumbar fusion  on 5/22 by Vinny Reynaga MD  -was discharged from the hospital on saturday with a wound vac    Assessment:   -wound vac has an indicator light that it is leaking  -no issues in the tubing  -they think it might be that the sicker is bent up a bit  -do not have supplies at home to reapply sticker/tagaderm    Recommendation: Call PCP Now   RN paged on Call- MARGO BECERRA at [phone] to all patient back directly  -Plans to follow recommendations  -Call back with and questions, concerns, or any change in symptoms  -call back in 30 min if you do not head from the on-call provider    LEIA HOANG RN 5/27/2024 3:22 PM     Reason for Disposition   [1] NPWT AND [2] suction is not working   [1] NPWT AND [2] device is alarming    Additional Information   Negative: Major bleeding into NPWT device (e.g., cannister filling with blood, sudden gush of blood)   Negative: Major bleeding from wound (e.g., actively dripping or spurting)   Negative: Sounds like a life-threatening emergency to the triager   Negative: SEVERE pain (e.g. excruciating)   Negative: [1] Total Contact Cast feels too tight (e.g., causing pain, numbness or toes tingling) AND [2] not improved with leg elevation   Negative: [1] NPWT AND [2] new bright red blood in canister or tubing   Negative: Patient sounds very sick or weak to the triager   Negative: [1] Looks infected (e.g., spreading redness, red streak, pus) AND [2] fever   Negative: [1] Looks infected AND [2] large red area (> 2 in. or 5 cm)   Negative: [1] Looks infected AND [2] diabetes mellitus or weak immune system (e.g., HIV positive, cancer chemo, splenectomy, organ transplant, chronic steroids)    Protocols used: Chronic Wounds and Negative Pressure Wound Therapy-A-AH

## 2024-05-28 ENCOUNTER — PATIENT OUTREACH (OUTPATIENT)
Dept: CARE COORDINATION | Facility: CLINIC | Age: 54
End: 2024-05-28
Payer: COMMERCIAL

## 2024-05-28 ENCOUNTER — TELEPHONE (OUTPATIENT)
Dept: NEUROSURGERY | Facility: CLINIC | Age: 54
End: 2024-05-28
Payer: COMMERCIAL

## 2024-05-28 NOTE — PROGRESS NOTES
Clinic Care Coordination Contact  [facility]/Voicemail    Clinical Data: Care Coordinator Outreach    Outreach Documentation Number of Outreach Attempt   5/28/2024   3:42 PM 1       Left message on patient's voicemail with call back information and requested return call.    Plan: Care Coordinator will send care coordination introduction letter with care coordinator contact information and explanation of care coordination services via mail. Care Coordinator will try to reach patient again in 1-2 business days.    Kinga Stallworth  [facility]  Clinic Care Coordinator  [facility]  [phone]  [email or web address]

## 2024-05-28 NOTE — TELEPHONE ENCOUNTER
Pt called, following recent hospitalization.     Wound Vac needs to be removed tomorrow and pt is unsure who needs to remove it. States she was told it cannot be in place for more than 7 days     Was placed during surgery - was advised she needed to go home with the wound vac     Pt said she called the wound clinic but they were unable to help her     Read ED notes to her:         Wound care and dressings     Your wound VAC will be on for 7 days total.  When it is completed the device will shut off and you can remove it.  We will evaluate your incision at your 2-week scheduled appointment.  You have any questions about your wound VAC please call clinic for further clarifications.         Follow-up and recommended labs and tests      Follow up with primary care provider, Oscar Baltazar MD, within 7 days for hospital follow- up.  The following labs/tests are recommended: cbc, bmp in 1 week .     Pt is not comfortable removing it herself, and she is going to call the surgeons office regarding this. Per ER notes the number for pt to call to surgeon office with questions is: [phone]     Also did med reconcile with patient     Pt asking if PCP can work her in for hospital follow up in next few days? Pt declines to see team or any other provider     cJ Vásquez RN  [facility]       Appointments in Next Year      Jun 05, 2024  2:00 PM  (Arrive by 1:45 PM)  Nurse Only with CS NEUROSURG NURSE  [facility] ([facility] ) [phone]     Oct 29, 2024  3:30 PM  (Arrive by 3:10 PM)  Adult Preventative Visit with Oscar Baltazar MD  [facility] ([facility] ) [phone]          Jc Vásquez RN  [facility]

## 2024-05-28 NOTE — TELEPHONE ENCOUNTER
Unfortunately I am off the remainder of this week after tomorrow morning, so she would have to wait till next week.  I recommend a team office visit until we can find a spot for her into my clinic schedule    Oscar Baltazar MD

## 2024-05-28 NOTE — TELEPHONE ENCOUNTER
Patient discharged with Wound Vac, applied by Dr Ashton. Instructions were for patient to remove after 7 days(5/29/24). Patient not comfortable removing herself.   Writer called the Vascular/wound care clinic. They will call patient to coordinate appt for removal.     Called patient to update.   VM box full.

## 2024-05-29 ENCOUNTER — PATIENT OUTREACH (OUTPATIENT)
Dept: CARE COORDINATION | Facility: CLINIC | Age: 54
End: 2024-05-29

## 2024-05-29 ENCOUNTER — OFFICE VISIT (OUTPATIENT)
Dept: OTHER | Facility: CLINIC | Age: 54
End: 2024-05-29
Attending: SURGERY
Payer: COMMERCIAL

## 2024-05-29 ENCOUNTER — TELEPHONE (OUTPATIENT)
Dept: FAMILY MEDICINE | Facility: CLINIC | Age: 54
End: 2024-05-29

## 2024-05-29 VITALS — HEART RATE: 97 BPM | DIASTOLIC BLOOD PRESSURE: 75 MMHG | SYSTOLIC BLOOD PRESSURE: 125 MMHG

## 2024-05-29 DIAGNOSIS — Z98.1 S/P LUMBAR SPINAL FUSION: Primary | ICD-10-CM

## 2024-05-29 ASSESSMENT — ACTIVITIES OF DAILY LIVING (ADL): DEPENDENT_IADLS:: MEAL PREPARATION

## 2024-05-29 NOTE — PROGRESS NOTES
Clinic Care Coordination Contact  Care Team Conversations    NELSON called and spoke with Donna from [facility], and inquired as to which homecare agencies they have tried.  Donna sent NELSON an email outlining the agencies tried so far and their reasons for denial:      NELSON will attempt to locate additional agencies that may accept pt insurance.

## 2024-05-29 NOTE — TELEPHONE ENCOUNTER
DEMETRICE Thompson patient care coordinator with [facility] calling to report FYI to PCP of the following:    Unable to secure home health care prior to patient discharge from hospital on 5/25/24. Tried 15 different home care agencies, unable to enroll patient with any of them due to insurance and staffing issues.     Donna calling to report this to PCP per [facility] protocol.     Routing to PCP as FYI of this as well as routing to care coordination pool to please advise if able to assist with this issue? Please route back if further instructions for triage - thank you!     Callback [phone]    Sara Espinosa RN  [facility]

## 2024-05-29 NOTE — PROGRESS NOTES
[place] [facility]    Lima Bueno presents for nurse only visit for wound check and removal of Kelly wound vac from low Pfannenstiel incision.    History: Lima Bueno is s/p:  1. Left retroperitoneal exposure of L5-S1 and intervertebral disc space for anterior lumbar interbody fusion.   2. Application of Kelly sponge.  Procedure date: 5/22/24 with Dr. Ashton    /75 (BP Location: Right arm, Patient Position: Chair, Cuff Size: Adult Regular)   Pulse 97   LMP 06/06/2019 (Exact Date)     Procedure:  Preveena sponge removed from incision site without difficulty.  Patient tolerated the procedure well.  Incision approximated and overall appears to be healing nicely.  No visible drainage noted at incision site.  Minimal sero-sanguinous fluid noted on Kelly sponge.  Advised to keep area clean and dry.  Use gauze sponges if necessary between skin and underwear.        Plan: Patient will return to vascular clinic PRN.  Patient stated she has an appointment for follow up with Dr. Reynaga next week.    Uzma Collins, WANDYN, RN, [facility] [place]

## 2024-05-29 NOTE — PROGRESS NOTES
Clinic Care Coordination Contact  Transitions of Care Outreach  Chief Complaint   Patient presents with    Clinic Care Coordination - Post Hospital       Most Recent Admission Date: 5/22/2024   Most Recent Admission Diagnosis: Spondylolisthesis of lumbar region - M43.16     Most Recent Discharge Date: 5/25/2024   Most Recent Discharge Diagnosis: S/P lumbar spinal fusion - Z98.1  Acute midline low back pain with right-sided sciatica - M54.41  Deep vein thrombosis (DVT) of proximal vein of right lower extremity, unspecified chronicity (H) - I82.4Y1  Other pulmonary embolism without acute cor pulmonale, unspecified chronicity (H) - I26.99     Transitions of Care Assessment    Discharge Assessment  How are you doing now that you are home?: Patient states she is doing okay. Patient states she needs help in the bathroom, with bathing, help with stairs and meals (has been using [employer] for food). Granddaughter has been helping her. Patient states she has all her medications and denies any questions regarding these. Patient is scheduled this afternoon to have her wound vac removed. Patient denies any signs or symptoms of an infection. Patient states her pain has been under good control. Patient states she has been eating/drinking fine. Patient states she has had normal bowel/bladder. Patient asks that writer contact [place] [facility] to see about their agency (phone [phone] opt 3). Patient would like to enroll in Care Coordination, however, due to her appointment, is unable to complete the assessment at this time. Patient would like a call on Friday to complete enrollment. RN CC advised that our team will do our best to call her back on Friday.  How are your symptoms? (Red Flag symptoms escalate to triage hotline per guidelines): Improved  Do you know how to contact your clinic care team if you have future questions or changes to your health status? : Yes  Does the patient have their discharge instructions? :  Yes  Does the patient have questions regarding their discharge instructions? : No  Were you started on any new medications or were there changes to any of your previous medications? : Yes  Does the patient have all of their medications?: Yes  Do you have questions regarding any of your medications? : No  Do you have all of your needed medical supplies or equipment (DME)?  (i.e. oxygen tank, CPAP, cane, etc.): Yes         Post-op (Clinicians Only)  Did the patient have surgery or a procedure: Yes  Incision: other (Wound vac)  Fever: No  Chills: No  Redness: No  Warmth: No  Swelling: No  Incision site pain: No  Eating & Drinking: eating and drinking without complaints/concerns  PO Intake: regular diet  Bowel Function: normal  Urinary Status: voiding without complaint/concerns    Care Management       Care Mgmt General Assessment  Referral  Referral Source: IP Handoff  Health Care Home/Utilization  Preferred Hospital: [facility]  [phone]  Preferred Urgent Care: [facility] [phone]     Resources  Patient receiving home care services:: No  Supplies Currently Used at Home: Wound Care Supplies  Equipment Currently Used at Home: walker, rolling  Psychosocial  Informal Support system:: Family  Functional Status  Dependent ADLs:: Ambulation-walker  Dependent IADLs:: Meal Preparation (Has been using [employer])  Mobility Status: Independent w/Device    and     Care Mgmt Encounter Assessment                               Follow up Plan     Discharge Follow-Up  Discharge follow up appointment scheduled in alignment with recommended follow up timeframe or Transitions of Risk Category? (Low = within 30 days; Moderate= within 14 days; High= within 7 days): No  Patient's follow up appointment not scheduled: Patient declined scheduling support. Education on the importance of transitions of care follow up. Provided scheduling phone number. (Patient states her primary care clinic  is already working on squeezing her in for an appointment.)    Patient would like to enroll in Care Coordination, however, cannot complete assessment at time of call. CC team will try reaching patient again on Friday to complete. RN CC contacted [place] [facility] (phone [phone], opt 3) per patient request. This agency is through [facility]. RN CC was advised that they are only accepting [facility] patients at this time. RN CC has reached out to [facility] to see if the HUB continues to work on finding another home care agency.    Future Appointments   Date Time Provider Department Center   5/29/2024  1:00 PM Nurse, [facility]   6/5/2024  2:00 PM CS NEUROSURG NURSE [facility]    7/8/2024  3:00 PM Frances Anton NP [facility]    8/26/2024  2:30 PM Vinny Reynaga MD [facility]    10/29/2024  3:30 PM Oscar Baltazar MD [facility]        Outpatient Plan as outlined on AVS reviewed with patient.    For any urgent concerns, please contact our 24 hour nurse triage line: [phone] ([phone])       Dylan Bowles RN Care Coordinator  [facility]  (Covering for Lead RN Care Coordinator)

## 2024-05-29 NOTE — PROGRESS NOTES
[facility]        Patient is here for a  follow up  to discuss wound vac    Pt is currently taking Statin and Xarelto.    /75 (BP Location: Right arm, Patient Position: Chair, Cuff Size: Adult Regular)   Pulse 97   LMP 06/06/2019 (Exact Date)     The provider has been notified that the patient has no concerns.     Questions patient would like addressed today are: N/A.    Refills are needed: N/A    Has homecare services and agency name:  Brandie Laura MA

## 2024-05-29 NOTE — TELEPHONE ENCOUNTER
Per separate encounter, surgical team assisting with coordination for wound vac removal     Routing encounter to TCs to contact pt to assist with scheduling hospital follow up with team (per below message PCP unavailable)    Mary Ellen LAST, Triage RN  [facility]

## 2024-05-30 ENCOUNTER — DOCUMENTATION ONLY (OUTPATIENT)
Dept: NEUROSURGERY | Facility: CLINIC | Age: 54
End: 2024-05-30
Payer: COMMERCIAL

## 2024-05-30 NOTE — PROGRESS NOTES
Faxed [employer] disability forms May 30, 2024 to fax number [facility]    Right Fax confirmed at 2:18 PM    Jayleen Stephens

## 2024-05-30 NOTE — PROGRESS NOTES
[employer] disability forms updated with new surgery dates. Sent to patient.     With VF/MA to send to HIM.

## 2024-05-31 ENCOUNTER — PATIENT OUTREACH (OUTPATIENT)
Dept: CARE COORDINATION | Facility: CLINIC | Age: 54
End: 2024-05-31
Payer: COMMERCIAL

## 2024-05-31 ASSESSMENT — ACTIVITIES OF DAILY LIVING (ADL): DEPENDENT_IADLS:: MEDICATION MANAGEMENT

## 2024-05-31 NOTE — LETTER
[facility]  Patient Centered Plan of Care  About Me:        Patient Name:  Lima Bueno    YOB: 1970  Age:         53 year old   [place] MRN:    [id number] Telephone Information:  Home Phone [phone]   Mobile [phone]       Address:  [address] [facility] [address] Email address:  [email or web address]      Emergency Contact(s)    Name Relationship Lgl Grd Work Phone Home Phone Mobile Phone   MADHAVI CENTENO Son    [phone]           Primary language:  English     needed? No   [place] [employer]:  [phone] op. 1  Other communication barriers:Glasses    Preferred Method of Communication:  Phone  Current living arrangement: I live in a private home with family    Mobility Status/ Medical Equipment: Independent w/Device        Health Maintenance  Health Maintenance Reviewed: Not assessed      My Access Plan  Medical Emergency 911   Primary Clinic Line [facility] [phone]   24 Hour Appointment Line [phone] or  [phone]) (toll-free)   24 Hour Nurse Line [phone] (toll-free)   Preferred Urgent Care [facility], [phone]     Preferred Hospital [facility]  [phone]     Preferred Pharmacy [facility] - [place], [place] - [address] Behavioral Health Crisis Line The National Suicide Prevention Lifeline at 1-110.673.6698 or Text/Call 421           My Care Team Members  Patient Care Team         Relationship Specialty Notifications Start End    Oscar Baltazar MD PCP - General Internal Medicine  10/11/12     Phone: [phone] Pager: [phone] Fax: [phone] [address] [place] [address]    Oscar Baltazar MD Assigned PCP   9/8/15     Phone: [phone] Pager: [phone] Fax: [phone] [address] [place] [address]    Krystal Calix PA-C  Assigned Surgical Provider   8/6/22     Phone: [phone] Fax: [phone]         [address] [place] [address]    Sera Cabezas MD Assigned OBGYN Provider   9/9/23     Phone: [phone] Fax: [phone]         [address] [place] [address]    Vinny Reynaga MD Assigned Neuroscience Provider   2/23/24     Phone: [phone] Fax: [phone]         [address] [facility] [address]    Chandrakant Toledo [facility] Pharmacist   3/21/24     Phone: [phone] Fax: [phone]         [address] [facility] [address]    Chandrakant Toledo [facility] Assigned [place] Pharmacist   4/23/24     Phone: [phone] Fax: [phone]         [address] [facility] [address]    Kinga Stallworth LICSW Lead Care Coordinator  Admissions 5/28/24     Phone: [phone]                     My Care Plans  Self Management and Treatment Plan    Care Plan  Care Plan: Mobility       Problem: HP GENERAL PROBLEM       Goal: I want to be able to be mobile.       Start Date: 5/31/2024 Expected End Date: 11/27/2024    This Visit's Progress: 10%    Note:     Barriers: Pain, surgery  Strengths: Motivated, strong family support  Patient expressed understanding of goal: Yes  Action steps to achieve this goal:  1. I will follow post-op care instructions.  2. I will attend follow up appointments as scheduled.  3. I will collaborate with my Primary Care Care Coordination team.                                Action Plans on File:                       Advance Care Plans/Directives:             My Medical and Care Information  Problem List   Patient Active Problem List   Diagnosis    Deep vein thrombosis (DVT) of lower extremity (H)    Pulmonary embolism (H)    Asthma    ADD (attention deficit disorder with hyperactivity)    Breast mass    CARDIOVASCULAR SCREENING; LDL GOAL LESS THAN 160    Protein S deficiency (H24)    Tobacco use disorder    Papanicolaou smear of cervix with low grade squamous  intraepithelial lesion (LGSIL)    Spinal stenosis, lumbar region, with neurogenic claudication    Chronic, continuous use of opioids    Long-term (current) use of anticoagulants [Z79.01]    Type 2 diabetes mellitus without complication, without long-term current use of insulin (H)    Hyperlipidemia LDL goal <100    Morbid obesity (H)    Essential hypertension, benign    Cyst of pancreas    Acute midline low back pain with right-sided sciatica    S/P lumbar spinal fusion      Current Medications and Allergies:    Current Outpatient Medications   Medication Instructions    albuterol (PROAIR HFA/PROVENTIL HFA/VENTOLIN HFA) 108 (90 Base) MCG/ACT inhaler 2 puffs, Inhalation, EVERY 6 HOURS PRN    albuterol (PROVENTIL) 2.5 mg, Nebulization, EVERY 6 HOURS PRN    atorvastatin (LIPITOR) 20 mg, Oral, DAILY    benzonatate (TESSALON) 200 mg, Oral, 3 TIMES DAILY PRN    blood glucose (NO BRAND SPECIFIED) test strip 1 Box of Test Strips for patients meter - test daily    famotidine (PEPCID) 20 mg, Oral, DAILY    fexofenadine (ALLEGRA) 180 mg, Oral, DAILY    fluticasone-salmeterol (ADVAIR HFA) 230-21 MCG/ACT inhaler 2 puffs, Inhalation, 2 TIMES DAILY    gabapentin (NEURONTIN) 300 mg, Oral, 3 TIMES DAILY PRN    guaiFENesin (MUCINEX) 1,200 mg, Oral, 2 TIMES DAILY    ketotifen (ZADITOR) 0.025 % ophthalmic solution 1 drop, Both Eyes, 2 TIMES DAILY, 1 drop in affected eye(s) 2 times a day    Lancets 30G MISC 120 30 gauge lancets (substitute as needed) - test daily    lisinopril (ZESTRIL) 10 mg, Oral, DAILY    methocarbamol (ROBAXIN) 750 mg, Oral, EVERY 6 HOURS PRN    oxyCODONE (ROXICODONE) 5 mg, Oral, EVERY 4 HOURS PRN    Ozempic (2 MG/DOSE) 2 mg, Subcutaneous, WEEKLY    rivaroxaban ANTICOAGULANT (XARELTO) 20 mg, Oral, DAILY WITH SUPPER    senna-docusate (SENOKOT-S/PERICOLACE) 8.6-50 MG tablet 1 tablet, Oral, 2 TIMES DAILY    traZODone (DESYREL) 300 mg, Oral, AT BEDTIME         Care Coordination Start Date: 5/29/2024   Frequency of Care  Coordination: monthly, more frequently as needed     Form Last Updated: 05/31/2024

## 2024-05-31 NOTE — LETTER
[facility] CARE COORDINATION  [address]  [facility] [address]    May 31, 2024    Lima Bueno  [address] [facility] [address]      Dear Kinga Amato is the clinic care coordinator who works with Oscar Baltazar MD, MD with the [facility]. Our team wanted to introduce the role and provide you with contact information for you to be able to call with any questions or concerns. Below is a description of clinic care coordination and how we can further assist you.       The clinic care coordination team is made up of a registered nurse, , financial resource worker and community health worker who understand the health care system. The goal of clinic care coordination is to help you manage your health and improve access to the health care system. Our team works alongside your provider to assist you in determining your health and social needs. We can help you obtain health care and community resources, providing you with necessary information and education. We can work with you through any barriers and develop a care plan that helps coordinate and strengthen the communication between you and your care team.  Our services are voluntary and are offered without charge to you personally.    Please feel free to contact Kinga Stallworth (phone [phone]) with any questions or concerns regarding care coordination and what we can offer.      We are focused on providing you with the highest-quality healthcare experience possible.    Sincerely,     [facility] Care Coordination Team    Enclosed: I have enclosed a copy of the Patient Centered Plan of Care. This has helpful information and goals that we have talked about. Please keep this in an easy to access place to use as needed.

## 2024-05-31 NOTE — PROGRESS NOTES
"Clinic Care Coordination Contact  Clinic Care Coordination Contact  OUTREACH    Referral Information:  Referral Source: IP Handoff         Chief Complaint   Patient presents with    Clinic Care Coordination - Initial        [place] Utilization: [facility]  Clinic Utilization  Difficulty keeping appointments:: No  Compliance Concerns: No  No-Show Concerns: No  No PCP office visit in Past Year: No  Utilization      No Show Count (past year)  1             ED Visits  0             Hospital Admissions  1                    Current as of: 5/30/2024  3:12 PM                Clinical Concerns:  Current Medical Concerns: Home Care has unable to be secured for the patient. Patient states her goal right now at home is pain management and being able to sit in a chair for 25 minutes. Patient lives with family who have been helping her. RN CC mentioned the possibility of needing to pursue outpatient rehab given the difficulty in finding a home health agency, to which patient states between her pain and limited mobility, she states she doesn't feel this a feasible option at this time.    Current Behavioral Concerns: None    Education Provided to patient: RN CC called and spoke with patient; introduced self, discussed role of Care Coordination, and explained reason for the call.       Health Maintenance Reviewed: Not assessed  Clinical Pathway: None    Medication Management:  Medication review status: Medications reviewed and no changes reported per patient.        RN CC asked patient if we could review medications in detail, to which patient said \"everything it says in my chart is correct\".     Functional Status:  Dependent ADLs:: Ambulation-walker  Dependent IADLs:: Medication Management  Mobility Status: Independent w/Device    Living Situation:  Current living arrangement:: I live in a private home with family  Type of residence:: Private home - stairs    Lifestyle & Psychosocial Needs:    Social Determinants " of Health     Food Insecurity: Low Risk  (5/31/2024)    Food Insecurity     Within the past 12 months, did you worry that your food would run out before you got money to buy more?: No     Within the past 12 months, did the food you bought just not last and you didn t have money to get more?: No   Depression: Not at risk (1/10/2024)    PHQ-2     PHQ-2 Score: 2   Housing Stability: Low Risk  (5/31/2024)    Housing Stability     Do you have housing? : Yes     Are you worried about losing your housing?: No   Tobacco Use: High Risk (5/29/2024)    Patient History     Smoking Tobacco Use: Some Days     Smokeless Tobacco Use: Never     Passive Exposure: Not on file   Financial Resource Strain: Low Risk  (5/31/2024)    Financial Resource Strain     Within the past 12 months, have you or your family members you live with been unable to get utilities (heat, electricity) when it was really needed?: No   Alcohol Use: Not on file   Transportation Needs: Low Risk  (5/31/2024)    Transportation Needs     Within the past 12 months, has lack of transportation kept you from medical appointments, getting your medicines, non-medical meetings or appointments, work, or from getting things that you need?: No   Physical Activity: Not on file   Interpersonal Safety: Low Risk  (5/31/2024)    Interpersonal Safety     Do you feel physically and emotionally safe where you currently live?: Yes     Within the past 12 months, have you been hit, slapped, kicked or otherwise physically hurt by someone?: No     Within the past 12 months, have you been humiliated or emotionally abused in other ways by your partner or ex-partner?: No   Stress: Not on file   Social Connections: Not on file   Health Literacy: Not on file        Transportation means:: Regular car     Informal Support system:: Family          Resources and Interventions:  Current Resources:      Community Resources: None  Supplies Currently Used at Home: Wound Care Supplies  Equipment  Currently Used at Home: walker, rolling                 Referrals Placed: None       Care Plan:  Care Plan: Mobility       Problem: HP GENERAL PROBLEM       Goal: I want to be able to be mobile.       Start Date: 5/31/2024 Expected End Date: 11/27/2024    This Visit's Progress: 10%    Note:     Barriers: Pain, surgery  Strengths: Motivated, strong family support  Patient expressed understanding of goal: Yes  Action steps to achieve this goal:  1. I will follow post-op care instructions.  2. I will attend follow up appointments as scheduled.  3. I will collaborate with my Primary Care Care Coordination team.                                Patient/Caregiver understanding: Patient reports understanding and denies any additional questions or concerns at this time. RN CC engaged in AIDET communication during encounter.     Outreach Frequency: monthly, more frequently as needed  Future Appointments                In 5 days Oscar Baltazar MD [facility],     In 5 days  NEUROSURG NURSE [facility] Neurology [facility]    In 1 month Frances Anton NP [facility] Neurology [facility],     In 2 months Vinny Reynaga MD [facility] Neurology [facility],     In 5 months Oscar Baltazar MD [facility] [place],             Plan: Patient enrolled in Care Coordination. Patient to attend upcoming appointments as scheduled. Care Coordination team to continue working on finding home health care for patient.    Dylan Bowles RN Care Coordinator  [facility] Primary Care [facility]  (Covering for Lead RN Care Coordinator)

## 2024-06-03 NOTE — PROGRESS NOTES
"Post-op Nurse Visit:    Patient seen today per the order of  Vinny Reynaga MD .   DOS: 5/22/24  Procedure: Lumbar 5 to Sacral 1 Anterior Lumbar Interbody Fusion  Lumbar 5 to Sacral 1 Robotic Posterior Spinal Fusion with right decompression    Pain/Neuro Assessment  6/10 to low back and left hip pain, feels like her LLE feels like \"buckling\".   Numbness/tingling: numbness to bilateral bottom of feet, happens when she pushes herself and stands too long. Improves with walking and gabapentin.   Strength: Equal strength to bilateral lower extremities. Denies weakness.     Pain Relief Measures:  Oxycodone: 1 tab q 6-8  Tylenol: 1,000 PRN between oxycodone and robaxin doses  Robaxin: 1 q 6  Gabapentin: TID  Ice: NA > educated to use if needed  Heat: NA     Incision    Incision inspected. Edges well-approximated. No redness, swelling, drainage, or warmth noted. Posterior Incision prepped with ChloraPrep and staple(s)  removed without difficulty. Steri-strips applied. Anterior incision prepped with ChloraPrep. Dissolvable sutures.     Activity  Following restrictions   Falls:  none  Patient is walking frequently without difficulty. FWW when ambulating. Can walk without the walker but nice to have so she can decrease pressure on feet  Denies redness, swelling, or warmth to bilateral calves.   Wearing brace? No    GI/  Difficulty swallowing? No  Patient's appetite is normal  Bowel/bladder problems? No  Taking stool softeners? Yes     Refills/x-rays/return to work  Refills given at this appointment? Yes, paulette'd up and routed to providers. Changed oxycodone rx to 1 tab 1 6 PRN  Sent for x-rays after this appointment? No  Ordered future x-rays? Yes  Scheduling team notified? Pt sent to   Return to work discussed at this appointment? Yes , does [employer] work. RTW 7/3 with restrictions. She is asking for 4 hours/day for 1 week starting 7/3, increase 2 hours/day until back at full time. Routed to JERAD team for approval. "     All of patient's questions addressed today. Patient was instructed to call with any additional questions/concerns.

## 2024-06-03 NOTE — PATIENT INSTRUCTIONS
Instructions for Patient    Incision  Steri-strips were applied today, they will fall off in 7-10 days. Do not to pull them off.   Keep your incision clean and dry at all times.   It is okay to shower, just pat the incision dry   No submerging incision in water such as pools, hot tubs, or baths for at least 8 weeks and until the incision is healed  Do not apply lotions or ointments to incision    Activity  No lifting greater than 10 pounds. No bending, twisting, or overhead reaching.  Walking is the best way to start exercise after surgery. Take short frequent walks. You may gradually increase the distance as tolerated. If you feel pain, decrease your activity, but DO NOT stop walking. Walking will help you gain strength, prevent muscle soreness and spasms, and prevent blood clots.   Continue to wear brace as directed by your surgeon  Avoid bed rest and prolonged sitting for longer than 30 minutes (change positions frequently while awake)  No contact sports or high impact activities such as; running/jogging, snowmobile or 4 bass riding or any other recreational vehicles until after given clearance at one of your follow up visits    Medications   Refills of pain medication:   Please call the neurosurgery clinic to request 2-3 days before you run out. A nurse will call you back to obtain a pain assessment.   Weaning from narcotic pain medications  When it is time, start weaning by extending the time between doses.   For example, if you're taking 2 tabs every 4 hours, spread it out to 2 tabs over 4.5, 5, 6 hours. At that point you can certainly cut down to 1 tab, then wean to an as needed basis until completely done with them.  Don't take more than 3000mg of Acetaminophen in 24 hours  Avoid Aspirin and NSAIDs (ex: ibuprofen/Advil) until given clearance (likely at the 6-week post-op appointment).  Encouraged icing for at least 3-4 times throughout the day for 20-30 minutes at a time. Avoid heat to the incision area.    Taking stool softeners regularly can reduce constipation commonly caused by narcotic pain medications.    Contact clinic or Emergency Room if you develop:   Infection (redness, swelling, warmth, drainage, fever over 101 F)  New injury  Bladder or bowel changes or loss of control    Signs of blood clot:  Swelling and/or warmth in one or both legs  Pain or tenderness in your leg, ankle, foot, or arm   Red or discolored skin     Go to the Emergency Room   If sudden onset of severe headache, weakness, confusion, change in level of consciousness, pain, or loss of movement.  Chest pain  Trouble breathing     Post-operative appointments  Arrive 30 minutes before your 6 week, 3 month, 6 month, and 1 year post-op appointments to allow time for an x-ray before each    [facility]  [place] [address] [place] [id number]  Telephone:  [phone]  Fax:  [phone]

## 2024-06-04 ENCOUNTER — TELEPHONE (OUTPATIENT)
Dept: FAMILY MEDICINE | Facility: CLINIC | Age: 54
End: 2024-06-04
Payer: COMMERCIAL

## 2024-06-04 NOTE — TELEPHONE ENCOUNTER
Spoke with patient who is scheduled at 12:10pm, Wednesday 6-5-2024 with Dr. Baltazar. Patient verbalized understanding of appt day and time.    Pia Lopez MA

## 2024-06-04 NOTE — TELEPHONE ENCOUNTER
Reason for Call:  Appointment Request    Patient requesting this type of appt:  Hospital/ED Follow-Up     Requested provider: Oscar Baltazar    Reason patient unable to be scheduled: Needs to be scheduled by clinic    When does patient want to be seen/preferred time:  Per PCP recommendation    Comments: Patient was scheduled for appointment on 06/05/24 but was cancelled and Dr. Baltazar's next available is July 16th. Patient is wanting to know if that is okay to schedule this appointment that far out from her neurosurgery hospital visit.     Could we send this information to you in [employer] or would you prefer to receive a phone call?:   Patient would prefer a phone call   Okay to leave a detailed message?: Yes at Home number on file [phone] (home)    Call taken on 6/4/2024 at 2:45 PM by Mode Mccabe

## 2024-06-05 ENCOUNTER — OFFICE VISIT (OUTPATIENT)
Dept: FAMILY MEDICINE | Facility: CLINIC | Age: 54
End: 2024-06-05
Payer: COMMERCIAL

## 2024-06-05 ENCOUNTER — ALLIED HEALTH/NURSE VISIT (OUTPATIENT)
Dept: NEUROSURGERY | Facility: CLINIC | Age: 54
End: 2024-06-05
Payer: COMMERCIAL

## 2024-06-05 ENCOUNTER — PATIENT OUTREACH (OUTPATIENT)
Dept: CARE COORDINATION | Facility: CLINIC | Age: 54
End: 2024-06-05

## 2024-06-05 VITALS
BODY MASS INDEX: 36.89 KG/M2 | TEMPERATURE: 97 F | SYSTOLIC BLOOD PRESSURE: 118 MMHG | DIASTOLIC BLOOD PRESSURE: 80 MMHG | WEIGHT: 221.4 LBS | HEIGHT: 65 IN | HEART RATE: 96 BPM | OXYGEN SATURATION: 97 % | RESPIRATION RATE: 17 BRPM

## 2024-06-05 DIAGNOSIS — I26.99 OTHER PULMONARY EMBOLISM WITHOUT ACUTE COR PULMONALE, UNSPECIFIED CHRONICITY (H): ICD-10-CM

## 2024-06-05 DIAGNOSIS — I10 ESSENTIAL HYPERTENSION, BENIGN: ICD-10-CM

## 2024-06-05 DIAGNOSIS — Z12.31 VISIT FOR SCREENING MAMMOGRAM: ICD-10-CM

## 2024-06-05 DIAGNOSIS — Z98.1 S/P LUMBAR SPINAL FUSION: Primary | ICD-10-CM

## 2024-06-05 DIAGNOSIS — E11.9 TYPE 2 DIABETES MELLITUS WITHOUT COMPLICATION, WITHOUT LONG-TERM CURRENT USE OF INSULIN (H): ICD-10-CM

## 2024-06-05 LAB
ERYTHROCYTE [DISTWIDTH] IN BLOOD BY AUTOMATED COUNT: 14.6 % (ref 10–15)
HCT VFR BLD AUTO: 35.8 % (ref 35–47)
HGB BLD-MCNC: 10.9 G/DL (ref 11.7–15.7)
MCH RBC QN AUTO: 26.6 PG (ref 26.5–33)
MCHC RBC AUTO-ENTMCNC: 30.4 G/DL (ref 31.5–36.5)
MCV RBC AUTO: 87 FL (ref 78–100)
PLATELET # BLD AUTO: 587 10E3/UL (ref 150–450)
RBC # BLD AUTO: 4.1 10E6/UL (ref 3.8–5.2)
WBC # BLD AUTO: 8.2 10E3/UL (ref 4–11)

## 2024-06-05 PROCEDURE — 99495 TRANSJ CARE MGMT MOD F2F 14D: CPT | Performed by: INTERNAL MEDICINE

## 2024-06-05 PROCEDURE — 85027 COMPLETE CBC AUTOMATED: CPT | Performed by: INTERNAL MEDICINE

## 2024-06-05 PROCEDURE — 36415 COLL VENOUS BLD VENIPUNCTURE: CPT | Performed by: INTERNAL MEDICINE

## 2024-06-05 PROCEDURE — 99207 PR NO CHARGE NURSE ONLY: CPT

## 2024-06-05 PROCEDURE — 80061 LIPID PANEL: CPT | Performed by: INTERNAL MEDICINE

## 2024-06-05 PROCEDURE — 80048 BASIC METABOLIC PNL TOTAL CA: CPT | Performed by: INTERNAL MEDICINE

## 2024-06-05 RX ORDER — METHOCARBAMOL 750 MG/1
750 TABLET, FILM COATED ORAL EVERY 6 HOURS PRN
Qty: 40 TABLET | Refills: 0 | Status: SHIPPED | OUTPATIENT
Start: 2024-06-05 | End: 2024-07-08

## 2024-06-05 RX ORDER — OXYCODONE HYDROCHLORIDE 5 MG/1
5 TABLET ORAL EVERY 6 HOURS PRN
Qty: 40 TABLET | Refills: 0 | Status: SHIPPED | OUTPATIENT
Start: 2024-06-05 | End: 2024-07-08

## 2024-06-05 ASSESSMENT — ASTHMA QUESTIONNAIRES
QUESTION_5 LAST FOUR WEEKS HOW WOULD YOU RATE YOUR ASTHMA CONTROL: WELL CONTROLLED
ACT_TOTALSCORE: 21
ACT_TOTALSCORE: 21
QUESTION_1 LAST FOUR WEEKS HOW MUCH OF THE TIME DID YOUR ASTHMA KEEP YOU FROM GETTING AS MUCH DONE AT WORK, SCHOOL OR AT HOME: NONE OF THE TIME
QUESTION_2 LAST FOUR WEEKS HOW OFTEN HAVE YOU HAD SHORTNESS OF BREATH: ONCE OR TWICE A WEEK
QUESTION_4 LAST FOUR WEEKS HOW OFTEN HAVE YOU USED YOUR RESCUE INHALER OR NEBULIZER MEDICATION (SUCH AS ALBUTEROL): ONCE A WEEK OR LESS
QUESTION_3 LAST FOUR WEEKS HOW OFTEN DID YOUR ASTHMA SYMPTOMS (WHEEZING, COUGHING, SHORTNESS OF BREATH, CHEST TIGHTNESS OR PAIN) WAKE YOU UP AT NIGHT OR EARLIER THAN USUAL IN THE MORNING: ONCE OR TWICE

## 2024-06-05 ASSESSMENT — PAIN SCALES - GENERAL: PAINLEVEL: NO PAIN (0)

## 2024-06-05 NOTE — PROGRESS NOTES
Clinic Care Coordination Contact  Care Team Conversations    SW received message from [facility] that they would reconsider pt for homecare, and would like PCP to put in new homecare referral during pt clinic visit on 6/5/24.  PCP notified.      Kinga Stallworth,  [facility]  Clinic Care Coordinator  [facility]  [phone]  [email or web address]

## 2024-06-05 NOTE — PROGRESS NOTES
Subjective   Lima is a 53 year old, presenting for the following health issues:  Hospital F/U    \[facility]\""        Hospital Follow-up Visit:    Hospital/Nursing Home/IP Rehab Facility: [facility]  Date of Admission: 5/22/2024  Date of Discharge: 5/25/2024  Reason(s) for Admission: S/P Lumbar Spinal Fusion  Was the patient in the ICU or did the patient experience delirium during hospitalization?  No  Do you have any other stressors you would like to discuss with your provider? No    Problems taking medications regularly:  None  Medication changes since discharge: None  Problems adhering to non-medication therapy:  None    Summary of hospitalization:  [facility] discharge summary reviewed  Diagnostic Tests/Treatments reviewed.  Follow up needed: neurosurgery, wound care  Other Healthcare Providers Involved in Patient s Care:         None  Update since discharge: improved.         Plan of care communicated with patient              S/P lumbar spinal fusion   Lima Bueno has had improvement in her lower back pain.  Notes that left hip has been sore when she is walking and difficulty putting weight on it.   She has continued gabapentin 300 mg three times daily.  Also taking methocarbamol.  She will have follow up in spine clinic today and looking to get back into aquatherapy.  Type 2 diabetes mellitus without complication, without long-term current use of insulin (H)   Doing really well with semaglutide.    Essential hypertension, benign   Blood pressure is excellent with lisinopril  Other pulmonary embolism without acute cor pulmonale, unspecified chronicity (H)   Back on Xarelto      Review of Systems  Constitutional, HEENT, cardiovascular, pulmonary, GI, , musculoskeletal - as above , neuro, skin, endocrine and psych systems are negative, except as otherwise noted.      Objective    /80 (BP Location: Right arm, Patient Position: Sitting, Cuff Size: Adult  "Large)   Pulse 96   Temp 97  F (36.1  C) (Tympanic)   Resp 17   Ht 1.651 m (5' 5\")   Wt 100.4 kg (221 lb 6.4 oz)   LMP 06/06/2019 (Exact Date)   SpO2 97%   BMI 36.84 kg/m    Body mass index is 36.84 kg/m .  Physical Exam   GENERAL: alert and no distress  EYES: Eyes grossly normal to inspection,    NECK: no adenopathy, no asymmetry, masses, or scars  RESP: lungs clear to auscultation - no rales, rhonchi or wheezes  CV: regular rate and rhythm, normal S1 S2, no S3 or S4, no murmur, click or rub, no peripheral edema  ABDOMEN: soft, nontender, no hepatosplenomegaly, no masses and bowel sounds normal  MS: Limited range of motion of left hip, otherwise ambulating without significant pain  SKIN: incisional scar on abdomen normal   NEURO: Normal strength and tone, mentation intact and speech normal  PSYCH: mentation appears normal, affect normal/bright    Patient Instructions   (Z98.1) S/P lumbar spinal fusion  (primary encounter diagnosis)  Comment: Noted successful L5-S1 discectomy and fusion completed by Dr. Hidalgo on May 22.  Continue pain management with recommendations from neurosurgery -okay to continue gabapentin in addition to pain medications prescribed by spine clinic.  Plan to start physical therapy - potentially aquatherapy.  Recent wound care notes reviewed and wound VAC has been removed.  Continue follow-up as needed  Plan:      (E11.9) Type 2 diabetes mellitus without complication, without long-term current use of insulin (H)  Comment: Recent A1c reviewed and stable.  You are doing great with current use of semaglutide.  Due for eye exam  Plan:     (I10) Essential hypertension, benign  Comment: Blood pressure is also been excellent.  Continue on lisinopril  Plan:     (I26.99) Other pulmonary embolism without acute cor pulmonale, unspecified chronicity (H)  Comment: Noted that Xarelto was resumed following recent hospitalization  Plan:      [facility] (also performs diagnostic " mammogram, ultrasound and biopsy) [phone].        Referral for home care placed    Signed Electronically by: Oscar Baltazar MD, MD

## 2024-06-05 NOTE — Clinical Note
Medications paulette'd up for approval. Pt also asking for RTW 4 hours/day starting 7/3 for 1 week and increase by 2 hours each week. Lmk if okay to draft.

## 2024-06-05 NOTE — PATIENT INSTRUCTIONS
(Z98.1) S/P lumbar spinal fusion  (primary encounter diagnosis)  Comment: Noted successful L5-S1 discectomy and fusion completed by Dr. Hidalgo on May 22.  Continue pain management with recommendations from neurosurgery -okay to continue gabapentin in addition to pain medications prescribed by spine clinic.  Plan to start physical therapy - potentially aquatherapy.  Recent wound care notes reviewed and wound VAC has been removed.  Continue follow-up as needed  Plan:      (E11.9) Type 2 diabetes mellitus without complication, without long-term current use of insulin (H)  Comment: Recent A1c reviewed and stable.  You are doing great with current use of semaglutide.  Due for eye exam  Plan:     (I10) Essential hypertension, benign  Comment: Blood pressure is also been excellent.  Continue on lisinopril  Plan:     (I26.99) Other pulmonary embolism without acute cor pulmonale, unspecified chronicity (H)  Comment: Noted that Xarelto was resumed following recent hospitalization  Plan:      [facility] [place] (also performs diagnostic mammogram, ultrasound and biopsy) [phone].

## 2024-06-05 NOTE — LETTER
June 5, 2024     To Whom it May Concern,      Lima Bueno is being seen at our clinic for post-operative follow up care. He/she is able to return to work on 7/3/24 with the restrictions of:    -Light duty work only  -No heavy lifting greater than 10 pounds   -No twisting or bending  -Return to work 4 hours/day for 1 week, then 6 hours/day for 1 week, then resume normal work schedule    Lima Bueno will be re-evaluated at the next follow up visit on 7/8/24. Please call our clinic with questions or concerns: [phone]       Sincerely,    Clara Fam PA-C   (Electronically Signed, June 5, 2024, 1973)

## 2024-06-06 LAB
ANION GAP SERPL CALCULATED.3IONS-SCNC: 7 MMOL/L (ref 7–15)
BUN SERPL-MCNC: 13.3 MG/DL (ref 6–20)
CALCIUM SERPL-MCNC: 9.4 MG/DL (ref 8.6–10)
CHLORIDE SERPL-SCNC: 105 MMOL/L (ref 98–107)
CHOLEST SERPL-MCNC: 111 MG/DL
CREAT SERPL-MCNC: 0.91 MG/DL (ref 0.51–0.95)
DEPRECATED HCO3 PLAS-SCNC: 27 MMOL/L (ref 22–29)
EGFRCR SERPLBLD CKD-EPI 2021: 75 ML/MIN/1.73M2
FASTING STATUS PATIENT QL REPORTED: NO
FASTING STATUS PATIENT QL REPORTED: NO
GLUCOSE SERPL-MCNC: 105 MG/DL (ref 70–99)
HDLC SERPL-MCNC: 41 MG/DL
LDLC SERPL CALC-MCNC: 55 MG/DL
NONHDLC SERPL-MCNC: 70 MG/DL
POTASSIUM SERPL-SCNC: 4.4 MMOL/L (ref 3.4–5.3)
SODIUM SERPL-SCNC: 139 MMOL/L (ref 135–145)
TRIGL SERPL-MCNC: 77 MG/DL

## 2024-06-06 NOTE — TELEPHONE ENCOUNTER
See updated care coordination encounter on 6/5/24.    Closing this encounter.    Sara LOPEZ  [facility]

## 2024-06-07 NOTE — RESULT ENCOUNTER NOTE
Jose Amato,    I had the opportunity to review your recent labs and a summary of your labs reads as follows:    Your complete blood counts show improvement in your hemoglobin with elevation of your platelet count  Your comprehensive metabolic panel showed normal renal function, normal liver function  Your fasting lipid panel show  - normal HDL (good) cholesterol -as your goal is greater than 40  - low LDL (bad) cholesterol as your goal is less than 100  - normal triglyceride levels      Sincerely,  Oscar Baltazar MD

## 2024-06-10 ENCOUNTER — TELEPHONE (OUTPATIENT)
Dept: FAMILY MEDICINE | Facility: CLINIC | Age: 54
End: 2024-06-10
Payer: COMMERCIAL

## 2024-06-10 NOTE — TELEPHONE ENCOUNTER
Triage,   Jenae from [facility] called to request the following verbal orders from BI:    PT start of care for 6/12/24.     Best call back #: [phone], extension # [phone]  Confidential vm.     Thanks!  Maggie DELONG

## 2024-06-12 ENCOUNTER — TELEPHONE (OUTPATIENT)
Dept: NEUROSURGERY | Facility: CLINIC | Age: 54
End: 2024-06-12
Payer: COMMERCIAL

## 2024-06-12 NOTE — TELEPHONE ENCOUNTER
Spoke to patient. S/p L5-S1 A/P Fusion 5/22/24 with Dr. Reynaga.     Discussed her concerns with current position at work she will be assigned to and her physical limitations. 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 Has stairs but does not think she can do all 3 flights. There is an elevator she could use. Concerned with lots of back and forth walking to assist clients between floors and opposite ends of the building. Her employer is asking about walking restrictions. She has concerns regarding emergency evacuation plan if needing to exit the premises in a hurry or assisting others with her physical limitations still. Said she is still building her strength, can't walk a full mile yet. Home PT coming out for initial eval visit today from 2-3 pm.     Discussed as she is still healing it may be appropriate to see if she could be reassigned to a different position, that is more clerical or option of telework.     Will review/discuss with care team if work letter be updated to reflect reassigned position, walking as tolerated, limit stair use.

## 2024-06-12 NOTE — CONFIDENTIAL NOTE
Per Clara Fam PA-C:    Yes, that is appropriate.   Can have activity limitations include:   - Limit bending/twisting, lifting no more than 10 pounds   - Walking as tolerated   - Limit stair use, stair use as tolerated     consider asking for more clerical/telework position okay     Please discuss with patient that she should not be driving or working while on narcotics.     Attempted to reach out to patient, no answer. Left voice message asking patient to either call clinic back or respond to myc message with above info. Letter drafted and sent to patient. Advised her to review letter and let me know if any changes need to be made.

## 2024-06-12 NOTE — TELEPHONE ENCOUNTER
[facility]    Phone Message    May a detailed message be left on voicemail: yes     Reason for Call: Other: Patient called stating provider had done the letter for her to return to work, but her employer wants to know what her walking restrictions are. She will need a new letter for her work. Patient had surgery on May 22, 2024 with Dr. Reynaga Please review and call back with questions.     Action Taken: Message routed to:  Other: CS Neurosurgery    Travel Screening: Not Applicable     Date of Service:

## 2024-06-12 NOTE — LETTER
June 12, 2024      Lima Bueno  [address] [address]        To Whom It May Concern:    Lima Bueno is being seen at our clinic for post-operative follow up care. She is able to return to work on 7/3/24 with the restrictions of:     -Light duty work only  -No heavy lifting greater than 10 pounds   -Limit twisting or bending  -Return to work 4 hours/day for 1 week, then 6 hours/day for 1 week, then resume normal work schedule  -Walking as tolerated  -Limit stair use, stair use as tolerated  -Please consider for more clerical/Telework position     Lima Bueno will be re-evaluated at the next follow up visit on 7/8/24. Please call our clinic with questions or concerns: [phone]       Sincerely,    Clara Fam PA-C  (Electronically Signed 06/12/24 3:19 PM)

## 2024-06-13 ENCOUNTER — TELEPHONE (OUTPATIENT)
Dept: FAMILY MEDICINE | Facility: CLINIC | Age: 54
End: 2024-06-13
Payer: COMMERCIAL

## 2024-06-13 DIAGNOSIS — Z98.1 S/P LUMBAR SPINAL FUSION: Primary | ICD-10-CM

## 2024-06-21 NOTE — TELEPHONE ENCOUNTER
Home Care is calling regarding an established patient with [facility].       Requesting orders from: Oscar Baltazar  Provider is following patient: Yes  Is this a 60-day recertification request?  No    Orders Requested    Physical Therapy  Request for initial certification (first set of orders)   Frequency:  6 visits in 8 weeks     Information was gathered and will be sent to provider for review.  RN will contact Home Care with information after provider review.  Confirmed ok to leave a detailed message with call back.  Contact information confirmed and updated as needed.    Haley López RN   
  Home Care is calling regarding an established patient with [facility].       Requesting orders from: Oscar Baltazar  Provider is following patient: Yes  Is this a 60-day recertification request?  No    Orders Requested    Physical Therapy  Change visits to next week- 2x/wk   Follow week- 1x/wk for 1wk   Then pt is going back to work and pt will not be considered homebound so HC can not continue.      Requesting referral to OP PT.  Pt would like to go to  clinic       Confirmed ok to leave a detailed message with call back.  Contact information confirmed and updated as needed.    Marni Perry RN   
Called CHRISTOPHER Gonzalez regarding PCP message below. Left detailed message on confidential VM. Left clinic number to call back if questions/concerns.  
I approve of requested home care orders.    Oscar Baltazar MD, MD    
Left secure message with Lisa, PHYSICAL THERAPY-[facility], informing her that PCP placed referral for OP PHYSICAL THERAPY.     Also called patient informing her of the referral. Patient stated understanding and will schedule OP PHYSICAL THERAPY appointment .    Ailin SOFIA, RN      June 21, 2024  5:10 PM       
Referral for physical therapy was placed  
N/A

## 2024-07-03 ENCOUNTER — PATIENT OUTREACH (OUTPATIENT)
Dept: CARE COORDINATION | Facility: CLINIC | Age: 54
End: 2024-07-03
Payer: COMMERCIAL

## 2024-07-03 NOTE — PROGRESS NOTES
Clinic Care Coordination Contact    Situation: Patient chart reviewed by care coordinator.    Background: Pt identified via inpatient handoff.  SW assisted in homecare search.      Assessment: Pt has now switched to outpatient PT and is returning to work soon.     Plan/Recommendations: SW to close, all needs met.      Kinga Stallworth  [facility]  Clinic Care Coordinator  [employer]  [phone]  [email or web address]

## 2024-07-08 ENCOUNTER — MYC MEDICAL ADVICE (OUTPATIENT)
Dept: NEUROSURGERY | Facility: CLINIC | Age: 54
End: 2024-07-08

## 2024-07-08 ENCOUNTER — DOCUMENTATION ONLY (OUTPATIENT)
Dept: NEUROSURGERY | Facility: CLINIC | Age: 54
End: 2024-07-08

## 2024-07-08 ENCOUNTER — ANCILLARY PROCEDURE (OUTPATIENT)
Dept: GENERAL RADIOLOGY | Facility: CLINIC | Age: 54
End: 2024-07-08
Attending: NEUROLOGICAL SURGERY
Payer: COMMERCIAL

## 2024-07-08 ENCOUNTER — OFFICE VISIT (OUTPATIENT)
Dept: NEUROSURGERY | Facility: CLINIC | Age: 54
End: 2024-07-08
Payer: COMMERCIAL

## 2024-07-08 VITALS — OXYGEN SATURATION: 99 % | SYSTOLIC BLOOD PRESSURE: 125 MMHG | HEART RATE: 101 BPM | DIASTOLIC BLOOD PRESSURE: 85 MMHG

## 2024-07-08 DIAGNOSIS — Z98.1 S/P LUMBAR SPINAL FUSION: ICD-10-CM

## 2024-07-08 DIAGNOSIS — Z98.1 S/P LUMBAR SPINAL FUSION: Primary | ICD-10-CM

## 2024-07-08 PROCEDURE — 72100 X-RAY EXAM L-S SPINE 2/3 VWS: CPT

## 2024-07-08 PROCEDURE — 99024 POSTOP FOLLOW-UP VISIT: CPT | Performed by: NURSE PRACTITIONER

## 2024-07-08 RX ORDER — METHOCARBAMOL 750 MG/1
750 TABLET, FILM COATED ORAL 3 TIMES DAILY PRN
Qty: 40 TABLET | Refills: 1 | Status: SHIPPED | OUTPATIENT
Start: 2024-07-08 | End: 2024-09-23

## 2024-07-08 RX ORDER — OXYCODONE HYDROCHLORIDE 5 MG/1
5 TABLET ORAL EVERY 6 HOURS PRN
Qty: 40 TABLET | Refills: 0 | Status: SHIPPED | OUTPATIENT
Start: 2024-07-08

## 2024-07-08 NOTE — PROGRESS NOTES
[facility] Neurosurgery Follow-Up:     HPI: 7 weeks s/p L5-S1 AP fusion on 5/22/2024 with Dr. Reynaga.  Patient is doing well.  She reports pre op right leg pain and right foot numbness have improved.  She describes stiffness in low back and hips that mostly occurs with activity.  She also reports intermittent paresthesias in left knee to foot that improves with repositioning.  Denies any new pain or weakness.  She is following activity restrictions and continues to work with physical therapy.      Current pain management:   Oxycodone 2 tabs per day  Robaxin 2 tabs per day    Patient is requesting to work 4 hours/day until her next follow-up appointment.  She sent paperwork via [employer].    Exam:  Constitutional:  Alert, well nourished, NAD.  HEENT: Normocephalic, atraumatic.   Pulm:  Without shortness of breath   CV:  No pitting edema of BLE.      /85   Pulse 101   LMP 06/06/2019 (Exact Date)   SpO2 99%     Neurological:  Awake  Alert  Oriented x 3  Motor exam:  Hip Flexor:                Right: 5/5  Left:  5/5  Quadriceps:              Right:  5/5  Left:  5/5  Hamstrings:              Right:  5/5  Left:  5/5  Gastroc Soleus:        Right:  5/5  Left:  5/5  Tib/Ant:                      Right:  5/5  Left:  5/5  EHL:                          Right:  5/5  Left:  5/5      Able to spontaneously move BLE  Sensation intact throughout BLE  Ambulates with walker   Incision: Well healed     Imaging: AP and lateral views reveal stable and intact hardware     Assessment/Plan:   7 weeks s/p L5-S1 AP fusion    -Continue with routine post op care plan   -Patient is requesting to work 4 hours/day until her next follow-up appointment.  Care team will complete paperwork.  -Continue with physical therapy  -May gradually increase activity as tolerated  -No lifting more than 20 pounds  -Robaxin and oxycodone refills sent to pharmacy  -Follow up in 6 weeks with x-ray prior to appointment  -Call our clinic for any incisional  concerns, increased pain, new symptoms, or any other post operative questions or concerns    Patient voiced understanding and agreement.      Frances Anton [facility]  Tel [phone]  Pager [phone]

## 2024-07-08 NOTE — PROGRESS NOTES
Pt sent form via [employer]. Started it. Will wait until after OV on 7/8 with Frances Anton CNP to know how to fill this out. In in progress bin CSS.

## 2024-07-08 NOTE — Clinical Note
It looks like you started working on paperwork.  Patient is requesting to work 4 hours/day until her next follow-up appointment with Dr. Reynaga. No lifting more than 20 lbs. Please send copy to patient and her employer once it's completed. Thanks!

## 2024-07-08 NOTE — NURSING NOTE
"Lima Bueno is a 53 year old female who presents for:  Chief Complaint   Patient presents with    RECHECK     6 week post op. Would like new RTW letter. Pain level 7.         Initial Vitals:  /85   Pulse 101   LMP 06/06/2019 (Exact Date)   SpO2 99%  Estimated body mass index is 36.84 kg/m  as calculated from the following:    Height as of 6/5/24: 5' 5\" (1.651 m).    Weight as of 6/5/24: 221 lb 6.4 oz (100.4 kg).. There is no height or weight on file to calculate BSA. BP completed using cuff size: regular  Data Unavailable      Jayleen Stephens    "

## 2024-07-08 NOTE — LETTER
7/8/2024      Lima Bueno  [address]  [facility] [address]      Dear Colleague,    Thank you for referring your patient, Lima Bueno, to the [facility] NEUROLOGY CLIN[facility]. Please see a copy of my visit note below.    [facility] Neurosurgery Follow-Up:     HPI: 7 weeks s/p L5-S1 AP lumbar fusion on 5/22/2024 with Dr. Reynaga.  Patient is doing well.  She reports pre op right leg pain and right foot numbness have improved.  She describes stiffness in low back and hips that mostly occurs with activity.  She also reports intermittent paresthesias in left knee to foot that improves with repositioning.  Denies any new pain or weakness.  She is following activity restrictions and continues to work with physical therapy.      Current pain management:   Oxycodone 2 tabs per day  Robaxin 2 tabs per day    Patient is requesting to work 4 hours/day until her next follow-up appointment.  She sent paperwork via [employer].    Exam:  Constitutional:  Alert, well nourished, NAD.  HEENT: Normocephalic, atraumatic.   Pulm:  Without shortness of breath   CV:  No pitting edema of BLE.      /85   Pulse 101   LMP 06/06/2019 (Exact Date)   SpO2 99%     Neurological:  Awake  Alert  Oriented x 3  Motor exam:  Hip Flexor:                Right: 5/5  Left:  5/5  Quadriceps:              Right:  5/5  Left:  5/5  Hamstrings:              Right:  5/5  Left:  5/5  Gastroc Soleus:        Right:  5/5  Left:  5/5  Tib/Ant:                      Right:  5/5  Left:  5/5  EHL:                          Right:  5/5  Left:  5/5      Able to spontaneously move BLE  Sensation intact throughout BLE  Ambulates with walker   Incision: Well healed     Imaging: AP and lateral views reveal stable and intact hardware     Assessment/Plan:   7 weeks s/p L5-S1 AP lumbar fusion    -Continue with routine post op care plan   -Patient is requesting to work 4 hours/day until her next follow-up appointment.  Care team  will complete paperwork.  -Continue with physical therapy  -May gradually increase activity as tolerated  -No lifting more than 20 pounds  -Robaxin and oxycodone refills sent to pharmacy  -Follow up in 6 weeks with x-ray prior to appointment  -Call our clinic for any incisional concerns, increased pain, new symptoms, or any other post operative questions or concerns    Patient voiced understanding and agreement.      Frances Anton CNP  [facility]  Tel [phone]  Pager [phone]      Again, thank you for allowing me to participate in the care of your patient.        Sincerely,        Frances Anton, NP

## 2024-07-08 NOTE — TELEPHONE ENCOUNTER
"During rooming, pt stated she would like a hard copy of the form once it is completed, OK to send via mail or scanned to her MyC.    Pt noted she did not fill out the area labeled \"to be completed by patient\" and wondering if that is okay. RTW has not yet been discussed, advised patient it can take 7-10 days to complete paperwork once provider has decided on appropriate RTW conditions.   "

## 2024-07-08 NOTE — PATIENT INSTRUCTIONS
-Continue with routine post op care plan   -Patient is requesting to work 4 hours/day until 3 months post op.  Care team will complete paperwork by Wednesday.  -Continue with physical therapy  -May gradually increase activity as tolerated  -No lifting more than 20 pounds  -Robaxin and oxycodone refills sent to pharmacy  -Follow up in 6 weeks with x-ray prior to appointment  -Call our clinic for any incisional concerns, increased pain, new symptoms, or any other post operative questions or concerns

## 2024-07-10 ENCOUNTER — THERAPY VISIT (OUTPATIENT)
Dept: PHYSICAL THERAPY | Facility: CLINIC | Age: 54
End: 2024-07-10
Attending: INTERNAL MEDICINE
Payer: COMMERCIAL

## 2024-07-10 DIAGNOSIS — Z98.1 S/P LUMBAR SPINAL FUSION: ICD-10-CM

## 2024-07-10 PROCEDURE — 97110 THERAPEUTIC EXERCISES: CPT | Mod: GP

## 2024-07-10 PROCEDURE — 97161 PT EVAL LOW COMPLEX 20 MIN: CPT | Mod: GP

## 2024-07-10 PROCEDURE — 97112 NEUROMUSCULAR REEDUCATION: CPT | Mod: GP

## 2024-07-10 NOTE — PROGRESS NOTES
PHYSICAL THERAPY EVALUATION  Type of Visit: Evaluation       Fall Risk Screen:  Fall screen completed by: PT  Have you fallen 2 or more times in the past year?: No  Have you fallen and had an injury in the past year?: No  Is patient a fall risk?: No    Subjective       Presenting condition or subjective complaint: Recovery from lumbar surgery  Date of onset: 05/22/24 (DOS)    Relevant medical history:     Dates & types of surgery: ALIF/Lumber on 05/22/24    Prior diagnostic imaging/testing results: MRI     Prior therapy history for the same diagnosis, illness or injury:        Pt is a 53 year old female presenting with complaints of low back pain s/p lumbar spinal fusion L5-S1 on 5/22/24. Pt reports that if she over-exerts herself, the pain goes higher than it used to and the pain is always there. Pt has also had difficulty putting weight through LLE/hip.    Pt describes the pain as dull and muscle ache and rates it a 7/10 at highest and 4/10 at lowest.     Aggravating Factors: standing, sitting (>30 minutes), walking too long (>10-15 minutes) (unable to give specific time); walking without walker     Alleviating Factors: pain medications (at night) -Gabapentin    Sleep Quality: on medications - sleeps OK    Red Flags: none    Pt goals: heal and gain core strength, increase walking tolerance (45 minutes)      Living Environment  Social support: With family members   Type of home: [facility]; 2-story   Stairs to enter the home: Yes       Ramp: No   Stairs inside the home: Yes 12 Is there a railing: Yes     Help at home: Self Cares (home health aide/personal care attendant, family, etc); Home management tasks (cooking, cleaning); Medication and/or finances; Home and Yard maintenance tasks; Assist for driving and community activities  Equipment owned: Walker     Employment: [employer]   Hobbies/Interests: Walking,Reading&Family time    Patient goals for therapy: Just heal and gain more core strength      Objective   LUMBAR:    Posture: forward head/shoulder; decreased lumbar lordosis     Gait: 4WW - decreased step/stride B; trendelenburg    Motor Control:   -TA Activation: mod cueing needed to achieve    Functional Screen:   -Sit to stand: BUE assist - pushes mostly through UEs  -SLS: unable; Romberg - 5 sec with pt falling back (able to catch self)    ROM (* Denotes Pain): not formally assessed due to instability in standing    Neuro Screen:   Myotomes/Strength (* Denotes Pain):   Myotomes L R   L1-2 (hip flexion) 4-/5* 4+/5*   L3 (knee extension) 4+/5 4+/5   L4 (ankle DF) 3+/5 3+/5   L5 (g. toe ext) 5/5 5/5   S1 (ankle PF or knee flex) 4+/5 4+/5     Dermatomes:   Reflexes:   Babinski: -B    Special Tests:   L R   Slump     SLR -50 -50   Long Axis Traction      FADIR - -   OLIVIER     Scour - -   Hamstring 90/90     Piriformis Test     Posterior Capsule Compression         LE Relevant Findings:    -ROM: hip flex (90/90) - 90 on L, WFL on R      Assessment & Plan   CLINICAL IMPRESSIONS  Medical Diagnosis: s/p lumbar spinal fusion 5/22/24 - L5-S1 anterior lumbar interbody fusion    Treatment Diagnosis: Low back pain s/p lumbar spinal fusion   Impression/Assessment: Patient is a 53 year old female with low back pain s/p L5-S1 lumbar fusion complaints.  The following significant findings have been identified: Pain, Decreased ROM/flexibility, Decreased joint mobility, Decreased strength, Impaired balance, Impaired gait, Impaired muscle performance, and Decreased activity tolerance. These impairments interfere with their ability to perform self care tasks, work tasks, recreational activities, household chores, driving , household mobility, and community mobility as compared to previous level of function.     Clinical Decision Making (Complexity):  Clinical Presentation: Stable/Uncomplicated  Clinical Presentation Rationale: based on medical and personal factors listed in PT evaluation  Clinical Decision Making  (Complexity): Low complexity    PLAN OF CARE  Treatment Interventions:  Modalities: Cryotherapy  Interventions: Gait Training, Manual Therapy, Neuromuscular Re-education, Therapeutic Activity, Therapeutic Exercise, Self-Care/Home Management    Long Term Goals     PT Goal 1  Goal Identifier: Walking  Goal Description: Pt will be able to walk for at least 25 minutes with rolling walker without a significant increase in pain in order to progress towards PLOF  Rationale: to maximize safety and independence within the community;to maximize safety and independence with performance of ADLs and functional tasks;to maximize safety and independence with self cares  Target Date: 10/03/24  PT Goal 2  Goal Identifier: Core strength  Goal Description: Pt will be able to perform at least 5 deadbugs/side (UE and LE moving) in order to demonstrate improved core strength  Rationale: to maximize safety and independence within the home;to maximize safety and independence with performance of ADLs and functional tasks;to maximize safety and independence with self cares  Target Date: 10/03/24  PT Goal 3  Goal Identifier: Strength  Goal Description: Pt will improve hip flex and ankle DF strength to at least a 4/5 in order to improve walking tolerance  Rationale: to maximize safety and independence within the home;to maximize safety and independence with performance of ADLs and functional tasks;to maximize safety and independence within the community  Target Date: 10/03/24  PT Goal 4  Goal Identifier: Balance  Goal Description: Pt will be able to perform Romberg stance for at least 20 seconds in order to improve balance and decrease risk of falls  Rationale: to maximize safety and independence with performance of ADLs and functional tasks;to maximize safety and independence within the home;to maximize safety and independence within the community  Target Date: 10/03/24      Frequency of Treatment: 1x/week  Duration of Treatment: 12  weeks    Recommended Referrals to Other Professionals:  none  Education Assessment:   Learner/Method: Patient    Risks and benefits of evaluation/treatment have been explained.   Patient/Family/caregiver agrees with Plan of Care.     Evaluation Time:     PT Eval, Low Complexity Minutes (35626): 19     Signing Clinician: CHRISTOPHER Mcgowan [facility]                                                                                   OUTPATIENT PHYSICAL THERAPY      PLAN OF TREATMENT FOR OUTPATIENT REHABILITATION   Patient's Last Name, First Name, M.I.  Lima Baldwin YOB: 1970   Provider's Name   [facility]   Medical Record No.  [id number]     Onset Date: 05/22/24 (DOS)  Start of Care Date: 07/10/24     Medical Diagnosis:  s/p lumbar spinal fusion 5/22/24 - L5-S1 anterior lumbar interbody fusion      PT Treatment Diagnosis:  Low back pain s/p lumbar spinal fusion Plan of Treatment  Frequency/Duration: 1x/week/ 12 weeks    Certification date from 07/11/24 to 10/03/24         See note for plan of treatment details and functional goals     Deja Estevez, PT                         I CERTIFY THE NEED FOR THESE SERVICES FURNISHED UNDER        THIS PLAN OF TREATMENT AND WHILE UNDER MY CARE     (Physician attestation of this document indicates review and certification of the therapy plan).              Referring Provider:  Oscar Baltazar    Initial Assessment  See Epic Evaluation- Start of Care Date: 07/10/24

## 2024-07-11 ENCOUNTER — TELEPHONE (OUTPATIENT)
Dept: NEUROSURGERY | Facility: CLINIC | Age: 54
End: 2024-07-11
Payer: COMMERCIAL

## 2024-07-11 NOTE — TELEPHONE ENCOUNTER
Per chart review form is in progress and message was sent to CSS today to complete form with recommendations from Frances Anton CNP noted at OV on 7/8.     See patient instructions from Frances Anton CNP for details.       Called patient to review we will complete the forms based on the information provided by Frances Anton CNP.  Patient verbalized understanding and had questions regarding how her information would be worded.     Patient had no further questions or concerns at this time.

## 2024-07-11 NOTE — TELEPHONE ENCOUNTER
[facility]    Phone Message    May a detailed message be left on voicemail: yes     Reason for Call: Other: Pt is calling and stating that she would like  a call from  or his nurse to discuss return to work paper work Pt would like to discuss what will be in the note and Pt is stating that she does not want her medical records sent to her work. Please call Pt back to discuss.     Action Taken: Message routed to:  Other: [facility] Neurosurgery    Travel Screening: Not Applicable     Date of Service:

## 2024-07-11 NOTE — LETTER
July 15, 2024      Lima Bueno  [address] [facility] [address]        To Whom It May Concern:      Lima Bueno was seen in our clinic. She may return to work on 7/8/24 with the following restrictions:  - Work 4 hours/day until next follow-up appointment on 8/26/24  - No on call shifts  - No lifting more than 20 pounds  - May gradually increase activity as tolerated    Please contact us with any questions or concerns.      Sincerely,      Frances Anton, CNP

## 2024-07-15 NOTE — TELEPHONE ENCOUNTER
Patient requesting letter with work restrictions to include restriction of no on call shifts.     Discussed request with Frances Anton CNP who states ok to include no on call shifts to letter. Letter written and sent to patient via [employer].     Letter printed and given to CSS to attach to paperwork.

## 2024-07-15 NOTE — PROGRESS NOTES
Completed form, deysi till needs to complete section A. Called patient: they will come into clinic to fill out their portion, in the meantime, patient requested a work letter that she can give to her employer. That has been completed and sent to patient via [employer].

## 2024-07-16 ENCOUNTER — TELEPHONE (OUTPATIENT)
Dept: NEUROSURGERY | Facility: CLINIC | Age: 54
End: 2024-07-16
Payer: COMMERCIAL

## 2024-07-16 NOTE — TELEPHONE ENCOUNTER
Patient had a question about forms from 07/08 encounter, there were two different dates for work restrictions (08/23/24 and 08/26/24). Confirmed with Frances Anton NP, that work restrictions will end on 08/26/24.    Called patient and left voicemail to confirm date as 08/26/24 and NOT 08/23/24 as previously written on form. Form will be sent to fax number on front as well as to HIM for our records.    Right Fax confirmed both at 2:49pm on 07/16/24.

## 2024-07-23 ENCOUNTER — THERAPY VISIT (OUTPATIENT)
Dept: PHYSICAL THERAPY | Facility: CLINIC | Age: 54
End: 2024-07-23
Payer: COMMERCIAL

## 2024-07-23 DIAGNOSIS — Z98.1 S/P LUMBAR SPINAL FUSION: Primary | ICD-10-CM

## 2024-07-23 PROCEDURE — 97530 THERAPEUTIC ACTIVITIES: CPT | Mod: GP

## 2024-07-23 PROCEDURE — 97110 THERAPEUTIC EXERCISES: CPT | Mod: GP

## 2024-07-23 PROCEDURE — 97116 GAIT TRAINING THERAPY: CPT | Mod: GP

## 2024-07-31 ENCOUNTER — THERAPY VISIT (OUTPATIENT)
Dept: PHYSICAL THERAPY | Facility: CLINIC | Age: 54
End: 2024-07-31
Payer: COMMERCIAL

## 2024-07-31 DIAGNOSIS — Z98.1 S/P LUMBAR SPINAL FUSION: Primary | ICD-10-CM

## 2024-07-31 PROCEDURE — 97112 NEUROMUSCULAR REEDUCATION: CPT | Mod: GP | Performed by: PHYSICAL THERAPIST

## 2024-07-31 PROCEDURE — 97110 THERAPEUTIC EXERCISES: CPT | Mod: GP | Performed by: PHYSICAL THERAPIST

## 2024-08-06 DIAGNOSIS — M48.00 SPINAL STENOSIS, UNSPECIFIED SPINAL REGION: ICD-10-CM

## 2024-08-06 DIAGNOSIS — M54.16 RIGHT LUMBAR RADICULOPATHY: ICD-10-CM

## 2024-08-06 RX ORDER — GABAPENTIN 100 MG/1
300 CAPSULE ORAL 3 TIMES DAILY PRN
Qty: 90 CAPSULE | Refills: 3 | Status: SHIPPED | OUTPATIENT
Start: 2024-08-06

## 2024-08-07 ENCOUNTER — THERAPY VISIT (OUTPATIENT)
Dept: PHYSICAL THERAPY | Facility: CLINIC | Age: 54
End: 2024-08-07
Payer: COMMERCIAL

## 2024-08-07 DIAGNOSIS — Z98.1 S/P LUMBAR SPINAL FUSION: Primary | ICD-10-CM

## 2024-08-07 PROCEDURE — 97116 GAIT TRAINING THERAPY: CPT | Mod: GP

## 2024-08-07 PROCEDURE — 97530 THERAPEUTIC ACTIVITIES: CPT | Mod: GP

## 2024-08-07 PROCEDURE — 97110 THERAPEUTIC EXERCISES: CPT | Mod: GP

## 2024-08-10 ENCOUNTER — HEALTH MAINTENANCE LETTER (OUTPATIENT)
Age: 54
End: 2024-08-10

## 2024-08-14 ENCOUNTER — THERAPY VISIT (OUTPATIENT)
Dept: PHYSICAL THERAPY | Facility: CLINIC | Age: 54
End: 2024-08-14
Payer: COMMERCIAL

## 2024-08-14 DIAGNOSIS — Z98.1 S/P LUMBAR SPINAL FUSION: Primary | ICD-10-CM

## 2024-08-14 PROCEDURE — 97110 THERAPEUTIC EXERCISES: CPT | Mod: GP | Performed by: PHYSICAL THERAPIST

## 2024-08-15 ENCOUNTER — TELEPHONE (OUTPATIENT)
Dept: FAMILY MEDICINE | Facility: CLINIC | Age: 54
End: 2024-08-15
Payer: COMMERCIAL

## 2024-08-15 DIAGNOSIS — Z98.1 S/P LUMBAR SPINAL FUSION: ICD-10-CM

## 2024-08-15 RX ORDER — OXYCODONE HYDROCHLORIDE 5 MG/1
5 TABLET ORAL EVERY 6 HOURS PRN
Qty: 40 TABLET | Refills: 0 | Status: CANCELLED | OUTPATIENT
Start: 2024-08-15

## 2024-08-16 NOTE — TELEPHONE ENCOUNTER
I received request for oxycodone.  I believe this was prescribed by her spine clinic.   Would recommend that this be addressed by spine clinic    Oscar Baltazar MD

## 2024-08-21 ENCOUNTER — THERAPY VISIT (OUTPATIENT)
Dept: PHYSICAL THERAPY | Facility: CLINIC | Age: 54
End: 2024-08-21
Payer: COMMERCIAL

## 2024-08-21 DIAGNOSIS — Z98.1 S/P LUMBAR SPINAL FUSION: Primary | ICD-10-CM

## 2024-08-21 PROCEDURE — 97110 THERAPEUTIC EXERCISES: CPT | Mod: GP

## 2024-08-21 PROCEDURE — 97140 MANUAL THERAPY 1/> REGIONS: CPT | Mod: GP

## 2024-08-26 ENCOUNTER — TELEPHONE (OUTPATIENT)
Dept: NEUROSURGERY | Facility: CLINIC | Age: 54
End: 2024-08-26

## 2024-08-26 ENCOUNTER — OFFICE VISIT (OUTPATIENT)
Dept: NEUROSURGERY | Facility: CLINIC | Age: 54
End: 2024-08-26
Payer: COMMERCIAL

## 2024-08-26 ENCOUNTER — HOSPITAL ENCOUNTER (OUTPATIENT)
Dept: GENERAL RADIOLOGY | Facility: CLINIC | Age: 54
Discharge: HOME | End: 2024-08-26
Attending: NEUROLOGICAL SURGERY | Admitting: NEUROLOGICAL SURGERY
Payer: COMMERCIAL

## 2024-08-26 VITALS — OXYGEN SATURATION: 100 % | HEART RATE: 91 BPM | DIASTOLIC BLOOD PRESSURE: 82 MMHG | SYSTOLIC BLOOD PRESSURE: 123 MMHG

## 2024-08-26 DIAGNOSIS — Z98.1 S/P LUMBAR SPINAL FUSION: Primary | ICD-10-CM

## 2024-08-26 DIAGNOSIS — Z98.1 S/P LUMBAR SPINAL FUSION: ICD-10-CM

## 2024-08-26 PROCEDURE — 72100 X-RAY EXAM L-S SPINE 2/3 VWS: CPT

## 2024-08-26 PROCEDURE — 99213 OFFICE O/P EST LOW 20 MIN: CPT | Performed by: NEUROLOGICAL SURGERY

## 2024-08-26 ASSESSMENT — PAIN SCALES - GENERAL: PAINLEVEL: MODERATE PAIN (5)

## 2024-08-26 NOTE — LETTER
8/26/2024      Lima Bueno  [address] [facility] [address]      Dear Colleague,    Thank you for referring your patient, Lima Bueno, to the [facility] NEUROLOGY CLINICS [place]. Please see a copy of my visit note below.    Doing well 3 mos post-op.  XRs stable.  Pain and activity levels substantially improving.  She does note continued stiff back and burning back pain when she bends forward.  Now walking with a cane.    Past Medical History:   Diagnosis Date     ADD (attention deficit disorder with hyperactivity)      Asthma      Breast mass 11/01/2012    Patient yet to have biopsy as of 11/1/2012       DVT (deep venous thrombosis) (H) 10/2012    right leg     Hypertension      LSIL (low grade squamous intraepithelial lesion) on Pap smear 02/19/2014    Neg high risk HPV     Obese      Pulmonary embolism (H) 2012    Saw Dr [place]edo, protein S deficiency     Past Surgical History:   Procedure Laterality Date     ANKLE SURGERY Right 01/2019     COLONOSCOPY N/A 12/10/2020    Procedure: COLONOSCOPY, WITH POLYPECTOMY AND BIOPSY;  Surgeon: Osorio Dunn MD;  Location:  GI     DECOMPRESSION LUMBAR MINIMALLY INVASIVE ONE LEVEL  5/22/2024    Procedure: Decompression lumbar minimally invasive one level;  Surgeon: Vinny Reynaga MD;  Location: [facility] OR     FUSION LUMBAR ANTERIOR ONE LEVEL N/A 5/22/2024    Procedure: Lumbar 5 to Sacral 1 Anterior Lumbar Interbody Fusion;  Surgeon: Vinny Reynaga MD;  Location: [facility] OR     FUSION, SPINE, LUMBAR, 1 LEVEL, POSTERIOR APPROACH, ROBOTIC-ASSISTED Right 5/22/2024    Procedure: Lumbar 5 to Sacral 1 Robotic Posterior Spinal Fusion with right decompression;  Surgeon: Vinny Reynaga MD;  Location:  OR     Social History     Socioeconomic History     Marital status: Single     Spouse name: Not on file     Number of children: 5     Years of education: Not on file     Highest education level: Not on file   Occupational History      Employer: [employer]     Occupation:      Comment: [employer];    Tobacco Use     Smoking status: Some Days     Current packs/day: 0.00     Average packs/day: 1 pack/day for 30.0 years (30.0 ttl pk-yrs)     Types: Cigarettes     Last attempt to quit: 3/25/2024     Years since quittin.4     Smokeless tobacco: Never   Vaping Use     Vaping status: Never Used   Substance and Sexual Activity     Alcohol use: No     Alcohol/week: 0.0 standard drinks of alcohol     Drug use: No     Sexual activity: Yes     Partners: Male     Birth control/protection: Post-menopausal   Other Topics Concern     Parent/sibling w/ CABG, MI or angioplasty before 65F 55M? Not Asked   Social History Narrative    Employed as [employer]     Social Determinants of Health     Financial Resource Strain: Low Risk  (2024)    Financial Resource Strain      Within the past 12 months, have you or your family members you live with been unable to get utilities (heat, electricity) when it was really needed?: No   Food Insecurity: Low Risk  (2024)    Food Insecurity      Within the past 12 months, did you worry that your food would run out before you got money to buy more?: No      Within the past 12 months, did the food you bought just not last and you didn t have money to get more?: No   Transportation Needs: Low Risk  (2024)    Transportation Needs      Within the past 12 months, has lack of transportation kept you from medical appointments, getting your medicines, non-medical meetings or appointments, work, or from getting things that you need?: No   Physical Activity: Not on file   Stress: Not on file   Social Connections: Not on file   Interpersonal Safety: Low Risk  (2024)    Interpersonal Safety      Do you feel physically and emotionally safe where you currently live?: Yes      Within the past 12 months, have you been hit, slapped, kicked or otherwise physically hurt by someone?:  No      Within the past 12 months, have you been humiliated or emotionally abused in other ways by your partner or ex-partner?: No   Housing Stability: Low Risk  (5/31/2024)    Housing Stability      Do you have housing? : Yes      Are you worried about losing your housing?: No     Family History   Problem Relation Age of Onset     Thyroid Disease Mother         hyperactive     Schizophrenia Father      Hypertension Brother      Ovarian Cancer Paternal Grandmother      Breast Cancer No family hx of         ROS: 10 point ROS neg other than the symptoms noted above in the HPI.    Physical Exam  /82 (BP Location: Left arm, Patient Position: Sitting, Cuff Size: Adult Regular)   Pulse 91   LMP 06/06/2019 (Exact Date)   SpO2 100%   HEENT:  Normocephalic, atraumatic.  PERRLA.  EOM s intact.  Visual fields full to gross exam  Neck:  Supple, non-tender, without lymphadenopathy.  Heart:  No peripheral edema  Lungs:  No SOB  Abdomen:  Non-distended.   Skin:  Warm and dry.  Extremities:  No edema, cyanosis or clubbing.  Psychiatric:  No apparent distress  Musculoskeletal:  Normal bulk and tone    NEUROLOGICAL EXAMINATION:     Mental status:  Alert and Oriented x 3, speech is fluent.  Cranial nerves:  II-XII intact.   Motor:    Shoulder Abduction:  Right:  5/5   Left:  5/5  Biceps:                      Right:  5/5   Left:  5/5  Triceps:                     Right:  5/5   Left:  5/5  Wrist Extensors:       Right:  5/5   Left:  5/5  Wrist Flexors:           Right:  5/5   Left:  5/5  interosseus :            Right:  5/5   Left:  5/5  Hip Flexor:                Right: 5/5  Left:  5/5  Quadriceps:             Right:  5/5  Left:  5/5  Hamstrings:             Right:  5/5  Left:  5/5  Gastroc Soleus:        Right:  5/5  Left:  5/5  Tib/Ant:                      Right:  5/5  Left:  5/5  EHL:                     Right:  5/5  Left:  5/5  Sensation:  Intact  Walking with a cane  Incision well healed    A/P:  Improving steadily and  increasing activity  Continue routine follow up     Again, thank you for allowing me to participate in the care of your patient.        Sincerely,        Vinny Reynaga MD

## 2024-08-26 NOTE — PATIENT INSTRUCTIONS
Patient Next Steps:    Continue routine follow-up.  [phone]    Ok to extend/continue current work restrictions.    Please call us if you have any further questions or concerns.    [facility]   Phone: [phone]  Fax: [phone]

## 2024-08-26 NOTE — PROGRESS NOTES
Doing well 3 mos post-op.  XRs stable.  Pain and activity levels substantially improving.  She does note continued stiff back and burning back pain when she bends forward.  Now walking with a cane.    Past Medical History:   Diagnosis Date    ADD (attention deficit disorder with hyperactivity)     Asthma     Breast mass 11/01/2012    Patient yet to have biopsy as of 11/1/2012      DVT (deep venous thrombosis) (H) 10/2012    right leg    Hypertension     LSIL (low grade squamous intraepithelial lesion) on Pap smear 02/19/2014    Neg high risk HPV    Obese     Pulmonary embolism (H) 2012    Saw Dr Toledo, protein S deficiency     Past Surgical History:   Procedure Laterality Date    ANKLE SURGERY Right 01/2019    COLONOSCOPY N/A 12/10/2020    Procedure: COLONOSCOPY, WITH POLYPECTOMY AND BIOPSY;  Surgeon: Osorio Dunn MD;  Location: [facility]    DECOMPRESSION LUMBAR MINIMALLY INVASIVE ONE LEVEL  5/22/2024    Procedure: Decompression lumbar minimally invasive one level;  Surgeon: Vinny Reynaga MD;  Location: [facility]    FUSION LUMBAR ANTERIOR ONE LEVEL N/A 5/22/2024    Procedure: Lumbar 5 to Sacral 1 Anterior Lumbar Interbody Fusion;  Surgeon: Vinny Reynaga MD;  Location: [facility]    FUSION, SPINE, LUMBAR, 1 LEVEL, POSTERIOR APPROACH, ROBOTIC-ASSISTED Right 5/22/2024    Procedure: Lumbar 5 to Sacral 1 Robotic Posterior Spinal Fusion with right decompression;  Surgeon: Vinny Reynaga MD;  Location: [facility]     Social History     Socioeconomic History    Marital status: Single     Spouse name: Not on file    Number of children: 5    Years of education: Not on file    Highest education level: Not on file   Occupational History     Employer: [employer]    Occupation:      Comment: [employer]; 2022   Tobacco Use    Smoking status: Some Days     Current packs/day: 0.00     Average packs/day: 1 pack/day for 30.0 years (30.0 ttl pk-yrs)     Types: Cigarettes     Last attempt to quit: 3/25/2024     Years  since quittin.4    Smokeless tobacco: Never   Vaping Use    Vaping status: Never Used   Substance and Sexual Activity    Alcohol use: No     Alcohol/week: 0.0 standard drinks of alcohol    Drug use: No    Sexual activity: Yes     Partners: Male     Birth control/protection: Post-menopausal   Other Topics Concern    Parent/sibling w/ CABG, MI or angioplasty before 65F 55M? Not Asked   Social History Narrative    Employed as [employer]     Social Determinants of Health     Financial Resource Strain: Low Risk  (2024)    Financial Resource Strain     Within the past 12 months, have you or your family members you live with been unable to get utilities (heat, electricity) when it was really needed?: No   Food Insecurity: Low Risk  (2024)    Food Insecurity     Within the past 12 months, did you worry that your food would run out before you got money to buy more?: No     Within the past 12 months, did the food you bought just not last and you didn t have money to get more?: No   Transportation Needs: Low Risk  (2024)    Transportation Needs     Within the past 12 months, has lack of transportation kept you from medical appointments, getting your medicines, non-medical meetings or appointments, work, or from getting things that you need?: No   Physical Activity: Not on file   Stress: Not on file   Social Connections: Not on file   Interpersonal Safety: Low Risk  (2024)    Interpersonal Safety     Do you feel physically and emotionally safe where you currently live?: Yes     Within the past 12 months, have you been hit, slapped, kicked or otherwise physically hurt by someone?: No     Within the past 12 months, have you been humiliated or emotionally abused in other ways by your partner or ex-partner?: No   Housing Stability: Low Risk  (2024)    Housing Stability     Do you have housing? : Yes     Are you worried about losing your housing?: No     Family History    Problem Relation Age of Onset    Thyroid Disease Mother         hyperactive    Schizophrenia Father     Hypertension Brother     Ovarian Cancer Paternal Grandmother     Breast Cancer No family hx of         ROS: 10 point ROS neg other than the symptoms noted above in the HPI.    Physical Exam  /82 (BP Location: Left arm, Patient Position: Sitting, Cuff Size: Adult Regular)   Pulse 91   LMP 06/06/2019 (Exact Date)   SpO2 100%   HEENT:  Normocephalic, atraumatic.  PERRLA.  EOM s intact.  Visual fields full to gross exam  Neck:  Supple, non-tender, without lymphadenopathy.  Heart:  No peripheral edema  Lungs:  No SOB  Abdomen:  Non-distended.   Skin:  Warm and dry.  Extremities:  No edema, cyanosis or clubbing.  Psychiatric:  No apparent distress  Musculoskeletal:  Normal bulk and tone    NEUROLOGICAL EXAMINATION:     Mental status:  Alert and Oriented x 3, speech is fluent.  Cranial nerves:  II-XII intact.   Motor:    Shoulder Abduction:  Right:  5/5   Left:  5/5  Biceps:                      Right:  5/5   Left:  5/5  Triceps:                     Right:  5/5   Left:  5/5  Wrist Extensors:       Right:  5/5   Left:  5/5  Wrist Flexors:           Right:  5/5   Left:  5/5  interosseus :            Right:  5/5   Left:  5/5  Hip Flexor:                Right: 5/5  Left:  5/5  Quadriceps:             Right:  5/5  Left:  5/5  Hamstrings:             Right:  5/5  Left:  5/5  Gastroc Soleus:        Right:  5/5  Left:  5/5  Tib/Ant:                      Right:  5/5  Left:  5/5  EHL:                     Right:  5/5  Left:  5/5  Sensation:  Intact  Walking with a cane  Incision well healed    A/P:  Improving steadily and increasing activity  Continue routine follow up

## 2024-08-26 NOTE — LETTER
August 26, 2024      Lima Bueno  [address] [address]        To Whom It May Concern:    Lima Bueno was seen in our clinic. She may return to work on 7/8/24 with the following restrictions:  - Work 4 hours/day  - No on call shifts  - No lifting more than 20 pounds  - May gradually increase activity as tolerated     Please contact us with any questions or concerns.      Sincerely,      Vinny Reynaga

## 2024-08-26 NOTE — NURSING NOTE
"Lima Bueon is a 53 year old female who presents for:  Chief Complaint   Patient presents with    RECHECK        Initial Vitals:  /82 (BP Location: Left arm, Patient Position: Sitting, Cuff Size: Adult Regular)   Pulse 91   LMP 06/06/2019 (Exact Date)   SpO2 100%  Estimated body mass index is 36.84 kg/m  as calculated from the following:    Height as of 6/5/24: 5' 5\" (1.651 m).    Weight as of 6/5/24: 221 lb 6.4 oz (100.4 kg).. There is no height or weight on file to calculate BSA. BP completed using cuff size: regular  Moderate Pain (5)    Last Bhagat    "

## 2024-09-06 NOTE — TELEPHONE ENCOUNTER
2nd attempt. LVMTC-please schedule 6 month and 1 year post-op appts with JERAD. Need Xrays prior per staff message. Please schedule 6 months appt on November, 2024 and 1 year on May 2025.

## 2024-09-11 ENCOUNTER — THERAPY VISIT (OUTPATIENT)
Dept: PHYSICAL THERAPY | Facility: CLINIC | Age: 54
End: 2024-09-11
Payer: MEDICAID

## 2024-09-11 DIAGNOSIS — Z98.1 S/P LUMBAR SPINAL FUSION: Primary | ICD-10-CM

## 2024-09-11 PROCEDURE — 97110 THERAPEUTIC EXERCISES: CPT | Mod: GP | Performed by: PHYSICAL THERAPIST

## 2024-09-11 PROCEDURE — 97530 THERAPEUTIC ACTIVITIES: CPT | Mod: GP | Performed by: PHYSICAL THERAPIST

## 2024-09-16 NOTE — TELEPHONE ENCOUNTER
Spoke with patient to schedule 6 week and 12 week post op visits with JERAD. Patient was incorrectly scheduled with Dr. Reynaga, writer cancelled that appointment. Patient wants only [place], there is no [facility] available in [place] location for the month of November. Writer offered 10/28 with Franc[facility] Anton in [place]. Patient will call back to confirm this.

## 2024-09-17 ENCOUNTER — MYC MEDICAL ADVICE (OUTPATIENT)
Dept: NEUROSURGERY | Facility: CLINIC | Age: 54
End: 2024-09-17
Payer: MEDICAID

## 2024-09-18 NOTE — TELEPHONE ENCOUNTER
Faxed HEATHER form to release from [place] to [employer] September 18, 2024 to fax number [phone]    Right Fax confirmed at 8:58 AM    Sanket Ramey

## 2024-09-20 ENCOUNTER — THERAPY VISIT (OUTPATIENT)
Dept: PHYSICAL THERAPY | Facility: CLINIC | Age: 54
End: 2024-09-20
Payer: MEDICAID

## 2024-09-20 DIAGNOSIS — Z98.1 S/P LUMBAR SPINAL FUSION: Primary | ICD-10-CM

## 2024-09-20 DIAGNOSIS — E78.5 HYPERLIPIDEMIA LDL GOAL <100: ICD-10-CM

## 2024-09-20 PROCEDURE — 97112 NEUROMUSCULAR REEDUCATION: CPT | Mod: GP | Performed by: PHYSICAL THERAPIST

## 2024-09-20 PROCEDURE — 97110 THERAPEUTIC EXERCISES: CPT | Mod: GP | Performed by: PHYSICAL THERAPIST

## 2024-09-20 PROCEDURE — 97530 THERAPEUTIC ACTIVITIES: CPT | Mod: GP | Performed by: PHYSICAL THERAPIST

## 2024-09-20 RX ORDER — ATORVASTATIN CALCIUM 20 MG/1
20 TABLET, FILM COATED ORAL DAILY
Qty: 90 TABLET | Refills: 1 | Status: SHIPPED | OUTPATIENT
Start: 2024-09-20

## 2024-09-23 DIAGNOSIS — Z98.1 S/P LUMBAR SPINAL FUSION: ICD-10-CM

## 2024-09-24 RX ORDER — METHOCARBAMOL 750 MG/1
750 TABLET, FILM COATED ORAL 3 TIMES DAILY PRN
Qty: 40 TABLET | Refills: 1 | Status: SHIPPED | OUTPATIENT
Start: 2024-09-24

## 2024-09-24 NOTE — TELEPHONE ENCOUNTER
LVMTC-patient scheduled XR on 11/29 for her 6 months follow up. Writer offered follow up with Arie Eid on 12/02 or 12/03 in [place].

## 2024-09-24 NOTE — TELEPHONE ENCOUNTER
Pt sent myc questionnaire. Also routed SM to schedule as pt is not yet scheduled for 6 mo follow-up appt. She is scheduled for XR only.

## 2024-10-06 ENCOUNTER — TELEPHONE (OUTPATIENT)
Dept: FAMILY MEDICINE | Facility: CLINIC | Age: 54
End: 2024-10-06
Payer: MEDICAID

## 2024-10-06 DIAGNOSIS — K86.2 CYST OF PANCREAS: Primary | ICD-10-CM

## 2024-10-06 NOTE — TELEPHONE ENCOUNTER
Can we call Lima Bueno and let her know that she is due for a follow up pancreatic MRI and I will     [facility] phone #[phone]     Oscar Baltazar MD

## 2024-10-08 ENCOUNTER — THERAPY VISIT (OUTPATIENT)
Dept: PHYSICAL THERAPY | Facility: CLINIC | Age: 54
End: 2024-10-08
Payer: MEDICAID

## 2024-10-08 DIAGNOSIS — Z98.1 S/P LUMBAR SPINAL FUSION: Primary | ICD-10-CM

## 2024-10-08 PROCEDURE — 97110 THERAPEUTIC EXERCISES: CPT | Mod: GP

## 2024-10-08 PROCEDURE — 97112 NEUROMUSCULAR REEDUCATION: CPT | Mod: GP

## 2024-10-08 NOTE — PROGRESS NOTES
"Lima is making significant progress in strength and endurance. Lima is consistent with her HEP and is proactive about advocating for herself to maximize healing. She continues to require skilled PT services to improve strength, balance, and endurance.     10/08/24 0500   Appointment Info   Signing clinician's name / credentials Radha Burr PT, DPT   Total/Authorized Visits E&T: (Initial plan for 12)   Visits Used 8   Medical Diagnosis s/p lumbar spinal fusion 5/22/24 - L5-S1 anterior lumbar interbody fusion   PT Tx Diagnosis Low back pain s/p lumbar spinal fusion   Precautions/Limitations Limit bending/twisting, lifting >10 pounds, walking as tolerated, limit stair use   Other pertinent information \"Sure-leydi\" -- Pain worse on the left than the right   Progress Note/Certification   Start of Care Date 07/10/24   Onset of illness/injury or Date of Surgery 05/22/24  (DOS)   Therapy Frequency 1x/week   Predicted Duration 12 weeks   Certification date from 10/04/24   Certification date to 12/27/24   Progress Note Due Date 12/27/24   Progress Note Completed Date 10/08/24   GOALS   PT Goals 2;3;4   PT Goal 1   Goal Identifier Walking   Goal Description Pt will be able to walk for at least 25 minutes with rolling walker without a significant increase in pain in order to progress towards PLOF   Rationale to maximize safety and independence within the community;to maximize safety and independence with performance of ADLs and functional tasks;to maximize safety and independence with self cares   Goal Progress Pt can walk 25 minutes with cane without increase in pain.   Target Date 10/03/24   Date Met 09/11/24   PT Goal 2   Goal Identifier Core strength   Goal Description Pt will be able to perform at least 5 deadbugs/side (UE and LE moving) in order to demonstrate improved core strength   Rationale to maximize safety and independence within the home;to maximize safety and independence with performance of ADLs and " functional tasks;to maximize safety and independence with self cares   Goal Progress Pt can perform 10 deadbugs/side (UE and LE moving)   Target Date 10/03/24   Date Met 09/11/24   PT Goal 3   Goal Identifier Strength   Goal Description Pt will improve hip flex and ankle DF strength to at least a 4/5 in order to improve walking tolerance   Rationale to maximize safety and independence within the home;to maximize safety and independence with performance of ADLs and functional tasks;to maximize safety and independence within the community   Goal Progress Pt has 5/5 hip flex and DF strength   Target Date 10/03/24   Date Met 09/11/24   PT Goal 4   Goal Identifier Balance   Goal Description Pt will be able to perform Romberg stance for at least 20 seconds in order to improve balance and decrease risk of falls   Rationale to maximize safety and independence with performance of ADLs and functional tasks;to maximize safety and independence within the home;to maximize safety and independence within the community   Goal Progress progressing   Target Date 12/27/24   Treatment Interventions (PT)   Interventions Therapeutic Activity   Therapeutic Procedure/Exercise   Ther Proc 1 NuStep, level 5   Ther Proc 1 - Details 5 min   Ther Proc 2 Nerve glide in hamstring stretch position   Ther Proc 2 - Details HEP -- 1x10/side; verbal and visual cues for good form and stretch; cues to not overdo it   Ther Proc 3 SLR EOB   Ther Proc 3 - Details NC   PTRx Ther Proc 1 Standing Extension at Counter Supported   PTRx Ther Proc 1 - Details continue at home PRN   PTRx Ther Proc 2 Roll Outs   PTRx Ther Proc 2 - Details NC today   PTRx Ther Proc 3 Shoulder Theraband Rows   PTRx Ther Proc 3 - Details GTB x 20 incr to 30 1x/day --cues for posture; progressed to standing on foam while maintaing positions   PTRx Ther Proc 4 Supine Abdominal Exercise #6 (Dead Bug)   PTRx Ther Proc 4 - Details NC today, reports going well at home   PTRx Ther Proc 5  "Bridging #1   PTRx Ther Proc 5 - Details 2x15   PTRx Ther Proc 6 Prone Lumbar Extension Exercise #3 (Leg Extension)   PTRx Ther Proc 6 - Details NC today   PTRx Ther Proc 7 Prone Lumbar Extension #2 (Arm Extension)   PTRx Ther Proc 7 - Details NC today   PTRx Ther Proc 8 Knee Bends   PTRx Ther Proc 8 - Details 2x10 with break betwee- reports fatigue   Skilled Intervention vc, vsc, mc for form, progressed strengthening   Patient Response/Progress able to tolerate strengthenign better   Therapeutic Activity   Ther Act 1 Body mechanics education   Ther Act 1 - Details continue to utilize   PTRx Ther Act 1 Walking against resistance   PTRx Ther Act 1 - Details completed multidirectional movements to simulate walking with resistance with her do; utilzed blue theraband held by therapist with patient walking around open gym space. After patient mastered this movement then progressed to banded resistance with pertubations to increased unpredicatable nature and therefore challenge balance greater.   Skilled Intervention Patient education to promote healing and safety with ADLs   Patient Response/Progress increased difficulty with pertubations; patient educated that she is not ready to walk her do with a leash yet safely   Neuromuscular Re-education   Neuro Re-ed 1 Romberg balance   Neuro Re-ed 1 - Details 3x30 sec holds with intermittent UE assist on table   Neuro Re-ed 2 Tandem stance on foam   Neuro Re-ed 2 - Details x30 seconds for increased challenge   PTRx Neuro Re-ed 1 Tandem Stance with Head Turns   PTRx Neuro Re-ed 1 - Details 30\"x2 each leg--hands hovering over counter pt defers trying SLS today   PTRx Neuro Re-ed 2 Backwards walking   PTRx Neuro Re-ed 2 - Details for imnproved core stability and balance training taking away vision   Education   Learner/Method Patient   Education Comments Online PTRX, not printed   Plan   Home program See PTRX   Plan for next session review how past 3 weeks have been       [facility]                                                                                   OUTPATIENT PHYSICAL THERAPY    PLAN OF TREATMENT FOR OUTPATIENT REHABILITATION   Patient's Last Name, First Name, Lima Kolb YOB: 1970   Provider's Name   [facility]   Medical Record No.  [id number]     Onset Date: 05/22/24 (DOS)  Start of Care Date: 07/10/24     Medical Diagnosis:  s/p lumbar spinal fusion 5/22/24 - L5-S1 anterior lumbar interbody fusion      PT Treatment Diagnosis:  Low back pain s/p lumbar spinal fusion Plan of Treatment  Frequency/Duration: 1x/week/ 12 weeks    Certification date from 10/04/24 to 12/27/24         See note for plan of treatment details and functional goals     Sade Smith, PT                         I CERTIFY THE NEED FOR THESE SERVICES FURNISHED UNDER        THIS PLAN OF TREATMENT AND WHILE UNDER MY CARE     (Physician attestation of this document indicates review and certification of the therapy plan).              Referring Provider:  Oscar Baltazar    Initial Assessment  See Epic Evaluation- Start of Care Date: 07/10/24            PLAN  Continue therapy per current plan of care.    Beginning/End Dates of Progress Note Reporting Period:  10/08/24 to 10/08/2024    Referring Provider:  Oscar Baltazar

## 2024-10-17 DIAGNOSIS — K21.9 GASTROESOPHAGEAL REFLUX DISEASE WITHOUT ESOPHAGITIS: ICD-10-CM

## 2024-10-17 DIAGNOSIS — E66.01 MORBID OBESITY (H): ICD-10-CM

## 2024-10-17 DIAGNOSIS — E11.9 TYPE 2 DIABETES MELLITUS WITHOUT COMPLICATION, WITHOUT LONG-TERM CURRENT USE OF INSULIN (H): ICD-10-CM

## 2024-10-17 DIAGNOSIS — I10 ESSENTIAL HYPERTENSION, BENIGN: ICD-10-CM

## 2024-10-17 RX ORDER — LISINOPRIL 10 MG/1
10 TABLET ORAL DAILY
Qty: 90 TABLET | Refills: 1 | Status: SHIPPED | OUTPATIENT
Start: 2024-10-17

## 2024-10-17 RX ORDER — SEMAGLUTIDE 2.68 MG/ML
INJECTION, SOLUTION SUBCUTANEOUS
Qty: 24 ML | Refills: 0 | Status: SHIPPED | OUTPATIENT
Start: 2024-10-17

## 2024-10-17 RX ORDER — FAMOTIDINE 20 MG/1
20 TABLET, FILM COATED ORAL DAILY
Qty: 90 TABLET | Refills: 1 | Status: SHIPPED | OUTPATIENT
Start: 2024-10-17

## 2024-10-21 ENCOUNTER — OFFICE VISIT (OUTPATIENT)
Dept: URGENT CARE | Facility: URGENT CARE | Age: 54
End: 2024-10-21
Payer: MEDICAID

## 2024-10-21 VITALS
SYSTOLIC BLOOD PRESSURE: 138 MMHG | HEART RATE: 107 BPM | WEIGHT: 220 LBS | DIASTOLIC BLOOD PRESSURE: 91 MMHG | RESPIRATION RATE: 18 BRPM | OXYGEN SATURATION: 97 % | BODY MASS INDEX: 36.61 KG/M2 | TEMPERATURE: 98.1 F

## 2024-10-21 DIAGNOSIS — Z87.09 HISTORY OF ASTHMA: ICD-10-CM

## 2024-10-21 DIAGNOSIS — J06.9 UPPER RESPIRATORY TRACT INFECTION, UNSPECIFIED TYPE: Primary | ICD-10-CM

## 2024-10-21 DIAGNOSIS — Z72.0 TOBACCO ABUSE: ICD-10-CM

## 2024-10-21 PROCEDURE — 87635 SARS-COV-2 COVID-19 AMP PRB: CPT | Performed by: FAMILY MEDICINE

## 2024-10-21 PROCEDURE — 99214 OFFICE O/P EST MOD 30 MIN: CPT | Performed by: FAMILY MEDICINE

## 2024-10-21 RX ORDER — BENZONATATE 200 MG/1
200 CAPSULE ORAL 3 TIMES DAILY PRN
Qty: 21 CAPSULE | Refills: 0 | Status: SHIPPED | OUTPATIENT
Start: 2024-10-21 | End: 2024-10-28

## 2024-10-21 RX ORDER — CETIRIZINE HYDROCHLORIDE 10 MG/1
10 TABLET ORAL DAILY
Qty: 14 TABLET | Refills: 0 | Status: SHIPPED | OUTPATIENT
Start: 2024-10-21 | End: 2024-11-04

## 2024-10-21 NOTE — PROGRESS NOTES
"SUBJECTIVE: Lima Bueno is a 53 year old female presenting with a chief complaint of \"cold symptoms\".  Onset of symptoms was 2 day(s) ago.  Predisposing factors include tobacco use.    Past Medical History:   Diagnosis Date    ADD (attention deficit disorder with hyperactivity)     Asthma     Breast mass 2012    Patient yet to have biopsy as of 2012      DVT (deep venous thrombosis) (H) 10/2012    right leg    Hypertension     LSIL (low grade squamous intraepithelial lesion) on Pap smear 2014    Neg high risk HPV    Obese     Pulmonary embolism (H)     Saw Dr Toledo, protein S deficiency     Allergies   Allergen Reactions    Phosphoric Acid Hives    Terbutaline Hives    Trazodone      Other reaction(s): Other - Describe In Comment Field  Facial nerve pain  Other reaction(s): Other - Describe In Comment Field  Facial nerve pain    Wellbutrin [Bupropion]      Smoking cessation -- told never to take again ,     Social History     Tobacco Use    Smoking status: Some Days     Current packs/day: 0.00     Average packs/day: 1 pack/day for 30.0 years (30.0 ttl pk-yrs)     Types: Cigarettes     Last attempt to quit: 3/25/2024     Years since quittin.5    Smokeless tobacco: Never   Substance Use Topics    Alcohol use: No     Alcohol/week: 0.0 standard drinks of alcohol       ROS:  SKIN: no rash  GI: no vomiting    OBJECTIVE:  BP (!) 138/91 (BP Location: Right arm, Patient Position: Sitting, Cuff Size: Adult Large)   Pulse 107   Temp 98.1  F (36.7  C) (Oral)   Resp 18   Wt 99.8 kg (220 lb)   LMP 2019 (Exact Date)   SpO2 97%   BMI 36.61 kg/m  GENERAL APPEARANCE: healthy, alert and no distress  EYES: EOMI,  PERRL, conjunctiva clear  HENT: ear canals and TM's normal.  Nose and mouth without ulcers, erythema or lesions  RESP: lungs clear to auscultation - no rales, rhonchi or wheezes  SKIN: no suspicious lesions or rashes      ICD-10-CM    1. Upper respiratory tract infection, " unspecified type  J06.9 cetirizine (ZYRTEC) 10 MG tablet     benzonatate (TESSALON) 200 MG capsule     Symptomatic COVID-19 Virus (Coronavirus) by PCR     Symptomatic COVID-19 Virus (Coronavirus) by PCR Nose      2. Tobacco abuse  Z72.0       3. History of asthma  Z87.09         Discontinue tobacco  Cont asthma inhalers  Fluids/Rest, f/u if worse/not any better

## 2024-10-22 LAB — SARS-COV-2 RNA RESP QL NAA+PROBE: NEGATIVE

## 2024-10-30 ENCOUNTER — THERAPY VISIT (OUTPATIENT)
Dept: PHYSICAL THERAPY | Facility: CLINIC | Age: 54
End: 2024-10-30
Payer: MEDICAID

## 2024-10-30 DIAGNOSIS — Z98.1 S/P LUMBAR SPINAL FUSION: Primary | ICD-10-CM

## 2024-10-30 PROCEDURE — 97112 NEUROMUSCULAR REEDUCATION: CPT | Mod: GP

## 2024-12-22 ENCOUNTER — HEALTH MAINTENANCE LETTER (OUTPATIENT)
Age: 54
End: 2024-12-22

## 2024-12-22 ENCOUNTER — NURSE TRIAGE (OUTPATIENT)
Dept: NURSING | Facility: CLINIC | Age: 54
End: 2024-12-22
Payer: MEDICAID

## 2024-12-23 ENCOUNTER — TELEPHONE (OUTPATIENT)
Dept: NEUROSURGERY | Facility: CLINIC | Age: 54
End: 2024-12-23
Payer: MEDICAID

## 2024-12-23 NOTE — TELEPHONE ENCOUNTER
[facility] Call Center    Phone Message    May a detailed message be left on voicemail: yes     Reason for Call: Symptoms or Concerns     If patient has red-flag symptoms, warm transfer to triage line    Current symptom or concern: pt fell on concrete yesterday    Symptoms have been present for:  2 day(s)        Are there any new or worsening symptoms? Yes: Pt is post surgery 6mo - pt states she is wanting to check in with neurosurgery due to the fall she took yesterday. Pt is concerned and states she's unable to sit up without multiple pillows supporting her. Please contact pt to discuss further. Aware of XR and Feb follow up with [facility].  Action Taken: Other: CS Neurosurgery    Travel Screening: Not Applicable     Date of Service:

## 2024-12-23 NOTE — TELEPHONE ENCOUNTER
Patient s/p L5-S1 AP fusion on 5/22/24.  Patient called reporting a fall, resutlsing in pain to Low back into tailbone. No radicular pain. No weakness, no numbness, tingling.     Did not have 6 month follow up. Does have xray scheduled for 12/26/24    Routed to schedulers to schedule clinic appt to review xray.

## 2024-12-23 NOTE — TELEPHONE ENCOUNTER
Left Voicemail (1st Attempt) for the patient to call back and schedule the following:    Location:  Neurosurgery  Provider: any JERAD (NOT BEV)  Appointment type: return adult  Appointment mode: any  Return date: next available    Specialty phone number: [phone]    Is Imaging Needed: no  Imaging Phone Number to provide to patient: n/a    Additional Notes: schedule appointment with JERAD to review XR from 12/26 (NOT WITH BEV REY)

## 2024-12-23 NOTE — TELEPHONE ENCOUNTER
"Nurse Triage SBAR    Is this a 2nd Level Triage? NO    Situation: Fall down stairs/ Tailbone injury    Background:     -Just had surgery, May 22nd for spinal fusion,     Assessment:   -Hx: S/p Spinal Fusion with Neurosurgery Dr. Vinny Reynaga  --Fell down stairs Landed on 3rd \"concrete\" step landed on \"tailbone\"  -Right side of my body, arm or leg is still tingling/numbing   -Butt Pain 8/10, hurts to lift Right Leg  -Can't cough, talk to loud  -I crawled up the stairs and was helped into bed  -Pt. States now when getting up out of bed to bathroom, it is very difficult to walk and very painful    Protocol Recommended Disposition:   Go to ED Now, Call PCP Now    Recommendation: Care advice reviewed. Patient verbalized understanding and agreed to follow care advice given. Advised to call back with any new signs or symptoms, Patient agreed  -Advised caller to [facility] Neurosurgery team be notified and ask to have them paged at ER             Reason for Disposition   [1] Caller has URGENT question AND [2] triager unable to answer question   Can't walk or very difficult to walk    Additional Information   Negative: Major injury from dangerous force (e.g., fall > 10 feet or 3 meters)   Negative: [1] Major bleeding (e.g., actively dripping or spurting) AND [2] can't be stopped   Negative: Shock suspected (e.g., cold/pale/clammy skin, too weak to stand)   Negative: Difficult to awaken or acting confused (e.g., disoriented, slurred speech)   Negative: [1] SEVERE weakness (i.e., unable to walk or barely able to walk, requires support) AND [2] new-onset or worsening   Negative: [1] Can't stand (bear weight) or walk AND [2] new-onset after fall   Negative: Sounds like a life-threatening emergency to the triager   Negative: Fainted (passed out)   Negative: New-onset or worsening weakness of the face, arm or leg on one side of the body   Negative: [1] New-onset or worsening dizziness AND [2] described as spinning or off balance (i.e., " vertigo)   Negative: [1] New-onset or worsening dizziness AND [2] NO spinning sensation or trouble with balance   Negative: New-onset or worsening pale skin (pallor)   Negative: Pregnant and fall   Negative: Patient has a concerning injury to a specific part of the body (e.g., chest, leg, head)   Negative: Patient has a wound (abrasion, cut, puncture, other skin injury or tear)   Negative: Injury (or injuries) that need emergency care   Negative: Sounds like a serious injury to the triager   Negative: [1] Muscle pain AND [2] dark (cola colored) or red-colored urine   Negative: [1] Unable to get up until help (e.g., caregiver, family, friend) arrived AND [2] on the ground 1 hour or more   Negative: Patient sounds very sick or weak to the triager   Negative: [1] MODERATE weakness (i.e., interferes with work, school, normal activities) AND [2] new-onset or worsening   Negative: [1] Fever > 101 F (38.3 C) AND [2] age > 60 years   Negative: [1] Fever > 100.0 F (37.8 C) AND [2] bedridden (e.g., CVA, chronic illness, recovering from surgery)   Negative: [1] Fever > 100.0 F (37.8 C) AND [2] diabetes mellitus or weak immune system (e.g., HIV positive, cancer chemo, splenectomy, organ transplant, chronic steroids)   Negative: Suspicious history for the fall   Negative: Dangerous mechanism of injury (e.g., fall > 10 feet or 3 meters, trampoline)(Exception: Pain began > 1 hour after injury.)   Negative: Sounds like a life-threatening emergency to the triager   Negative: Injury to back above tailbone   Negative: Wound looks infected    Protocols used: Falls and Ghmoffv-U-FX, Tailbone Injury-A-  Kinga Kelly RN, Triage Nurse Advisor, 12/22/2024 7:16 PM

## 2024-12-24 DIAGNOSIS — Z98.1 S/P LUMBAR SPINAL FUSION: ICD-10-CM

## 2024-12-24 RX ORDER — OXYCODONE HYDROCHLORIDE 5 MG/1
5 TABLET ORAL EVERY 8 HOURS PRN
Qty: 6 TABLET | Refills: 0 | Status: SHIPPED | OUTPATIENT
Start: 2024-12-24

## 2024-12-24 NOTE — TELEPHONE ENCOUNTER
"To Dr. Baltazar-    Patient fell on Sunday 7 pm, she is post op from spinal fusion. Patient called neurosurgery requesting pain medication until she can be seen by them on 12/26/24. Neurosurgery advised her to connect with her PCP for request.     \"I don't want to go to ER, I just want to get through Xmas\"    Please review and advise, medication and pharmacy pended if appropriate.      Kathy Perez RN      Patient would like a call back  "

## 2024-12-26 ENCOUNTER — PATIENT OUTREACH (OUTPATIENT)
Dept: CARE COORDINATION | Facility: CLINIC | Age: 54
End: 2024-12-26

## 2024-12-26 NOTE — TELEPHONE ENCOUNTER
Called and left general message for pt informing her that her medication request has been approved.   Advised her to return call to the clinic with further questions.     Thank you,  Mellissa Ovalles RN

## 2025-01-03 ENCOUNTER — HOSPITAL ENCOUNTER (OUTPATIENT)
Dept: GENERAL RADIOLOGY | Facility: CLINIC | Age: 55
Discharge: HOME | End: 2025-01-03
Payer: COMMERCIAL

## 2025-01-03 DIAGNOSIS — M53.3 COCCYGEAL PAIN: ICD-10-CM

## 2025-01-03 PROCEDURE — 72170 X-RAY EXAM OF PELVIS: CPT

## 2025-01-13 PROBLEM — Z98.1 S/P LUMBAR SPINAL FUSION: Status: RESOLVED | Noted: 2024-05-22 | Resolved: 2025-01-13

## 2025-01-28 DIAGNOSIS — Z98.1 S/P LUMBAR SPINAL FUSION: ICD-10-CM

## 2025-01-28 RX ORDER — METHOCARBAMOL 750 MG/1
750 TABLET, FILM COATED ORAL 3 TIMES DAILY PRN
Qty: 40 TABLET | Refills: 1 | Status: SHIPPED | OUTPATIENT
Start: 2025-01-28

## 2025-01-29 ENCOUNTER — TELEPHONE (OUTPATIENT)
Dept: FAMILY MEDICINE | Facility: CLINIC | Age: 55
End: 2025-01-29
Payer: COMMERCIAL

## 2025-01-29 NOTE — TELEPHONE ENCOUNTER
Prior Authorization Retail Medication Request    Medication/Dose: Ozempic 8 mg/3mL  Diagnosis and ICD code (if different than what is on RX):  E11.9  New/renewal/insurance change PA/secondary ins. PA:  Previously Tried and Failed:  None  Rationale:  Patient stable on medication and has improvement in A1C while on medication    Insurance   Primary: [employer]  Insurance ID:  [id number]    Secondary (if applicable):[employer] [place]  Insurance ID:  [id number]    Pharmacy Information (if different than what is on RX)  Name:  [facility]  Phone:  [phone]  Fax:[phone]    Clinic Information  Preferred routing pool for dept communication: [facility] Pool ([id number])

## 2025-02-02 NOTE — TELEPHONE ENCOUNTER
PA Initiation    Medication: OZEMPIC (2 MG/DOSE) 8 MG/3ML SC SOPN  Insurance Company: [employer] ([facility]) - Phone [phone] Fax [phone]  Pharmacy Filling the Rx: [place], [place] - [address] [place] [address]  Filling Pharmacy Phone: [phone]  Filling Pharmacy Fax: [phone]  Start Date: 2/2/2025

## 2025-02-03 DIAGNOSIS — E11.9 TYPE 2 DIABETES MELLITUS WITHOUT COMPLICATION, WITHOUT LONG-TERM CURRENT USE OF INSULIN (H): ICD-10-CM

## 2025-02-03 DIAGNOSIS — E66.01 MORBID OBESITY (H): ICD-10-CM

## 2025-02-03 RX ORDER — SEMAGLUTIDE 2.68 MG/ML
2 INJECTION, SOLUTION SUBCUTANEOUS
Qty: 24 ML | Refills: 0 | Status: SHIPPED | OUTPATIENT
Start: 2025-02-03

## 2025-02-03 NOTE — TELEPHONE ENCOUNTER
Prior Authorization Approval    Medication: OZEMPIC (2 MG/DOSE) 8 MG/3ML SC SOPN  Authorization Effective Date: 2/2/2025  Authorization Expiration Date: 2/2/2026  Approved Dose/Quantity:   Reference #:     Insurance Company: [employer] ([facility]) - Phone [phone] Fax [phone]  Expected CoPay: $    CoPay Card Available:      Financial Assistance Needed:   Which Pharmacy is filling the prescription: [facility] [address]  Pharmacy Notified: YES  Patient Notified: **Instructed pharmacy to notify patient when script is ready to /ship.**

## 2025-02-12 DIAGNOSIS — F51.01 PRIMARY INSOMNIA: ICD-10-CM

## 2025-02-12 RX ORDER — TRAZODONE HYDROCHLORIDE 150 MG/1
300 TABLET ORAL AT BEDTIME
Qty: 180 TABLET | Refills: 3 | Status: SHIPPED | OUTPATIENT
Start: 2025-02-12

## 2025-02-18 ENCOUNTER — TELEPHONE (OUTPATIENT)
Dept: NEUROSURGERY | Facility: CLINIC | Age: 55
End: 2025-02-18
Payer: COMMERCIAL

## 2025-02-18 NOTE — TELEPHONE ENCOUNTER
Attempted to reach patient to remind them about appointment scheduled with Frances Anton NP on 2/19/25 in our [place] clinic.    A voicemail was left with a call back number if the patient has questions or would like to reschedule.

## 2025-02-19 ENCOUNTER — OFFICE VISIT (OUTPATIENT)
Dept: NEUROSURGERY | Facility: CLINIC | Age: 55
End: 2025-02-19
Payer: COMMERCIAL

## 2025-02-19 ENCOUNTER — HOSPITAL ENCOUNTER (OUTPATIENT)
Dept: GENERAL RADIOLOGY | Facility: CLINIC | Age: 55
Discharge: HOME | End: 2025-02-19
Attending: NEUROLOGICAL SURGERY
Payer: COMMERCIAL

## 2025-02-19 VITALS — HEART RATE: 81 BPM | SYSTOLIC BLOOD PRESSURE: 140 MMHG | OXYGEN SATURATION: 98 % | DIASTOLIC BLOOD PRESSURE: 84 MMHG

## 2025-02-19 DIAGNOSIS — Z98.1 S/P LUMBAR SPINAL FUSION: ICD-10-CM

## 2025-02-19 DIAGNOSIS — Z98.1 S/P LUMBAR SPINAL FUSION: Primary | ICD-10-CM

## 2025-02-19 DIAGNOSIS — S32.2XXA CLOSED FRACTURE OF COCCYX, INITIAL ENCOUNTER (H): ICD-10-CM

## 2025-02-19 PROCEDURE — 72100 X-RAY EXAM L-S SPINE 2/3 VWS: CPT

## 2025-02-19 PROCEDURE — 99213 OFFICE O/P EST LOW 20 MIN: CPT | Performed by: NURSE PRACTITIONER

## 2025-02-19 ASSESSMENT — PAIN SCALES - GENERAL: PAINLEVEL_OUTOF10: MODERATE PAIN (6)

## 2025-02-19 NOTE — LETTER
2/19/2025      Lima Bueno  [id number]  [place] [address]      Dear Colleague,    Thank you for referring your patient, Lima Bueno, to the [facility] NEUROLOGY CLINICS [facility]. Please see a copy of my visit note below.    [facility] Neurosurgery Follow-Up:     HPI: 9 months s/p L5-S1 anterior lumbar interbody fusion and L5-S1 robotic posterior spinal fusion with right decompression on 5/22/2024 with Dr. Reynaga. Patient is doing well. She reports feeling 95% better compared to pre op. She has intermittent low back soreness, pre op right leg pain has improved. At last clinic visit, she was diagnosed with a coccyx fracture after a fall. She reports coccyx pain continues to improve as well. Denies any incision concerns. Denies any new or worsening symptoms. Patient has been following activity restrictions. She would like to resume post op PT.     Exam:  Constitutional:  Alert, well nourished, NAD.  HEENT: Normocephalic, atraumatic.   Pulm:  Without shortness of breath   CV:  No pitting edema of BLE.      BP (!) 140/84   Pulse 81   LMP 06/06/2019 (Exact Date)   SpO2 98%     Neurological:  Awake  Alert  Oriented x 3  Motor exam:  Hip Flexor:                Right: 5/5  Left:  5/5  Quadriceps:              Right:  5/5  Left:  5/5  Hamstrings:              Right:  5/5  Left:  5/5  Gastroc Soleus:        Right:  5/5  Left:  5/5  Tib/Ant:                      Right:  5/5  Left:  5/5  EHL:                          Right:  5/5  Left:  5/5      Able to spontaneously move BLE  Sensation intact throughout BLE  Normal gait     Incisions: Well healed     Imaging: AP and lateral views reveal stable and intact hardware     Assessment:  9 months s/p L5-S1 anterior lumbar interbody fusion and L5-S1 robotic posterior spinal fusion with right decompression on 5/22/2024 with Dr. Reynaga. 8 weeks s/p coccyx fracture. Patient is doing well. Reports intermittent low back soreness. Pre op right leg  pain improved. Coccyx pain continues to improve as well. Post op xray reviewed with patient today. Patient would like to resume post op PT.     Plan:   -Continue with routine post op care plan   -Okay to continue with PT as tolerated   -Use pillows/cushions while sitting to help with coccyx pain   -Follow up in 3 months with xray prior to appt    -Call our clinic for any incisional concerns, increased pain, new symptoms, or any other post operative questions or concerns    Patient voiced understanding and agreement.      Frances Anton CNP  [facility] Neurosurgery  Tel [phone]  Pager [phone]      Again, thank you for allowing me to participate in the care of your patient.        Sincerely,        Frances Anton NP    Electronically signed

## 2025-02-19 NOTE — NURSING NOTE
"Lima Bueno is a 54 year old female who presents for:  Chief Complaint   Patient presents with    RECHECK     S/P lumbar spinal fusion DOS 5-22-24        Initial Vitals:  BP (!) 140/84   Pulse 81   LMP 06/06/2019 (Exact Date)   SpO2 98%  Estimated body mass index is 36.61 kg/m  as calculated from the following:    Height as of 6/5/24: 5' 5\" (1.651 m).    Weight as of 10/21/24: 220 lb (99.8 kg).. There is no height or weight on file to calculate BSA. BP completed using cuff size: large  Moderate Pain (6)    Nursing Comments:       Lisa Campbell    "

## 2025-02-19 NOTE — PROGRESS NOTES
[facility] Neurosurgery Follow-Up:     HPI: 9 months s/p L5-S1 anterior lumbar interbody fusion and L5-S1 robotic posterior spinal fusion with right decompression on 5/22/2024 with Dr. Reynaga. Patient is doing well. She reports feeling 95% better compared to pre op. She has intermittent low back soreness, pre op right leg pain has improved. At last clinic visit, she was diagnosed with a coccyx fracture after a fall. She reports coccyx pain continues to improve as well. Denies any incision concerns. Denies any new or worsening symptoms. Patient has been following activity restrictions. She would like to resume post op PT.     Exam:  Constitutional:  Alert, well nourished, NAD.  HEENT: Normocephalic, atraumatic.   Pulm:  Without shortness of breath   CV:  No pitting edema of BLE.      BP (!) 140/84   Pulse 81   LMP 06/06/2019 (Exact Date)   SpO2 98%     Neurological:  Awake  Alert  Oriented x 3  Motor exam:  Hip Flexor:                Right: 5/5  Left:  5/5  Quadriceps:              Right:  5/5  Left:  5/5  Hamstrings:              Right:  5/5  Left:  5/5  Gastroc Soleus:        Right:  5/5  Left:  5/5  Tib/Ant:                      Right:  5/5  Left:  5/5  EHL:                          Right:  5/5  Left:  5/5      Able to spontaneously move BLE  Sensation intact throughout BLE  Normal gait     Incisions: Well healed     Imaging: AP and lateral views reveal stable and intact hardware     Assessment:  9 months s/p L5-S1 anterior lumbar interbody fusion and L5-S1 robotic posterior spinal fusion with right decompression on 5/22/2024 with Dr. Reynaga. 8 weeks s/p coccyx fracture. Patient is doing well. Reports intermittent low back soreness. Pre op right leg pain improved. Coccyx pain continues to improve as well. Post op xray reviewed with patient today. Patient would like to resume post op PT.     Plan:   -Continue with routine post op care plan   -Okay to continue with PT as tolerated   -Use pillows/cushions while  sitting to help with coccyx pain   -Follow up in 3 months with xray prior to appt    -Call our clinic for any incisional concerns, increased pain, new symptoms, or any other post operative questions or concerns    Patient voiced understanding and agreement.      Frances Anton [facility] Neurosurgery  Tel [phone]  Pager [phone]

## 2025-04-12 ENCOUNTER — HEALTH MAINTENANCE LETTER (OUTPATIENT)
Age: 55
End: 2025-04-12

## 2025-05-30 ENCOUNTER — HOSPITAL ENCOUNTER (OUTPATIENT)
Dept: GENERAL RADIOLOGY | Facility: CLINIC | Age: 55
Discharge: HOME | End: 2025-05-30
Attending: NURSE PRACTITIONER | Admitting: NURSE PRACTITIONER
Payer: COMMERCIAL

## 2025-05-30 DIAGNOSIS — Z98.1 S/P LUMBAR SPINAL FUSION: ICD-10-CM

## 2025-05-30 PROCEDURE — 72100 X-RAY EXAM L-S SPINE 2/3 VWS: CPT

## 2025-05-31 DIAGNOSIS — E78.5 HYPERLIPIDEMIA LDL GOAL <100: ICD-10-CM

## 2025-06-02 RX ORDER — ATORVASTATIN CALCIUM 20 MG/1
20 TABLET, FILM COATED ORAL
Qty: 90 TABLET | Refills: 0 | Status: SHIPPED | OUTPATIENT
Start: 2025-06-02

## 2025-06-02 NOTE — TELEPHONE ENCOUNTER
Prescription approved per [facility] Refill Protocol.  Sonja Pelaez RN  [facility]

## 2025-06-03 ENCOUNTER — RESULTS FOLLOW-UP (OUTPATIENT)
Dept: NEUROLOGY | Facility: CLINIC | Age: 55
End: 2025-06-03

## 2025-06-09 ENCOUNTER — OFFICE VISIT (OUTPATIENT)
Dept: NEUROSURGERY | Facility: CLINIC | Age: 55
End: 2025-06-09
Payer: COMMERCIAL

## 2025-06-09 VITALS — DIASTOLIC BLOOD PRESSURE: 84 MMHG | HEART RATE: 76 BPM | SYSTOLIC BLOOD PRESSURE: 129 MMHG | OXYGEN SATURATION: 97 %

## 2025-06-09 DIAGNOSIS — Z98.1 S/P LUMBAR SPINAL FUSION: Primary | ICD-10-CM

## 2025-06-09 PROCEDURE — 3074F SYST BP LT 130 MM HG: CPT | Performed by: NURSE PRACTITIONER

## 2025-06-09 PROCEDURE — 1125F AMNT PAIN NOTED PAIN PRSNT: CPT | Performed by: NURSE PRACTITIONER

## 2025-06-09 PROCEDURE — 3079F DIAST BP 80-89 MM HG: CPT | Performed by: NURSE PRACTITIONER

## 2025-06-09 PROCEDURE — 99213 OFFICE O/P EST LOW 20 MIN: CPT | Performed by: NURSE PRACTITIONER

## 2025-06-09 ASSESSMENT — PAIN SCALES - GENERAL: PAINLEVEL_OUTOF10: MILD PAIN (1)

## 2025-06-09 NOTE — NURSING NOTE
"Lima Bueno is a 54 year old female who presents for:  Chief Complaint   Patient presents with    RECHECK      S/P lumbar spinal fusion review lumbar images        Initial Vitals:  /84   Pulse 76   LMP 06/06/2019 (Exact Date)   SpO2 97%  Estimated body mass index is 36.61 kg/m  as calculated from the following:    Height as of 6/5/24: 5' 5\" (1.651 m).    Weight as of 10/21/24: 220 lb (99.8 kg).. There is no height or weight on file to calculate BSA. BP completed using cuff size: large  Mild Pain (1)    Nursing Comments:       Lisa Campbell    "

## 2025-06-09 NOTE — LETTER
6/9/2025      Lima Bueno  [id number]  [place] [address]      Dear Colleague,    Thank you for referring your patient, Lima Bueno, to the [facility] NEUROLOGY CLINICS [facility]. Please see a copy of my visit note below.    [facility] Neurosurgery Follow-Up:     HPI: 1 year s/p L5-S1 anterior posterior fusion with Dr. Reynaga. Patient is doing well. Reports intermittent muscular low back pain, improves with stretching. Denies  any radicular pain, numbness, tingling, weakness. Denies any incision concerns. She is able to work and complete her daily activities without issue. Overall, patient is happy with her post op recovery.     Current pain management:   Gabapentin  Robaxin as needed at bedtime     Exam:  /84   Pulse 76   LMP 06/06/2019 (Exact Date)   SpO2 97%     Neurological:  Awake  Alert  Oriented x 3  Motor exam:  Hip Flexor:                Right: 5/5  Left:  5/5  Quadriceps:              Right:  5/5  Left:  5/5  Hamstrings:              Right:  5/5  Left:  5/5  Gastroc Soleus:        Right:  5/5  Left:  5/5  Tib/Ant:                      Right:  5/5  Left:  5/5  EHL:                          Right:  5/5  Left:  5/5      Able to spontaneously move BLE  Sensation intact throughout BLE  Normal gait     Incision: Well healed     Imaging: AP and lateral views reveal stable and intact hardware     Assessment:  1 year s/p L5-S1 anterior posterior fusion  Xray reveals stable and intact hardware     Plan:   -Activity as tolerated  -Follow up with our clinic as needed   -Advised patient to call our clinic with any other post operative questions or concerns    Patient voiced understanding and agreement.      Frances Anton CNP  [facility] Neurosurgery  Tel [phone]  Pager [phone]      Again, thank you for allowing me to participate in the care of your patient.        Sincerely,        Frances Anton NP    Electronically signed

## 2025-06-09 NOTE — PROGRESS NOTES
[facility] Neurosurgery Follow-Up:     HPI: 1 year s/p L5-S1 anterior posterior fusion with Dr. Reynaga. Patient is doing well. Reports intermittent muscular low back pain, improves with stretching. Denies  any radicular pain, numbness, tingling, weakness. Denies any incision concerns. She is able to work and complete her daily activities without issue. Overall, patient is happy with her post op recovery.     Current pain management:   Gabapentin  Robaxin as needed at bedtime     Exam:  /84   Pulse 76   LMP 06/06/2019 (Exact Date)   SpO2 97%     Neurological:  Awake  Alert  Oriented x 3  Motor exam:  Hip Flexor:                Right: 5/5  Left:  5/5  Quadriceps:              Right:  5/5  Left:  5/5  Hamstrings:              Right:  5/5  Left:  5/5  Gastroc Soleus:        Right:  5/5  Left:  5/5  Tib/Ant:                      Right:  5/5  Left:  5/5  EHL:                          Right:  5/5  Left:  5/5      Able to spontaneously move BLE  Sensation intact throughout BLE  Normal gait     Incision: Well healed     Imaging: AP and lateral views reveal stable and intact hardware     Assessment:  1 year s/p L5-S1 anterior posterior fusion  Xray reveals stable and intact hardware     Plan:   -Activity as tolerated  -Follow up with our clinic as needed   -Advised patient to call our clinic with any other post operative questions or concerns    Patient voiced understanding and agreement.      Frances Anton CNP  [facility] Neurosurgery  Tel [phone]  Pager [phone]

## 2025-07-03 DIAGNOSIS — I82.4Y1 DEEP VEIN THROMBOSIS (DVT) OF PROXIMAL VEIN OF RIGHT LOWER EXTREMITY, UNSPECIFIED CHRONICITY (H): ICD-10-CM

## 2025-07-03 DIAGNOSIS — I26.99 OTHER PULMONARY EMBOLISM WITHOUT ACUTE COR PULMONALE, UNSPECIFIED CHRONICITY (H): ICD-10-CM

## 2025-07-03 NOTE — TELEPHONE ENCOUNTER
[facility] Call Center    Phone Message    May a detailed message be left on voicemail: yes     Reason for Call: Medication Refill Request    Has the patient contacted the pharmacy for the refill? Yes   Name of medication being requested: rivaroxaban ANTICOAGULANT (XARELTO) 20 MG TABS tablet  Provider who prescribed the medication: Glen Kim CNP   Pharmacy: [facility]   Date medication is needed: 07/03/2025    Patient is out of medication and pharmacy is requesting refill be sent today    Action Taken: Message routed to:  Other: [facility] Neurosurgery    Travel Screening: Not Applicable

## 2025-07-03 NOTE — TELEPHONE ENCOUNTER
Called patient to update neurosurgery does not manage Xarelto. Advised patient to contact PCP. Patient voiced agreement and understanding.

## 2025-07-03 NOTE — TELEPHONE ENCOUNTER
Called pt, no break in medication. Needing refill today as she has now completely run out. Rx and pharmacy pended.      Makenzie Dotson RN on 7/3/2025 at 9:21 AM

## 2025-07-15 DIAGNOSIS — M48.00 SPINAL STENOSIS, UNSPECIFIED SPINAL REGION: ICD-10-CM

## 2025-07-15 DIAGNOSIS — E78.5 HYPERLIPIDEMIA LDL GOAL <100: ICD-10-CM

## 2025-07-15 DIAGNOSIS — K21.9 GASTROESOPHAGEAL REFLUX DISEASE WITHOUT ESOPHAGITIS: ICD-10-CM

## 2025-07-15 DIAGNOSIS — M54.16 RIGHT LUMBAR RADICULOPATHY: ICD-10-CM

## 2025-07-15 DIAGNOSIS — I10 ESSENTIAL HYPERTENSION, BENIGN: ICD-10-CM

## 2025-07-15 RX ORDER — LISINOPRIL 10 MG/1
10 TABLET ORAL DAILY
Qty: 90 TABLET | Refills: 3 | Status: SHIPPED | OUTPATIENT
Start: 2025-07-15

## 2025-07-15 RX ORDER — FAMOTIDINE 20 MG/1
20 TABLET, FILM COATED ORAL DAILY
Qty: 90 TABLET | Refills: 3 | Status: SHIPPED | OUTPATIENT
Start: 2025-07-15

## 2025-07-15 RX ORDER — GABAPENTIN 100 MG/1
CAPSULE ORAL
Qty: 90 CAPSULE | Refills: 1 | Status: SHIPPED | OUTPATIENT
Start: 2025-07-15

## 2025-07-15 RX ORDER — ATORVASTATIN CALCIUM 20 MG/1
20 TABLET, FILM COATED ORAL DAILY
Qty: 90 TABLET | Refills: 3 | Status: SHIPPED | OUTPATIENT
Start: 2025-07-15

## 2025-07-26 ENCOUNTER — HEALTH MAINTENANCE LETTER (OUTPATIENT)
Age: 55
End: 2025-07-26

## 2025-08-16 ENCOUNTER — HEALTH MAINTENANCE LETTER (OUTPATIENT)
Age: 55
End: 2025-08-16

## 2025-08-31 DIAGNOSIS — Z98.1 S/P LUMBAR SPINAL FUSION: ICD-10-CM

## 2025-09-02 RX ORDER — METHOCARBAMOL 750 MG/1
750 TABLET, FILM COATED ORAL 3 TIMES DAILY PRN
Qty: 40 TABLET | Refills: 1 | Status: SHIPPED | OUTPATIENT
Start: 2025-09-02

## (undated) DEVICE — STPL SKIN 35W 6.9MM  PXW35

## (undated) DEVICE — SPONGE COTTONOID 1/2X3" 80-1407

## (undated) DEVICE — PREP DURAPREP 26ML APL 8630

## (undated) DEVICE — GLOVE BIOGEL PI MICRO SZ 7.5 48575

## (undated) DEVICE — DRAPE C-ARMOR 5 SIDED 5523

## (undated) DEVICE — DRSG PREVENA NEGATIVE PRESSURE WND 13CM SGL LF PRE1101US

## (undated) DEVICE — SU VICRYL 3-0 PS-1 18" UND J683

## (undated) DEVICE — ESU ELEC BLADE 2.75" COATED/INSULATED E1455

## (undated) DEVICE — KIT MAZOR X ROBOTIC SPINE DISP KIT0574

## (undated) DEVICE — POSITIONER PT PRONESAFE HEAD REST W/DERMAPROX INSERT 40599

## (undated) DEVICE — DECANTER BAG 2002S

## (undated) DEVICE — DRAPE IOBAN INCISE 23X17" 6650EZ

## (undated) DEVICE — CATH TRAY FOLEY SURESTEP 16FR WDRAIN BAG STLK LATEX A300316A

## (undated) DEVICE — PACK UNIVERSAL SPLIT 29131

## (undated) DEVICE — SU CHROMIC 3-0 SH 27" G122H

## (undated) DEVICE — RX SURGIFLO HEMOSTATIC MATRIX W/THROMBIN 8ML 2994

## (undated) DEVICE — TOOL DISSECT MIDAS MR8 14CM TELESC MATCH 2.5 MR8-T14MH25

## (undated) DEVICE — ESU GROUND PAD UNIVERSAL W/O CORD

## (undated) DEVICE — LINEN TOWEL PACK X5 5464

## (undated) DEVICE — ADH LIQUID MASTISOL TOPICAL VIAL 2-3ML 0523-48

## (undated) DEVICE — MANIFOLD NEPTUNE 4 PORT 700-20

## (undated) DEVICE — SYR 20ML LL W/O NDL 302830

## (undated) DEVICE — DRAPE COVER C-ARM SEAMLESS SNAP-KAP 03-KP26 LATEX FREE

## (undated) DEVICE — SOL WATER IRRIG 1000ML BOTTLE 2F7114

## (undated) DEVICE — SU VICRYL 2-0 CT-1 27" J339H

## (undated) DEVICE — SU SILK 2-0 TIE 24" SA75H

## (undated) DEVICE — TAPE DRSG UNIVERSAL CLOTH 3" WHITE LATEX 881-3

## (undated) DEVICE — CATH TRAY FOLEY COUDE SURESTEP 16FR W/URNE MTR STLK A304716A

## (undated) DEVICE — TUBING SUCTION SOFT 20'X3/16" 0036570

## (undated) DEVICE — NDL SPINAL 18GA 3.5" 405184

## (undated) DEVICE — GLOVE BIOGEL PI MICRO INDICATOR UNDERGLOVE SZ 8.0 48980

## (undated) DEVICE — Device

## (undated) DEVICE — MARKER SPHERES PASSIVE MEDT PACK 5 8801075

## (undated) DEVICE — LIGHT HANDLE X2

## (undated) DEVICE — SPONGE SURGIFOAM 100 1974

## (undated) DEVICE — SU VICRYL 0 CT-1 CR 8X18" J740D

## (undated) DEVICE — SOL NACL 0.9% IRRIG 1000ML BOTTLE 2F7124

## (undated) DEVICE — STPL SKIN 35W ROTATING HEAD PRW35

## (undated) DEVICE — KIT PATIENT JACKSON 1 SPK10118

## (undated) DEVICE — PACK LARGE SPINE SNE15LSFSE

## (undated) DEVICE — DRAPE SHEET REV FOLD 3/4 9349

## (undated) DEVICE — GOWN XLG DISP 9545

## (undated) DEVICE — CANISTER WOUND VAC W/GEL 500ML M8275063/5

## (undated) DEVICE — DRAPE MICROSCOPE LEICA 54X150" AR8033650

## (undated) DEVICE — SU VICRYL 2-0 CT-2 CR 8X18" J726D

## (undated) DEVICE — SUCTION MANIFOLD NEPTUNE SGL

## (undated) DEVICE — DRAPE STERI TOWEL LG 1010

## (undated) RX ORDER — GABAPENTIN 300 MG/1
CAPSULE ORAL
Status: DISPENSED
Start: 2024-05-22

## (undated) RX ORDER — VECURONIUM BROMIDE 1 MG/ML
INJECTION, POWDER, LYOPHILIZED, FOR SOLUTION INTRAVENOUS
Status: DISPENSED
Start: 2024-05-22

## (undated) RX ORDER — EPHEDRINE SULFATE 50 MG/ML
INJECTION, SOLUTION INTRAMUSCULAR; INTRAVENOUS; SUBCUTANEOUS
Status: DISPENSED
Start: 2024-05-22

## (undated) RX ORDER — HYDROMORPHONE HCL IN WATER/PF 6 MG/30 ML
PATIENT CONTROLLED ANALGESIA SYRINGE INTRAVENOUS
Status: DISPENSED
Start: 2024-05-22

## (undated) RX ORDER — DEXAMETHASONE SODIUM PHOSPHATE 4 MG/ML
INJECTION, SOLUTION INTRA-ARTICULAR; INTRALESIONAL; INTRAMUSCULAR; INTRAVENOUS; SOFT TISSUE
Status: DISPENSED
Start: 2024-05-22

## (undated) RX ORDER — FENTANYL CITRATE 50 UG/ML
INJECTION, SOLUTION INTRAMUSCULAR; INTRAVENOUS
Status: DISPENSED
Start: 2020-12-10

## (undated) RX ORDER — FENTANYL CITRATE 0.05 MG/ML
INJECTION, SOLUTION INTRAMUSCULAR; INTRAVENOUS
Status: DISPENSED
Start: 2024-05-22

## (undated) RX ORDER — FENTANYL CITRATE 50 UG/ML
INJECTION, SOLUTION INTRAMUSCULAR; INTRAVENOUS
Status: DISPENSED
Start: 2024-05-22

## (undated) RX ORDER — PROPOFOL 10 MG/ML
INJECTION, EMULSION INTRAVENOUS
Status: DISPENSED
Start: 2024-05-22

## (undated) RX ORDER — HYDROMORPHONE HYDROCHLORIDE 1 MG/ML
INJECTION, SOLUTION INTRAMUSCULAR; INTRAVENOUS; SUBCUTANEOUS
Status: DISPENSED
Start: 2024-05-22